# Patient Record
Sex: MALE | Race: WHITE | NOT HISPANIC OR LATINO | Employment: FULL TIME | URBAN - METROPOLITAN AREA
[De-identification: names, ages, dates, MRNs, and addresses within clinical notes are randomized per-mention and may not be internally consistent; named-entity substitution may affect disease eponyms.]

---
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
## 2017-04-12 ENCOUNTER — APPOINTMENT (OUTPATIENT)
Dept: NEUROLOGY | Facility: MEDICAL CENTER | Age: 52
End: 2017-04-12
Payer: COMMERCIAL

## 2017-08-17 ENCOUNTER — HOSPITAL ENCOUNTER (OUTPATIENT)
Dept: LAB | Facility: MEDICAL CENTER | Age: 52
End: 2017-08-17
Attending: FAMILY MEDICINE
Payer: COMMERCIAL

## 2017-08-17 ENCOUNTER — HOSPITAL ENCOUNTER (OUTPATIENT)
Dept: RADIOLOGY | Facility: MEDICAL CENTER | Age: 52
End: 2017-08-17
Attending: FAMILY MEDICINE
Payer: COMMERCIAL

## 2017-08-17 DIAGNOSIS — R05.9 COUGH: ICD-10-CM

## 2017-08-17 LAB
ABO GROUP BLD: NORMAL
ALBUMIN SERPL BCP-MCNC: 4.4 G/DL (ref 3.2–4.9)
ALBUMIN/GLOB SERPL: 1.5 G/DL
ALP SERPL-CCNC: 57 U/L (ref 30–99)
ALT SERPL-CCNC: <5 U/L (ref 2–50)
ANION GAP SERPL CALC-SCNC: 8 MMOL/L (ref 0–11.9)
AST SERPL-CCNC: 16 U/L (ref 12–45)
BASOPHILS # BLD AUTO: 1.2 % (ref 0–1.8)
BASOPHILS # BLD: 0.08 K/UL (ref 0–0.12)
BILIRUB SERPL-MCNC: 0.7 MG/DL (ref 0.1–1.5)
BUN SERPL-MCNC: 14 MG/DL (ref 8–22)
CALCIUM SERPL-MCNC: 9.5 MG/DL (ref 8.5–10.5)
CHLORIDE SERPL-SCNC: 104 MMOL/L (ref 96–112)
CHOLEST SERPL-MCNC: 166 MG/DL (ref 100–199)
CO2 SERPL-SCNC: 27 MMOL/L (ref 20–33)
CREAT SERPL-MCNC: 0.92 MG/DL (ref 0.5–1.4)
EOSINOPHIL # BLD AUTO: 0.05 K/UL (ref 0–0.51)
EOSINOPHIL NFR BLD: 0.7 % (ref 0–6.9)
ERYTHROCYTE [DISTWIDTH] IN BLOOD BY AUTOMATED COUNT: 39.2 FL (ref 35.9–50)
GFR SERPL CREATININE-BSD FRML MDRD: >60 ML/MIN/1.73 M 2
GLOBULIN SER CALC-MCNC: 3 G/DL (ref 1.9–3.5)
GLUCOSE SERPL-MCNC: 76 MG/DL (ref 65–99)
HCT VFR BLD AUTO: 46.1 % (ref 42–52)
HDLC SERPL-MCNC: 60 MG/DL
HGB BLD-MCNC: 15.8 G/DL (ref 14–18)
IMM GRANULOCYTES # BLD AUTO: 0.01 K/UL (ref 0–0.11)
IMM GRANULOCYTES NFR BLD AUTO: 0.1 % (ref 0–0.9)
LDLC SERPL CALC-MCNC: 99 MG/DL
LYMPHOCYTES # BLD AUTO: 1.35 K/UL (ref 1–4.8)
LYMPHOCYTES NFR BLD: 19.7 % (ref 22–41)
MCH RBC QN AUTO: 30.3 PG (ref 27–33)
MCHC RBC AUTO-ENTMCNC: 34.3 G/DL (ref 33.7–35.3)
MCV RBC AUTO: 88.5 FL (ref 81.4–97.8)
MONOCYTES # BLD AUTO: 0.53 K/UL (ref 0–0.85)
MONOCYTES NFR BLD AUTO: 7.7 % (ref 0–13.4)
NEUTROPHILS # BLD AUTO: 4.84 K/UL (ref 1.82–7.42)
NEUTROPHILS NFR BLD: 70.6 % (ref 44–72)
NRBC # BLD AUTO: 0 K/UL
NRBC BLD AUTO-RTO: 0 /100 WBC
PLATELET # BLD AUTO: 218 K/UL (ref 164–446)
PMV BLD AUTO: 10.2 FL (ref 9–12.9)
POTASSIUM SERPL-SCNC: 4.4 MMOL/L (ref 3.6–5.5)
PROT SERPL-MCNC: 7.4 G/DL (ref 6–8.2)
PSA SERPL-MCNC: 1.45 NG/ML (ref 0–4)
RBC # BLD AUTO: 5.21 M/UL (ref 4.7–6.1)
RH BLD: NORMAL
SODIUM SERPL-SCNC: 139 MMOL/L (ref 135–145)
TRIGL SERPL-MCNC: 34 MG/DL (ref 0–149)
TSH SERPL DL<=0.005 MIU/L-ACNC: 0.91 UIU/ML (ref 0.3–3.7)
WBC # BLD AUTO: 6.9 K/UL (ref 4.8–10.8)

## 2017-08-17 PROCEDURE — 86901 BLOOD TYPING SEROLOGIC RH(D): CPT

## 2017-08-17 PROCEDURE — 84443 ASSAY THYROID STIM HORMONE: CPT

## 2017-08-17 PROCEDURE — 84270 ASSAY OF SEX HORMONE GLOBUL: CPT

## 2017-08-17 PROCEDURE — 85025 COMPLETE CBC W/AUTO DIFF WBC: CPT

## 2017-08-17 PROCEDURE — 71020 DX-CHEST-2 VIEWS: CPT

## 2017-08-17 PROCEDURE — 84402 ASSAY OF FREE TESTOSTERONE: CPT

## 2017-08-17 PROCEDURE — 36415 COLL VENOUS BLD VENIPUNCTURE: CPT

## 2017-08-17 PROCEDURE — 84153 ASSAY OF PSA TOTAL: CPT

## 2017-08-17 PROCEDURE — 80053 COMPREHEN METABOLIC PANEL: CPT

## 2017-08-17 PROCEDURE — 86900 BLOOD TYPING SEROLOGIC ABO: CPT

## 2017-08-17 PROCEDURE — 80061 LIPID PANEL: CPT

## 2017-08-17 PROCEDURE — 84403 ASSAY OF TOTAL TESTOSTERONE: CPT

## 2017-08-19 LAB
SHBG SERPL-SCNC: 46 NMOL/L (ref 11–80)
TESTOST FREE MFR SERPL: 1.5 % (ref 1.6–2.9)
TESTOST FREE SERPL-MCNC: 73 PG/ML (ref 47–244)
TESTOST SERPL-MCNC: 470 NG/DL (ref 300–890)

## 2017-11-16 ENCOUNTER — APPOINTMENT (OUTPATIENT)
Dept: ADMISSIONS | Facility: MEDICAL CENTER | Age: 52
End: 2017-11-16
Attending: SURGERY
Payer: COMMERCIAL

## 2017-11-16 RX ORDER — RASAGILINE 1 MG/1
1 TABLET ORAL DAILY
COMMUNITY
End: 2021-06-02

## 2017-11-22 ENCOUNTER — HOSPITAL ENCOUNTER (OUTPATIENT)
Facility: MEDICAL CENTER | Age: 52
End: 2017-11-22
Attending: SURGERY | Admitting: SURGERY
Payer: COMMERCIAL

## 2017-11-22 VITALS
OXYGEN SATURATION: 98 % | HEART RATE: 81 BPM | WEIGHT: 125 LBS | SYSTOLIC BLOOD PRESSURE: 133 MMHG | RESPIRATION RATE: 18 BRPM | BODY MASS INDEX: 18.94 KG/M2 | DIASTOLIC BLOOD PRESSURE: 68 MMHG | TEMPERATURE: 98.1 F | HEIGHT: 68 IN

## 2017-11-22 PROCEDURE — 700101 HCHG RX REV CODE 250

## 2017-11-22 PROCEDURE — C1781 MESH (IMPLANTABLE): HCPCS | Performed by: SURGERY

## 2017-11-22 PROCEDURE — 160029 HCHG SURGERY MINUTES - 1ST 30 MINS LEVEL 4: Performed by: SURGERY

## 2017-11-22 PROCEDURE — 160002 HCHG RECOVERY MINUTES (STAT): Performed by: SURGERY

## 2017-11-22 PROCEDURE — 160041 HCHG SURGERY MINUTES - EA ADDL 1 MIN LEVEL 4: Performed by: SURGERY

## 2017-11-22 PROCEDURE — 160025 RECOVERY II MINUTES (STATS): Performed by: SURGERY

## 2017-11-22 PROCEDURE — 501838 HCHG SUTURE GENERAL: Performed by: SURGERY

## 2017-11-22 PROCEDURE — 700111 HCHG RX REV CODE 636 W/ 250 OVERRIDE (IP)

## 2017-11-22 PROCEDURE — 160046 HCHG PACU - 1ST 60 MINS PHASE II: Performed by: SURGERY

## 2017-11-22 PROCEDURE — 502714 HCHG ROBOTIC SURGERY SERVICES: Performed by: SURGERY

## 2017-11-22 PROCEDURE — 160035 HCHG PACU - 1ST 60 MINS PHASE I: Performed by: SURGERY

## 2017-11-22 PROCEDURE — A9270 NON-COVERED ITEM OR SERVICE: HCPCS

## 2017-11-22 PROCEDURE — 160036 HCHG PACU - EA ADDL 30 MINS PHASE I: Performed by: SURGERY

## 2017-11-22 PROCEDURE — 700102 HCHG RX REV CODE 250 W/ 637 OVERRIDE(OP)

## 2017-11-22 PROCEDURE — 160009 HCHG ANES TIME/MIN: Performed by: SURGERY

## 2017-11-22 PROCEDURE — 500868 HCHG NEEDLE, SURGI(VARES): Performed by: SURGERY

## 2017-11-22 PROCEDURE — 160047 HCHG PACU  - EA ADDL 30 MINS PHASE II: Performed by: SURGERY

## 2017-11-22 PROCEDURE — 160048 HCHG OR STATISTICAL LEVEL 1-5: Performed by: SURGERY

## 2017-11-22 DEVICE — MESH PROGRIP LAPROSCOPIC SELF FIXATING (1/CA): Type: IMPLANTABLE DEVICE | Status: FUNCTIONAL

## 2017-11-22 RX ORDER — LIDOCAINE AND PRILOCAINE 25; 25 MG/G; MG/G
1 CREAM TOPICAL
Status: DISCONTINUED | OUTPATIENT
Start: 2017-11-22 | End: 2017-11-22 | Stop reason: HOSPADM

## 2017-11-22 RX ORDER — LIDOCAINE HYDROCHLORIDE 10 MG/ML
INJECTION, SOLUTION INFILTRATION; PERINEURAL
Status: DISCONTINUED
Start: 2017-11-22 | End: 2017-11-22 | Stop reason: HOSPADM

## 2017-11-22 RX ORDER — OXYCODONE HCL 5 MG/5 ML
SOLUTION, ORAL ORAL
Status: COMPLETED
Start: 2017-11-22 | End: 2017-11-22

## 2017-11-22 RX ORDER — SODIUM CHLORIDE, SODIUM LACTATE, POTASSIUM CHLORIDE, CALCIUM CHLORIDE 600; 310; 30; 20 MG/100ML; MG/100ML; MG/100ML; MG/100ML
INJECTION, SOLUTION INTRAVENOUS CONTINUOUS
Status: DISCONTINUED | OUTPATIENT
Start: 2017-11-22 | End: 2017-11-22 | Stop reason: HOSPADM

## 2017-11-22 RX ORDER — LIDOCAINE HYDROCHLORIDE 10 MG/ML
0.5 INJECTION, SOLUTION INFILTRATION; PERINEURAL
Status: DISCONTINUED | OUTPATIENT
Start: 2017-11-22 | End: 2017-11-22 | Stop reason: HOSPADM

## 2017-11-22 RX ORDER — BUPIVACAINE HYDROCHLORIDE AND EPINEPHRINE 5; 5 MG/ML; UG/ML
INJECTION, SOLUTION EPIDURAL; INTRACAUDAL; PERINEURAL
Status: DISCONTINUED | OUTPATIENT
Start: 2017-11-22 | End: 2017-11-22 | Stop reason: HOSPADM

## 2017-11-22 RX ORDER — LIDOCAINE HYDROCHLORIDE 10 MG/ML
INJECTION, SOLUTION INFILTRATION; PERINEURAL
Status: COMPLETED
Start: 2017-11-22 | End: 2017-11-22

## 2017-11-22 RX ADMIN — LIDOCAINE HYDROCHLORIDE 0.5 ML: 10 INJECTION, SOLUTION INFILTRATION; PERINEURAL at 12:15

## 2017-11-22 RX ADMIN — SODIUM CHLORIDE, SODIUM LACTATE, POTASSIUM CHLORIDE, CALCIUM CHLORIDE: 600; 310; 30; 20 INJECTION, SOLUTION INTRAVENOUS at 12:15

## 2017-11-22 RX ADMIN — OXYCODONE HYDROCHLORIDE 5 MG: 5 SOLUTION ORAL at 14:41

## 2017-11-22 RX ADMIN — OXYCODONE HYDROCHLORIDE 5 MG: 5 SOLUTION ORAL at 14:30

## 2017-11-22 ASSESSMENT — PAIN SCALES - GENERAL
PAINLEVEL_OUTOF10: 4
PAINLEVEL_OUTOF10: 0
PAINLEVEL_OUTOF10: 4
PAINLEVEL_OUTOF10: 7
PAINLEVEL_OUTOF10: 3
PAINLEVEL_OUTOF10: 3
PAINLEVEL_OUTOF10: 7
PAINLEVEL_OUTOF10: 0
PAINLEVEL_OUTOF10: 3

## 2017-11-22 NOTE — OP REPORT
DATE OF SERVICE:  11/22/2017    OPERATING SURGEON:  Zackary Rosa MD.    ASSISTANT:  Kevin Mesa MD.    PROCEDURE:  Robotic repair of bilateral inguinal hernias with mesh.    ANESTHESIA:  General.    ESTIMATED BLOOD LOSS:  Less than 5 mL.    PREOPERATIVE DIAGNOSIS:  Bilateral inguinal hernias.    POSTOPERATIVE DIAGNOSIS:  Bilateral direct hernias.    SUMMARY:  This 52-year-old male has discomfort in the groin and is noted to   have bilateral inguinal hernias.  At this time, he is scheduled for elective   robotic repair.    DESCRIPTION OF PROCEDURE:  The patient was taken to the operating room and   satisfactory general anesthesia was induced via endotracheal intubation.    Juarez catheter was placed in the bladder.  Patient was shaved, prepped and   draped in appropriate fashion.  Local was instilled USP between the   umbilicus and the pubis and a small midline incision made.  A Veress needle   was used for insufflation and a 12 mm port was inserted here and under direct   vision with the laparoscope an 8 mm port on each flank.    The patient was placed in Trendelenburg position and the robot docked.    Peritoneal flaps were developed bilaterally demonstrating direct defects on   both sides with no indirect hernias seen.    A ProGrip mesh was then placed on each side and positioned appropriately with   the flaps closed with a running Quill suture.    The robot was then undocked and the needle was retrieved.    The CO2 was let out of the abdominal cavity and the fascial punctures were   closed with 0 Vicryl.  The skin was closed with 4-0 Vicryl subcuticular skin   stitches and Dermabond.  Wounds were dressed and the patient sent to recovery   room in good condition.  No specimens were sent to pathology.       ____________________________________     ZACKARY ROSA MD    TER / NTS    DD:  11/22/2017 14:02:13  DT:  11/22/2017 14:24:01    D#:  [id number]  Job#:  [id number]    cc: Kevin Mesa MD

## 2017-11-22 NOTE — OR NURSING
Pt resting comfortably, VSS, states minimal pain; incision sites CDI to abdomen. Pt saturating above 94% on RA. Pt tolerating sips of ginger ale. Discharge instructions explained to pt and pt's wife. No further needs at this time; WCTM.     Pt states he is not ready to get dressed, remains stable, VSS, pain remains minimal.     Pt up to BR and voided adequately.     Pt tremulous, states it is due to his Parkinson's disease, states it is baseline. Pt up and getting dressed.     Pt states he is ready for d/c.     Pt states he is feeling a little lightheaded, ginger ale and saltine crackers provided.     Pt D/C safely to home.

## 2017-11-22 NOTE — OR SURGEON
Immediate Post OP Note    PreOp Diagnosis: BIH    PostOp Diagnosis:   Same--direct    Procedure(s):  INGUINAL HERNIA REPAIR ROBOTIC/ WITH MESH PLACEMENT - Wound Class: Clean  Bilateral  Surgeon(s):  JOSE MANUEL Stallworth M.D.    Anesthesiologist/Type of Anesthesia:  Anesthesiologist: Rigoberto Hernandez M.D./General    Surgical Staff:  Circulator: Kevin Reyes R.N.; Nadya Bellamy R.N.  Scrub Person: Pretty Ham; Kam Hercules    Specimens:  * No specimens in log *    Estimated Blood Loss:       Findings:       Complications:           11/22/2017 2:04 PM Zackary Ponce

## 2017-11-22 NOTE — DISCHARGE INSTRUCTIONS
ACTIVITY: Rest and take it easy for the first 24 hours.  A responsible adult is recommended to remain with you during that time.  It is normal to feel sleepy.  We encourage you to not do anything that requires balance, judgment or coordination.    MILD FLU-LIKE SYMPTOMS ARE NORMAL. YOU MAY EXPERIENCE GENERALIZED MUSCLE ACHES, THROAT IRRITATION, HEADACHE AND/OR SOME NAUSEA.    FOR 24 HOURS DO NOT:  Drive, operate machinery or run household appliances.  Drink beer or alcoholic beverages.   Make important decisions or sign legal documents.    SPECIAL INSTRUCTIONS: Open Hernia Repair, Care After  Refer to this sheet in the next few weeks. These instructions provide you with information on caring for yourself after your procedure. Your health care provider may also give you more specific instructions. Your treatment has been planned according to current medical practices, but problems sometimes occur. Call your health care provider if you have any problems or questions after your procedure.  WHAT TO EXPECT AFTER THE PROCEDURE  After your procedure, it is typical to have the following:  · Pain in your abdomen, especially along your incision. You will be given pain medicines to control the pain.  · Constipation. You may be given a stool softener to help prevent this.  HOME CARE INSTRUCTIONS  · Only take over-the-counter or prescription medicines as directed by your health care provider.  · Keep the incision area dry and clean. You may wash the incision area gently with soap and water 48 hours after surgery. Gently blot or dab the incision area dry. Do not take baths, use swimming pools, or use hot tubs for 10 days or until your health care provider approves.  · Change bandages (dressings) as directed by your health care provider.  · Continue your normal diet as directed by your health care provider. Eat plenty of fruits and vegetables to help prevent constipation.  · Drink enough fluids to keep your urine clear or pale  yellow. This also helps prevent constipation.  · Do not drive until your health care provider says it is okay.  · Do not lift anything heavier than 10 lb (4.5 kg) or play contact sports for 4 weeks or until your health care provider approves.  · Follow up with your health care provider as directed. Ask your health care provider when to make an appointment to have your stitches (sutures) or staples removed.  SEEK MEDICAL CARE IF:  · You have increased bleeding coming from the incision site.  · You have blood in your stool.  · You have increasing pain in the incision area.  · You see redness or swelling in the incision area.  · You have fluid (pus) coming from the incision.  · You have a fever.  · You notice a bad smell coming from the incision area or dressing.  SEEK IMMEDIATE MEDICAL CARE IF:  · You develop a rash.  · You have chest pain or shortness of breath.  · You feel lightheaded or feel faint.     This information is not intended to replace advice given to you by your health care provider. Make sure you discuss any questions you have with your health care provider.     Document Released: 07/07/2006 Document Revised: 01/08/2016 Document Reviewed: 07/30/2014  [employer] Interactive Patient Education ©2016 [employer]      DIET: To avoid nausea, slowly advance diet as tolerated, avoiding spicy or greasy foods for the first day.  Add more substantial food to your diet according to your physician's instructions.  Babies can be fed formula or breast milk as soon as they are hungry.  INCREASE FLUIDS AND FIBER TO AVOID CONSTIPATION.    SURGICAL DRESSING/BATHING: Follow MD instruction    FOLLOW-UP APPOINTMENT:  A follow-up appointment should be arranged with your doctor in 1-2 weeks; call to schedule.    You should CALL YOUR PHYSICIAN if you develop:  Fever greater than 101 degrees F.  Pain not relieved by medication, or persistent nausea or vomiting.  Excessive bleeding (blood soaking through dressing) or unexpected  drainage from the wound.  Extreme redness or swelling around the incision site, drainage of pus or foul smelling drainage.  Inability to urinate or empty your bladder within 8 hours.  Problems with breathing or chest pain.    You should call 911 if you develop problems with breathing or chest pain.  If you are unable to contact your doctor or surgical center, you should go to the nearest emergency room or urgent care center.  Physician's telephone #: Dr. Ponce [phone]    If any questions arise, call your doctor.  If your doctor is not available, please feel free to call the Surgical Center at [phone].  The Center is open Monday through Friday from 7AM to 7PM.  You can also call the [employer] HOTLINE open 24 hours/day, 7 days/week and speak to a nurse at [phone], or toll free at [phone].    A registered nurse may call you a few days after your surgery to see how you are doing after your procedure.    MEDICATIONS: Resume taking daily medication.  Take prescribed pain medication with food.  If no medication is prescribed, you may take non-aspirin pain medication if needed.  PAIN MEDICATION CAN BE VERY CONSTIPATING.  Take a stool softener or laxative such as senokot, pericolace, or milk of magnesia if needed.    Prescription given for Norco.  Last pain medication given at 2:41 pm.    If your physician has prescribed pain medication that includes Acetaminophen (Tylenol), do not take additional Acetaminophen (Tylenol) while taking the prescribed medication.    Depression / Suicide Risk    As you are discharged from this [facility] facility, it is important to learn how to keep safe from harming yourself.    Recognize the warning signs:  · Abrupt changes in personality, positive or negative- including increase in energy   · Giving away possessions  · Change in eating patterns- significant weight changes-  positive or negative  · Change in sleeping patterns- unable to sleep or sleeping all the  time   · Unwillingness or inability to communicate  · Depression  · Unusual sadness, discouragement and loneliness  · Talk of wanting to die  · Neglect of personal appearance   · Rebelliousness- reckless behavior  · Withdrawal from people/activities they love  · Confusion- inability to concentrate     If you or a loved one observes any of these behaviors or has concerns about self-harm, here's what you can do:  · Talk about it- your feelings and reasons for harming yourself  · Remove any means that you might use to hurt yourself (examples: pills, rope, extension cords, firearm)  · Get professional help from the community (Mental Health, Substance Abuse, psychological counseling)  · Do not be alone:Call your Safe Contact- someone whom you trust who will be there for you.  · Call your local CRISIS HOTLINE 834-5997 or 555-965-5142  · Call your local Children's Mobile Crisis Response Team [place] (361) 338-9139 or www.Jemstep  · Call the toll free National Suicide Prevention Hotlines   · National Suicide Prevention Lifeline 455-405-KFNM (9696)  · National Hope Line Network 800-SUICIDE (407-9184)

## 2018-07-20 ENCOUNTER — OFFICE VISIT (OUTPATIENT)
Dept: MEDICAL GROUP | Facility: MEDICAL CENTER | Age: 53
End: 2018-07-20
Payer: COMMERCIAL

## 2018-07-20 VITALS
BODY MASS INDEX: 18.79 KG/M2 | HEIGHT: 68 IN | TEMPERATURE: 97.8 F | HEART RATE: 64 BPM | SYSTOLIC BLOOD PRESSURE: 118 MMHG | DIASTOLIC BLOOD PRESSURE: 70 MMHG | OXYGEN SATURATION: 97 % | RESPIRATION RATE: 16 BRPM | WEIGHT: 124 LBS

## 2018-07-20 DIAGNOSIS — G47.20 NIGHT-WAKING DISORDER: ICD-10-CM

## 2018-07-20 DIAGNOSIS — Z01.89 NEEDS SLEEP APNEA ASSESSMENT: ICD-10-CM

## 2018-07-20 DIAGNOSIS — Z76.89 ENCOUNTER TO ESTABLISH CARE: ICD-10-CM

## 2018-07-20 DIAGNOSIS — G20.A1 PARKINSON'S DISEASE: ICD-10-CM

## 2018-07-20 PROCEDURE — 99203 OFFICE O/P NEW LOW 30 MIN: CPT | Performed by: NURSE PRACTITIONER

## 2018-07-20 ASSESSMENT — PATIENT HEALTH QUESTIONNAIRE - PHQ9: CLINICAL INTERPRETATION OF PHQ2 SCORE: 0

## 2018-07-20 NOTE — ASSESSMENT & PLAN NOTE
Diagnosed in 2010, followed by Dr. Briones  On azilect and sinemet which is working fairly well for him.  He is able to stay active.  He participates in a boxing class and bikes regularly

## 2018-07-20 NOTE — PROGRESS NOTES
"Subjective:     Chief Complaint   Patient presents with   • Breathing Problem     AT NIGHT WHILE SLEEPING     Rakan Rader III is a 53 y.o. male here to establish care.  He had previously seen Dr. Hernandez.  He is single, no children.  We discussed:    Parkinson's disease (HCC)  Diagnosed in 2010, followed by Dr. Briones  On azilect and sinemet which is working fairly well for him.  He is able to stay active.  He participates in a boxing class and bikes regularly  Night-waking disorder  Of the last year he has had significant difficulty with sleep.  States that he is waking up frequently, sometimes wakes up feeling as though he cannot catch his breath.  Averaging 4 or 5 hours of sleep at night although not continuous.  Not having any dreams.  Feeling fatigued during the day.  He does have history of septal deviation repair, still feels that his airflow is restricted on the left side       Current medicines (including changes today)  Current Outpatient Prescriptions   Medication Sig Dispense Refill   • rasagiline (AZILECT) 1 MG Tab Take 1 mg by mouth every day.     • carbidopa-levodopa (SINEMET)  MG Tab Take 1 Tab by mouth every day.       No current facility-administered medications for this visit.      He  has a past medical history of Anxiety.    ROS included above     Objective:     Blood pressure 118/70, pulse 64, temperature 36.6 °C (97.8 °F), resp. rate 16, height 1.727 m (5' 8\"), weight 56.2 kg (124 lb), SpO2 97 %. Body mass index is 18.85 kg/m².     Physical Exam:  General: Alert, oriented in no acute distress.  Eye contact is good, speech is normal, affect calm  HEENT: Oral mucosa pink moist, no lesions.  Slight nasal deviation with restricted air flow in left nare.  TMs gray with good landmarks bilaterally. No lymphadenopathy.  Lungs: clear to auscultation bilaterally, normal effort, no wheeze/ rhonchi/ rales.  CV: regular rate and rhythm, S1, S2, no murmur  Abdomen: soft, nontender, flat, no " hepatosplenomegaly  Ext: no edema, color normal, vascularity normal, temperature normal    Assessment and Plan:   The following treatment plan was discussed   1. Parkinson's disease (HCC)   stable on current medication, followed by neurology   2. Night-waking disorder   frequent waking, not getting adequate rest.  Sometimes wakes up feeling as though he cannot breathe.  Referred for sleep study, follow-up pending results   3. Encounter to establish care     4. Needs sleep apnea assessment  REFERRAL TO SLEEP STUDIES       Followup: Pending test         Please note that this dictation was created using voice recognition software. I have worked with consultants from the vendor as well as technical experts from [facility] [employer] to optimize the interface. I have made every reasonable attempt to correct obvious errors, but I expect that there are errors of grammar and possibly content that I did not discover before finalizing the note.

## 2018-07-20 NOTE — ASSESSMENT & PLAN NOTE
Of the last year he has had significant difficulty with sleep.  States that he is waking up frequently, sometimes wakes up feeling as though he cannot catch his breath.  Averaging 4 or 5 hours of sleep at night although not continuous.  Not having any dreams.  Feeling fatigued during the day.  He does have history of septal deviation repair, still feels that his airflow is restricted on the left side

## 2018-07-20 NOTE — LETTER
[facility]  [facility].  [address]  [place] [address]  Fax: [phone]   Authorization for Release/Disclosure of   Protected Health Information   Name: RAKAN RADER III : 1965 SSN: [id number]   Address: [address] [place] [address] [place] [address] Phone:    [phone] (home)    I authorize the entity listed below to release/disclose the PHI below to:   [facility] [place]   Provider or Entity Name: [facility]     Address   City, State, Zip   Phone:      Fax:     Reason for request: continuity of care   Information to be released:    [ x ] LAST COLONOSCOPY,  including any PATH REPORT and follow-up  [  ] LAST FIT/COLOGUARD RESULT [  ] LAST DEXA  [  ] LAST MAMMOGRAM  [  ] LAST PAP  [  ] LAST LABS [  ] RETINA EXAM REPORT  [  ] IMMUNIZATION RECORDS  [  ] Release all info      [  ] Check here and initial the line next to each item to release ALL health information INCLUDING  _____ Care and treatment for drug and / or alcohol abuse  _____ HIV testing, infection status, or AIDS  _____ Genetic Testing    DATES OF SERVICE OR TIME PERIOD TO BE DISCLOSED: _____________  I understand and acknowledge that:  * This Authorization may be revoked at any time by you in writing, except if your health information has already been used or disclosed.  * Your health information that will be used or disclosed as a result of you signing this authorization could be re-disclosed by the recipient. If this occurs, your re-disclosed health information may no longer be protected by State or Federal laws.  * You may refuse to sign this Authorization. Your refusal will not affect your ability to obtain treatment.  * This Authorization becomes effective upon signing and will  on (date) __________.      If no date is indicated, this Authorization will  one (1) year from the signature date.    Name: Rakan Rader III    Signature:   Date:          7/20/2018       PLEASE FAX REQUESTED RECORDS BACK TO: [phone]

## 2018-07-26 ENCOUNTER — SLEEP CENTER VISIT (OUTPATIENT)
Dept: SLEEP MEDICINE | Facility: MEDICAL CENTER | Age: 53
End: 2018-07-26
Payer: COMMERCIAL

## 2018-07-26 VITALS
HEART RATE: 81 BPM | RESPIRATION RATE: 16 BRPM | OXYGEN SATURATION: 96 % | DIASTOLIC BLOOD PRESSURE: 94 MMHG | SYSTOLIC BLOOD PRESSURE: 144 MMHG | WEIGHT: 124 LBS | HEIGHT: 68 IN | BODY MASS INDEX: 18.79 KG/M2

## 2018-07-26 DIAGNOSIS — F41.9 ANXIETY: ICD-10-CM

## 2018-07-26 DIAGNOSIS — G20.A1 PARKINSON'S DISEASE: ICD-10-CM

## 2018-07-26 DIAGNOSIS — G47.20 NIGHT-WAKING DISORDER: ICD-10-CM

## 2018-07-26 DIAGNOSIS — Z72.0 TOBACCO ABUSE: ICD-10-CM

## 2018-07-26 DIAGNOSIS — G47.33 OSA (OBSTRUCTIVE SLEEP APNEA): ICD-10-CM

## 2018-07-26 PROCEDURE — 99244 OFF/OP CNSLTJ NEW/EST MOD 40: CPT | Performed by: INTERNAL MEDICINE

## 2018-07-26 RX ORDER — ZOLPIDEM TARTRATE 5 MG/1
5 TABLET ORAL NIGHTLY PRN
Qty: 3 TAB | Refills: 0 | Status: SHIPPED | OUTPATIENT
Start: 2018-07-26 | End: 2018-08-26

## 2018-07-26 NOTE — PROGRESS NOTES
CC: Evaluation for possible obstructive sleep apnea syndrome    HPI:    Mr. Rakan Rader III is a 52 y/o M kindly referred by [facility] CHANEL for evaluation and management of possible sleep disordered breathing. Accompanied by his wife with whom it doesn't sleep.       Symptom Summary:  Snoring: +  Very loud snoring: light to medium, inconsistent  Witnessed apneas: -  Resuscitative snorts: +  Nocturnal shortness of breath: +  Non-restorative sleep: +  Insomnia: +  Nocturnal awakenings: + 3-4  Nocturia: not often  EDS: denies  Fatigue: +  Tiredness: +  Falls asleep accidentally: sometimes   Napping or returning to bed after arising: -  Restless legs: -  Limb movements during sleep: +  Nocturnal headaches: -  Nocturnal Panic Attacks: + 2/2 SOB    Significant comorbidities and modifying factors include Parkinson's disease and anxiety.    Sx present for more than 15 years and severity is 9/10 on a severity scale.          Patient Active Problem List    Diagnosis Date Noted   • Parkinson's disease (HCC) 07/20/2018   • Night-waking disorder 07/20/2018   • Anxiety 06/24/2011       Past Medical History:   Diagnosis Date   • Anxiety         Past Surgical History:   Procedure Laterality Date   • INGUINAL HERNIA REPAIR ROBOTIC Bilateral 11/22/2017    Procedure: INGUINAL HERNIA REPAIR ROBOTIC/ WITH MESH PLACEMENT;  Surgeon: Zackary Ponce M.D.;  Location: SURGERY [facility];  Service: General   • ELBOW ORIF      right as a child   • LAMINOTOMY      c5-7   • OTHER NEUROLOGICAL SURG      cervical fusion   • OTHER ORTHOPEDIC SURGERY      finger surgery as child   • SEPTOTURBINOPLASTY     • SHOULDER ARTHROSCOPY W/ SLAP / LABRAL REPAIR      left   • SINUSCOPY      maxillary sinuses       Family History   Problem Relation Age of Onset   • Heart Disease Mother    • Hypertension Mother    • Hyperlipidemia Mother    • Psychiatry Mother    • Psychiatry Father    • Alcohol/Drug Father    • Alcohol/Drug Sister    • Sleep  "Apnea Neg Hx        Social History     Social History   • Marital status:      Spouse name: N/A   • Number of children: N/A   • Years of education: N/A     Occupational History   • Not on file.     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      Other Topics Concern   • Not on file     Social History Narrative   • No narrative on file       Current Outpatient Prescriptions   Medication Sig Dispense Refill   • zolpidem (AMBIEN) 5 MG Tab Take 1 Tab by mouth at bedtime as needed for up to 3 doses. Take 1-3 tabs prn 3 Tab 0   • rasagiline (AZILECT) 1 MG Tab Take 1 mg by mouth every day.     • carbidopa-levodopa (SINEMET)  MG Tab Take 1 Tab by mouth every day.       No current facility-administered medications for this visit.     \"CURRENT RX\"    ALLERGIES: Asa [aspirin] and Ibuprofen    ROS  Constitutional: Denies fever, chills, sweats,  weight loss, fatigue.  Eyes: Denies vision loss, pain, drainage, double vision, glasses.  Ears/Nose/Mouth/Throat: Denies earache, difficulty hearing, rhinitis/nasal congestion, injury, recurrent sore throat, persistent hoarseness, decayed teeth/toothaches, ringing or buzzing in the ears.  Cardiovascular: Denies chest pain, tightness, palpitations, swelling in legs/feet, fainting, difficulty breathing when lying down but gets better when sitting up.   Respiratory: Denies shortness of breath, cough, sputum, wheezing, painful breathing, coughing up blood.   Sleep: per HPI  Gastrointestinal: Denies  difficulty swallowing, nausea, abdominal pain, diarrhea, constipation, heartburn.  Genitourinary: Denies  blood in " "urine, discharge, frequent urination.   Musculoskeletal: Denies painful joints, sore muscles, back pain.   Integumentary: Denies rashes, lumps, color changes.   Neurological: Denies frequent headaches,weakness, dizziness. + for migraines    PHYSICAL EXAM    /94   Pulse 81   Resp 16   Ht 1.727 m (5' 8\")   Wt 56.2 kg (124 lb)   SpO2 96%   BMI 18.85 kg/m²   Appearance: Well-nourished, well-developed, no acute distress  Eyes:  PERRLA, EOMI; glasses  ENMT: without lesions, deformities;hearing grossly intact; tongue normal, posterior pharynx without erythema or exudate; Mallampati classification:   Neck: Supple, trachea midline, no masses  Respiratory effort:  No intercostal retractions or use of accessory muscles  Lung auscultation:  No wheezes rhonchi rubs or rales  Cardiac: No murmurs, rubs, or gallops; regular rhythm, normal rate; no edema  Abdomen:  No tenderness, no organomegaly  Musculoskeletal:  Grossly normal; gait and station normal; digits and nails normal  Skin:  No rashes, petechiae, cyanosis  Neurologic: without focal signs; oriented to person, time, place, and purpose; judgement intact; tremulous and anxious  Psychiatric:  No depression, anxiety, agitation        PROBLEMS:  1. TOAN (obstructive sleep apnea)    - zolpidem (AMBIEN) 5 MG Tab; Take 1 Tab by mouth at bedtime as needed for up to 3 doses. Take 1-3 tabs prn  Dispense: 3 Tab; Refill: 0  - POLYSOMNOGRAPHY, 4 OR MORE; Future    2. Parkinson's disease (HCC)      3. Night-waking disorder      4. Anxiety      5. Tobacco abuse        PLAN:   The patient has signs and symptoms consistent with obstructive sleep apnea hypopnea syndrome. Will schedule to have a nocturnal polysomnogram using zolpidem to assist with sleep onset and maintenance should the need arise. Will return after the results are available to determine further diagnostic needs and/or treatment options.    The risks of untreated sleep apnea were discussed with the patient at " length. Patients with TOAN are at increased risk of cardiovascular disease including coronary artery disease, systemic arterial hypertension, pulmonary arterial hypertension, cardiac arrythmias, and stroke. TOAN patients have an increased risk of motor vehicle accidents, type 2 diabetes, chronic kidney disease, and non-alcoholic liver disease. The patient was advised to avoid driving a motor vehicle when drowsy.    Positive airway pressure, such as CPAP, is considered first-line and preferred therapy for sleep apnea and may reverse both symptoms and risks.       Return for after sleep study.

## 2018-08-06 ENCOUNTER — SLEEP STUDY (OUTPATIENT)
Dept: SLEEP MEDICINE | Facility: MEDICAL CENTER | Age: 53
End: 2018-08-06
Attending: INTERNAL MEDICINE
Payer: COMMERCIAL

## 2018-08-06 DIAGNOSIS — G47.33 OSA (OBSTRUCTIVE SLEEP APNEA): ICD-10-CM

## 2018-08-06 PROCEDURE — 95810 POLYSOM 6/> YRS 4/> PARAM: CPT | Performed by: INTERNAL MEDICINE

## 2018-08-07 NOTE — PROCEDURES
Clinical Comments:  The patient underwent a comprehensive polysomnogram using the standard montage for measurement of parameters of sleep, respiratory events, movement abnormalities, heart rate and rhythm. A microphone was used to monitor snoring.      INTERPRETATION:  Testing began at 9:51:36 PM.  The total recording time was 469.5 minutes with a sleep period of 440.2 minutes and the total sleep time was 375.0 minutes with a sleep efficiency of 79.9%.  The sleep latency was 29.3 minutes, and REM latency was 23.5 minutes.  The patient experienced 30 arousals in total, for an arousal index of 4.8    RESPIRATORY: The patient had 2 apneas in total.  Of these, 0 were obstructive apneas, and 2 were central apneas.  This resulted in an apnea index (AI) of 0.3.  The patient had 14 hypopneas, for a hypopnea index of 2.2.  The overall AHI was 2.6, while the AHI during Stage R sleep was 3.2.  AHI while supine was 3.2.    OXIMETRY: Oxygen saturation monitoring showed a mean SpO2 of 95.8%, with a minimum oxygen saturation of 90.0%.  Oxygen saturations were less than or = 89% for 0.0 minutes of sleep time.    CARDIAC: The highest heart rate during the recording was 94.0 beats per minute.  The average heart rate during sleep was 63.0 bpm.    LIMB MOVEMENTS: There were a total of 349 PLMs during sleep, of which 2 were PLMs arousals.  This resulted in a PLMS index of 55.8.    RECORDING TECHNIQUE:       After the scalp was prepared, gold plated electrodes were applied to the scalp according to the International 10-20 System. EEG (electroencephalogram) was continuously monitored from the O1-M2, O2-M1, C3-M2, C4-M1, F3-M2, and F4-M1.   EOGs (electrooculograms) were monitored by electrodes placed at the left and right outer canthi.  Chin EMG (electromyogram) was monitored by electrodes placed on the mentalis and sub-mentalis muscles.  Nasal and oral airflow were monitored using a triple port thermocouple as well as oronasal pressure  transducer.  Respiratory effort was measured by inductive plethysmography technology employing abdominal and thoracic belts.  Blood oxygen saturation and pulse were monitored by pulse oximetry.  Heart rhythm was monitored by surface electrocardiogram.  Leg EMG was studied using surface electrodes placed on left and right anterior tibialis.  A microphone was used to monitor tracheal sounds and snoring.  Body position was monitored and documented by technician observation      SUMMARY:    This was an overnight diagnostic polysomnogram with a possible subsequent positive airway pressure titration.  However, the patient did not meet the split-night protocol.    The total recording time was 469.5 minutes, the sleep period time was 440.2 minutes, and the total sleep time was 375.0 minutes.  The patient's sleep efficiency was 79.9 % which is somewhat reduced.  The patient experienced a normal for REM periods.    The sleep stage durations revealed 65.2 minutes of wake after sleep onset (WASO), 24.0 minutes of N1 sleep, 199 minutes of N2 sleep, 19 minutes of N3 sleep, and 133 minutes of REM.    The latency to sleep was 29.3 minutes which is prolonged.  The latency to REM was 23.5 minutes which is shortened.  Minimal sleep fragmentation occurred.  The arousal index was 4.8.  The Total Limb Movement (isolated) Index was 55.8, the Limb Movement with Arousal Index was 0.3, and the PLM Series Index was 1.4.    The patient experienced 2 apneas and 14 hypopneas and 0 RERAS.  The apnea hypopnea index was 2.6, the RDI was 2.6, the mean event duration was 17.9 seconds, and the longest event lasted 23.5 seconds.  The REM index was 3.2 and the supine index was 3.2.  The apnea hypopnea index represents no significant sleep apnea hypopnea syndrome.    The zonia saturation during sleep was 90 % and the patient spent 0 % of the recording with saturations less than or equal to 90%.    During sleep, the minimum heart rate was 21 bpm, the mean  heart rate was 64 bpm, and the maximum heart rate was 94 bpm.        ASSESSMENT:    No significant obstructive sleep apnea hypopnea - AHI 2.6 which is normal  No nocturnal desaturation - zonia saturation 90 % - saturations <90% for 0% of the recording        RECOMMENDATION:    There is no indication for positive airway pressure therapy or nocturnal oxygen administration.  Clinical correlation is needed.

## 2018-09-19 NOTE — PROGRESS NOTES
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  He was first seen 7/26/2018 for complaints of snoring, nocturnal shortness of breath, resuscitative snorts, fatigue, tiredness, and nocturnal panic attacks.    The sleep study was performed on 8/6/2018 and showed no significant obstructive sleep apnea-AHI was 2.6 which is normal.  Additionally, the patient had no significant nocturnal desaturation.  The lowest saturation was 90% during sleep.    Mr. Rader relates that he often wakes up about 1 AM and then has difficulty falling back to sleep for hours at a time.  He does not have any trouble with sleep onset insomnia.  He experiences racing thoughts when he wakes up in the middle the night as well as significant anxiety.        Patient Active Problem List    Diagnosis Date Noted   • History of tobacco abuse 09/20/2018   • Parkinson's disease (HCC) 07/20/2018   • Night-waking disorder 07/20/2018   • Anxiety 06/24/2011       Past Medical History:   Diagnosis Date   • Anxiety         Past Surgical History:   Procedure Laterality Date   • INGUINAL HERNIA REPAIR ROBOTIC Bilateral 11/22/2017    Procedure: INGUINAL HERNIA REPAIR ROBOTIC/ WITH MESH PLACEMENT;  Surgeon: Zackary Ponce M.D.;  Location: SURGERY [facility];  Service: General   • ELBOW ORIF      right as a child   • LAMINOTOMY      c5-7   • OTHER NEUROLOGICAL SURG      cervical fusion   • OTHER ORTHOPEDIC SURGERY      finger surgery as child   • SEPTOTURBINOPLASTY     • SHOULDER ARTHROSCOPY W/ SLAP / LABRAL REPAIR      left   • SINUSCOPY      maxillary sinuses       Family History   Problem Relation Age of Onset   • Heart Disease Mother    • Hypertension Mother    • Hyperlipidemia Mother    • Psychiatry Mother    • Psychiatry Father    • Alcohol/Drug Father    • Alcohol/Drug Sister    •  "Sleep Apnea Neg Hx        Social History     Social History   • Marital status:      Spouse name: N/A   • Number of children: N/A   • Years of education: N/A     Occupational History   • Not on file.     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      Other Topics Concern   • Not on file     Social History Narrative   • No narrative on file       Current Outpatient Prescriptions   Medication Sig Dispense Refill   • rasagiline (AZILECT) 1 MG Tab Take 1 mg by mouth every day.     • carbidopa-levodopa (SINEMET)  MG Tab Take 1 Tab by mouth every day.       No current facility-administered medications for this visit.     \"CURRENT RX\"    ALLERGIES: Asa [aspirin] and Ibuprofen    ROS    Constitutional: Denies fever, chills, sweats,  weight loss, fatigue  Cardiovascular: Denies chest pain, tightness, palpitations, swelling in legs/feet, fainting, difficulty breathing when lying down but gets better when sitting up.   Respiratory: Denies shortness of breath, cough, sputum, wheezing, painful breathing, coughing up blood.   Sleep: per HPI  Gastrointestinal: Denies  difficulty swallowing, nausea, abdominal pain, diarrhea, constipation, heartburn..   Musculoskeletal: Denies painful joints, sore muscles, back pain.        PHYSICAL EXAM      /68 (BP Location: Right arm, Patient Position: Sitting, BP Cuff Size: Adult)   Pulse (!) 57   Resp 14   Ht 1.727 m (5' 8\")   Wt 56.7 kg (125 lb)   SpO2 95%   BMI 19.01 kg/m²   Appearance: Well-nourished, well-developed, no acute distress  Eyes:  PERRLA, EOMI  ENMT: without lesions, deformities;hearing " grossly intact; tongue normal, posterior pharynx without erythema or exudate; Mallampati classification:   Neck: Supple, trachea midline, no masses  Respiratory effort:  No intercostal retractions or use of accessory muscles  Lung auscultation:  No wheezes rhonchi rubs or rales  Cardiac: No murmurs, rubs, or gallops; regular rhythm, normal rate; no edema  Abdomen:  No tenderness, no organomegaly  Musculoskeletal:  Grossly normal; gait and station normal; digits and nails normal  Skin:  No rashes, petechiae, cyanosis  Neurologic: without focal signs; oriented to person, time, place, and purpose; judgement intact  Psychiatric:  No depression, anxiety, agitation        PROBLEMS:  1. Parkinson's disease (HCC)      2. Anxiety      3. History of tobacco abuse      4. Refusal of influenza vaccine by provider      5. Other insomnia    - REFERRAL TO OTHER      6. Nasal Septal Deviation - recommend ENT evaluation            PLAN:   Patient does not have any significant sleep disordered breathing or nocturnal desaturation.  There is no indication for positive airway pressure treatment or nocturnal oxygen therapy.  He declines a flu shot.    Mr. Rader relates that he often wakes up about 1 AM and then has difficulty falling back to sleep for hours at a time.  He does not have any trouble with sleep onset insomnia.  He experiences racing thoughts when he wakes up in the middle the night as well as significant anxiety.    Will refer to Dr. Yvette Salgado for CBT-I.      Return if symptoms worsen or fail to improve.

## 2018-09-20 ENCOUNTER — SLEEP CENTER VISIT (OUTPATIENT)
Dept: SLEEP MEDICINE | Facility: MEDICAL CENTER | Age: 53
End: 2018-09-20
Payer: COMMERCIAL

## 2018-09-20 VITALS
WEIGHT: 125 LBS | SYSTOLIC BLOOD PRESSURE: 104 MMHG | DIASTOLIC BLOOD PRESSURE: 68 MMHG | RESPIRATION RATE: 14 BRPM | OXYGEN SATURATION: 95 % | HEART RATE: 57 BPM | HEIGHT: 68 IN | BODY MASS INDEX: 18.94 KG/M2

## 2018-09-20 DIAGNOSIS — Z87.891 HISTORY OF TOBACCO ABUSE: ICD-10-CM

## 2018-09-20 DIAGNOSIS — Z28.29 REFUSAL OF INFLUENZA VACCINE BY PROVIDER: ICD-10-CM

## 2018-09-20 DIAGNOSIS — G47.09 OTHER INSOMNIA: ICD-10-CM

## 2018-09-20 DIAGNOSIS — F41.9 ANXIETY: ICD-10-CM

## 2018-09-20 DIAGNOSIS — G20.A1 PARKINSON'S DISEASE: ICD-10-CM

## 2018-09-20 PROCEDURE — 99214 OFFICE O/P EST MOD 30 MIN: CPT | Performed by: INTERNAL MEDICINE

## 2019-09-04 ENCOUNTER — OFFICE VISIT (OUTPATIENT)
Dept: MEDICAL GROUP | Facility: MEDICAL CENTER | Age: 54
End: 2019-09-04
Payer: COMMERCIAL

## 2019-09-04 VITALS
SYSTOLIC BLOOD PRESSURE: 110 MMHG | RESPIRATION RATE: 16 BRPM | DIASTOLIC BLOOD PRESSURE: 60 MMHG | TEMPERATURE: 97.6 F | HEART RATE: 75 BPM | HEIGHT: 68 IN | BODY MASS INDEX: 18.79 KG/M2 | OXYGEN SATURATION: 96 % | WEIGHT: 124 LBS

## 2019-09-04 DIAGNOSIS — M79.672 BILATERAL FOOT PAIN: ICD-10-CM

## 2019-09-04 DIAGNOSIS — Z13.220 LIPID SCREENING: ICD-10-CM

## 2019-09-04 DIAGNOSIS — F32.0 CURRENT MILD EPISODE OF MAJOR DEPRESSIVE DISORDER WITHOUT PRIOR EPISODE (HCC): ICD-10-CM

## 2019-09-04 DIAGNOSIS — Z13.29 THYROID DISORDER SCREEN: ICD-10-CM

## 2019-09-04 DIAGNOSIS — Z00.00 ANNUAL PHYSICAL EXAM: ICD-10-CM

## 2019-09-04 DIAGNOSIS — M79.671 BILATERAL FOOT PAIN: ICD-10-CM

## 2019-09-04 DIAGNOSIS — G20.A1 PARKINSON'S DISEASE: ICD-10-CM

## 2019-09-04 PROCEDURE — 99396 PREV VISIT EST AGE 40-64: CPT | Mod: 25 | Performed by: NURSE PRACTITIONER

## 2019-09-04 PROCEDURE — 99213 OFFICE O/P EST LOW 20 MIN: CPT | Performed by: NURSE PRACTITIONER

## 2019-09-04 RX ORDER — ESCITALOPRAM OXALATE 10 MG/1
TABLET ORAL
Qty: 30 TAB | Refills: 1 | Status: SHIPPED | OUTPATIENT
Start: 2019-09-04 | End: 2019-10-02

## 2019-09-04 ASSESSMENT — PATIENT HEALTH QUESTIONNAIRE - PHQ9: CLINICAL INTERPRETATION OF PHQ2 SCORE: 0

## 2019-09-04 NOTE — LETTER
[facility]  [address]  [place] [address]  Fax: [phone]   Authorization for Release/Disclosure of   Protected Health Information   Name: LURDES VIZCARRA III : 1965 SSN: [id number]   Address: [address] [place] [address] [place] [address] Phone:    [phone] (home)    I authorize the entity listed below to release/disclose the PHI below to:   [facility] and [facility]   Provider or Entity Name:  [facility]   Address   [facility], Zip            [address] [place] [address] Phone:  [phone]      Fax:       [phone]          Reason for request: continuity of care   Information to be released:    [ X ] LAST COLONOSCOPY,  including any PATH REPORT and follow-up  [ X ] LAST FIT/COLOGUARD RESULT [  ] LAST DEXA  [  ] LAST MAMMOGRAM  [  ] LAST PAP  [  ] LAST LABS [  ] RETINA EXAM REPORT  [  ] IMMUNIZATION RECORDS  [  ] Release all info      [  ] Check here and initial the line next to each item to release ALL health information INCLUDING  _____ Care and treatment for drug and / or alcohol abuse  _____ HIV testing, infection status, or AIDS  _____ Genetic Testing    DATES OF SERVICE OR TIME PERIOD TO BE DISCLOSED: _____________  I understand and acknowledge that:  * This Authorization may be revoked at any time by you in writing, except if your health information has already been used or disclosed.  * Your health information that will be used or disclosed as a result of you signing this authorization could be re-disclosed by the recipient. If this occurs, your re-disclosed health information may no longer be protected by State or Federal laws.  * You may refuse to sign this Authorization. Your refusal will not affect your ability to obtain treatment.  * This Authorization becomes effective upon signing and will  on (date) __________.      If no date is indicated, this Authorization will  one  (1) year from the signature date.    Name: Rakan Rader SARAH    Signature:   Date:     9/4/2019       PLEASE FAX REQUESTED RECORDS BACK TO: [phone]

## 2019-09-04 NOTE — PROGRESS NOTES
Chief Complaint   Patient presents with   • Annual Exam     REQUESTING LABS      Rakan Rader III is a 54 y.o. male established patient here for annual exam.  We discussed:    Parkinson's disease (HCC)  Continues to be followed by Dr. Hart, currently managed with Azilect and Sinemet.  Symptoms tend to be better in the morning, worse as the day progresses.  He is finding that his gait is worsening, he has more pressure on the right medial foot and is wearing his shoes out in this area.  He is pain with the right worse than the left.  He has not seen a podiatrist in the past.  He is also finding that he gets into more frequent patterns of negative thinking, feeling discouraged, feels as though his wife does not love him although he states that he understands this is a rational.  We have discussed that depression is often a component of Parkinson's and that he may benefit from SSRI.  No SI/HI, no history of manic behavior, no appetite changes or hallucination reported    Current medicines (including changes today)  Current Outpatient Medications   Medication Sig Dispense Refill   • escitalopram (LEXAPRO) 10 MG Tab 1/2 tab PO dialy x 1 week then 1 tab PO daily 30 Tab 1   • rasagiline (AZILECT) 1 MG Tab Take 1 mg by mouth every day.     • carbidopa-levodopa (SINEMET)  MG Tab Take 1 Tab by mouth every day.       No current facility-administered medications for this visit.      He  has a past medical history of Anxiety.  He  has a past surgical history that includes elbow orif; septoturbinoplasty; sinuscopy; other neurological surg; shoulder arthroscopy w/ slap / labral repair; other orthopedic surgery; inguinal hernia repair robotic (Bilateral, 2017); and laminotomy.  Social History     Tobacco Use   • Smoking status: Former Smoker     Packs/day: 1.00     Years: 5.00     Pack years: 5.00     Types: Cigarettes     Last attempt to quit: 1987     Years since quittin.8   • Smokeless tobacco:  "Former User     Types: Chew   • Tobacco comment: quit at age 23   Substance Use Topics   • Alcohol use: No     Comment: stopped drinking  @ 20   • Drug use: No     Social History     Social History Narrative   • Not on file     Family History   Problem Relation Age of Onset   • Heart Disease Mother    • Hypertension Mother    • Hyperlipidemia Mother    • Psychiatric Illness Mother    • Psychiatric Illness Father    • Alcohol/Drug Father    • Alcohol/Drug Sister    • Sleep Apnea Neg Hx      Family Status   Relation Name Status   • Mo     • Fa     • Sis     • Sis  Alive   • Neg Hx  (Not Specified)         ROS  Problems listed discussed above, all other systems reviewed and negative     Objective:     /60 (BP Location: Left arm, Patient Position: Sitting, BP Cuff Size: Adult)   Pulse 75   Temp 36.4 °C (97.6 °F) (Temporal)   Resp 16   Ht 1.727 m (5' 8\")   Wt 56.2 kg (124 lb)   SpO2 96%  Body mass index is 18.85 kg/m².  Physical Exam:  General: Alert, oriented in no acute distress.  Eye contact is good, speech is normal, affect calm  HEENT:  Oral mucosa pink moist, no lesions. Nares patent. TMs gray with good landmarks bilaterally. No cervical or supraclavicular lymphadenopathy, thyroid isthmus palpable without masses or nodules.  Lungs: clear to auscultation bilaterally, good aeration, normal effort. No wheeze/ rhonchi/ rales.  CV: regular rate and rhythm, S1, S2. No murmur, no JVD, no edema. Pedal pulses 2 + bilaterally  Abdomen: soft, flat, nontender, BS x4, no hepatosplenomegaly.  Ext: color normal, vascularity normal, temperature normal. No rash or lesions.    Assessment and Plan:   The following treatment plan was discussed   1. Annual physical exam   normal physical exam except as detailed below.  Healthy diet and regular exercise discussed  Comp Metabolic Panel   2. Parkinson's disease (HCC)   having more difficulty with gait and pressure particularly on the right medial " foot, causing foot pain.  REFERRAL TO PODIATRY   3. Bilateral foot pain  REFERRAL TO PODIATRY   4. Current mild episode of major depressive disorder without prior episode (HCC)  Finding himself stuck in patterns of negative thinking, feeling down at times.  Benefits of SSRIs reviewed.  Will start trial of Lexapro, follow-up on this in 1 month.  escitalopram (LEXAPRO) 10 MG Tab   5. Thyroid disorder screen  TSH WITH REFLEX TO FT4   6. Lipid screening  Lipid Profile       Educated in proper administration of medication(s) ordered today including safety, possible SE, risks, benefits, rationale and alternatives to therapy.     Followup: 1 month for review of labs and medication follow up             Please note that this dictation was created using voice recognition software. I have worked with consultants from the vendor as well as technical experts from [employer] to optimize the interface. I have made every reasonable attempt to correct obvious errors, but I expect that there are errors of grammar and possibly content that I did not discover before finalizing the note.

## 2019-09-04 NOTE — ASSESSMENT & PLAN NOTE
Continues to be followed by Dr. Hart, currently managed with Azilect and Sinemet.  Symptoms tend to be better in the morning, worse as the day progresses.  He is finding that his gait is worsening, he has more pressure on the right medial foot and is wearing his shoes out in this area.  He is pain with the right worse than the left.  He has not seen a podiatrist in the past.  He is also finding that he gets into more frequent patterns of negative thinking, feeling discouraged, feels as though his wife does not love him although he states that he understands this is a rational.  We have discussed that depression is often a component of Parkinson's and that he may benefit from SSRI.  No SI/HI, no history of manic behavior, no appetite changes or hallucination reported

## 2019-09-11 ENCOUNTER — PATIENT MESSAGE (OUTPATIENT)
Dept: MEDICAL GROUP | Facility: MEDICAL CENTER | Age: 54
End: 2019-09-11

## 2019-09-11 NOTE — TELEPHONE ENCOUNTER
From: Rakan Rader III  To: BA Marquez  Sent: 9/11/2019 9:13 AM PDT  Subject: Prescription Question    Jeff Aleman, I didn't  the medication prescribed, because I don't want to go down road of more pills. I want to be normal.   Rakan

## 2019-09-16 ENCOUNTER — HOSPITAL ENCOUNTER (OUTPATIENT)
Dept: LAB | Facility: MEDICAL CENTER | Age: 54
End: 2019-09-16
Attending: NURSE PRACTITIONER
Payer: COMMERCIAL

## 2019-09-16 DIAGNOSIS — Z00.00 ANNUAL PHYSICAL EXAM: ICD-10-CM

## 2019-09-16 DIAGNOSIS — Z13.29 THYROID DISORDER SCREEN: ICD-10-CM

## 2019-09-16 DIAGNOSIS — Z13.220 LIPID SCREENING: ICD-10-CM

## 2019-09-16 LAB
ALBUMIN SERPL BCP-MCNC: 4.7 G/DL (ref 3.2–4.9)
ALBUMIN/GLOB SERPL: 1.6 G/DL
ALP SERPL-CCNC: 53 U/L (ref 30–99)
ALT SERPL-CCNC: 37 U/L (ref 2–50)
ANION GAP SERPL CALC-SCNC: 7 MMOL/L (ref 0–11.9)
AST SERPL-CCNC: 21 U/L (ref 12–45)
BILIRUB SERPL-MCNC: 0.8 MG/DL (ref 0.1–1.5)
BUN SERPL-MCNC: 15 MG/DL (ref 8–22)
CALCIUM SERPL-MCNC: 9.2 MG/DL (ref 8.5–10.5)
CHLORIDE SERPL-SCNC: 104 MMOL/L (ref 96–112)
CHOLEST SERPL-MCNC: 215 MG/DL (ref 100–199)
CO2 SERPL-SCNC: 29 MMOL/L (ref 20–33)
CREAT SERPL-MCNC: 0.89 MG/DL (ref 0.5–1.4)
FASTING STATUS PATIENT QL REPORTED: NORMAL
GLOBULIN SER CALC-MCNC: 2.9 G/DL (ref 1.9–3.5)
GLUCOSE SERPL-MCNC: 93 MG/DL (ref 65–99)
HDLC SERPL-MCNC: 82 MG/DL
LDLC SERPL CALC-MCNC: 122 MG/DL
POTASSIUM SERPL-SCNC: 4 MMOL/L (ref 3.6–5.5)
PROT SERPL-MCNC: 7.6 G/DL (ref 6–8.2)
SODIUM SERPL-SCNC: 140 MMOL/L (ref 135–145)
TRIGL SERPL-MCNC: 53 MG/DL (ref 0–149)
TSH SERPL DL<=0.005 MIU/L-ACNC: 3.84 UIU/ML (ref 0.38–5.33)

## 2019-09-16 PROCEDURE — 36415 COLL VENOUS BLD VENIPUNCTURE: CPT

## 2019-09-16 PROCEDURE — 80053 COMPREHEN METABOLIC PANEL: CPT

## 2019-09-16 PROCEDURE — 80061 LIPID PANEL: CPT

## 2019-09-16 PROCEDURE — 84443 ASSAY THYROID STIM HORMONE: CPT

## 2019-09-24 ENCOUNTER — PATIENT MESSAGE (OUTPATIENT)
Dept: MEDICAL GROUP | Facility: MEDICAL CENTER | Age: 54
End: 2019-09-24

## 2019-09-25 ENCOUNTER — PATIENT MESSAGE (OUTPATIENT)
Dept: MEDICAL GROUP | Facility: MEDICAL CENTER | Age: 54
End: 2019-09-25

## 2019-09-25 DIAGNOSIS — N40.1 BPH WITH OBSTRUCTION/LOWER URINARY TRACT SYMPTOMS: ICD-10-CM

## 2019-09-25 DIAGNOSIS — N13.8 BPH WITH OBSTRUCTION/LOWER URINARY TRACT SYMPTOMS: ICD-10-CM

## 2019-09-25 DIAGNOSIS — Z12.5 SCREENING FOR PROSTATE CANCER: ICD-10-CM

## 2019-09-25 RX ORDER — TERAZOSIN 1 MG/1
1 CAPSULE ORAL
Qty: 90 CAP | Refills: 0 | Status: SHIPPED | OUTPATIENT
Start: 2019-09-25 | End: 2020-06-25 | Stop reason: SDUPTHER

## 2019-09-25 NOTE — TELEPHONE ENCOUNTER
From: Rakan Rader III  To: BA Marquez  Sent: 9/24/2019 10:37 PM PDT  Subject: Prescription Question    Jeff Aleman, Can you prescribe Terazosin HCL, for my enlarged prostate? I forgot to ask the last visit.   Rickey Bledsoe

## 2019-09-25 NOTE — TELEPHONE ENCOUNTER
From: Rakan Rader III  To: BA Marquez  Sent: 9/25/2019 7:37 AM PDT  Subject: Prescription Question    Its a new prescription request. I'm using the restroom in the middle of the night, and eight to ten times a day. I had a colonoscopy over a year ago and was told I have an enlarged prostate.   Rickey Shah    ----- Message -----  From: BA Marquez  Sent: 9/25/2019 7:27 AM PDT  To: Rakan Rader III  Subject: RE: Prescription Question  Jeff Bledsoe,  This is something that you have been taking, or new prescription request?  Elsy CHANEL      ----- Message -----   From: Rakan Rader [facility]   Sent: 9/24/2019 10:37 PM PDT   To: BA Marquez  Subject: Prescription Question    Jeff Aleman, Can you prescribe Terazosin HCL, for my enlarged prostate? I forgot to ask the last visit.   Rickey Bledsoe

## 2019-09-26 NOTE — TELEPHONE ENCOUNTER
From: Rakan Rader III  To: BA Marquez  Sent: 9/25/2019 6:04 PM PDT  Subject: Prescription Question    Jeff Aleman,    To clarify; I go to any [facility] for the PSA and give them my info?  Do I pickup the prescription at my next appointment with you?  Rickey for all of your help,  Pablo    ----- Message -----  From: BA Marquez  Sent: 9/25/19 9:22 AM  To: Rakan Thompson [facility]  Subject: RE: Prescription Question    Jeff Shah,  We can certainly try a low-dose of the medication to see if that eases your urinary concern. I would also like you to have your PSA retested, I will add a lab order so that you can go in at your convenience. You do not need to fast for this.  Terazosin is best taken at bedtime, we will start with a low dose and adjust as needed. It can take 4 to 6 weeks to have full effect. Side effects include hypotension, dizziness, headache, nausea, palpitations. Please let me know if you have any problems.  Elsy CHANEL        ----- Message -----   From: Rakan Thompson [facility]   Sent: 9/25/2019 7:37 AM PDT   To: BA Marquez  Subject: Prescription Question    Its a new prescription request. I'm using the restroom in the middle of the night, and eight to ten times a day. I had a colonoscopy over a year ago and was told I have an enlarged prostate.   Rickey Shah    ----- Message -----  From: BA Marquez  Sent: 9/25/2019 7:27 AM PDT  To: Rakan Rader III  Subject: RE: Prescription Question  Jeff Bledsoe,  This is something that you have been taking, or new prescription request?  Elsy CHANEL      ----- Message -----   From: Rakan [place] III   Sent: 9/24/2019 10:37 PM PDT   To: BA Marquez  Subject: Prescription Question    Jeff Aleman, Can you prescribe Terazosin HCL, for my enlarged prostate? I forgot to ask the last visit.   Rickey Bledsoe

## 2019-09-30 ENCOUNTER — HOSPITAL ENCOUNTER (OUTPATIENT)
Dept: LAB | Facility: MEDICAL CENTER | Age: 54
End: 2019-09-30
Attending: NURSE PRACTITIONER
Payer: COMMERCIAL

## 2019-09-30 DIAGNOSIS — Z12.5 SCREENING FOR PROSTATE CANCER: ICD-10-CM

## 2019-09-30 LAB — PSA SERPL-MCNC: 2.53 NG/ML (ref 0–4)

## 2019-09-30 PROCEDURE — 84153 ASSAY OF PSA TOTAL: CPT

## 2019-09-30 PROCEDURE — 36415 COLL VENOUS BLD VENIPUNCTURE: CPT

## 2019-10-02 ENCOUNTER — OFFICE VISIT (OUTPATIENT)
Dept: MEDICAL GROUP | Facility: MEDICAL CENTER | Age: 54
End: 2019-10-02
Payer: COMMERCIAL

## 2019-10-02 VITALS
WEIGHT: 126 LBS | HEART RATE: 82 BPM | HEIGHT: 68 IN | SYSTOLIC BLOOD PRESSURE: 122 MMHG | DIASTOLIC BLOOD PRESSURE: 70 MMHG | RESPIRATION RATE: 16 BRPM | BODY MASS INDEX: 19.1 KG/M2 | OXYGEN SATURATION: 96 % | TEMPERATURE: 96.9 F

## 2019-10-02 DIAGNOSIS — F41.9 ANXIETY: ICD-10-CM

## 2019-10-02 DIAGNOSIS — E78.00 ELEVATED CHOLESTEROL: ICD-10-CM

## 2019-10-02 DIAGNOSIS — N40.1 BENIGN PROSTATIC HYPERPLASIA WITH URINARY FREQUENCY: ICD-10-CM

## 2019-10-02 DIAGNOSIS — Z23 NEED FOR VACCINATION: ICD-10-CM

## 2019-10-02 DIAGNOSIS — R35.0 BENIGN PROSTATIC HYPERPLASIA WITH URINARY FREQUENCY: ICD-10-CM

## 2019-10-02 PROCEDURE — 90686 IIV4 VACC NO PRSV 0.5 ML IM: CPT | Performed by: NURSE PRACTITIONER

## 2019-10-02 PROCEDURE — 99214 OFFICE O/P EST MOD 30 MIN: CPT | Mod: 25 | Performed by: NURSE PRACTITIONER

## 2019-10-02 PROCEDURE — 90471 IMMUNIZATION ADMIN: CPT | Performed by: NURSE PRACTITIONER

## 2019-10-02 NOTE — ASSESSMENT & PLAN NOTE
Component      Latest Ref Rng & Units 8/17/2017 9/16/2019          11:02 AM  7:55 AM   Cholesterol,Tot      100 - 199 mg/dL 166 215 (H)   Triglycerides      0 - 149 mg/dL 34 53   HDL      >=40 mg/dL 60 82   LDL      <100 mg/dL 99 122 (H)   Labs reviewed.  LDL has increased some but his HDL is excellent.  He is working on diet but admits that he could reduce meat and dairy.  Exercising regularly

## 2019-10-02 NOTE — ASSESSMENT & PLAN NOTE
Increase in urinary frequency particularly in the mornings.  Tends to wake up about once a night to go to the bathroom.  Just started terazosin in the last few days, 1 mg nightly.  No notable change in symptoms at this time.  Recent PSA normal at 2.53.  No dysuria, hematuria

## 2019-10-02 NOTE — ASSESSMENT & PLAN NOTE
Intermittent difficulty dating back years, we discussed this in some mild depressive symptoms related to his Parkinson's disease at last visit.  I had given him trial of Lexapro which he ultimately decided not to start.  Feels that is doing fine at this time and wishes to stay off of medication

## 2019-10-02 NOTE — PROGRESS NOTES
"Subjective:     Chief Complaint   Patient presents with   • Follow-Up     Rakan Rader III is a 54 y.o. male here today to follow up on chronic issues and for lab review.  We discussed:    Elevated cholesterol  Component      Latest Ref Rng & Units 8/17/2017 9/16/2019          11:02 AM  7:55 AM   Cholesterol,Tot      100 - 199 mg/dL 166 215 (H)   Triglycerides      0 - 149 mg/dL 34 53   HDL      >=40 mg/dL 60 82   LDL      <100 mg/dL 99 122 (H)   Labs reviewed.  LDL has increased some but his HDL is excellent.  He is working on diet but admits that he could reduce meat and dairy.  Exercising regularly    Benign prostatic hyperplasia with urinary frequency  Increase in urinary frequency particularly in the mornings.  Tends to wake up about once a night to go to the bathroom.  Just started terazosin in the last few days, 1 mg nightly.  No notable change in symptoms at this time.  Recent PSA normal at 2.53.  No dysuria, hematuria    Anxiety  Intermittent difficulty dating back years, we discussed this in some mild depressive symptoms related to his Parkinson's disease at last visit.  I had given him trial of Lexapro which he ultimately decided not to start.  Feels that is doing fine at this time and wishes to stay off of medication       Current medicines (including changes today)  Current Outpatient Medications   Medication Sig Dispense Refill   • terazosin (HYTRIN) 1 MG Cap Take 1 Cap by mouth every bedtime. 90 Cap 0   • rasagiline (AZILECT) 1 MG Tab Take 1 mg by mouth every day.     • carbidopa-levodopa (SINEMET)  MG Tab Take 1 Tab by mouth every day.       No current facility-administered medications for this visit.      He  has a past medical history of Anxiety.    ROS included above     Objective:     /70 (BP Location: Left arm, Patient Position: Sitting, BP Cuff Size: Adult)   Pulse 82   Temp 36.1 °C (96.9 °F) (Temporal)   Resp 16   Ht 1.727 m (5' 8\")   Wt 57.2 kg (126 lb)   SpO2 96%  " Body mass index is 19.16 kg/m².     Physical Exam:  General: Alert, oriented in no acute distress.  Eye contact is good, speech is normal, affect calm  Lungs: clear to auscultation bilaterally, normal effort, no wheeze/ rhonchi/ rales.  CV: regular rate and rhythm, S1, S2, no murmur  Ext: RUE tremor, no edema, color normal, vascularity normal, temperature normal    Assessment and Plan:   The following treatment plan was discussed   1. Elevated cholesterol   LDL has increased but his HDL is excellent at 82.  Dietary recommendations reviewed, he will cut back on meat and dairy products, continue regular exercise.  Repeat labs next year   2. Benign prostatic hyperplasia with urinary frequency   recent PSA normal at 2.53, just started trial of Matt Zosyn and is tolerating this without difficulty.  We have discussed that this will take it for another few weeks to have full effect, he will contact me with an update at that time   3. Anxiety   prescribed Lexapro at last visit, elected not to take medication.  He prefers to stay off at this point, will monitor symptoms   4. Need for vaccination  I have placed the below orders and discussed them with an approved delegating provider. The MA is performing the below orders under the direction of Dr. Todd  Influenza Vaccine Quad Injection (PF)       Followup: 4-6 weeks via email regarding medication         Please note that this dictation was created using voice recognition software. I have worked with consultants from the vendor as well as technical experts from [facility] [employer] to optimize the interface. I have made every reasonable attempt to correct obvious errors, but I expect that there are errors of grammar and possibly content that I did not discover before finalizing the note.

## 2020-02-27 ENCOUNTER — HOSPITAL ENCOUNTER (OUTPATIENT)
Dept: RADIOLOGY | Facility: MEDICAL CENTER | Age: 55
End: 2020-02-27
Attending: PSYCHIATRY & NEUROLOGY
Payer: COMMERCIAL

## 2020-02-27 DIAGNOSIS — G20.A1 PARALYSIS AGITANS (HCC): ICD-10-CM

## 2020-02-27 PROCEDURE — A9584 IODINE I-123 IOFLUPANE: HCPCS

## 2020-03-09 ENCOUNTER — TELEPHONE (OUTPATIENT)
Dept: MEDICAL GROUP | Facility: MEDICAL CENTER | Age: 55
End: 2020-03-09

## 2020-03-09 NOTE — TELEPHONE ENCOUNTER
Patient left voicemail at . He wants to be put on the schedule for the Shingles Vaccine. Please call patient to let him know if provider will order. Thank you.

## 2020-03-09 NOTE — TELEPHONE ENCOUNTER
Please inform pt we are unable to provide this vaccine in our office. He should check with his pharmacy. Elsy CHANEL

## 2020-03-11 ENCOUNTER — APPOINTMENT (OUTPATIENT)
Dept: MEDICAL GROUP | Facility: MEDICAL CENTER | Age: 55
End: 2020-03-11
Payer: COMMERCIAL

## 2020-03-16 ENCOUNTER — NON-PROVIDER VISIT (OUTPATIENT)
Dept: MEDICAL GROUP | Facility: MEDICAL CENTER | Age: 55
End: 2020-03-16
Payer: COMMERCIAL

## 2020-03-16 PROCEDURE — 90715 TDAP VACCINE 7 YRS/> IM: CPT | Performed by: FAMILY MEDICINE

## 2020-03-16 PROCEDURE — 90471 IMMUNIZATION ADMIN: CPT | Performed by: FAMILY MEDICINE

## 2020-03-17 ENCOUNTER — TELEPHONE (OUTPATIENT)
Dept: MEDICAL GROUP | Facility: MEDICAL CENTER | Age: 55
End: 2020-03-17

## 2020-03-17 DIAGNOSIS — Z23 NEED FOR VACCINATION: ICD-10-CM

## 2020-03-17 NOTE — TELEPHONE ENCOUNTER
Patient is on the MA Schedule today for T-Dap vaccine/injection.    SPECIFIC Action To Be Taken: Orders pending, please sign.

## 2020-06-25 ENCOUNTER — OFFICE VISIT (OUTPATIENT)
Dept: MEDICAL GROUP | Facility: MEDICAL CENTER | Age: 55
End: 2020-06-25
Payer: COMMERCIAL

## 2020-06-25 VITALS
TEMPERATURE: 98.6 F | HEIGHT: 68 IN | BODY MASS INDEX: 19.21 KG/M2 | HEART RATE: 66 BPM | WEIGHT: 126.76 LBS | DIASTOLIC BLOOD PRESSURE: 68 MMHG | RESPIRATION RATE: 15 BRPM | SYSTOLIC BLOOD PRESSURE: 110 MMHG | OXYGEN SATURATION: 99 %

## 2020-06-25 DIAGNOSIS — R35.0 URINARY FREQUENCY: ICD-10-CM

## 2020-06-25 DIAGNOSIS — E78.00 ELEVATED CHOLESTEROL: ICD-10-CM

## 2020-06-25 DIAGNOSIS — N40.1 BPH WITH OBSTRUCTION/LOWER URINARY TRACT SYMPTOMS: ICD-10-CM

## 2020-06-25 DIAGNOSIS — N13.8 BPH WITH OBSTRUCTION/LOWER URINARY TRACT SYMPTOMS: ICD-10-CM

## 2020-06-25 PROCEDURE — 99214 OFFICE O/P EST MOD 30 MIN: CPT | Performed by: NURSE PRACTITIONER

## 2020-06-25 RX ORDER — AMANTADINE HYDROCHLORIDE 100 MG/1
100 CAPSULE, GELATIN COATED ORAL 2 TIMES DAILY
COMMUNITY
End: 2021-06-02

## 2020-06-25 RX ORDER — TERAZOSIN 1 MG/1
1 CAPSULE ORAL
Qty: 90 CAP | Refills: 0 | Status: SHIPPED
Start: 2020-06-25 | End: 2021-04-21

## 2020-06-25 ASSESSMENT — PATIENT HEALTH QUESTIONNAIRE - PHQ9: CLINICAL INTERPRETATION OF PHQ2 SCORE: 0

## 2020-06-25 NOTE — ASSESSMENT & PLAN NOTE
This is been a chronic issue which is gradually worsening.  Now states that he is having difficulty emptying his bladder, has to strain, often has to urinate many times upon waking in the morning.  In the past he was given trial of terazosin, he admits today that he never really took the medication.  He is reluctant to take any new prescriptions as he is already on several for his Parkinson's  States that he was told his prostate was enlarged when he had a colonoscopy in 2017.  He does not recall having a digital rectal exam in the last several years  Most recent PSA normal at 2.53  Denies dysuria, pelvic pain, hematuria

## 2020-06-25 NOTE — PROGRESS NOTES
"Subjective:     Chief Complaint   Patient presents with   • Urinary Retention     x2yrs (no px)     Rakan Rader III is a 55 y.o. male here today to follow up on:    Benign prostatic hyperplasia with urinary frequency  This is been a chronic issue which is gradually worsening.  Now states that he is having difficulty emptying his bladder, has to strain, often has to urinate many times upon waking in the morning.  In the past he was given trial of terazosin, he admits today that he never really took the medication.  He is reluctant to take any new prescriptions as he is already on several for his Parkinson's  States that he was told his prostate was enlarged when he had a colonoscopy in 2017.  He does not recall having a digital rectal exam in the last several years  Most recent PSA normal at 2.53  Denies dysuria, pelvic pain, hematuria    Elevated cholesterol  History of mildly elevated cholesterol, working on diet and exercise regimen.  Due for recheck of labs       Current medicines (including changes today)  Current Outpatient Medications   Medication Sig Dispense Refill   • amantadine (SYMMETREL) 100 MG Cap Take 100 mg by mouth 2 times a day.     • terazosin (HYTRIN) 1 MG Cap Take 1 Cap by mouth every bedtime. 90 Cap 0   • carbidopa-levodopa (SINEMET)  MG Tab Take 1 Tab by mouth every day.     • rasagiline (AZILECT) 1 MG Tab Take 1 mg by mouth every day.       No current facility-administered medications for this visit.      He  has a past medical history of Anxiety.    ROS included above     Objective:     /68 (BP Location: Left arm, Patient Position: Sitting, BP Cuff Size: Adult)   Pulse 66   Temp 37 °C (98.6 °F)   Resp 15   Ht 1.727 m (5' 8\")   Wt 57.5 kg (126 lb 12.2 oz)   SpO2 99%  Body mass index is 19.27 kg/m².     Physical Exam:  General: Alert, oriented in no acute distress.  Eye contact is good, speech is normal, affect calm  Lungs: clear to auscultation bilaterally, normal " effort, no wheeze/ rhonchi/ rales.  CV: regular rate and rhythm, S1, S2, no murmur  RECTAL: normal rectal sphincter tone, no masses. Prostate moderately enlarged, smooth without asymmetry or nodularity  Ext: no edema, color normal, vascularity normal, temperature normal    Assessment and Plan:   The following treatment plan was discussed   1. BPH with obstruction/lower urinary tract symptoms   patient is been reluctant to try medication but is now willing given that his urinary symptoms are very problematic.  Prostate exam in the office with bilateral enlargement, no nodularity.  We will start trial of  terazosin (HYTRIN) 1 MG Cap  Risks and side effects reviewed, he will contact me for any concerns    PSA TOTAL + %FREE   2. Urinary frequency  PSA TOTAL + %FREE   3. Elevated cholesterol   working on diet and exercise regimen, update labs  Lipid Profile    Comp Metabolic Panel       Followup: Pending labs         Please note that this dictation was created using voice recognition software. I have worked with consultants from the vendor as well as technical experts from [facility] [employer] to optimize the interface. I have made every reasonable attempt to correct obvious errors, but I expect that there are errors of grammar and possibly content that I did not discover before finalizing the note.

## 2020-06-25 NOTE — ASSESSMENT & PLAN NOTE
History of mildly elevated cholesterol, working on diet and exercise regimen.  Due for recheck of labs

## 2020-07-01 ENCOUNTER — HOSPITAL ENCOUNTER (OUTPATIENT)
Dept: LAB | Facility: MEDICAL CENTER | Age: 55
End: 2020-07-01
Attending: NURSE PRACTITIONER
Payer: COMMERCIAL

## 2020-07-01 DIAGNOSIS — E78.00 ELEVATED CHOLESTEROL: ICD-10-CM

## 2020-07-01 DIAGNOSIS — R35.0 URINARY FREQUENCY: ICD-10-CM

## 2020-07-01 DIAGNOSIS — N40.1 BPH WITH OBSTRUCTION/LOWER URINARY TRACT SYMPTOMS: ICD-10-CM

## 2020-07-01 DIAGNOSIS — N13.8 BPH WITH OBSTRUCTION/LOWER URINARY TRACT SYMPTOMS: ICD-10-CM

## 2020-07-01 LAB
ALBUMIN SERPL BCP-MCNC: 4.8 G/DL (ref 3.2–4.9)
ALBUMIN/GLOB SERPL: 1.7 G/DL
ALP SERPL-CCNC: 76 U/L (ref 30–99)
ALT SERPL-CCNC: 26 U/L (ref 2–50)
ANION GAP SERPL CALC-SCNC: 12 MMOL/L (ref 7–16)
AST SERPL-CCNC: 24 U/L (ref 12–45)
BILIRUB SERPL-MCNC: 0.8 MG/DL (ref 0.1–1.5)
BUN SERPL-MCNC: 13 MG/DL (ref 8–22)
CALCIUM SERPL-MCNC: 9.7 MG/DL (ref 8.5–10.5)
CHLORIDE SERPL-SCNC: 102 MMOL/L (ref 96–112)
CHOLEST SERPL-MCNC: 210 MG/DL (ref 100–199)
CO2 SERPL-SCNC: 27 MMOL/L (ref 20–33)
CREAT SERPL-MCNC: 0.8 MG/DL (ref 0.5–1.4)
FASTING STATUS PATIENT QL REPORTED: NORMAL
GLOBULIN SER CALC-MCNC: 2.9 G/DL (ref 1.9–3.5)
GLUCOSE SERPL-MCNC: 84 MG/DL (ref 65–99)
HDLC SERPL-MCNC: 73 MG/DL
LDLC SERPL CALC-MCNC: 125 MG/DL
POTASSIUM SERPL-SCNC: 4.2 MMOL/L (ref 3.6–5.5)
PROT SERPL-MCNC: 7.7 G/DL (ref 6–8.2)
SODIUM SERPL-SCNC: 141 MMOL/L (ref 135–145)
TRIGL SERPL-MCNC: 59 MG/DL (ref 0–149)

## 2020-07-01 PROCEDURE — 84153 ASSAY OF PSA TOTAL: CPT

## 2020-07-01 PROCEDURE — 80061 LIPID PANEL: CPT

## 2020-07-01 PROCEDURE — 80053 COMPREHEN METABOLIC PANEL: CPT

## 2020-07-01 PROCEDURE — 36415 COLL VENOUS BLD VENIPUNCTURE: CPT

## 2020-07-01 PROCEDURE — 84154 ASSAY OF PSA FREE: CPT

## 2020-07-03 LAB
PSA FREE MFR SERPL: 17 %
PSA FREE SERPL-MCNC: 0.5 NG/ML
PSA SERPL-MCNC: 2.9 NG/ML (ref 0–4)

## 2020-11-11 ENCOUNTER — TELEMEDICINE (OUTPATIENT)
Dept: MEDICAL GROUP | Facility: MEDICAL CENTER | Age: 55
End: 2020-11-11
Payer: COMMERCIAL

## 2020-11-11 DIAGNOSIS — G43.109 MIGRAINE WITH AURA AND WITHOUT STATUS MIGRAINOSUS, NOT INTRACTABLE: ICD-10-CM

## 2020-11-11 PROCEDURE — 99213 OFFICE O/P EST LOW 20 MIN: CPT | Mod: 95,CR | Performed by: NURSE PRACTITIONER

## 2020-11-11 RX ORDER — PRAMIPEXOLE DIHYDROCHLORIDE 0.12 MG/1
0.12 TABLET ORAL 3 TIMES DAILY
COMMUNITY
End: 2021-10-14

## 2020-11-11 RX ORDER — RIZATRIPTAN BENZOATE 5 MG/1
TABLET ORAL
Qty: 8 TAB | Refills: 0 | Status: SHIPPED | OUTPATIENT
Start: 2020-11-11 | End: 2022-01-13

## 2020-11-11 NOTE — PROGRESS NOTES
"Telemedicine: Established Patient   This evaluation was conducted via [employer] using secure and encrypted videoconferencing technology. The patient was in a private location in the state of [place].    The patient's identity was confirmed and verbal consent was obtained for this virtual visit.    Subjective:   CC:   Chief Complaint   Patient presents with   • Migraine     2 days or longer       Rakan Rader III is a 55 y.o. male established patient with Parkinson's presenting for evaluation and management of:    Migraine with aura and without status migrainosus, not intractable  Patient reports longstanding history of migraine, diagnosed many years ago.  Typically has episodes about 1 day a month, this is been manageable for him with over-the-counter medications until recently.  The last 2 months his episodes of drug on for 2 to 3 days at a time.  He does have associated nausea, describes blurred vision or \"wavy\" vision prior to onset of headache.  Tends to be in the left temporal region.  Feels that it is commonly triggered by weather change.  He had been given a prescription for sumatriptan many years ago.  [place] that this caused him arm/shoulder pain.  He is interested in an alternative medication option.  Denies any numbness or tingling in extremities, speech difficulty.  No history hypertension, heart disease.  Denies any nasal congestion, sinus pressure      ROS      Allergies   Allergen Reactions   • Asa [Aspirin] Shortness of Breath and Swelling   • Ibuprofen Shortness of Breath and Swelling       Current medicines (including changes today)  Current Outpatient Medications   Medication Sig Dispense Refill   • pramipexole (MIRAPEX) 0.125 MG Tab Take 0.125 mg by mouth 3 times a day.     • rizatriptan (MAXALT) 5 MG tablet 1 tab PO once PRN migraine. May repeat dose in 1 hr if needed. Do not exceed 10mg/ 24 hrs 8 Tab 0   • terazosin (HYTRIN) 1 MG Cap Take 1 Cap by mouth every bedtime. 90 Cap 0   • " carbidopa-levodopa (SINEMET)  MG Tab Take 1 Tab by mouth every day.     • amantadine (SYMMETREL) 100 MG Cap Take 100 mg by mouth 2 times a day.     • rasagiline (AZILECT) 1 MG Tab Take 1 mg by mouth every day.       No current facility-administered medications for this visit.        Patient Active Problem List    Diagnosis Date Noted   • Migraine with aura and without status migrainosus, not intractable 11/11/2020   • Elevated cholesterol 10/02/2019   • Benign prostatic hyperplasia with urinary frequency 10/02/2019   • History of tobacco abuse 09/20/2018   • Other insomnia 09/20/2018   • Parkinson's disease (HCC) 07/20/2018   • Night-waking disorder 07/20/2018   • Anxiety 06/24/2011       Family History   Problem Relation Age of Onset   • Heart Disease Mother    • Hypertension Mother    • Hyperlipidemia Mother    • Psychiatric Illness Mother    • Psychiatric Illness Father    • Alcohol/Drug Father    • Alcohol/Drug Sister    • Sleep Apnea Neg Hx        He  has a past medical history of Anxiety.  He  has a past surgical history that includes elbow orif; septoturbinoplasty; sinuscopy; other neurological surg; shoulder arthroscopy w/ slap / labral repair; other orthopedic surgery; inguinal hernia repair robotic (Bilateral, 11/22/2017); and laminotomy.       Objective:   There were no vitals taken for this visit.    Physical Exam  Physical Exam:  Constitutional: Alert, no distress, well-groomed.  Skin: No rashes in visible areas.  Eye: Round. Conjunctiva clear, lids normal. No icterus.   ENMT: Lips pink without lesions, good dentition, moist mucous membranes. Phonation normal.  Neck: No masses, no thyromegaly. Moves freely without pain.  CV: Pulse as reported by patient  Respiratory: Unlabored respiratory effort, no cough or audible wheeze  Psych: Alert and oriented x3, normal affect and mood.     Assessment and Plan:   The following treatment plan was discussed:     1. Migraine with aura and without status  migrainosus, not intractable  - rizatriptan (MAXALT) 5 MG tablet; 1 tab PO once PRN migraine. May repeat dose in 1 hr if needed. Do not exceed 10mg/ 24 hrs  Dispense: 8 Tab; Refill: 0  Episodes about once monthly and lasting 2 to 3 days at a time, not resolving with OTC medications.  Treatment options reviewed.  We will start trial of Maxalt, risks and side effects discussed.  He may combine that with Excedrin Migraine.  Hydration and healthy lifestyle factors discussed.    Follow-up: As needed

## 2020-11-11 NOTE — ASSESSMENT & PLAN NOTE
"Patient reports longstanding history of migraine, diagnosed many years ago.  Typically has episodes about 1 day a month, this is been manageable for him with over-the-counter medications until recently.  The last 2 months his episodes of drug on for 2 to 3 days at a time.  He does have associated nausea, describes blurred vision or \"wavy\" vision prior to onset of headache.  Tends to be in the left temporal region.  Feels that it is commonly triggered by weather change.  He had been given a prescription for sumatriptan many years ago.  [place] that this caused him arm/shoulder pain.  He is interested in an alternative medication option.  Denies any numbness or tingling in extremities, speech difficulty.  No history hypertension, heart disease.  "

## 2020-11-29 ENCOUNTER — PATIENT MESSAGE (OUTPATIENT)
Dept: MEDICAL GROUP | Facility: MEDICAL CENTER | Age: 55
End: 2020-11-29

## 2021-04-21 DIAGNOSIS — N40.1 BPH WITH OBSTRUCTION/LOWER URINARY TRACT SYMPTOMS: ICD-10-CM

## 2021-04-21 DIAGNOSIS — N13.8 BPH WITH OBSTRUCTION/LOWER URINARY TRACT SYMPTOMS: ICD-10-CM

## 2021-04-21 RX ORDER — TERAZOSIN 2 MG/1
2 CAPSULE ORAL
Qty: 30 CAPSULE | Refills: 1 | Status: SHIPPED | OUTPATIENT
Start: 2021-04-21 | End: 2021-05-18

## 2021-04-21 NOTE — PROGRESS NOTES
Patient reporting worsening of his urinary stream, does state that he is able to fully empty his bladder.  Discussed with the patient's about increasing his Terazosin from 1 mg Every evening to 2 mg caps.  New prescription sent to patient's preferred pharmacy [facility].

## 2021-06-02 ENCOUNTER — OFFICE VISIT (OUTPATIENT)
Dept: MEDICAL GROUP | Facility: MEDICAL CENTER | Age: 56
End: 2021-06-02
Payer: COMMERCIAL

## 2021-06-02 VITALS
WEIGHT: 126.76 LBS | BODY MASS INDEX: 19.21 KG/M2 | RESPIRATION RATE: 18 BRPM | TEMPERATURE: 97.3 F | DIASTOLIC BLOOD PRESSURE: 70 MMHG | HEART RATE: 62 BPM | OXYGEN SATURATION: 98 % | SYSTOLIC BLOOD PRESSURE: 114 MMHG | HEIGHT: 68 IN

## 2021-06-02 DIAGNOSIS — Z12.5 SCREENING PSA (PROSTATE SPECIFIC ANTIGEN): ICD-10-CM

## 2021-06-02 DIAGNOSIS — R20.0 RIGHT ARM NUMBNESS: ICD-10-CM

## 2021-06-02 DIAGNOSIS — E78.00 ELEVATED CHOLESTEROL: ICD-10-CM

## 2021-06-02 DIAGNOSIS — Z00.00 ANNUAL PHYSICAL EXAM: ICD-10-CM

## 2021-06-02 DIAGNOSIS — G20.A1 PARKINSON'S DISEASE (HCC): ICD-10-CM

## 2021-06-02 DIAGNOSIS — R35.0 BENIGN PROSTATIC HYPERPLASIA WITH URINARY FREQUENCY: ICD-10-CM

## 2021-06-02 DIAGNOSIS — G43.109 MIGRAINE WITH AURA AND WITHOUT STATUS MIGRAINOSUS, NOT INTRACTABLE: ICD-10-CM

## 2021-06-02 DIAGNOSIS — Z13.29 THYROID DISORDER SCREEN: ICD-10-CM

## 2021-06-02 DIAGNOSIS — Z13.0 SCREENING FOR DEFICIENCY ANEMIA: ICD-10-CM

## 2021-06-02 DIAGNOSIS — N40.1 BENIGN PROSTATIC HYPERPLASIA WITH URINARY FREQUENCY: ICD-10-CM

## 2021-06-02 PROCEDURE — 99396 PREV VISIT EST AGE 40-64: CPT | Mod: 25 | Performed by: NURSE PRACTITIONER

## 2021-06-02 PROCEDURE — 99213 OFFICE O/P EST LOW 20 MIN: CPT | Performed by: NURSE PRACTITIONER

## 2021-06-02 RX ORDER — CARBIDOPA AND LEVODOPA 25; 100 MG/1; MG/1
1 TABLET, EXTENDED RELEASE ORAL 4 TIMES DAILY
COMMUNITY
Start: 2021-04-21 | End: 2021-06-02

## 2021-06-02 ASSESSMENT — PATIENT HEALTH QUESTIONNAIRE - PHQ9: CLINICAL INTERPRETATION OF PHQ2 SCORE: 0

## 2021-06-02 NOTE — ASSESSMENT & PLAN NOTE
Managing with sinemet  mg QID, mirapex 0.125 TID, azilect 1 mg daily.  Last appointment with neurology was about a year ago.  Symptoms have generally been stable although he does mention that he has developed some dystonia when the Sinemet wears off

## 2021-06-02 NOTE — PROGRESS NOTES
"Subjective:     Chief Complaint   Patient presents with   • Annual Exam     numbness, ache to RUE x3m     Rakan Rader III is a 56 y.o. male here for annual with concerns of numbness in the right arm    Migraine with aura and without status migrainosus, not intractable  Taking coQ10, magnesium, and B2, has not had a migraine in months    Benign prostatic hyperplasia with urinary frequency  On terazosin which is helping with urinary symptoms    Elevated cholesterol  H/o elevated LDL. Working on diet. Continues riding his bike and boxing for exercise    Right arm numbness  New problem starting over last few months.  History of cervical spinal fusion in 2013.  Numbness radiates down the L arm, starting over the deltoid and then down the medial aspect of the arm into the thumb.  Not \"painful\" but uncomfortable.  Not taking any medication for this.  He has some minor neck discomfort, denies any shoulder joint pain or change in range of motion.  No perceivable change in strength.    Parkinson's disease (HCC)  Managing with sinemet  mg QID, mirapex 0.125 TID.  Last appointment with neurology was about a year ago.  Symptoms have generally been stable although he does mention that he has developed some dystonia when the Sinemet wears off       Current medicines (including changes today)  Current Outpatient Medications   Medication Sig Dispense Refill   • terazosin (HYTRIN) 2 MG Cap TAKE 1 CAPSULE BY MOUTH EVERYDAY AT BEDTIME 30 capsule 5   • pramipexole (MIRAPEX) 0.125 MG Tab Take 0.125 mg by mouth 3 times a day.     • rizatriptan (MAXALT) 5 MG tablet 1 tab PO once PRN migraine. May repeat dose in 1 hr if needed. Do not exceed 10mg/ 24 hrs 8 Tab 0   • carbidopa-levodopa (SINEMET)  MG Tab Take 1 Tab by mouth every day.     • carbidopa-levodopa CR (SINEMET CR)  MG per tablet Take 1 tablet by mouth 4 times a day.     • amantadine (SYMMETREL) 100 MG Cap Take 100 mg by mouth 2 times a day. (Patient not " "taking: Reported on 6/2/2021)     • rasagiline (AZILECT) 1 MG Tab Take 1 mg by mouth every day. (Patient not taking: Reported on 6/2/2021)       No current facility-administered medications for this visit.     He  has a past medical history of Anxiety.    ROS included above     Objective:     /70 (BP Location: Left arm, Patient Position: Sitting, BP Cuff Size: Adult)   Pulse 62   Temp 36.3 °C (97.3 °F) (Temporal)   Resp 18   Ht 1.727 m (5' 8\")   Wt 57.5 kg (126 lb 12.2 oz)   SpO2 98%  Body mass index is 19.27 kg/m².     Physical Exam:  General: Alert, oriented in no acute distress.  Eye contact is good, speech is normal, affect calm  HEENT:  TMs gray with good landmarks bilaterally. No lymphadenopathy.  Lungs: clear to auscultation bilaterally, normal effort, no wheeze/ rhonchi/ rales.  CV: regular rate and rhythm, S1, S2, no murmur  Abdomen: soft, nontender, No CVAT, no hepatosplenomegaly  MS: No point tenderness over cervical spinous process, anterior posterior right glenohumeral joint.  Neck with full range of motion, right shoulder also with full range of motion.  Strength is 5 out of 5 in bilateral upper extremities.  Pulses and temperature normal  Ext: no edema, color normal, vascularity normal, temperature normal    Assessment and Plan:   The following treatment plan was discussed   1. Annual physical exam   general health and wellness discussion including healthy diet, regular exercise.  Preventative health measures reviewed  Lipid Profile    Comp Metabolic Panel    PROSTATE SPECIFIC AG SCREENING    TSH WITH REFLEX TO FT4    VITAMIN B12   2. Migraine with aura and without status migrainosus, not intractable   stable and doing well on preventative treatment with co-Q10, magnesium, B2   3. Benign prostatic hyperplasia with urinary frequency   stable with Terazosin   4. Elevated cholesterol   working on diet and exercise regimen  Lipid Profile   5. Parkinson's disease (HCC)   generally stable, " followed by neurology.  No recent falls.  He does have some muscle tension and dystonia  REFERRAL TO PHYSICAL THERAPY   6. Screening PSA (prostate specific antigen)  PROSTATE SPECIFIC AG SCREENING   7. Screening for deficiency anemia  CBC WITH DIFFERENTIAL   8. Thyroid disorder screen     9. Right arm numbness   history of cervical spinal fusion.  New development of numbness in the right arm with no history of injury, suspect that this is a cervical radiculopathy.  Will evaluate x-ray and attempt physical therapy.  May need nerve conduction test if no improvement  DX-CERVICAL SPINE-2 OR 3 VIEWS    VITAMIN B12    REFERRAL TO PHYSICAL THERAPY       Followup: Pending labs         Please note that this dictation was created using voice recognition software. I have worked with consultants from the vendor as well as technical experts from [facility] [employer] to optimize the interface. I have made every reasonable attempt to correct obvious errors, but I expect that there are errors of grammar and possibly content that I did not discover before finalizing the note.

## 2021-06-02 NOTE — ASSESSMENT & PLAN NOTE
"New problem starting over last few months.  History of cervical spinal fusion in 2013.  Numbness radiates down the L arm, starting over the deltoid and then down the medial aspect of the arm into the thumb.  Not \"painful\" but uncomfortable.  Not taking any medication for this.  He has some minor neck discomfort, denies any shoulder joint pain or change in range of motion.  No perceivable change in strength.  "

## 2021-06-08 ENCOUNTER — HOSPITAL ENCOUNTER (OUTPATIENT)
Dept: RADIOLOGY | Facility: MEDICAL CENTER | Age: 56
End: 2021-06-08
Attending: NURSE PRACTITIONER
Payer: COMMERCIAL

## 2021-06-08 DIAGNOSIS — R20.0 RIGHT ARM NUMBNESS: ICD-10-CM

## 2021-06-08 PROCEDURE — 72040 X-RAY EXAM NECK SPINE 2-3 VW: CPT

## 2021-06-18 ENCOUNTER — HOSPITAL ENCOUNTER (OUTPATIENT)
Dept: LAB | Facility: MEDICAL CENTER | Age: 56
End: 2021-06-18
Attending: NURSE PRACTITIONER
Payer: COMMERCIAL

## 2021-06-18 DIAGNOSIS — R20.0 RIGHT ARM NUMBNESS: ICD-10-CM

## 2021-06-18 DIAGNOSIS — Z12.5 SCREENING PSA (PROSTATE SPECIFIC ANTIGEN): ICD-10-CM

## 2021-06-18 DIAGNOSIS — E78.00 ELEVATED CHOLESTEROL: ICD-10-CM

## 2021-06-18 DIAGNOSIS — Z13.0 SCREENING FOR DEFICIENCY ANEMIA: ICD-10-CM

## 2021-06-18 DIAGNOSIS — Z00.00 ANNUAL PHYSICAL EXAM: ICD-10-CM

## 2021-06-18 LAB
ALBUMIN SERPL BCP-MCNC: 4.4 G/DL (ref 3.2–4.9)
ALBUMIN/GLOB SERPL: 1.5 G/DL
ALP SERPL-CCNC: 69 U/L (ref 30–99)
ALT SERPL-CCNC: 24 U/L (ref 2–50)
ANION GAP SERPL CALC-SCNC: 8 MMOL/L (ref 7–16)
AST SERPL-CCNC: 17 U/L (ref 12–45)
BASOPHILS # BLD AUTO: 1.2 % (ref 0–1.8)
BASOPHILS # BLD: 0.07 K/UL (ref 0–0.12)
BILIRUB SERPL-MCNC: 0.6 MG/DL (ref 0.1–1.5)
BUN SERPL-MCNC: 11 MG/DL (ref 8–22)
CALCIUM SERPL-MCNC: 9.2 MG/DL (ref 8.5–10.5)
CHLORIDE SERPL-SCNC: 104 MMOL/L (ref 96–112)
CHOLEST SERPL-MCNC: 186 MG/DL (ref 100–199)
CO2 SERPL-SCNC: 26 MMOL/L (ref 20–33)
CREAT SERPL-MCNC: 0.78 MG/DL (ref 0.5–1.4)
EOSINOPHIL # BLD AUTO: 0.08 K/UL (ref 0–0.51)
EOSINOPHIL NFR BLD: 1.4 % (ref 0–6.9)
ERYTHROCYTE [DISTWIDTH] IN BLOOD BY AUTOMATED COUNT: 43.1 FL (ref 35.9–50)
FASTING STATUS PATIENT QL REPORTED: NORMAL
GLOBULIN SER CALC-MCNC: 2.9 G/DL (ref 1.9–3.5)
GLUCOSE SERPL-MCNC: 109 MG/DL (ref 65–99)
HCT VFR BLD AUTO: 47.4 % (ref 42–52)
HDLC SERPL-MCNC: 75 MG/DL
HGB BLD-MCNC: 15.3 G/DL (ref 14–18)
IMM GRANULOCYTES # BLD AUTO: 0.02 K/UL (ref 0–0.11)
IMM GRANULOCYTES NFR BLD AUTO: 0.3 % (ref 0–0.9)
LDLC SERPL CALC-MCNC: 104 MG/DL
LYMPHOCYTES # BLD AUTO: 1.25 K/UL (ref 1–4.8)
LYMPHOCYTES NFR BLD: 21.1 % (ref 22–41)
MCH RBC QN AUTO: 30.3 PG (ref 27–33)
MCHC RBC AUTO-ENTMCNC: 32.3 G/DL (ref 33.7–35.3)
MCV RBC AUTO: 93.9 FL (ref 81.4–97.8)
MONOCYTES # BLD AUTO: 0.49 K/UL (ref 0–0.85)
MONOCYTES NFR BLD AUTO: 8.3 % (ref 0–13.4)
NEUTROPHILS # BLD AUTO: 4.01 K/UL (ref 1.82–7.42)
NEUTROPHILS NFR BLD: 67.7 % (ref 44–72)
NRBC # BLD AUTO: 0 K/UL
NRBC BLD-RTO: 0 /100 WBC
PLATELET # BLD AUTO: 205 K/UL (ref 164–446)
PMV BLD AUTO: 10.2 FL (ref 9–12.9)
POTASSIUM SERPL-SCNC: 4.3 MMOL/L (ref 3.6–5.5)
PROT SERPL-MCNC: 7.3 G/DL (ref 6–8.2)
PSA SERPL-MCNC: 2.72 NG/ML (ref 0–4)
RBC # BLD AUTO: 5.05 M/UL (ref 4.7–6.1)
SODIUM SERPL-SCNC: 138 MMOL/L (ref 135–145)
TRIGL SERPL-MCNC: 35 MG/DL (ref 0–149)
TSH SERPL DL<=0.005 MIU/L-ACNC: 2.04 UIU/ML (ref 0.38–5.33)
VIT B12 SERPL-MCNC: 1346 PG/ML (ref 211–911)
WBC # BLD AUTO: 5.9 K/UL (ref 4.8–10.8)

## 2021-06-18 PROCEDURE — 80061 LIPID PANEL: CPT

## 2021-06-18 PROCEDURE — 82607 VITAMIN B-12: CPT

## 2021-06-18 PROCEDURE — 80053 COMPREHEN METABOLIC PANEL: CPT

## 2021-06-18 PROCEDURE — 84153 ASSAY OF PSA TOTAL: CPT

## 2021-06-18 PROCEDURE — 85025 COMPLETE CBC W/AUTO DIFF WBC: CPT

## 2021-06-18 PROCEDURE — 84443 ASSAY THYROID STIM HORMONE: CPT

## 2021-06-18 PROCEDURE — 36415 COLL VENOUS BLD VENIPUNCTURE: CPT

## 2021-06-28 ENCOUNTER — PATIENT MESSAGE (OUTPATIENT)
Dept: MEDICAL GROUP | Facility: MEDICAL CENTER | Age: 56
End: 2021-06-28

## 2021-09-07 ENCOUNTER — PATIENT MESSAGE (OUTPATIENT)
Dept: MEDICAL GROUP | Facility: MEDICAL CENTER | Age: 56
End: 2021-09-07

## 2021-09-07 DIAGNOSIS — G20.A1 PARKINSON'S DISEASE (HCC): ICD-10-CM

## 2021-09-08 NOTE — TELEPHONE ENCOUNTER
From: Rakan Rader III  To: Nurse Practitioner Love Fernando  Sent: 9/7/2021 12:13 PM PDT  Subject: Referral     Jeff Aleman, can you give me a referral to Dr. Harsh Ricks Neurologist at [facility]? He is a Movement Specialist in Parkinson's Disease. I want another opinion on my Parkinson's Disease and options on treatment, I don't feel I am getting from my General Neurologist. Thank you, Rakan Rader

## 2021-10-14 ENCOUNTER — OFFICE VISIT (OUTPATIENT)
Dept: NEUROLOGY | Facility: MEDICAL CENTER | Age: 56
End: 2021-10-14
Attending: PSYCHIATRY & NEUROLOGY
Payer: COMMERCIAL

## 2021-10-14 VITALS
HEIGHT: 68 IN | HEART RATE: 64 BPM | DIASTOLIC BLOOD PRESSURE: 74 MMHG | OXYGEN SATURATION: 98 % | TEMPERATURE: 97.7 F | WEIGHT: 126.98 LBS | RESPIRATION RATE: 18 BRPM | SYSTOLIC BLOOD PRESSURE: 118 MMHG | BODY MASS INDEX: 19.25 KG/M2

## 2021-10-14 DIAGNOSIS — G20.A1 PARKINSON'S DISEASE (HCC): ICD-10-CM

## 2021-10-14 PROCEDURE — 99212 OFFICE O/P EST SF 10 MIN: CPT | Performed by: PSYCHIATRY & NEUROLOGY

## 2021-10-14 PROCEDURE — 99204 OFFICE O/P NEW MOD 45 MIN: CPT | Performed by: PSYCHIATRY & NEUROLOGY

## 2021-10-14 RX ORDER — RASAGILINE 1 MG/1
1 TABLET ORAL DAILY
COMMUNITY
Start: 2021-08-19

## 2021-10-14 RX ORDER — ROTIGOTINE 4 MG/24H
1 PATCH, EXTENDED RELEASE TRANSDERMAL DAILY
Qty: 30 PATCH | Refills: 2 | Status: SHIPPED | OUTPATIENT
Start: 2021-10-14 | End: 2022-01-13

## 2021-10-14 ASSESSMENT — FIBROSIS 4 INDEX: FIB4 SCORE: 0.91

## 2021-10-14 NOTE — PROGRESS NOTES
Chief Complaint   Patient presents with   • New Patient     Parkinson Disease       History of present illness:  Rakan Rader III 56 y.o. male with Parkinson's disease since 2010 and was diagnosed in 2016. His initial symptoms were tremors of the right hand. He was previously seen by Dr. Hart.   He is a  at [email or web address] but he is having OFF time and sinemet may take 1-1.5 hours to kick in. His OFF time is bothersome and he is unable to function at work if he wears off. He delays breakfast and lunch so that his medication will function. He has involuntary movement of his right side with twisting of his right leg when the sinemet is wearing off.       PD Meds:   Rasagiline 1mg daily - no benefit noted   Sinemet 25/100 1 tab every 2 hours. 1 tab lasts about 2 hours.     Movement-Specific ROS  Sleep: Vivid dreams that seem real. No trouble falling asleep.   Constipation: No   Urination: Has frequent urination. Does not take terazosin because does not want to take pills.    Dizziness: No   Hallucinations: No    Excessive daytime somnolence: No   Anxiety: Yes, has been meditating. Claustrophobia triggers anxiety.   Balance: Has had poor balance in the evening     Past medical history:   Past Medical History:   Diagnosis Date   • Anxiety        Past surgical history:   Past Surgical History:   Procedure Laterality Date   • INGUINAL HERNIA REPAIR ROBOTIC Bilateral 11/22/2017    Procedure: INGUINAL HERNIA REPAIR ROBOTIC/ WITH MESH PLACEMENT;  Surgeon: Zacakry Ponce M.D.;  Location: SURGERY [facility];  Service: General   • ELBOW ORIF      right as a child   • LAMINOTOMY      c5-7   • OTHER NEUROLOGICAL SURG      cervical fusion   • OTHER ORTHOPEDIC SURGERY      finger surgery as child   • SEPTOTURBINOPLASTY     • SHOULDER ARTHROSCOPY W/ SLAP / LABRAL REPAIR      left   • SINUSCOPY      maxillary sinuses       Family history:   Family History   Problem Relation Age of Onset   • Heart  Disease Mother    • Hypertension Mother    • Hyperlipidemia Mother    • Psychiatric Illness Mother    • Psychiatric Illness Father    • Alcohol/Drug Father    • Alcohol/Drug Sister    • Sleep Apnea Neg Hx        Social history:   Social History     Socioeconomic History   • Marital status:      Spouse name: Not on file   • Number of children: Not on file   • Years of education: Not on file   • Highest education level: Not on file   Occupational History   • Not on file   Tobacco Use   • Smoking status: Former Smoker     Packs/day: 1.00     Years: 5.00     Pack years: 5.00     Types: Cigarettes     Quit date: 1987     Years since quittin.9   • Smokeless tobacco: Former User     Types: Chew   • Tobacco comment: quit at age 23   Vaping Use   • Vaping Use: Never used   Substance and Sexual Activity   • Alcohol use: No     Comment: stopped drinking  @ 20   • Drug use: No   • Sexual activity: Yes     Partners: Female     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    Other Topics Concern   • Not on file   Social History Narrative   • Not on file     Social Determinants of Health     Financial Resource Strain:    • Difficulty of Paying Living Expenses:    Food Insecurity:    • Worried About Running Out of Food in the Last Year:    • Ran Out of Food in the Last Year:    Transportation Needs:    • Lack of Transportation (Medical):    • Lack of Transportation (Non-Medical):    Physical Activity:    • Days of Exercise per Week:    • Minutes of Exercise per Session:    Stress:    • Feeling of Stress :    Social Connections:    • Frequency of Communication with Friends and Family:    • Frequency of Social Gatherings with Friends and Family:    • Attends Judaism Services:    • Active Member of Clubs or Organizations:    • Attends Club or Organization Meetings:    • Marital Status:    Intimate Partner Violence:    • Fear of Current or Ex-Partner:    • Emotionally Abused:    • Physically  "Abused:    • Sexually Abused:        Current medications:   Current Outpatient Medications   Medication   • rasagiline (AZILECT) 1 MG Tab   • Multiple Vitamin (MULTIVITAMIN ADULT PO)   • B Complex Vitamins (VITAMIN B COMPLEX PO)   • rotigotine (NEUPRO) 4 MG/24HR PATCH 24 HR   • Levodopa 42 MG Cap   • carbidopa-levodopa (SINEMET)  MG Tab   • terazosin (HYTRIN) 2 MG Cap   • pramipexole (MIRAPEX) 0.125 MG Tab   • rizatriptan (MAXALT) 5 MG tablet     No current facility-administered medications for this visit.       Medication Allergy:  Allergies   Allergen Reactions   • Asa [Aspirin] Shortness of Breath and Swelling   • Ibuprofen Shortness of Breath and Swelling       Physical examination:   Vitals:    10/14/21 0825 10/14/21 0828   BP: 128/84 118/74   BP Location: Left arm Left arm   Patient Position: Sitting Standing   BP Cuff Size: Adult Adult   Pulse: 68 64   Resp: 18    Temp: 36.5 °C (97.7 °F)    TempSrc: Temporal    SpO2: 97% 98%   Weight: 57.6 kg (126 lb 15.8 oz)    Height: 1.727 m (5' 8\")      Last dose of L-DOPA: 7am.     Neurological Exam  Mental Status  Awake and alert. Speech is normal. Language is fluent with no aphasia.    Motor   Increased muscle tone. +Mild right arm rigidity. . The following abnormal movements were seen: +Mild dyskinesia  No tremor.  Mild right finger tapping decrement.   Normal foot tapping .    Gait  Casual gait: Reduced right arm swing. Normal left arm swing.  No bradykinesia .      Labs:  I reviewed the following labs personally:  None     Imagin Divya scan   IMPRESSION:        Abnormal pattern of uptake in the striata, left more than right, suggestive of a Parkinsonian type syndrome.    ASSESSMENT AND PLAN:  Problem List Items Addressed This Visit     Parkinson's disease (HCC)    Relevant Medications    rasagiline (AZILECT) 1 MG Tab    rotigotine (NEUPRO) 4 MG/24HR PATCH 24 HR    Levodopa 42 MG Cap          1. Parkinson's disease (HCC)  - rotigotine (NEUPRO) 4 MG/24HR " PATCH 24 HR; Place 1 Patch on the skin every day.  Dispense: 30 Patch; Refill: 2  - Levodopa 42 MG Cap; Place 2 Capsules into inhaler and inhale 2 times a day as needed.  Dispense: 120 Capsule; Refill: 3    Other orders  - rasagiline (AZILECT) 1 MG Tab; Take 1 mg by mouth every day.  - Multiple Vitamin (MULTIVITAMIN ADULT PO); Take  by mouth.  - B Complex Vitamins (VITAMIN B COMPLEX PO); Take  by mouth.    He has noticed that food impairs the effect of his sinemet. 1 tab of sinemet takes up to 1.5 hours to kick in he eats. His ON state is high functioning however his fluctuations are bothersome. I have prescribed neupro as well as Inbrija inhaler PRN to bypass gastric absorption issues. No changes were made to sinemet.       FOLLOW-UP:   Return in about 3 months (around 1/14/2022).    Total time spent for the day for this patient unrelated to procedure time is: 44 minutes. I spent 38 minutes in face to face time and I spent 3 minutes pre-charting and 3 minutes in post-visit documentation.      AMAN Del CidO.  [facility]   Movement Disorders Specialist

## 2021-10-14 NOTE — PATIENT INSTRUCTIONS
Start neupro patch at 2mg once daily. Increase to 4mg once daily after 1 week.     Start Inbrija as a rescue treatment. A full dose is 2 caps.     Continue current sinemet regimen with rasagiline.

## 2021-11-22 DIAGNOSIS — N40.1 BPH WITH OBSTRUCTION/LOWER URINARY TRACT SYMPTOMS: ICD-10-CM

## 2021-11-22 DIAGNOSIS — N13.8 BPH WITH OBSTRUCTION/LOWER URINARY TRACT SYMPTOMS: ICD-10-CM

## 2021-11-22 RX ORDER — TERAZOSIN 2 MG/1
CAPSULE ORAL
Qty: 90 CAPSULE | Refills: 1 | Status: SHIPPED | OUTPATIENT
Start: 2021-11-22 | End: 2022-01-13

## 2021-11-22 NOTE — TELEPHONE ENCOUNTER
Received request via: Pharmacy    Was the patient seen in the last year in this department? Yes  6/2/2021  Does the patient have an active prescription (recently filled or refills available) for medication(s) requested? No

## 2022-01-13 ENCOUNTER — OFFICE VISIT (OUTPATIENT)
Dept: NEUROLOGY | Facility: MEDICAL CENTER | Age: 57
End: 2022-01-13
Attending: PSYCHIATRY & NEUROLOGY
Payer: COMMERCIAL

## 2022-01-13 VITALS
HEART RATE: 77 BPM | TEMPERATURE: 98.2 F | OXYGEN SATURATION: 94 % | WEIGHT: 126.54 LBS | BODY MASS INDEX: 19.18 KG/M2 | DIASTOLIC BLOOD PRESSURE: 62 MMHG | SYSTOLIC BLOOD PRESSURE: 102 MMHG | HEIGHT: 68 IN

## 2022-01-13 DIAGNOSIS — G20.A1 PARKINSON'S DISEASE (HCC): ICD-10-CM

## 2022-01-13 PROBLEM — R20.0 RIGHT ARM NUMBNESS: Status: RESOLVED | Noted: 2021-06-02 | Resolved: 2022-01-13

## 2022-01-13 PROCEDURE — 99214 OFFICE O/P EST MOD 30 MIN: CPT | Performed by: PSYCHIATRY & NEUROLOGY

## 2022-01-13 PROCEDURE — 99212 OFFICE O/P EST SF 10 MIN: CPT | Performed by: PSYCHIATRY & NEUROLOGY

## 2022-01-13 RX ORDER — ROTIGOTINE 6 MG/24H
1 PATCH, EXTENDED RELEASE TRANSDERMAL DAILY
Qty: 30 PATCH | Refills: 3 | Status: SHIPPED | OUTPATIENT
Start: 2022-01-13 | End: 2022-04-13

## 2022-01-13 ASSESSMENT — FIBROSIS 4 INDEX: FIB4 SCORE: 0.91

## 2022-01-13 ASSESSMENT — PATIENT HEALTH QUESTIONNAIRE - PHQ9: CLINICAL INTERPRETATION OF PHQ2 SCORE: 0

## 2022-01-13 NOTE — PROGRESS NOTES
Chief Complaint   Patient presents with   • Follow-Up     Parkinson's       History of present illness:  Rakan Rader III 56 y.o. male with Parkinson's disease since 2010 and was diagnosed in 2016. His initial symptoms were tremors of the right hand. He was previously seen by Dr. Hart.   He is a  at [employer] but he is having OFF time and sinemet may take 1-1.5 hours to kick in. His OFF time is bothersome and he is unable to function at work if he wears off. He delays breakfast and lunch so that his medication will function. He has involuntary movement of his right side with twisting of his right leg when the sinemet is wearing off.      10/14/21: He has noticed that food impairs the effect of his sinemet. 1 tab of sinemet takes up to 1.5 hours to kick in he eats. His ON state is high functioning however his fluctuations are bothersome. I have prescribed neupro as well as Inbrija inhaler PRN to bypass gastric absorption issues. No changes were made to sinemet.       1/13/21: Neupro has reduced the severity of the OFF state. Dyskinesia is less severe.     PD Meds:   Rasagiline 1mg daily - no benefit noted   Sinemet 25/100 1 tab every 2 hours. 1 tab lasts about 2 hours.   Neupro 4mg daily      Movement-Specific ROS  Sleep: Is continuing to have trouble falling asleep. He may have a sleepless night 2-3 times/week.   Constipation: No   Urination: Has frequent urination. Does not take terazosin because does not want to take pills.    Dizziness: No   Hallucinations: No    Excessive daytime somnolence: No   Anxiety: Yes, has been meditating. Claustrophobia triggers anxiety.   Balance: Has had poor balance in the evening     Past medical history:   Past Medical History:   Diagnosis Date   • Anxiety        Past surgical history:   Past Surgical History:   Procedure Laterality Date   • INGUINAL HERNIA REPAIR ROBOTIC Bilateral 11/22/2017    Procedure: INGUINAL HERNIA REPAIR ROBOTIC/ WITH MESH  PLACEMENT;  Surgeon: Zackary Ponce M.D.;  Location: SURGERY [facility];  Service: General   • LAMINOTOMY      c5-7   • ORIF, ELBOW      right as a child   • OTHER NEUROLOGICAL SURG      cervical fusion   • OTHER ORTHOPEDIC SURGERY      finger surgery as child   • SEPTOTURBINOPLASTY     • SHOULDER ARTHROSCOPY W/ SLAP / LABRAL REPAIR      left   • SINUSCOPY      maxillary sinuses       Family history:   Family History   Problem Relation Age of Onset   • Heart Disease Mother    • Hypertension Mother    • Hyperlipidemia Mother    • Psychiatric Illness Mother    • Psychiatric Illness Father    • Alcohol/Drug Father    • Alcohol/Drug Sister    • Sleep Apnea Neg Hx        Social history:   Social History     Socioeconomic History   • Marital status:      Spouse name: Not on file   • Number of children: Not on file   • Years of education: Not on file   • Highest education level: Not on file   Occupational History   • Not on file   Tobacco Use   • Smoking status: Former Smoker     Packs/day: 1.00     Years: 5.00     Pack years: 5.00     Types: Cigarettes     Quit date: 1987     Years since quittin.1   • Smokeless tobacco: Former User     Types: Chew   • Tobacco comment: quit at age 23   Vaping Use   • Vaping Use: Never used   Substance and Sexual Activity   • Alcohol use: No     Comment: stopped drinking  @ 20   • Drug use: No   • Sexual activity: Yes     Partners: Female     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    Other Topics Concern   • Not on file   Social History Narrative   • Not on file     Social Determinants of Health     Financial Resource Strain:    • Difficulty of Paying Living Expenses: Not on file   Food Insecurity:    • Worried About Running Out of Food in the Last Year: Not on file   • Ran Out of Food in the Last Year: Not on file   Transportation Needs:    • Lack of Transportation (Medical): Not on file   • Lack of Transportation (Non-Medical): Not on  "file   Physical Activity:    • Days of Exercise per Week: Not on file   • Minutes of Exercise per Session: Not on file   Stress:    • Feeling of Stress : Not on file   Social Connections:    • Frequency of Communication with Friends and Family: Not on file   • Frequency of Social Gatherings with Friends and Family: Not on file   • Attends Mormon Services: Not on file   • Active Member of Clubs or Organizations: Not on file   • Attends Club or Organization Meetings: Not on file   • Marital Status: Not on file   Intimate Partner Violence:    • Fear of Current or Ex-Partner: Not on file   • Emotionally Abused: Not on file   • Physically Abused: Not on file   • Sexually Abused: Not on file   Housing Stability:    • Unable to Pay for Housing in the Last Year: Not on file   • Number of Places Lived in the Last Year: Not on file   • Unstable Housing in the Last Year: Not on file       Current medications:   Current Outpatient Medications   Medication   • rotigotine (NEUPRO) 6 MG/24HR patch   • rasagiline (AZILECT) 1 MG Tab   • Multiple Vitamin (MULTIVITAMIN ADULT PO)   • B Complex Vitamins (VITAMIN B COMPLEX PO)   • Levodopa 42 MG Cap   • carbidopa-levodopa (SINEMET)  MG Tab   • terazosin (HYTRIN) 2 MG Cap   • rizatriptan (MAXALT) 5 MG tablet     No current facility-administered medications for this visit.       Medication Allergy:  Allergies   Allergen Reactions   • Asa [Aspirin] Shortness of Breath and Swelling   • Ibuprofen Shortness of Breath and Swelling       Physical examination:   Vitals:    01/13/22 0744 01/13/22 0748   BP: 100/62 102/62   BP Location: Left arm Left arm   Patient Position: Sitting Standing   BP Cuff Size: Adult Adult   Pulse: 73 77   Temp: 36.8 °C (98.2 °F)    TempSrc: Temporal    SpO2: 96% 94%   Weight: 57.4 kg (126 lb 8.7 oz)    Height: 1.727 m (5' 8\")      Neurological Exam  Mental Status   Mild dysarthria present.    Motor    Mild dyskinesias present at rest.  He currently is ON..   "     Labs:  I reviewed the following labs personally:  None    Imaging:   None     ASSESSMENT AND PLAN:  Problem List Items Addressed This Visit     Parkinson's disease (HCC)    Relevant Medications    rotigotine (NEUPRO) 6 MG/24HR patch          1. Parkinson's disease (HCC)  - rotigotine (NEUPRO) 6 MG/24HR patch; Place 1 Patch on the skin every day.  Dispense: 30 Patch; Refill: 3    66-year-old male with Parkinson's disease.  He is having bothersome motor fluctuations.  At the last visit I started Neupro 4 mg, which has been effective for improving the severity of the off state.  He continues to have bothersome off time.  Increase the Neupro patch to 6 mg.  He also has the Inbrija inhaler, which has been taking 45 minutes to kick in however he has only been using a half dose.  I instructed him today on how to administer a full dose of Inbrija.     FOLLOW-UP:   Return in about 3 months (around 4/13/2022).    Total time spent for the day for this patient unrelated to procedure time is: 23 minutes. I spent 16 minutes in face to face time and I spent 1 minutes pre-charting and 6 minutes in post-visit documentation.      AMAN Del CidO.  [facility]   Movement Disorders Specialist

## 2022-02-09 ENCOUNTER — OFFICE VISIT (OUTPATIENT)
Dept: URGENT CARE | Facility: CLINIC | Age: 57
End: 2022-02-09
Payer: COMMERCIAL

## 2022-02-09 VITALS
WEIGHT: 124 LBS | HEIGHT: 68 IN | DIASTOLIC BLOOD PRESSURE: 70 MMHG | SYSTOLIC BLOOD PRESSURE: 150 MMHG | BODY MASS INDEX: 18.79 KG/M2 | TEMPERATURE: 97.8 F | OXYGEN SATURATION: 96 % | HEART RATE: 69 BPM | RESPIRATION RATE: 18 BRPM

## 2022-02-09 DIAGNOSIS — G89.29 CHRONIC LOW BACK PAIN WITHOUT SCIATICA, UNSPECIFIED BACK PAIN LATERALITY: ICD-10-CM

## 2022-02-09 DIAGNOSIS — M54.50 CHRONIC LOW BACK PAIN WITHOUT SCIATICA, UNSPECIFIED BACK PAIN LATERALITY: ICD-10-CM

## 2022-02-09 DIAGNOSIS — M62.830 BACK MUSCLE SPASM: ICD-10-CM

## 2022-02-09 PROCEDURE — 99214 OFFICE O/P EST MOD 30 MIN: CPT | Performed by: PHYSICIAN ASSISTANT

## 2022-02-09 RX ORDER — METHOCARBAMOL 750 MG/1
750 TABLET, FILM COATED ORAL 3 TIMES DAILY PRN
Qty: 30 TABLET | Refills: 0 | Status: SHIPPED | OUTPATIENT
Start: 2022-02-09 | End: 2022-03-16

## 2022-02-09 RX ORDER — METHYLPREDNISOLONE 4 MG/1
TABLET ORAL
Qty: 21 TABLET | Refills: 0 | Status: SHIPPED | OUTPATIENT
Start: 2022-02-09 | End: 2022-03-16

## 2022-02-09 ASSESSMENT — ENCOUNTER SYMPTOMS
SENSORY CHANGE: 0
COUGH: 0
NUMBNESS: 0
LEG PAIN: 0
NAUSEA: 0
BACK PAIN: 1
CHILLS: 0
WEAKNESS: 0
ABDOMINAL PAIN: 0
TINGLING: 0
SHORTNESS OF BREATH: 0
MYALGIAS: 1
PARESTHESIAS: 0
PERIANAL NUMBNESS: 0
FEVER: 0
PARESIS: 0
VOMITING: 0
BOWEL INCONTINENCE: 0

## 2022-02-09 ASSESSMENT — FIBROSIS 4 INDEX: FIB4 SCORE: 0.91

## 2022-02-09 NOTE — PROGRESS NOTES
"Subjective     Rakan Rader III is a 56 y.o. male who presents with Back Pain ((L) Lower side, locking up. x week ago  (Hx of back locking,worse))            Back Pain  This is a chronic (Several years- recurrent episodes of severe pain and muscle spasm ) problem. Episode onset: recent onset 1 week after bending over. The problem occurs constantly. The problem is unchanged. The pain is present in the lumbar spine (left ). The quality of the pain is described as cramping (\"locked up feeling\"). The pain is moderate. The symptoms are aggravated by bending, twisting and position. Pertinent negatives include no abdominal pain, bladder incontinence, bowel incontinence, chest pain, dysuria, fever, leg pain, numbness, paresis, paresthesias, pelvic pain, perianal numbness, tingling or weakness. He has tried home exercises and chiropractic manipulation for the symptoms. The treatment provided no relief.         Past Medical History:   Diagnosis Date   • Anxiety        Past Surgical History:   Procedure Laterality Date   • INGUINAL HERNIA REPAIR ROBOTIC Bilateral 11/22/2017    Procedure: INGUINAL HERNIA REPAIR ROBOTIC/ WITH MESH PLACEMENT;  Surgeon: Zackary Ponce M.D.;  Location: SURGERY [facility];  Service: General   • LAMINOTOMY      c5-7   • ORIF, ELBOW      right as a child   • OTHER NEUROLOGICAL SURG      cervical fusion   • OTHER ORTHOPEDIC SURGERY      finger surgery as child   • SEPTOTURBINOPLASTY     • SHOULDER ARTHROSCOPY W/ SLAP / LABRAL REPAIR      left   • SINUSCOPY      maxillary sinuses       Family History   Problem Relation Age of Onset   • Heart Disease Mother    • Hypertension Mother    • Hyperlipidemia Mother    • Psychiatric Illness Mother    • Psychiatric Illness Father    • Alcohol/Drug Father    • Alcohol/Drug Sister    • Sleep Apnea Neg Hx        Allergies   Allergen Reactions   • Asa [Aspirin] Shortness of Breath and Swelling   • Ibuprofen Shortness of Breath and Swelling " "      Medications, Allergies, and current problem list reviewed today in Epic    Review of Systems   Constitutional: Negative for chills, fever and malaise/fatigue.   Respiratory: Negative for cough and shortness of breath.    Cardiovascular: Negative for chest pain and leg swelling.   Gastrointestinal: Negative for abdominal pain, bowel incontinence, nausea and vomiting.   Genitourinary: Negative for bladder incontinence, dysuria and pelvic pain.        No urinary or bowel incontinence    Musculoskeletal: Positive for back pain and myalgias.   Neurological: Negative for tingling, sensory change, weakness, numbness and paresthesias.       All other systems reviewed and are negative.              Objective     /70   Pulse 69   Temp 36.6 °C (97.8 °F)   Resp 18   Ht 1.727 m (5' 8\")   Wt 56.2 kg (124 lb)   SpO2 96%   BMI 18.85 kg/m²      Physical Exam  Constitutional:       General: He is not in acute distress.     Appearance: He is not ill-appearing.   HENT:      Head: Normocephalic and atraumatic.   Eyes:      Conjunctiva/sclera: Conjunctivae normal.   Cardiovascular:      Rate and Rhythm: Normal rate and regular rhythm.   Pulmonary:      Effort: Pulmonary effort is normal. No respiratory distress.   Musculoskeletal:        Back:       Comments: Lower extremities with FROM and distal n/v intact.    Skin:     General: Skin is warm and dry.   Neurological:      General: No focal deficit present.      Mental Status: He is alert and oriented to person, place, and time.      Motor: Tremor (resting ) present.   Psychiatric:         Mood and Affect: Mood normal.         Behavior: Behavior normal.         Thought Content: Thought content normal.         Judgment: Judgment normal.                             Assessment & Plan        1. Chronic low back pain without sciatica, unspecified back pain laterality    - methylPREDNISolone (MEDROL DOSEPAK) 4 MG Tablet Therapy Pack; Follow schedule on package instructions.  " Dispense: 21 Tablet; Refill: 0  - methocarbamol (ROBAXIN) 750 MG Tab; Take 1 Tablet by mouth 3 times a day as needed.  Dispense: 30 Tablet; Refill: 0  - Referral to Pain Clinic- Physiatry     2. Back muscle spasm       Differential diagnoses, Supportive care, and indications for immediate follow-up discussed with patient.   Pathogenesis of diagnosis discussed including typical length and natural progression.   Instructed to return to clinic or nearest emergency department for any change in condition, further concerns, or worsening of symptoms.    The patient demonstrated a good understanding and agreed with the treatment plan.    Marilyn Varela P.A.-C.

## 2022-03-16 ENCOUNTER — OFFICE VISIT (OUTPATIENT)
Dept: PHYSICAL MEDICINE AND REHAB | Facility: MEDICAL CENTER | Age: 57
End: 2022-03-16
Payer: COMMERCIAL

## 2022-03-16 VITALS
HEART RATE: 74 BPM | SYSTOLIC BLOOD PRESSURE: 98 MMHG | DIASTOLIC BLOOD PRESSURE: 52 MMHG | WEIGHT: 122.58 LBS | OXYGEN SATURATION: 97 % | BODY MASS INDEX: 18.58 KG/M2 | TEMPERATURE: 98 F | HEIGHT: 68 IN

## 2022-03-16 DIAGNOSIS — G89.29 CHRONIC LEFT-SIDED LOW BACK PAIN WITHOUT SCIATICA: ICD-10-CM

## 2022-03-16 DIAGNOSIS — M54.50 CHRONIC LEFT-SIDED LOW BACK PAIN WITHOUT SCIATICA: ICD-10-CM

## 2022-03-16 DIAGNOSIS — M62.838 MUSCLE SPASM: ICD-10-CM

## 2022-03-16 DIAGNOSIS — G20.A1 PARKINSON'S DISEASE (HCC): ICD-10-CM

## 2022-03-16 PROCEDURE — 99204 OFFICE O/P NEW MOD 45 MIN: CPT | Performed by: PHYSICAL MEDICINE & REHABILITATION

## 2022-03-16 ASSESSMENT — PAIN SCALES - GENERAL: PAINLEVEL: NO PAIN

## 2022-03-16 ASSESSMENT — PATIENT HEALTH QUESTIONNAIRE - PHQ9: CLINICAL INTERPRETATION OF PHQ2 SCORE: 0

## 2022-03-16 ASSESSMENT — FIBROSIS 4 INDEX: FIB4 SCORE: 0.91

## 2022-03-16 NOTE — PROGRESS NOTES
New patient note    Interventional spine and Pain  Physiatry (physical medicine and  Rehabilitation)     Date of service: See epic    Chief complaint:   Chief Complaint   Patient presents with   • New Patient     Back pain        Referring provider: Marilyn Varela P.A.    HISTORY    HPI: Rakan Rader III 56 y.o.  who presents today with Diagnoses of Chronic left-sided low back pain without sciatica, Muscle spasm, and Parkinson's disease (HCC) were pertinent to this visit.    HPI    Acute on chronic left low back pain axial. This has been waxing and waning for the past several years. There is associated spasms and severe pain 8/10. This is usually worse after lifting motions.      Medications tried include muscle relaxers, robaxin, oral steroids. He is allergic to NSAIDs and ASA.     He has done a home exercise and stretching routine including the past three months. He has done chiropractic care at [facility] chiropractor.     Medical records review:  I reviewed the note from the referring provider Marilyn Varela P.A.* including the note dated 2/9/2022.  Noted to have back spasms with chronic low back pain given Robaxin and Medrol Dosepak..           ROS:   Positive for Parkinson's followed by neurology  Red Flags ROS:   Fever, Chills, Sweats: Denies  Involuntary Weight Loss: Denies  Bladder Incontinence: Denies  Bowel Incontinence: denies  Saddle Anesthesia: Denies    All other systems reviewed and negative.       PMHx:   Past Medical History:   Diagnosis Date   • Anxiety          Current Outpatient Medications on File Prior to Visit   Medication Sig Dispense Refill   • rotigotine (NEUPRO) 6 MG/24HR patch Place 1 Patch on the skin every day. 30 Patch 3   • rasagiline (AZILECT) 1 MG Tab Take 1 mg by mouth every day.     • Multiple Vitamin (MULTIVITAMIN ADULT PO) Take  by mouth.     • B Complex Vitamins (VITAMIN B COMPLEX PO) Take  by mouth.     • Levodopa 42 MG Cap Place 2 Capsules into inhaler and  inhale 2 times a day as needed. 120 Capsule 3   • carbidopa-levodopa (SINEMET)  MG Tab Take 1 Tablet by mouth 5 Times a Day.       No current facility-administered medications on file prior to visit.        PSHx:   Past Surgical History:   Procedure Laterality Date   • INGUINAL HERNIA REPAIR ROBOTIC Bilateral 11/22/2017    Procedure: INGUINAL HERNIA REPAIR ROBOTIC/ WITH MESH PLACEMENT;  Surgeon: Zackary Ponce M.D.;  Location: SURGERY [facility];  Service: General   • LAMINOTOMY      c5-7   • ORIF, ELBOW      right as a child   • OTHER NEUROLOGICAL SURG      cervical fusion   • OTHER ORTHOPEDIC SURGERY      finger surgery as child   • SEPTOTURBINOPLASTY     • SHOULDER ARTHROSCOPY W/ SLAP / LABRAL REPAIR      left   • SINUSCOPY      maxillary sinuses       Family history   Family History   Problem Relation Age of Onset   • Heart Disease Mother    • Hypertension Mother    • Hyperlipidemia Mother    • Psychiatric Illness Mother    • Psychiatric Illness Father    • Alcohol/Drug Father    • Alcohol/Drug Sister    • Sleep Apnea Neg Hx          Medications: reviewed on epic.   Outpatient Medications Marked as Taking for the 3/16/22 encounter (Office Visit) with Geovanny Lunsford M.D.   Medication Sig Dispense Refill   • rotigotine (NEUPRO) 6 MG/24HR patch Place 1 Patch on the skin every day. 30 Patch 3   • rasagiline (AZILECT) 1 MG Tab Take 1 mg by mouth every day.     • Multiple Vitamin (MULTIVITAMIN ADULT PO) Take  by mouth.     • B Complex Vitamins (VITAMIN B COMPLEX PO) Take  by mouth.     • Levodopa 42 MG Cap Place 2 Capsules into inhaler and inhale 2 times a day as needed. 120 Capsule 3   • carbidopa-levodopa (SINEMET)  MG Tab Take 1 Tablet by mouth 5 Times a Day.          Allergies:   Allergies   Allergen Reactions   • Asa [Aspirin] Shortness of Breath and Swelling   • Ibuprofen Shortness of Breath and Swelling       Social Hx:   Social History     Socioeconomic History   • Marital status:  "     Spouse name: Not on file   • Number of children: Not on file   • Years of education: Not on file   • Highest education level: Not on file   Occupational History   • Not on file   Tobacco Use   • Smoking status: Former Smoker     Packs/day: 1.00     Years: 5.00     Pack years: 5.00     Types: Cigarettes     Quit date: 1987     Years since quittin.3   • Smokeless tobacco: Former User     Types: Chew   • Tobacco comment: quit at age 23   Vaping Use   • Vaping Use: Never used   Substance and Sexual Activity   • Alcohol use: No     Comment: stopped drinking  @    • Drug use: No   • Sexual activity: Yes     Partners: Female     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    Other Topics Concern   • Not on file   Social History Narrative   • Not on file     Social Determinants of Health     Financial Resource Strain: Not on file   Food Insecurity: Not on file   Transportation Needs: Not on file   Physical Activity: Not on file   Stress: Not on file   Social Connections: Not on file   Intimate Partner Violence: Not on file   Housing Stability: Not on file         EXAMINATION     Physical Exam:   Vitals: BP (!) 98/52 (BP Location: Right arm, Patient Position: Sitting, BP Cuff Size: Adult)   Pulse 74   Temp 36.7 °C (98 °F) (Temporal)   Ht 1.727 m (5' 8\")   Wt 55.6 kg (122 lb 9.2 oz)   SpO2 97%     Constitutional:   Body Habitus: Body mass index is 18.64 kg/m².  Cooperation: Fully cooperates with exam  Appearance: Well-groomed, well-nourished, not disheveled     Eyes: No scleral icterus to suggest severe liver disease, no proptosis to suggest severe hyperthyroid    ENT -no obvious auditory deficits, no obvious tongue lesions, tongue midline, no facial droop     Skin -no rashes or lesions noted     Respiratory-  breathing comfortable on room air, no audible wheezing    Cardiovascular- capillary refills less than 2 seconds.     Psychiatric- alert and oriented ×3. Normal affect. "       Musculoskeletal and Neuro -     Movement pattern consistent with Parkinson's    Thoracic/Lumbar Spine/Sacral Spine/Hips   Inspection: No evidence of atrophy in bilateral lower extremities throughout     ROM: full  active range of motion with flexion, lateral flexion, and rotation bilaterally.   There is decreased active range of motion with lumbar extension with pain.    There is pain with facet loading maneuver (extension rotation) with axial low back pain on the LEFT side(s)    Palpation:   No tenderness to palpation in midline at T1-T12 levels. No tenderness to palpation in the left and right of the midline T1-L5, NEGATIVE for tenderness to palpation to the para-midline region in the lower lumbar levels.  palpation over SI joint: negative bilaterally    palpation in hip or over the gluteus medius tendon insertion: negative bilaterally      Lumbar spine Special tests  Neuro tension  Straight leg test negative bilaterally    Slump test negative bilaterally      HIP  FAIR test negative bilaterally    Range of motion in the RIGHT hip is full  in flexion, extension, abduction, internal rotation, external rotation.  Range of motion in the LEFT hip is full  in flexion, extension, abduction, internal rotation, external rotation.      SI joint tests  Observation patient sits on one buttocks: Negative  SI joint compression negative bilaterally    SI joint distraction negative bilaterally    Thigh thrust test negative bilaterally    RUSTY test negative bilaterally                 Key points for the international standards for neurological classification of spinal cord injury (ISNCSCI) to light touch.     Dermatome R L                                      L2 2 2   L3 2 2   L4 2 2   L5 2 2   S1 2 2   S2 2 2       Motor Exam Lower Extremities    ? Myotome R L   Hip flexion L2 5 5   Knee extension L3 5 5   Ankle dorsiflexion L4 5 5   Toe extension L5 5 5   Ankle plantarflexion S1 5 5         Reflexes  ?  R L                 Patella  0 0   Achilles   0 0     Reflexes difficult to assess because of the patient's Parkinson's    Babinski sign negative bilaterally   Clonus of the ankle negative bilaterally       MEDICAL DECISION MAKING    Medical records review: see under HPI section.     DATA    Labs:   Lab Results   Component Value Date/Time    SODIUM 138 06/18/2021 08:31 AM    POTASSIUM 4.3 06/18/2021 08:31 AM    CHLORIDE 104 06/18/2021 08:31 AM    CO2 26 06/18/2021 08:31 AM    ANION 8.0 06/18/2021 08:31 AM    GLUCOSE 109 (H) 06/18/2021 08:31 AM    BUN 11 06/18/2021 08:31 AM    CREATININE 0.78 06/18/2021 08:31 AM    CALCIUM 9.2 06/18/2021 08:31 AM    ASTSGOT 17 06/18/2021 08:31 AM    ALTSGPT 24 06/18/2021 08:31 AM    TBILIRUBIN 0.6 06/18/2021 08:31 AM    ALBUMIN 4.4 06/18/2021 08:31 AM    TOTPROTEIN 7.3 06/18/2021 08:31 AM    GLOBULIN 2.9 06/18/2021 08:31 AM    AGRATIO 1.5 06/18/2021 08:31 AM   ]    No results found for: PROTHROMBTM, INR     Lab Results   Component Value Date/Time    WBC 5.9 06/18/2021 08:31 AM    RBC 5.05 06/18/2021 08:31 AM    HEMOGLOBIN 15.3 06/18/2021 08:31 AM    HEMATOCRIT 47.4 06/18/2021 08:31 AM    MCV 93.9 06/18/2021 08:31 AM    MCH 30.3 06/18/2021 08:31 AM    MCHC 32.3 (L) 06/18/2021 08:31 AM    MPV 10.2 06/18/2021 08:31 AM    NEUTSPOLYS 67.70 06/18/2021 08:31 AM    LYMPHOCYTES 21.10 (L) 06/18/2021 08:31 AM    MONOCYTES 8.30 06/18/2021 08:31 AM    EOSINOPHILS 1.40 06/18/2021 08:31 AM    BASOPHILS 1.20 06/18/2021 08:31 AM        No results found for: HBA1C     Imaging:   I personally reviewed following images, these are my reads          IMAGING radiology reads. I reviewed the following radiology reads                                                                 Results for orders placed during the hospital encounter of 06/08/21    DX-CERVICAL SPINE-2 OR 3 VIEWS    Impression  1.  No cervical spine compression fracture or subluxation.    2.  ACDF changes C5-C7.    3.  Mild diffuse disc space narrowing at  C4-5.     Results for orders placed during the hospital encounter of 08/17/17    DX-CHEST-2 VIEWS    Impression  Negative two views of the chest.                                             Diagnosis   Visit Diagnoses     ICD-10-CM   1. Chronic left-sided low back pain without sciatica  M54.50    G89.29   2. Muscle spasm  M62.838   3. Parkinson's disease (HCC)  G20           ASSESSMENT AND PLAN:  Rakan Rader III 56 y.o. male      Rakan was seen today for new patient.    Diagnoses and all orders for this visit:    Chronic left-sided low back pain without sciatica  -     DX-LUMBAR SPINE-2 OR 3 VIEWS; Future  -     MR-LUMBAR SPINE-W/O; Future  -     Referral to Physical Therapy    Muscle spasm  -     Referral to Physical Therapy    Parkinson's disease (HCC)  -     Referral to Physical Therapy        He is failed conservative treatments described above.  I believe the patient is mostly having facet mediated pain which is causing functional deficits and moderate to severe pain.      Physical therapy: I ordered physical therapy to focus on strengthening and stretching.     home exercise program: I provided the patient with a strengthening and stretching with a home exercise program     Diagnostic workup: As above    Medications:   During flares of pain he could use Robaxin.    Interventional program: I would consider the patient for an epidural steroid injection versus diagnostic medial branch block depending on the results of the above     Follow-up: After the above diagnostic studies           Please note that this dictation was created using voice recognition software. I have made every reasonable attempt to correct obvious errors but there may be errors of grammar and content that I may have overlooked prior to finalization of this note.      Geovanny Lunsford MD  Physical Medicine and Rehabilitation  Interventional Spine and Sports Physiatry  [facility]           Marilyn Alexander, P.A.*   ALEJANDRO MARTINES  RACHAEL Fernando.

## 2022-03-24 ENCOUNTER — HOSPITAL ENCOUNTER (OUTPATIENT)
Dept: RADIOLOGY | Facility: MEDICAL CENTER | Age: 57
End: 2022-03-24
Attending: PHYSICAL MEDICINE & REHABILITATION
Payer: COMMERCIAL

## 2022-03-24 DIAGNOSIS — M54.50 CHRONIC LEFT-SIDED LOW BACK PAIN WITHOUT SCIATICA: ICD-10-CM

## 2022-03-24 DIAGNOSIS — G89.29 CHRONIC LEFT-SIDED LOW BACK PAIN WITHOUT SCIATICA: ICD-10-CM

## 2022-03-24 PROCEDURE — 72148 MRI LUMBAR SPINE W/O DYE: CPT

## 2022-03-28 ENCOUNTER — OFFICE VISIT (OUTPATIENT)
Dept: PHYSICAL MEDICINE AND REHAB | Facility: MEDICAL CENTER | Age: 57
End: 2022-03-28
Payer: COMMERCIAL

## 2022-03-28 VITALS
BODY MASS INDEX: 18.94 KG/M2 | HEIGHT: 68 IN | DIASTOLIC BLOOD PRESSURE: 72 MMHG | HEART RATE: 85 BPM | OXYGEN SATURATION: 97 % | SYSTOLIC BLOOD PRESSURE: 128 MMHG | TEMPERATURE: 97.4 F | WEIGHT: 125 LBS

## 2022-03-28 DIAGNOSIS — M47.816 LUMBAR SPONDYLOSIS: ICD-10-CM

## 2022-03-28 DIAGNOSIS — G89.29 CHRONIC LEFT-SIDED LOW BACK PAIN WITHOUT SCIATICA: ICD-10-CM

## 2022-03-28 DIAGNOSIS — M43.16 SPONDYLOLISTHESIS OF LUMBAR REGION: ICD-10-CM

## 2022-03-28 DIAGNOSIS — G20.A1 PARKINSON'S DISEASE (HCC): ICD-10-CM

## 2022-03-28 DIAGNOSIS — M54.50 CHRONIC LEFT-SIDED LOW BACK PAIN WITHOUT SCIATICA: ICD-10-CM

## 2022-03-28 PROCEDURE — 99214 OFFICE O/P EST MOD 30 MIN: CPT | Performed by: PHYSICAL MEDICINE & REHABILITATION

## 2022-03-28 ASSESSMENT — PATIENT HEALTH QUESTIONNAIRE - PHQ9: CLINICAL INTERPRETATION OF PHQ2 SCORE: 0

## 2022-03-28 ASSESSMENT — FIBROSIS 4 INDEX: FIB4 SCORE: 0.91

## 2022-03-28 ASSESSMENT — PAIN SCALES - GENERAL: PAINLEVEL: 2=MINIMAL-SLIGHT

## 2022-03-28 NOTE — PROGRESS NOTES
Follow up patient note  Interventional spine and Pain  Physiatry (physical medicine and  Rehabilitation)       Chief complaint:   Chief Complaint   Patient presents with   • Follow-Up     Back pain          HISTORY    Please see new patient note by Dr Lunsford,  for more details.     HPI  Patient identification: Rakan Rader III ,  1965,   With Diagnoses of Lumbar spondylosis, Chronic left-sided low back pain without sciatica, Spondylolisthesis of lumbar region, and Parkinson's disease (HCC) were pertinent to this visit.       The patient is having intermittent pain in his low back.  He denies radiating pains.  Now the pain is 2 out of 10 in intensity in the left side of his low back.  He denies numbness tingling and weakness.  He has upcoming visits with physical therapy.  He wants to avoid medication management given that the patient is on multiple medications for his Parkinson's disease.      Medications tried include muscle relaxers, robaxin, oral steroids. He is allergic to NSAIDs and ASA.      ROS Red Flags :   Fever, Chills, Sweats: Denies  Involuntary Weight Loss: Denies  Bowel/Bladder Incontinence: Denies  Saddle Anesthesia: Denies        PMHx:   Past Medical History:   Diagnosis Date   • Anxiety        PSHx:   Past Surgical History:   Procedure Laterality Date   • INGUINAL HERNIA REPAIR ROBOTIC Bilateral 2017    Procedure: INGUINAL HERNIA REPAIR ROBOTIC/ WITH MESH PLACEMENT;  Surgeon: Zackary Ponce M.D.;  Location: SURGERY [facility];  Service: General   • LAMINOTOMY      c5-7   • ORIF, ELBOW      right as a child   • OTHER NEUROLOGICAL SURG      cervical fusion   • OTHER ORTHOPEDIC SURGERY      finger surgery as child   • SEPTOTURBINOPLASTY     • SHOULDER ARTHROSCOPY W/ SLAP / LABRAL REPAIR      left   • SINUSCOPY      maxillary sinuses       Family history   Family History   Problem Relation Age of Onset   • Heart Disease Mother    • Hypertension Mother    • Hyperlipidemia  Mother    • Psychiatric Illness Mother    • Psychiatric Illness Father    • Alcohol/Drug Father    • Alcohol/Drug Sister    • Sleep Apnea Neg Hx          Medications:   Outpatient Medications Marked as Taking for the 3/28/22 encounter (Office Visit) with Geovanny Lunsford M.D.   Medication Sig Dispense Refill   • rotigotine (NEUPRO) 6 MG/24HR patch Place 1 Patch on the skin every day. 30 Patch 3   • rasagiline (AZILECT) 1 MG Tab Take 1 mg by mouth every day.     • Multiple Vitamin (MULTIVITAMIN ADULT PO) Take  by mouth.     • B Complex Vitamins (VITAMIN B COMPLEX PO) Take  by mouth.     • Levodopa 42 MG Cap Place 2 Capsules into inhaler and inhale 2 times a day as needed. 120 Capsule 3   • carbidopa-levodopa (SINEMET)  MG Tab Take 1 Tablet by mouth 5 Times a Day.          Current Outpatient Medications on File Prior to Visit   Medication Sig Dispense Refill   • rotigotine (NEUPRO) 6 MG/24HR patch Place 1 Patch on the skin every day. 30 Patch 3   • rasagiline (AZILECT) 1 MG Tab Take 1 mg by mouth every day.     • Multiple Vitamin (MULTIVITAMIN ADULT PO) Take  by mouth.     • B Complex Vitamins (VITAMIN B COMPLEX PO) Take  by mouth.     • Levodopa 42 MG Cap Place 2 Capsules into inhaler and inhale 2 times a day as needed. 120 Capsule 3   • carbidopa-levodopa (SINEMET)  MG Tab Take 1 Tablet by mouth 5 Times a Day.       No current facility-administered medications on file prior to visit.         Allergies:   Allergies   Allergen Reactions   • Asa [Aspirin] Shortness of Breath and Swelling   • Ibuprofen Shortness of Breath and Swelling       Social Hx:   Social History     Socioeconomic History   • Marital status:      Spouse name: Not on file   • Number of children: Not on file   • Years of education: Not on file   • Highest education level: Not on file   Occupational History   • Not on file   Tobacco Use   • Smoking status: Former Smoker     Packs/day: 1.00     Years: 5.00     Pack years: 5.00      "Types: Cigarettes     Quit date: 1987     Years since quittin.3   • Smokeless tobacco: Former User     Types: Chew   • Tobacco comment: quit at age 23   Vaping Use   • Vaping Use: Never used   Substance and Sexual Activity   • Alcohol use: No     Comment: stopped drinking  @    • Drug use: No   • Sexual activity: Yes     Partners: Female     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    Other Topics Concern   • Not on file   Social History Narrative   • Not on file     Social Determinants of Health     Financial Resource Strain: Not on file   Food Insecurity: Not on file   Transportation Needs: Not on file   Physical Activity: Not on file   Stress: Not on file   Social Connections: Not on file   Intimate Partner Violence: Not on file   Housing Stability: Not on file         EXAMINATION     Physical Exam:   Vitals: /72 (BP Location: Right arm, Patient Position: Sitting, BP Cuff Size: Adult)   Pulse 85   Temp 36.3 °C (97.4 °F) (Temporal)   Ht 1.727 m (5' 8\")   Wt 56.7 kg (125 lb)   SpO2 97%     Constitutional:   Body Habitus: Body mass index is 19.01 kg/m².  Cooperation: Fully cooperates with exam  Appearance: Well-groomed no disheveled    Respiratory-  breathing comfortable on room air, no audible wheezing  Cardiovascular- capillary refills less than 2 seconds. No lower extremity edema is noted.   Psychiatric- alert and oriented ×3. Normal affect.    MSK and Neuro: -  Strength is 5 out of 5 in the bilateral lower extremities.  Sensation is intact.  No tenderness palpation on any bony prominence.  Facet loading maneuver is positive on the left and negative on the right.  Constant motion consistent with the patient's diagnosis of Parkinson's.          MEDICAL DECISION MAKING    DATA    Labs:   Lab Results   Component Value Date/Time    SODIUM 138 2021 08:31 AM    POTASSIUM 4.3 2021 08:31 AM    CHLORIDE 104 2021 08:31 AM    CO2 26 2021 08:31 AM    " GLUCOSE 109 (H) 06/18/2021 08:31 AM    BUN 11 06/18/2021 08:31 AM    CREATININE 0.78 06/18/2021 08:31 AM        No results found for: PROTHROMBTM, INR     Lab Results   Component Value Date/Time    WBC 5.9 06/18/2021 08:31 AM    RBC 5.05 06/18/2021 08:31 AM    HEMOGLOBIN 15.3 06/18/2021 08:31 AM    HEMATOCRIT 47.4 06/18/2021 08:31 AM    MCV 93.9 06/18/2021 08:31 AM    MCH 30.3 06/18/2021 08:31 AM    MCHC 32.3 (L) 06/18/2021 08:31 AM    MPV 10.2 06/18/2021 08:31 AM    NEUTSPOLYS 67.70 06/18/2021 08:31 AM    LYMPHOCYTES 21.10 (L) 06/18/2021 08:31 AM    MONOCYTES 8.30 06/18/2021 08:31 AM    EOSINOPHILS 1.40 06/18/2021 08:31 AM    BASOPHILS 1.20 06/18/2021 08:31 AM        No results found for: HBA1C       Imaging:   I personally reviewed following images    MRI lumbar spine 3/24/2022  Neuroforaminal stenosis is worst on the left at L3-4 and L5-S1.  Facet arthropathy worse bilaterally at L4-5 and L5 is 1.  Grade 1 spondylolisthesis L5 on S1.      I reviewed the following radiology reports                         Results for orders placed during the hospital encounter of 03/24/22    MR-LUMBAR SPINE-W/O    Impression  1.  L3-4 slight degenerative retrolisthesis. L5-S1 grade 1 spondylolisthesis.  2.  L2-3 slight left lateral disc bulging.  3.  L3-4 moderate-sized left paramedian disc protrusion/extrusion with marked compromise of the left lateral recess.  4.  L4-5 mild disc bulging. Borderline bilateral inferior foraminal stenosis.  5.  L5-S1 minimal disc bulging and pseudo-disc bulging associated with spondylolisthesis. Mild hypertrophic facet arthropathy left greater than right. Mild right foraminal stenosis and moderate-marked left foraminal stenosis.        Results for orders placed during the hospital encounter of 03/24/22    MR-LUMBAR SPINE-W/O    Impression  1.  L3-4 slight degenerative retrolisthesis. L5-S1 grade 1 spondylolisthesis.  2.  L2-3 slight left lateral disc bulging.  3.  L3-4 moderate-sized left  paramedian disc protrusion/extrusion with marked compromise of the left lateral recess.  4.  L4-5 mild disc bulging. Borderline bilateral inferior foraminal stenosis.  5.  L5-S1 minimal disc bulging and pseudo-disc bulging associated with spondylolisthesis. Mild hypertrophic facet arthropathy left greater than right. Mild right foraminal stenosis and moderate-marked left foraminal stenosis.                                                                                         Results for orders placed during the hospital encounter of 21    DX-CERVICAL SPINE-2 OR 3 VIEWS    Impression  1.  No cervical spine compression fracture or subluxation.    2.  ACDF changes C5-C7.    3.  Mild diffuse disc space narrowing at C4-5.     Results for orders placed during the hospital encounter of 17    DX-CHEST-2 VIEWS    Impression  Negative two views of the chest.                                             DIAGNOSIS   Visit Diagnoses     ICD-10-CM   1. Lumbar spondylosis  M47.816   2. Chronic left-sided low back pain without sciatica  M54.50    G89.29   3. Spondylolisthesis of lumbar region  M43.16   4. Parkinson's disease (HCC)  G20         ASSESSMENT and PLAN:     Rakan Rader III  1965 male      Rakan was seen today for follow-up.    Diagnoses and all orders for this visit:    Lumbar spondylosis  -     Referral to establish with [facility]CP    Chronic left-sided low back pain without sciatica  -     Referral to establish with [facility]CP    Spondylolisthesis of lumbar region  -     Referral to establish with [facility] PCP    Parkinson's disease (HCC)  -     Referral to establish with [facility] PCP        I agree with physical therapy.  Currently the patient's pain is well controlled.  He has no neurological deficits in terms of his strength and sensation in the lower extremities.  He follows up with neurology for management of his Parkinson's.  He is also requesting a referral for a PCP and I placed a  referral.    We discussed diagnostic medial branch block since the patient does have worsening of his pain and has functional deficits despite physical therapy and conservative treatments and I would consider him for diagnostic medial branch blocks on the left targeting the L4-5 and L5-S1 facet joints.    Follow up: As needed with me    Thank you for allowing me to participate in the care of this patient. If you have any questions please not hesitate to contact me.             Please note that this dictation was created using voice recognition software. I have made every reasonable attempt to correct obvious errors but there may be errors of grammar and content that I may have overlooked prior to finalization of this note.      Geovanny Lunsford MD  Interventional Spine and Sports Physiatry  Physical Medicine and Rehabilitation  [facility]

## 2022-04-12 ENCOUNTER — APPOINTMENT (OUTPATIENT)
Dept: PHYSICAL MEDICINE AND REHAB | Facility: MEDICAL CENTER | Age: 57
End: 2022-04-12
Payer: COMMERCIAL

## 2022-04-13 ENCOUNTER — OFFICE VISIT (OUTPATIENT)
Dept: NEUROLOGY | Facility: MEDICAL CENTER | Age: 57
End: 2022-04-13
Attending: PSYCHIATRY & NEUROLOGY
Payer: COMMERCIAL

## 2022-04-13 VITALS
HEART RATE: 77 BPM | HEIGHT: 68 IN | TEMPERATURE: 97.1 F | BODY MASS INDEX: 18.94 KG/M2 | WEIGHT: 125 LBS | OXYGEN SATURATION: 98 % | SYSTOLIC BLOOD PRESSURE: 100 MMHG | RESPIRATION RATE: 18 BRPM | DIASTOLIC BLOOD PRESSURE: 64 MMHG

## 2022-04-13 DIAGNOSIS — G47.09 OTHER INSOMNIA: ICD-10-CM

## 2022-04-13 DIAGNOSIS — G20.A1 PARKINSON'S DISEASE (HCC): ICD-10-CM

## 2022-04-13 PROCEDURE — 99212 OFFICE O/P EST SF 10 MIN: CPT | Performed by: PSYCHIATRY & NEUROLOGY

## 2022-04-13 PROCEDURE — 99215 OFFICE O/P EST HI 40 MIN: CPT | Performed by: PSYCHIATRY & NEUROLOGY

## 2022-04-13 RX ORDER — ROTIGOTINE 8 MG/24H
1 PATCH, EXTENDED RELEASE TRANSDERMAL DAILY
Qty: 30 PATCH | Refills: 2 | Status: SHIPPED | OUTPATIENT
Start: 2022-04-13 | End: 2022-07-07

## 2022-04-13 ASSESSMENT — FIBROSIS 4 INDEX: FIB4 SCORE: 0.91

## 2022-04-13 NOTE — PROGRESS NOTES
Chief Complaint   Patient presents with   • Follow-Up     Parkinson's disease       History of present illness:  Rakan Rader III 56 y.o. male with Parkinson's disease since 2010 and was diagnosed in 2016. His initial symptoms were tremors of the right hand. He was previously seen by Dr. Hart.   He is a  at [employer] but he is having OFF time and sinemet may take 1-1.5 hours to kick in. His OFF time is bothersome and he is unable to function at work if he wears off. He delays breakfast and lunch so that his medication will function. He has involuntary movement of his right side with twisting of his right leg when the sinemet is wearing off.       10/14/21: He has noticed that food impairs the effect of his sinemet. 1 tab of sinemet takes up to 1.5 hours to kick in he eats. His ON state is high functioning however his fluctuations are bothersome. I have prescribed neupro as well as Inbrija inhaler PRN to bypass gastric absorption issues. No changes were made to sinemet.        1/13/21: Neupro has reduced the severity of the OFF state. Dyskinesia is less severe.   He is having bothersome motor fluctuations.  At the last visit I started Neupro 4 mg, which has been effective for improving the severity of the off state.  He continues to have bothersome off time.  Increase the Neupro patch to 6 mg.  He also has the Inbrija inhaler, which has been taking 45 minutes to kick in however he has only been using a half dose.  I instructed him today on how to administer a full dose of Inbrija.     4/13/22: The Inbrija inhaler takes about 30 minutes to kick in and lasts for about 2 hours. He uses a dose about every day. He has 2 OFF periods/day. He is afraid of brain surgery.     PD Meds:   Rasagiline 1mg daily - no benefit noted   Sinemet 25/100 1 tab every 2 hours. 1 tab lasts about 2 hours.   Neupro 6mg daily   Inbrija PRN      Movement-Specific ROS  Sleep: +Insomnia. Is continuing to have  trouble falling asleep. He has trouble returning to sleep when he wakes up in the middle of the night.   Constipation: No   Urination: Has frequent urination. Does not take terazosin because does not want to take pills.    Dizziness: No   Hallucinations: No    Excessive daytime somnolence: No   Anxiety: Yes, has been meditating. Claustrophobia triggers anxiety.   Balance: Has had poor balance in the evening    Dyskinesia: Twisting of his leg like a snake. 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     Past medical history:   Past Medical History:   Diagnosis Date   • Anxiety        Past surgical history:   Past Surgical History:   Procedure Laterality Date   • INGUINAL HERNIA REPAIR ROBOTIC Bilateral 2017    Procedure: INGUINAL HERNIA REPAIR ROBOTIC/ WITH MESH PLACEMENT;  Surgeon: Zackary Ponce M.D.;  Location: SURGERY [facility];  Service: General   • LAMINOTOMY      c5-7   • ORIF, ELBOW      right as a child   • OTHER NEUROLOGICAL SURG      cervical fusion   • OTHER ORTHOPEDIC SURGERY      finger surgery as child   • SEPTOTURBINOPLASTY     • SHOULDER ARTHROSCOPY W/ SLAP / LABRAL REPAIR      left   • SINUSCOPY      maxillary sinuses       Family history:   Family History   Problem Relation Age of Onset   • Heart Disease Mother    • Hypertension Mother    • Hyperlipidemia Mother    • Psychiatric Illness Mother    • Psychiatric Illness Father    • Alcohol/Drug Father    • Alcohol/Drug Sister    • Sleep Apnea Neg Hx        Social history:   Social History     Socioeconomic History   • Marital status:      Spouse name: Not on file   • Number of children: Not on file   • Years of education: Not on file   • Highest education level: Not on file   Occupational History   • Not on file   Tobacco Use   • Smoking status: Former Smoker     Packs/day: 1.00     Years: 5.00     Pack years: 5.00     Types: Cigarettes     Quit date: 1987     Years since quittin.4   • Smokeless tobacco:  "Former User     Types: Chew   • Tobacco comment: quit at age 23   Vaping Use   • Vaping Use: Never used   Substance and Sexual Activity   • Alcohol use: No     Comment: stopped drinking 1985 @ 20   • Drug use: No   • Sexual activity: Yes     Partners: Female     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    Other Topics Concern   • Not on file   Social History Narrative   • Not on file     Social Determinants of Health     Financial Resource Strain: Not on file   Food Insecurity: Not on file   Transportation Needs: Not on file   Physical Activity: Not on file   Stress: Not on file   Social Connections: Not on file   Intimate Partner Violence: Not on file   Housing Stability: Not on file       Current medications:   Current Outpatient Medications   Medication   • Rotigotine (NEUPRO) 8 MG/24HR PATCH 24 HR   • rasagiline (AZILECT) 1 MG Tab   • Multiple Vitamin (MULTIVITAMIN ADULT PO)   • B Complex Vitamins (VITAMIN B COMPLEX PO)   • Levodopa 42 MG Cap   • carbidopa-levodopa (SINEMET)  MG Tab     No current facility-administered medications for this visit.       Medication Allergy:  Allergies   Allergen Reactions   • Asa [Aspirin] Shortness of Breath and Swelling   • Ibuprofen Shortness of Breath and Swelling       Physical examination:   Vitals:    04/13/22 0803 04/13/22 0807   BP: 118/70 100/64   BP Location: Left arm Left arm   Patient Position: Sitting Standing   BP Cuff Size: Adult Adult   Pulse: 66 77   Resp: 18    Temp: 36.2 °C (97.1 °F)    TempSrc: Temporal    SpO2: 99% 98%   Weight: 56.7 kg (125 lb)    Height: 1.727 m (5' 8\")      Neurological Exam    Motor   Normal muscle tone. The following abnormal movements were seen: Mild dyskinetic movements are present.  No tremors.  His left-sided movements are slightly diminished amplitude and rhythm..    No tremors. .    Gait  Casual gait is normal including stance, stride, and arm swing.  There is an normal gait.  He has normal stride length and " normal arm swing.  No bradykinesia of gait..       Labs:  I reviewed the following labs personally:  None    Imaging:   None     ASSESSMENT AND PLAN:  Problem List Items Addressed This Visit     Parkinson's disease ([facility])    Relevant Medications    Rotigotine (NEUPRO) 8 MG/24HR PATCH 24 HR    Other insomnia          1. Parkinson's disease (HCC)  - Rotigotine (NEUPRO) 8 MG/24HR PATCH 24 HR; Place 1 Patch on the skin every day.  Dispense: 30 Patch; Refill: 2    56-year-old male with Parkinson's disease, currently complicated by motor fluctuations and dyskinesia.  He is ON state is high functioning, with the complication of mild dyskinetic movements.  He was unable to tolerate amantadine in the past due to psychosis.  He continues to have 1-2 wearing off episodes per day.  I have increased the Neupro patch to 8 mg.  He also has the Inbrija inhaler as a rescue medication, which is effective after 30 minutes.  Today I counseled him on deep brain stimulation, however he does not wish to proceed to brain surgery yet.    2. Other insomnia    Insomnia is a problem for him.  Sleep anxiety is a problem. He wakes up in the middle of the night and is unable to.  I counseled him on sleep hygiene and recommended as needed use of melatonin.    FOLLOW-UP:   Return in about 4 months (around 8/13/2022).    Total time spent for the day for this patient unrelated to procedure time is: 40 minutes. I spent 30 minutes in face to face time and I spent 5 minutes pre-charting and 5 minutes in post-visit documentation.      Dr. Chance Ricks D.O.  [facility]

## 2022-04-20 SDOH — ECONOMIC STABILITY: HOUSING INSECURITY: IN THE LAST 12 MONTHS, HOW MANY PLACES HAVE YOU LIVED?: 1

## 2022-04-20 SDOH — ECONOMIC STABILITY: FOOD INSECURITY: WITHIN THE PAST 12 MONTHS, THE FOOD YOU BOUGHT JUST DIDN'T LAST AND YOU DIDN'T HAVE MONEY TO GET MORE.: NEVER TRUE

## 2022-04-20 SDOH — ECONOMIC STABILITY: HOUSING INSECURITY
IN THE LAST 12 MONTHS, WAS THERE A TIME WHEN YOU DID NOT HAVE A STEADY PLACE TO SLEEP OR SLEPT IN A SHELTER (INCLUDING NOW)?: NO

## 2022-04-20 SDOH — ECONOMIC STABILITY: INCOME INSECURITY: IN THE LAST 12 MONTHS, WAS THERE A TIME WHEN YOU WERE NOT ABLE TO PAY THE MORTGAGE OR RENT ON TIME?: NO

## 2022-04-20 SDOH — ECONOMIC STABILITY: TRANSPORTATION INSECURITY
IN THE PAST 12 MONTHS, HAS THE LACK OF TRANSPORTATION KEPT YOU FROM MEDICAL APPOINTMENTS OR FROM GETTING MEDICATIONS?: NO

## 2022-04-20 SDOH — ECONOMIC STABILITY: TRANSPORTATION INSECURITY
IN THE PAST 12 MONTHS, HAS LACK OF TRANSPORTATION KEPT YOU FROM MEETINGS, WORK, OR FROM GETTING THINGS NEEDED FOR DAILY LIVING?: NO

## 2022-04-20 SDOH — ECONOMIC STABILITY: FOOD INSECURITY: WITHIN THE PAST 12 MONTHS, YOU WORRIED THAT YOUR FOOD WOULD RUN OUT BEFORE YOU GOT MONEY TO BUY MORE.: NEVER TRUE

## 2022-04-20 SDOH — ECONOMIC STABILITY: INCOME INSECURITY: HOW HARD IS IT FOR YOU TO PAY FOR THE VERY BASICS LIKE FOOD, HOUSING, MEDICAL CARE, AND HEATING?: NOT VERY HARD

## 2022-04-20 SDOH — ECONOMIC STABILITY: TRANSPORTATION INSECURITY
IN THE PAST 12 MONTHS, HAS LACK OF RELIABLE TRANSPORTATION KEPT YOU FROM MEDICAL APPOINTMENTS, MEETINGS, WORK OR FROM GETTING THINGS NEEDED FOR DAILY LIVING?: NO

## 2022-04-20 SDOH — HEALTH STABILITY: PHYSICAL HEALTH: ON AVERAGE, HOW MANY DAYS PER WEEK DO YOU ENGAGE IN MODERATE TO STRENUOUS EXERCISE (LIKE A BRISK WALK)?: 3 DAYS

## 2022-04-20 SDOH — HEALTH STABILITY: PHYSICAL HEALTH: ON AVERAGE, HOW MANY MINUTES DO YOU ENGAGE IN EXERCISE AT THIS LEVEL?: 120 MIN

## 2022-04-20 SDOH — HEALTH STABILITY: MENTAL HEALTH
STRESS IS WHEN SOMEONE FEELS TENSE, NERVOUS, ANXIOUS, OR CAN'T SLEEP AT NIGHT BECAUSE THEIR MIND IS TROUBLED. HOW STRESSED ARE YOU?: ONLY A LITTLE

## 2022-04-20 ASSESSMENT — SOCIAL DETERMINANTS OF HEALTH (SDOH)
IN A TYPICAL WEEK, HOW MANY TIMES DO YOU TALK ON THE PHONE WITH FAMILY, FRIENDS, OR NEIGHBORS?: THREE TIMES A WEEK
DO YOU BELONG TO ANY CLUBS OR ORGANIZATIONS SUCH AS CHURCH GROUPS UNIONS, FRATERNAL OR ATHLETIC GROUPS, OR SCHOOL GROUPS?: YES
HOW OFTEN DO YOU ATTEND CHURCH OR RELIGIOUS SERVICES?: 1 TO 4 TIMES PER YEAR
HOW OFTEN DO YOU GET TOGETHER WITH FRIENDS OR RELATIVES?: MORE THAN THREE TIMES A WEEK
HOW OFTEN DO YOU ATTEND CHURCH OR RELIGIOUS SERVICES?: 1 TO 4 TIMES PER YEAR
IN A TYPICAL WEEK, HOW MANY TIMES DO YOU TALK ON THE PHONE WITH FAMILY, FRIENDS, OR NEIGHBORS?: THREE TIMES A WEEK
DO YOU BELONG TO ANY CLUBS OR ORGANIZATIONS SUCH AS CHURCH GROUPS UNIONS, FRATERNAL OR ATHLETIC GROUPS, OR SCHOOL GROUPS?: YES
HOW OFTEN DO YOU GET TOGETHER WITH FRIENDS OR RELATIVES?: MORE THAN THREE TIMES A WEEK
HOW OFTEN DO YOU HAVE SIX OR MORE DRINKS ON ONE OCCASION: NEVER
HOW OFTEN DO YOU ATTENT MEETINGS OF THE CLUB OR ORGANIZATION YOU BELONG TO?: MORE THAN 4 TIMES PER YEAR
HOW OFTEN DO YOU HAVE A DRINK CONTAINING ALCOHOL: NEVER
WITHIN THE PAST 12 MONTHS, YOU WORRIED THAT YOUR FOOD WOULD RUN OUT BEFORE YOU GOT THE MONEY TO BUY MORE: NEVER TRUE
HOW OFTEN DO YOU ATTENT MEETINGS OF THE CLUB OR ORGANIZATION YOU BELONG TO?: MORE THAN 4 TIMES PER YEAR
HOW HARD IS IT FOR YOU TO PAY FOR THE VERY BASICS LIKE FOOD, HOUSING, MEDICAL CARE, AND HEATING?: NOT VERY HARD

## 2022-04-20 ASSESSMENT — LIFESTYLE VARIABLES
HOW OFTEN DO YOU HAVE A DRINK CONTAINING ALCOHOL: NEVER
HOW OFTEN DO YOU HAVE SIX OR MORE DRINKS ON ONE OCCASION: NEVER

## 2022-04-21 ENCOUNTER — OFFICE VISIT (OUTPATIENT)
Dept: MEDICAL GROUP | Facility: CLINIC | Age: 57
End: 2022-04-21
Payer: COMMERCIAL

## 2022-04-21 VITALS
BODY MASS INDEX: 19.29 KG/M2 | DIASTOLIC BLOOD PRESSURE: 81 MMHG | SYSTOLIC BLOOD PRESSURE: 156 MMHG | HEART RATE: 69 BPM | OXYGEN SATURATION: 97 % | HEIGHT: 68 IN | WEIGHT: 127.3 LBS

## 2022-04-21 DIAGNOSIS — N40.1 BENIGN PROSTATIC HYPERPLASIA WITH URINARY FREQUENCY: Primary | ICD-10-CM

## 2022-04-21 DIAGNOSIS — R35.0 BENIGN PROSTATIC HYPERPLASIA WITH URINARY FREQUENCY: Primary | ICD-10-CM

## 2022-04-21 PROCEDURE — 99213 OFFICE O/P EST LOW 20 MIN: CPT | Mod: GE | Performed by: FAMILY MEDICINE

## 2022-04-21 RX ORDER — TAMSULOSIN HYDROCHLORIDE 0.4 MG/1
0.4 CAPSULE ORAL
Qty: 90 CAPSULE | Refills: 3 | Status: SHIPPED | OUTPATIENT
Start: 2022-04-21

## 2022-04-21 ASSESSMENT — ENCOUNTER SYMPTOMS
FEVER: 0
BACK PAIN: 0
MYALGIAS: 0
FLANK PAIN: 0

## 2022-04-21 ASSESSMENT — FIBROSIS 4 INDEX: FIB4 SCORE: 0.93

## 2022-04-21 NOTE — PROGRESS NOTES
Subjective:   CC:   Chief Complaint   Patient presents with   • Establish Care     BPH (prostate concerns)       HPI: Rakan is a 57 y.o. male who presents today for the following problems:    1.  Increased urination  - Patient endorses a 2-year history of urinating 10-13 times per day, 1-3 times at night  - States that he will have the feeling of needing to go, with dribbling, and eventually able to urinate a small amount  - He was told after his colonoscopy 3 years ago that he had an enlarged prostate and may develop BPH  - He also states that he was told by his previous PCP that he likely had BPH  - Of note, the patient is also being seen by neurology for Parkinson disease, and is treated with multiple medications    Current Outpatient Medications   Medication Sig Dispense Refill   • tamsulosin (FLOMAX) 0.4 MG capsule Take 1 Capsule by mouth every morning with breakfast. 90 Capsule 3   • Rotigotine (NEUPRO) 8 MG/24HR PATCH 24 HR Place 1 Patch on the skin every day. 30 Patch 2   • rasagiline (AZILECT) 1 MG Tab Take 1 mg by mouth every day.     • Multiple Vitamin (MULTIVITAMIN ADULT PO) Take  by mouth.     • B Complex Vitamins (VITAMIN B COMPLEX PO) Take  by mouth.     • Levodopa 42 MG Cap Place 2 Capsules into inhaler and inhale 2 times a day as needed. 120 Capsule 3   • carbidopa-levodopa (SINEMET)  MG Tab Take 1 Tablet by mouth 5 Times a Day.       No current facility-administered medications for this visit.       Social History     Tobacco Use   • Smoking status: Former Smoker     Packs/day: 1.00     Years: 5.00     Pack years: 5.00     Types: Cigarettes     Quit date: 1987     Years since quittin.4   • Smokeless tobacco: Former User     Types: Chew   • Tobacco comment: quit at age 23   Vaping Use   • Vaping Use: Never used   Substance Use Topics   • Alcohol use: No     Comment: stopped drinking  @ 20   • Drug use: No     Review of Systems   Constitutional: Negative for fever and  "malaise/fatigue.   Genitourinary: Positive for frequency and urgency. Negative for dysuria, flank pain and hematuria.   Musculoskeletal: Negative for back pain and myalgias.     Objective:     Vitals:    04/21/22 0830   BP: 156/81   BP Location: Left arm   Patient Position: Sitting   BP Cuff Size: Small adult   Pulse: 69   SpO2: 97%   Weight: 57.7 kg (127 lb 4.8 oz)   Height: 1.721 m (5' 7.75\")     Body mass index is 19.5 kg/m².     Physical Exam:   Gen: Well developed, well nourished in no acute distress.   Skin: Pink, warm, and dry  HEENT: conjunctiva non-injected, sclera non-icteric. EOMs intact.   Nasal mucosa without edema nor erythema. No facial tenderness  Pinna normal. TM pearly gray.   Oral mucous membranes pink and moist with no lesions.  Neck: Supple, trachea midline. No adenopathy or masses in the neck or supraclavicular regions.  Lungs: Effort is normal. Clear to auscultation bilaterally with good excursion.  CV: regular rate and rhythm.  Abdomen: soft, nontender, + BS. No HSM.  No CVAT  Ext: no edema, color normal, vascularity normal, temperature normal  Alert and oriented Eye contact is good, speech goal directed, affect calm    Assessment & Plan:   1. Benign prostatic hyperplasia with urinary frequency  Chronic, symptomatic.  Condition new to this provider. Patient endorses a 2-year history of urinating 10-13 times per day, 1-3 times at night. States that he will have the feeling of needing to go, with dribbling, and eventually able to urinate a small amount. He was told after his colonoscopy 3 years ago that he had an enlarged prostate and may develop BPH, as well as by his previous PCP that he likely had BPH. He is currently being treated by his neurologist for Parkinson's disease, and I have asked the patient to discuss with his neurologist if any of his medications could be contributing to his increased urination.  A quick check of adverse reactions with medications that he is on notes that the " carbidopa levodopa combination medication has a < 1% reported adverse reaction of increased urination.  I did not find any mention of increased urination as an adverse reaction with the other medications that he is to follow-up with for his Parkinson's disease.  At this time plan to check labs including CMP, PSA, CBC.  Plan to also start the patient on Flomax.  If the patient's PSA comes back elevated or if his symptoms or not improved with the Flomax, then I will send him to urology for further evaluation and management.  - Comp Metabolic Panel; Future  - tamsulosin (FLOMAX) 0.4 MG capsule; Take 1 Capsule by mouth every morning with breakfast.  Dispense: 90 Capsule; Refill: 3  - PROSTATE SPECIFIC AG SCREENING; Future  - CBC WITH DIFFERENTIAL; Future    Followup: Return in about 2 weeks (around 5/5/2022).    Nirmal Nolasco D.O.    Please note that this dictation was created using voice recognition software. I have made every reasonable attempt to correct obvious errors, but I expect that there are errors of grammar and possibly content that I did not discover before finalizing the note.

## 2022-05-13 ENCOUNTER — HOSPITAL ENCOUNTER (OUTPATIENT)
Dept: LAB | Facility: MEDICAL CENTER | Age: 57
End: 2022-05-13
Attending: FAMILY MEDICINE

## 2022-05-13 DIAGNOSIS — R35.0 BENIGN PROSTATIC HYPERPLASIA WITH URINARY FREQUENCY: ICD-10-CM

## 2022-05-13 DIAGNOSIS — N40.1 BENIGN PROSTATIC HYPERPLASIA WITH URINARY FREQUENCY: ICD-10-CM

## 2022-05-13 LAB
ALBUMIN SERPL BCP-MCNC: 4.6 G/DL (ref 3.2–4.9)
ALBUMIN/GLOB SERPL: 1.9 G/DL
ALP SERPL-CCNC: 76 U/L (ref 30–99)
ALT SERPL-CCNC: 13 U/L (ref 2–50)
ANION GAP SERPL CALC-SCNC: 9 MMOL/L (ref 7–16)
AST SERPL-CCNC: 26 U/L (ref 12–45)
BASOPHILS # BLD AUTO: 1.7 % (ref 0–1.8)
BASOPHILS # BLD: 0.09 K/UL (ref 0–0.12)
BILIRUB SERPL-MCNC: 0.4 MG/DL (ref 0.1–1.5)
BUN SERPL-MCNC: 14 MG/DL (ref 8–22)
CALCIUM SERPL-MCNC: 8.9 MG/DL (ref 8.5–10.5)
CHLORIDE SERPL-SCNC: 102 MMOL/L (ref 96–112)
CO2 SERPL-SCNC: 25 MMOL/L (ref 20–33)
CREAT SERPL-MCNC: 0.8 MG/DL (ref 0.5–1.4)
EOSINOPHIL # BLD AUTO: 0.16 K/UL (ref 0–0.51)
EOSINOPHIL NFR BLD: 3 % (ref 0–6.9)
ERYTHROCYTE [DISTWIDTH] IN BLOOD BY AUTOMATED COUNT: 40.4 FL (ref 35.9–50)
GFR SERPLBLD CREATININE-BSD FMLA CKD-EPI: 104 ML/MIN/1.73 M 2
GLOBULIN SER CALC-MCNC: 2.4 G/DL (ref 1.9–3.5)
GLUCOSE SERPL-MCNC: 91 MG/DL (ref 65–99)
HCT VFR BLD AUTO: 46.5 % (ref 42–52)
HGB BLD-MCNC: 15.4 G/DL (ref 14–18)
IMM GRANULOCYTES # BLD AUTO: 0.01 K/UL (ref 0–0.11)
IMM GRANULOCYTES NFR BLD AUTO: 0.2 % (ref 0–0.9)
LYMPHOCYTES # BLD AUTO: 1.24 K/UL (ref 1–4.8)
LYMPHOCYTES NFR BLD: 23.2 % (ref 22–41)
MCH RBC QN AUTO: 30.4 PG (ref 27–33)
MCHC RBC AUTO-ENTMCNC: 33.1 G/DL (ref 33.7–35.3)
MCV RBC AUTO: 91.7 FL (ref 81.4–97.8)
MONOCYTES # BLD AUTO: 0.61 K/UL (ref 0–0.85)
MONOCYTES NFR BLD AUTO: 11.4 % (ref 0–13.4)
NEUTROPHILS # BLD AUTO: 3.23 K/UL (ref 1.82–7.42)
NEUTROPHILS NFR BLD: 60.5 % (ref 44–72)
NRBC # BLD AUTO: 0 K/UL
NRBC BLD-RTO: 0 /100 WBC
PLATELET # BLD AUTO: 220 K/UL (ref 164–446)
PMV BLD AUTO: 10.1 FL (ref 9–12.9)
POTASSIUM SERPL-SCNC: 4.1 MMOL/L (ref 3.6–5.5)
PROT SERPL-MCNC: 7 G/DL (ref 6–8.2)
PSA SERPL-MCNC: 2.91 NG/ML (ref 0–4)
RBC # BLD AUTO: 5.07 M/UL (ref 4.7–6.1)
SODIUM SERPL-SCNC: 136 MMOL/L (ref 135–145)
WBC # BLD AUTO: 5.3 K/UL (ref 4.8–10.8)

## 2022-05-13 PROCEDURE — 36415 COLL VENOUS BLD VENIPUNCTURE: CPT

## 2022-05-13 PROCEDURE — 80053 COMPREHEN METABOLIC PANEL: CPT

## 2022-05-13 PROCEDURE — 84153 ASSAY OF PSA TOTAL: CPT

## 2022-05-13 PROCEDURE — 85025 COMPLETE CBC W/AUTO DIFF WBC: CPT

## 2022-05-25 ENCOUNTER — APPOINTMENT (OUTPATIENT)
Dept: PHYSICAL THERAPY | Facility: REHABILITATION | Age: 57
End: 2022-05-25
Attending: PHYSICAL MEDICINE & REHABILITATION
Payer: COMMERCIAL

## 2022-06-01 ENCOUNTER — APPOINTMENT (OUTPATIENT)
Dept: PHYSICAL THERAPY | Facility: REHABILITATION | Age: 57
End: 2022-06-01
Attending: PHYSICAL MEDICINE & REHABILITATION
Payer: COMMERCIAL

## 2022-06-08 ENCOUNTER — APPOINTMENT (OUTPATIENT)
Dept: PHYSICAL THERAPY | Facility: REHABILITATION | Age: 57
End: 2022-06-08
Attending: PHYSICAL MEDICINE & REHABILITATION
Payer: COMMERCIAL

## 2022-06-14 ENCOUNTER — APPOINTMENT (OUTPATIENT)
Dept: MEDICAL GROUP | Facility: CLINIC | Age: 57
End: 2022-06-14
Payer: COMMERCIAL

## 2022-06-15 ENCOUNTER — APPOINTMENT (OUTPATIENT)
Dept: PHYSICAL THERAPY | Facility: REHABILITATION | Age: 57
End: 2022-06-15
Attending: PHYSICAL MEDICINE & REHABILITATION
Payer: COMMERCIAL

## 2022-06-21 ENCOUNTER — OFFICE VISIT (OUTPATIENT)
Dept: MEDICAL GROUP | Facility: CLINIC | Age: 57
End: 2022-06-21
Payer: COMMERCIAL

## 2022-06-21 VITALS
BODY MASS INDEX: 18.67 KG/M2 | DIASTOLIC BLOOD PRESSURE: 70 MMHG | OXYGEN SATURATION: 95 % | WEIGHT: 123.2 LBS | HEART RATE: 77 BPM | TEMPERATURE: 97.9 F | HEIGHT: 68 IN | SYSTOLIC BLOOD PRESSURE: 111 MMHG

## 2022-06-21 DIAGNOSIS — R35.0 BENIGN PROSTATIC HYPERPLASIA WITH URINARY FREQUENCY: ICD-10-CM

## 2022-06-21 DIAGNOSIS — N40.1 BENIGN PROSTATIC HYPERPLASIA WITH URINARY FREQUENCY: ICD-10-CM

## 2022-06-21 PROCEDURE — 99213 OFFICE O/P EST LOW 20 MIN: CPT | Mod: GE | Performed by: STUDENT IN AN ORGANIZED HEALTH CARE EDUCATION/TRAINING PROGRAM

## 2022-06-21 ASSESSMENT — FIBROSIS 4 INDEX: FIB4 SCORE: 1.802775637731994647

## 2022-06-22 ENCOUNTER — APPOINTMENT (OUTPATIENT)
Dept: PHYSICAL THERAPY | Facility: REHABILITATION | Age: 57
End: 2022-06-22
Attending: PHYSICAL MEDICINE & REHABILITATION
Payer: COMMERCIAL

## 2022-06-22 NOTE — ASSESSMENT & PLAN NOTE
The patient does state that his urinary symptoms have noticeably improved, though he still voids approximately 10 times a day and finds this bothersome.  He was diagnosed with Parkinson's at a young age and takes several medications as prescribed by neurology for this.  At the last visit, Dr. Nolasco performed a thorough review of his medications and their side effect profiles, and none seemed likely to cause urinary frequency or retention.    The patient's CBC, CMP were within normal limits.  His PSA is essentially stable, though slightly higher than last year.    I discussed with the patient option of increasing pharmacological therapy for BPH.  He states that he takes so many medications for his Parkinson's that he would rather not add more medications to his regimen, and in fact would not like to take Flomax indefinitely (ss he dislikes having to take so many daily medications).  He is interested in a urology referral today for possible procedural interventions for his BPH.  I think this is reasonable, and so have sent a referral for him today.    I did asked the patient whether we could collect a urinalysis today but he stated he needed to go back to work, and so this was not performed.

## 2022-06-22 NOTE — PROGRESS NOTES
"Subjective:     CC: Follow-up BPH and Flomax    HPI:   Rakan presents today with the above chief complaint    Problem   Benign Prostatic Hyperplasia With Urinary Frequency    Patient returns to clinic for follow up BPH.  He was seen by Dr. Nolasco for the same 2 months ago.  The patient has a longstanding history of enlarged prostate, and he saw Dr. Nolasco 2 months ago for frequent urination (approximately 13 or more voids per day).  Dr. Nolasco started him on Flomax and asked the patient to return in a few months to follow up on his symptoms.  Additionally, he ordered a CBC, CMP and PSA.         Current Outpatient Medications Ordered in Epic   Medication Sig Dispense Refill   • tamsulosin (FLOMAX) 0.4 MG capsule Take 1 Capsule by mouth every morning with breakfast. 90 Capsule 3   • Rotigotine (NEUPRO) 8 MG/24HR PATCH 24 HR Place 1 Patch on the skin every day. 30 Patch 2   • rasagiline (AZILECT) 1 MG Tab Take 1 mg by mouth every day.     • Multiple Vitamin (MULTIVITAMIN ADULT PO) Take  by mouth.     • B Complex Vitamins (VITAMIN B COMPLEX PO) Take  by mouth.     • Levodopa 42 MG Cap Place 2 Capsules into inhaler and inhale 2 times a day as needed. 120 Capsule 3   • carbidopa-levodopa (SINEMET)  MG Tab Take 1 Tablet by mouth 5 Times a Day.       No current [facility]-ordered facility-administered medications on file.       Health Maintenance: Patient states he is up-to-date on colonoscopy and Shingrix.  He has had 2 COVID-19 vaccinations but has not had a booster.    ROS:  Gen: no fevers/chills  Eyes: no changes in vision  ENT: no sore throat, no hearing loss, no rhinorrhea  Pulm: no sob, no cough  CV: no chest pain, no palpitations  GI: no nausea/vomiting, no diarrhea  : no dysuria; see HPI  MSk: no myalgias  Skin: no rash        Objective:     Exam:  /70 (BP Location: Left arm, Patient Position: Sitting, BP Cuff Size: Small adult)   Pulse 77   Temp 36.6 °C (97.9 °F)   Ht 1.72 m (5' 7.72\")   Wt 55.9 kg (123 " lb 3.2 oz)   SpO2 95%   BMI 18.89 kg/m²  Body mass index is 18.89 kg/m².    Gen: Alert and oriented, No apparent distress.  Neck: Neck is supple without lymphadenopathy.  Lungs: Normal effort, CTA bilaterally, no wheezes, rhonchi, or rales  CV: Regular rate and rhythm. No murmurs, rubs, or gallops.  Ext: No clubbing, cyanosis, edema.      Assessment & Plan:     57 y.o. male with the following -     Problem List Items Addressed This Visit     Benign prostatic hyperplasia with urinary frequency     The patient does state that his urinary symptoms have noticeably improved, though he still voids approximately 10 times a day and finds this bothersome.  He was diagnosed with Parkinson's at a young age and takes several medications as prescribed by neurology for this.  At the last visit, Dr. Nolasco performed a thorough review of his medications and their side effect profiles, and none seemed likely to cause urinary frequency or retention.    The patient's CBC, CMP were within normal limits.  His PSA is essentially stable, though slightly higher than last year.    I discussed with the patient option of increasing pharmacological therapy for BPH.  He states that he takes so many medications for his Parkinson's that he would rather not add more medications to his regimen, and in fact would not like to take Flomax indefinitely (ss he dislikes having to take so many daily medications).  He is interested in a urology referral today for possible procedural interventions for his BPH.  I think this is reasonable, and so have sent a referral for him today.    I did asked the patient whether we could collect a urinalysis today but he stated he needed to go back to work, and so this was not performed.           Relevant Orders    Referral to Urology              No follow-ups on file.  Patient plans to establish with a [facility] resident for primary care, as he would like to establish with a physician who will be in this practice for at  least a few years.

## 2022-06-29 ENCOUNTER — APPOINTMENT (OUTPATIENT)
Dept: PHYSICAL THERAPY | Facility: REHABILITATION | Age: 57
End: 2022-06-29
Attending: PHYSICAL MEDICINE & REHABILITATION
Payer: COMMERCIAL

## 2022-07-06 ENCOUNTER — APPOINTMENT (OUTPATIENT)
Dept: PHYSICAL THERAPY | Facility: REHABILITATION | Age: 57
End: 2022-07-06
Attending: PHYSICAL MEDICINE & REHABILITATION
Payer: COMMERCIAL

## 2022-07-07 DIAGNOSIS — G20.A1 PARKINSON'S DISEASE (HCC): ICD-10-CM

## 2022-07-07 RX ORDER — ROTIGOTINE 8 MG/24H
1 PATCH, EXTENDED RELEASE TRANSDERMAL DAILY
Qty: 30 PATCH | Refills: 2 | Status: SHIPPED | OUTPATIENT
Start: 2022-07-07 | End: 2022-10-25

## 2022-07-13 ENCOUNTER — APPOINTMENT (OUTPATIENT)
Dept: PHYSICAL THERAPY | Facility: REHABILITATION | Age: 57
End: 2022-07-13
Attending: PHYSICAL MEDICINE & REHABILITATION
Payer: COMMERCIAL

## 2022-08-01 ENCOUNTER — PATIENT MESSAGE (OUTPATIENT)
Dept: NEUROLOGY | Facility: MEDICAL CENTER | Age: 57
End: 2022-08-01
Payer: COMMERCIAL

## 2022-08-01 DIAGNOSIS — G20.A1 PARKINSON'S DISEASE (HCC): ICD-10-CM

## 2022-08-17 ENCOUNTER — OFFICE VISIT (OUTPATIENT)
Dept: NEUROLOGY | Facility: MEDICAL CENTER | Age: 57
End: 2022-08-17
Attending: PSYCHIATRY & NEUROLOGY
Payer: COMMERCIAL

## 2022-08-17 VITALS
TEMPERATURE: 96.9 F | HEIGHT: 68 IN | DIASTOLIC BLOOD PRESSURE: 68 MMHG | BODY MASS INDEX: 18.94 KG/M2 | HEART RATE: 83 BPM | WEIGHT: 125 LBS | OXYGEN SATURATION: 98 % | SYSTOLIC BLOOD PRESSURE: 118 MMHG

## 2022-08-17 DIAGNOSIS — G20.B1 DYSKINESIA DUE TO PARKINSON'S DISEASE (HCC): ICD-10-CM

## 2022-08-17 DIAGNOSIS — G20.A1 PARKINSON'S DISEASE (HCC): ICD-10-CM

## 2022-08-17 PROCEDURE — 99212 OFFICE O/P EST SF 10 MIN: CPT | Performed by: PSYCHIATRY & NEUROLOGY

## 2022-08-17 PROCEDURE — 99214 OFFICE O/P EST MOD 30 MIN: CPT | Performed by: PSYCHIATRY & NEUROLOGY

## 2022-08-17 RX ORDER — AMANTADINE HYDROCHLORIDE 100 MG/1
100 CAPSULE, GELATIN COATED ORAL DAILY
Qty: 90 CAPSULE | Refills: 2 | Status: SHIPPED | OUTPATIENT
Start: 2022-08-17 | End: 2022-09-15

## 2022-08-17 ASSESSMENT — FIBROSIS 4 INDEX: FIB4 SCORE: 1.802775637731994647

## 2022-08-17 NOTE — PROGRESS NOTES
"Chief Complaint   Patient presents with    Follow-Up     Movement Disorder       History of present illness:  Rakan Rader III 57 y.o. male with Parkinson's disease since 2010 and was diagnosed in 2016. His initial symptoms were tremors of the right hand. He was previously seen by Dr. Hart.   He is a  at [employer] but he is having OFF time and sinemet may take 1-1.5 hours to kick in. His OFF time is bothersome and he is unable to function at work if he wears off. He delays breakfast and lunch so that his medication will function. He has involuntary movement of his right side with twisting of his right leg when the sinemet is wearing off.       10/14/21: He has noticed that food impairs the effect of his sinemet. 1 tab of sinemet takes up to 1.5 hours to kick in he eats. His ON state is high functioning however his fluctuations are bothersome. I have prescribed neupro as well as Inbrija inhaler PRN to bypass gastric absorption issues. No changes were made to sinemet.        1/13/21: Neupro has reduced the severity of the OFF state. Dyskinesia is less severe.   He is having bothersome motor fluctuations.  At the last visit I started Neupro 4 mg, which has been effective for improving the severity of the off state.  He continues to have bothersome off time.  Increase the Neupro patch to 6 mg.  He also has the Inbrija inhaler, which has been taking 45 minutes to kick in however he has only been using a half dose.  I instructed him today on how to administer a full dose of Inbrija.      4/13/22: The Inbrija inhaler takes about 30 minutes to kick in and lasts for about 2 hours. He uses a dose about every day. He has 2 OFF periods/day. He is afraid of brain surgery.      8/17/22: He uses the Inbrija inhaler occasionally in the early afternoon. He may \"crash\" in the early afternoon where his tremor, balance, and mobility is worse. He uses the Inbrija 5 days/week. He has toe curling in the " morning that resolves when the l-dopa kicks in.     PD Meds:   Sinemet 25/100 1 tab every 2 hours. 1 tab lasts about 2 hours 5 times daily   Neupro 8mg daily - this prevents the OFF periods from being as severe.    Inbrija PRN      Movement-Specific ROS  Sleep: +Insomnia. Is continuing to have trouble falling asleep. He has trouble returning to sleep when he wakes up in the middle of the night.   Constipation: No   Urination: Has frequent urination. Does not take terazosin because does not want to take pills.    Dizziness: No   Hallucinations: No    Excessive daytime somnolence: No   Anxiety: Yes, has been meditating. Claustrophobia triggers anxiety. His anxiety is worsened when his dopamine levels are low.    Balance: Has had poor balance in the evening    Dyskinesia: Twisting of his leg like a snake. 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 His wife is not willing to risk this occurring again.       Past medical history:   Past Medical History:   Diagnosis Date    Anxiety        Past surgical history:   Past Surgical History:   Procedure Laterality Date    INGUINAL HERNIA REPAIR ROBOTIC Bilateral 11/22/2017    Procedure: INGUINAL HERNIA REPAIR ROBOTIC/ WITH MESH PLACEMENT;  Surgeon: Zackary Ponce M.D.;  Location: SURGERY [facility];  Service: General    LAMINOTOMY      c5-7    ORIF, ELBOW      right as a child    OTHER NEUROLOGICAL SURG      cervical fusion    OTHER ORTHOPEDIC SURGERY      finger surgery as child    SEPTOTURBINOPLASTY      SHOULDER ARTHROSCOPY W/ SLAP / LABRAL REPAIR      left    SINUSCOPY      maxillary sinuses       Family history:   Family History   Problem Relation Age of Onset    Heart Disease Mother     Hypertension Mother     Hyperlipidemia Mother     Psychiatric Illness Mother     Psychiatric Illness Father     Alcohol/Drug Father     Alcohol/Drug Sister     Sleep Apnea Neg Hx        Social history:   Social History     Socioeconomic History    Marital status:       Spouse name: Not on file    Number of children: Not on file    Years of education: Not on file    Highest education level: GED or equivalent   Occupational History    Not on file   Tobacco Use    Smoking status: Former     Packs/day: 1.00     Years: 5.00     Pack years: 5.00     Types: Cigarettes     Quit date: 1987     Years since quittin.7    Smokeless tobacco: Former     Types: Chew    Tobacco comments:     quit at age 23   Vaping Use    Vaping Use: Never used   Substance and Sexual Activity    Alcohol use: No     Comment: stopped drinking  @ 20    Drug use: No    Sexual activity: Yes     Partners: Female     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    Other Topics Concern    Not on file   Social History Narrative    Not on file     Social Determinants of Health     Financial Resource Strain: Low Risk     Difficulty of Paying Living Expenses: Not very hard   Food Insecurity: No Food Insecurity    Worried About Running Out of Food in the Last Year: Never true    Ran Out of Food in the Last Year: Never true   Transportation Needs: No Transportation Needs    Lack of Transportation (Medical): No    Lack of Transportation (Non-Medical): No   Physical Activity: Sufficiently Active    Days of Exercise per Week: 3 days    Minutes of Exercise per Session: 120 min   Stress: No Stress Concern Present    Feeling of Stress : Only a little   Social Connections: Socially Integrated    Frequency of Communication with Friends and Family: Three times a week    Frequency of Social Gatherings with Friends and Family: More than three times a week    Attends Sikh Services: 1 to 4 times per year    Active Member of Clubs or Organizations: Yes    Attends Club or Organization Meetings: More than 4 times per year    Marital Status:    Intimate Partner Violence: Not on file   Housing Stability: Low Risk     Unable to Pay for Housing in the Last Year: No    Number of Places Lived in  "the Last Year: 1    Unstable Housing in the Last Year: No       Current medications:   Current Outpatient Medications   Medication    amantadine (SYMMETREL) 100 MG Cap    Levodopa 42 MG Cap    NEUPRO 8 MG/24HR PATCH 24 HR    tamsulosin (FLOMAX) 0.4 MG capsule    Multiple Vitamin (MULTIVITAMIN ADULT PO)    B Complex Vitamins (VITAMIN B COMPLEX PO)    carbidopa-levodopa (SINEMET)  MG Tab    rasagiline (AZILECT) 1 MG Tab     No current facility-administered medications for this visit.       Medication Allergy:  Allergies   Allergen Reactions    Asa [Aspirin] Shortness of Breath and Swelling    Ibuprofen Shortness of Breath and Swelling       Physical examination:   Vitals:    08/17/22 0802 08/17/22 0808   BP: 126/70 118/68   BP Location: Right arm Right arm   Patient Position: Sitting Standing   BP Cuff Size: Adult Adult   Pulse: 82 83   Temp: 36.1 °C (96.9 °F)    TempSrc: Temporal    SpO2: 99% 98%   Weight: 56.7 kg (125 lb)    Height: 1.727 m (5' 8\")      Neurological Exam    Motor   Normal muscle tone. The following abnormal movements were seen: Moderate dyskinetic movements .        .    Gait  Casual gait is normal including stance, stride, and arm swing.  No bradykinesia .None     Labs:  I reviewed the following labs personally:  None    Imaging:   None    ASSESSMENT AND PLAN:  Problem List Items Addressed This Visit       Parkinson's disease (HCC)    Relevant Medications    amantadine (SYMMETREL) 100 MG Cap    Dyskinesia due to Parkinson's disease (HCC)    Relevant Medications    amantadine (SYMMETREL) 100 MG Cap       1. Parkinson's disease (HCC)  - amantadine (SYMMETREL) 100 MG Cap; Take 1 Capsule by mouth every day.  Dispense: 90 Capsule; Refill: 2    2. Dyskinesia due to Parkinson's disease (HCC)  - amantadine (SYMMETREL) 100 MG Cap; Take 1 Capsule by mouth every day.  Dispense: 90 Capsule; Refill: 2    57-year-old male with Parkinson's disease complicated by motor fluctuations and dyskinesia.  He is " having motor fluctuations on a dosage of 1 tablet of carbidopa/levodopa every 2 hours,, combined with the Neupro patch at a dosage of 8 mg once daily.  Today, I recommended that he pursue deep brain stimulation, however he is not sure he wishes to proceed with this yet, as he is afraid of the brain surgery.  Dyskinesias are a moderate issue for him.  I prescribed amantadine for treatment of the drug-induced dyskinetic movements, however he does endorse that in the past 1 pill twice daily caused psychosis therefore I only prescribed 1 pill once daily.    FOLLOW-UP:   Return in about 6 months (around 2/17/2023).    Total time spent for the day for this patient unrelated to procedure time is: 37 minutes. I spent 33 minutes in face to face time and I spent 1 minutes pre-charting and 3 minutes in post-visit documentation.      Dr. Chance Ricks D.O.  [facility]

## 2022-08-17 NOTE — PATIENT INSTRUCTIONS
Use the Inbrija inhaler first thing on waking up in the morning with your first daily dose of levodopa.     Amantadine 100mg once daily was prescribed for treatment of dyskinesia.     Deep brain stimulation is the recommended long term treatment for Parkinson's disease in your case. Dr. Vladimir Nieto is the neurosurgeon who does this at [facility].

## 2022-09-09 ENCOUNTER — PATIENT MESSAGE (OUTPATIENT)
Dept: NEUROLOGY | Facility: MEDICAL CENTER | Age: 57
End: 2022-09-09
Payer: COMMERCIAL

## 2022-09-09 DIAGNOSIS — G20.A1 PARKINSON'S DISEASE (HCC): ICD-10-CM

## 2022-09-11 ENCOUNTER — PATIENT MESSAGE (OUTPATIENT)
Dept: NEUROLOGY | Facility: MEDICAL CENTER | Age: 57
End: 2022-09-11
Payer: COMMERCIAL

## 2022-09-11 DIAGNOSIS — G20.A1 PARKINSON'S DISEASE (HCC): ICD-10-CM

## 2022-09-15 ENCOUNTER — TELEPHONE (OUTPATIENT)
Dept: NEUROLOGY | Facility: MEDICAL CENTER | Age: 57
End: 2022-09-15
Payer: COMMERCIAL

## 2022-09-15 NOTE — TELEPHONE ENCOUNTER
Patient wife (Sandra) came in to the office today to inform Dr. Ricks about the patient having bad side effect on Amantadine. Requesting an urgent call from Dr. Ricks today please to discuss what is going on. Please advise. Thank you.RASHI Walls.

## 2022-09-15 NOTE — TELEPHONE ENCOUNTER
Wife is reporting that Rakan is paranoid that she is sleeping with his friends. Rakan did fill a prescription for amantadine and she believes that this is making him psychotic. Similar issues occurred in the past when he was on amantadine. I have D/C'd the amantadine.     Dr. Chance Ricks D.O.  [facility]

## 2022-09-22 RX ORDER — AMANTADINE HYDROCHLORIDE 100 MG/1
100 CAPSULE, GELATIN COATED ORAL 2 TIMES DAILY
Qty: 60 CAPSULE | Refills: 2 | Status: SHIPPED | OUTPATIENT
Start: 2022-09-22 | End: 2022-11-18

## 2022-10-25 DIAGNOSIS — G20.A1 PARKINSON'S DISEASE (HCC): ICD-10-CM

## 2022-10-25 RX ORDER — ROTIGOTINE 8 MG/24H
1 PATCH, EXTENDED RELEASE TRANSDERMAL DAILY
Qty: 30 PATCH | Refills: 2 | Status: SHIPPED | OUTPATIENT
Start: 2022-10-25

## 2022-11-11 DIAGNOSIS — G20.A1 PARKINSON'S DISEASE (HCC): ICD-10-CM

## 2022-11-11 NOTE — TELEPHONE ENCOUNTER
Received request via: Pharmacy    Was the patient seen in the last year in this department? Yes    Does the patient have an active prescription (recently filled or refills available) for medication(s) requested? Yes.     Does the patient have [facility] Plus and need 100 day supply (blood pressure, diabetes and cholesterol meds only)? No    Please send refill to [facility] specialty pharmacy. Patient is down to 2 last dose.

## 2022-11-16 ENCOUNTER — PATIENT MESSAGE (OUTPATIENT)
Dept: NEUROLOGY | Facility: MEDICAL CENTER | Age: 57
End: 2022-11-16
Payer: COMMERCIAL

## 2022-11-16 DIAGNOSIS — G20.A1 PARKINSON'S DISEASE (HCC): ICD-10-CM

## 2022-11-18 DIAGNOSIS — G20.A1 PARKINSON'S DISEASE (HCC): ICD-10-CM

## 2022-11-18 RX ORDER — AMANTADINE HYDROCHLORIDE 100 MG/1
CAPSULE, GELATIN COATED ORAL
Qty: 60 CAPSULE | Refills: 2 | Status: SHIPPED | OUTPATIENT
Start: 2022-11-18 | End: 2023-02-22

## 2022-11-18 NOTE — TELEPHONE ENCOUNTER
Received request via: Pharmacy    Was the patient seen in the last year in this department? Yes    Does the patient have an active prescription (recently filled or refills available) for medication(s) requested? Yes.     Does the patient have [facility] and need 100 day supply (blood pressure, diabetes and cholesterol meds only)? No

## 2022-12-07 ENCOUNTER — APPOINTMENT (RX ONLY)
Dept: URBAN - METROPOLITAN AREA CLINIC 6 | Facility: CLINIC | Age: 57
Setting detail: DERMATOLOGY
End: 2022-12-07

## 2022-12-07 DIAGNOSIS — L82.0 INFLAMED SEBORRHEIC KERATOSIS: ICD-10-CM

## 2022-12-07 DIAGNOSIS — D22 MELANOCYTIC NEVI: ICD-10-CM

## 2022-12-07 DIAGNOSIS — L73.8 OTHER SPECIFIED FOLLICULAR DISORDERS: ICD-10-CM

## 2022-12-07 DIAGNOSIS — Z71.89 OTHER SPECIFIED COUNSELING: ICD-10-CM

## 2022-12-07 PROBLEM — D22.5 MELANOCYTIC NEVI OF TRUNK: Status: ACTIVE | Noted: 2022-12-07

## 2022-12-07 PROCEDURE — ? DIAGNOSIS COMMENT

## 2022-12-07 PROCEDURE — 17110 DESTRUCTION B9 LES UP TO 14: CPT

## 2022-12-07 PROCEDURE — ? RECOMMENDATIONS

## 2022-12-07 PROCEDURE — ? LIQUID NITROGEN

## 2022-12-07 PROCEDURE — ? COUNSELING

## 2022-12-07 PROCEDURE — ? PHOTO-DOCUMENTATION

## 2022-12-07 PROCEDURE — 99203 OFFICE O/P NEW LOW 30 MIN: CPT | Mod: 25

## 2022-12-07 ASSESSMENT — LOCATION SIMPLE DESCRIPTION DERM
LOCATION SIMPLE: LEFT UPPER BACK
LOCATION SIMPLE: RIGHT THIGH
LOCATION SIMPLE: RIGHT UPPER BACK

## 2022-12-07 ASSESSMENT — LOCATION DETAILED DESCRIPTION DERM
LOCATION DETAILED: LEFT MID-UPPER BACK
LOCATION DETAILED: RIGHT ANTERIOR PROXIMAL THIGH
LOCATION DETAILED: RIGHT SUPERIOR UPPER BACK
LOCATION DETAILED: LEFT SUPERIOR UPPER BACK

## 2022-12-07 ASSESSMENT — LOCATION ZONE DERM
LOCATION ZONE: TRUNK
LOCATION ZONE: LEG

## 2022-12-07 NOTE — PROCEDURE: RECOMMENDATIONS
Recommendation Preamble: The following recommendations were made during the visit:
Detail Level: Zone
Render Risk Assessment In Note?: no
Recommendation Override: Recommended hot compresses to the area .

## 2022-12-07 NOTE — PROCEDURE: DIAGNOSIS COMMENT
Comment: Will be rechecking in 2 months .
Detail Level: Simple
Render Risk Assessment In Note?: no
Comment: With keratin plug

## 2022-12-07 NOTE — PROCEDURE: LIQUID NITROGEN
Show Aperture Variable?: Yes
Render Post-Care Instructions In Note?: no
Medical Necessity Information: It is in your best interest to select a reason for this procedure from the list below. All of these items fulfill various CMS LCD requirements except the new and changing color options.
Medical Necessity Clause: This procedure was medically necessary because the lesions that were treated were:
Post-Care Instructions: I reviewed with the patient in detail post-care instructions. Patient is to wear sunprotection, and avoid picking at any of the treated lesions. Pt may apply Vaseline to crusted or scabbing areas.
Spray Paint Text: The liquid nitrogen was applied to the skin utilizing a spray paint frosting technique.
Detail Level: Detailed
Consent: The patient's consent was obtained including but not limited to risks of crusting, scabbing, blistering, scarring, darker or lighter pigmentary change, recurrence, incomplete removal and infection.

## 2022-12-07 NOTE — PROCEDURE: PHOTO-DOCUMENTATION
Photo Preface (Leave Blank If You Do Not Want): Photographs were obtained today
Detail Level: Zone
Details (Free Text): Sizes \\nleft superior upper back - 0.3\\n\\nleft superior upper back - 0.6 \\n \\nright superior upper back - 0.4

## 2022-12-30 ENCOUNTER — PATIENT MESSAGE (OUTPATIENT)
Dept: MEDICAL GROUP | Facility: CLINIC | Age: 57
End: 2022-12-30

## 2023-02-07 ENCOUNTER — APPOINTMENT (RX ONLY)
Dept: URBAN - METROPOLITAN AREA CLINIC 6 | Facility: CLINIC | Age: 58
Setting detail: DERMATOLOGY
End: 2023-02-07

## 2023-02-07 DIAGNOSIS — Z71.89 OTHER SPECIFIED COUNSELING: ICD-10-CM

## 2023-02-07 DIAGNOSIS — D485 NEOPLASM OF UNCERTAIN BEHAVIOR OF SKIN: ICD-10-CM

## 2023-02-07 PROBLEM — D48.5 NEOPLASM OF UNCERTAIN BEHAVIOR OF SKIN: Status: ACTIVE | Noted: 2023-02-07

## 2023-02-07 PROCEDURE — 99212 OFFICE O/P EST SF 10 MIN: CPT | Mod: 25

## 2023-02-07 PROCEDURE — 11102 TANGNTL BX SKIN SINGLE LES: CPT

## 2023-02-07 PROCEDURE — ? BIOPSY BY SHAVE METHOD

## 2023-02-07 PROCEDURE — ? COUNSELING

## 2023-02-07 ASSESSMENT — LOCATION DETAILED DESCRIPTION DERM
LOCATION DETAILED: LEFT SUPERIOR UPPER BACK
LOCATION DETAILED: LEFT MID-UPPER BACK

## 2023-02-07 ASSESSMENT — LOCATION ZONE DERM: LOCATION ZONE: TRUNK

## 2023-02-07 ASSESSMENT — LOCATION SIMPLE DESCRIPTION DERM: LOCATION SIMPLE: LEFT UPPER BACK

## 2023-02-07 NOTE — PROCEDURE: BIOPSY BY SHAVE METHOD
Detail Level: Detailed
Depth Of Biopsy: dermis
Was A Bandage Applied: Yes
Size Of Lesion In Cm: 0
X Size Of Lesion In Cm: 0.6
Biopsy Type: H and E
Biopsy Method: Dermablade
Anesthesia Type: 0.05% lidocaine without epinephrine
Anesthesia Volume In Cc: 0.5
Hemostasis: Drysol
Wound Care: Petrolatum
Dressing: bandage
Destruction After The Procedure: No
Type Of Destruction Used: Curettage
Curettage Text: The wound bed was treated with curettage after the biopsy was performed.
Cryotherapy Text: The wound bed was treated with cryotherapy after the biopsy was performed.
Electrodesiccation Text: The wound bed was treated with electrodesiccation after the biopsy was performed.
Electrodesiccation And Curettage Text: The wound bed was treated with electrodesiccation and curettage after the biopsy was performed.
Silver Nitrate Text: The wound bed was treated with silver nitrate after the biopsy was performed.
Lab: [id number]
Lab Facility: 
Consent: Written consent was obtained and risks were reviewed including but not limited to scarring, infection, bleeding, scabbing, incomplete removal, nerve damage and allergy to anesthesia.
Post-Care Instructions: I reviewed with the patient in detail post-care instructions. Patient is to keep the biopsy site dry overnight, and then apply bacitracin twice daily until healed. Patient may apply hydrogen peroxide soaks to remove any crusting.
Notification Instructions: Patient will be notified of biopsy results. However, patient instructed to call the office if not contacted within 2 weeks.
Billing Type: Third-Party Bill
Information: Selecting Yes will display possible errors in your note based on the variables you have selected. This validation is only offered as a suggestion for you. PLEASE NOTE THAT THE VALIDATION TEXT WILL BE REMOVED WHEN YOU FINALIZE YOUR NOTE. IF YOU WANT TO FAX A PRELIMINARY NOTE YOU WILL NEED TO TOGGLE THIS TO 'NO' IF YOU DO NOT WANT IT IN YOUR FAXED NOTE.

## 2023-02-22 DIAGNOSIS — G20.A1 PARKINSON'S DISEASE (HCC): ICD-10-CM

## 2023-02-22 RX ORDER — AMANTADINE HYDROCHLORIDE 100 MG/1
100 CAPSULE, GELATIN COATED ORAL 2 TIMES DAILY
Qty: 180 CAPSULE | Refills: 2 | Status: SHIPPED | OUTPATIENT
Start: 2023-02-22

## 2023-02-22 NOTE — TELEPHONE ENCOUNTER
Received request via: Pharmacy    Was the patient seen in the last year in this department? No    Does the patient have an active prescription (recently filled or refills available) for medication(s) requested? No    Does the patient have [facility] Plus and need 100 day supply (blood pressure, diabetes and cholesterol meds only)? Patient does not have SCP

## 2023-03-23 ENCOUNTER — APPOINTMENT (RX ONLY)
Dept: URBAN - METROPOLITAN AREA CLINIC 6 | Facility: CLINIC | Age: 58
Setting detail: DERMATOLOGY
End: 2023-03-23

## 2023-03-23 DIAGNOSIS — D22 MELANOCYTIC NEVI: ICD-10-CM

## 2023-03-23 PROBLEM — D22.5 MELANOCYTIC NEVI OF TRUNK: Status: ACTIVE | Noted: 2023-03-23

## 2023-03-23 PROBLEM — D48.5 NEOPLASM OF UNCERTAIN BEHAVIOR OF SKIN: Status: ACTIVE | Noted: 2023-03-23

## 2023-03-23 PROCEDURE — 11401 EXC TR-EXT B9+MARG 0.6-1 CM: CPT

## 2023-03-23 PROCEDURE — 12032 INTMD RPR S/A/T/EXT 2.6-7.5: CPT | Mod: 59

## 2023-03-23 PROCEDURE — ? EXCISION

## 2023-03-23 PROCEDURE — ? COUNSELING

## 2023-03-23 PROCEDURE — 11102 TANGNTL BX SKIN SINGLE LES: CPT | Mod: 59

## 2023-03-23 PROCEDURE — ? BIOPSY BY SHAVE METHOD

## 2023-03-23 ASSESSMENT — LOCATION DETAILED DESCRIPTION DERM
LOCATION DETAILED: RIGHT SUPERIOR UPPER BACK
LOCATION DETAILED: LEFT SUPERIOR UPPER BACK

## 2023-03-23 ASSESSMENT — LOCATION SIMPLE DESCRIPTION DERM
LOCATION SIMPLE: RIGHT UPPER BACK
LOCATION SIMPLE: LEFT UPPER BACK

## 2023-03-23 ASSESSMENT — LOCATION ZONE DERM: LOCATION ZONE: TRUNK

## 2023-03-23 NOTE — PROCEDURE: EXCISION
Medical Necessity Information: It is in your best interest to select a reason for this procedure from the list below. All of these items fulfill various CMS LCD requirements except lesion extends to a margin.
Include Z78.9 (Other Specified Conditions Influencing Health Status) As An Associated Diagnosis?: No
Medical Necessity Clause: This procedure was medically necessary because the lesion that was treated was:
Lab: [id number]
Lab Facility: 
Accession #: [id number]
Date Of Previous Biopsy (Optional): 02/07/2023
Referring Physician (Optional): Kerri Jang
Surgeon (Optional): Dr. Mercado
Size Of Lesion In Cm: 0.8
X Size Of Lesion In Cm (Optional): 0.6
Size Of Margin In Cm: 0.1
Anesthesia Volume In Cc: 1
Eye Clamp Note Details: An eye clamp was used during the procedure.
Excision Method: Elliptical
Saucerization Depth: dermis and superficial adipose tissue
Repair Type: Intermediate
Intermediate / Complex Repair - Final Wound Length In Cm: 4.5
Suturegard Retention Suture: 2-0 Nylon
Retention Suture Bite Size: 3 mm
Length To Time In Minutes Device Was In Place: 10
Undermining Type: Entire Wound
Debridement Text: The wound edges were debrided prior to proceeding with the closure to facilitate wound healing.
Helical Rim Text: The closure involved the helical rim.
Vermilion Border Text: The closure involved the vermilion border.
Nostril Rim Text: The closure involved the nostril rim.
Retention Suture Text: Retention sutures were placed to support the closure and prevent dehiscence.
Primary Defect Length (In Cm): 0
Epidermal Closure Graft Donor Site (Optional): simple interrupted
Graft Donor Site Bandage (Optional-Leave Blank If You Don't Want In Note): Steri-strips and a pressure bandage were applied to the donor site.
Detail Level: Detailed
Excision Depth: adipose tissue
Scalpel Size: 10 blade
Anesthesia Type: 1% lidocaine with epinephrine and a 1:10 solution of 8.4% sodium bicarbonate
Additional Anesthesia Volume In Cc: 6
Hemostasis: Electrodesiccation
Estimated Blood Loss (Cc): minimal
Deep Sutures: 4-0 Monocryl
Dermal Closure: buried vertical mattress
Epidermal Sutures: 4-0 Caprosyn
Epidermal Closure: running subcuticular
Wound Care: Vaseline
Dressing: pressure dressing
Suturegard Intro: Intraoperative tissue expansion was performed, utilizing the SUTUREGARD device, in order to reduce wound tension.
Suturegard Body: The suture ends were repeatedly re-tightened and re-clamped to achieve the desired tissue expansion.
Hemigard Intro: Due to skin fragility and wound tension, it was decided to use HEMIGARD adhesive retention suture devices to permit a linear closure. The skin was cleaned and dried for a 6cm distance away from the wound. Excessive hair, if present, was removed to allow for adhesion.
Hemigard Postcare Instructions: The HEMIGARD strips are to remain completely dry for at least 5-7 days.
Positioning (Leave Blank If You Do Not Want): The patient was placed in a comfortable position exposing the surgical site.
Complex Repair Preamble Text (Leave Blank If You Do Not Want): Extensive wide undermining was performed.
Intermediate Repair Preamble Text (Leave Blank If You Do Not Want): Undermining was performed with blunt dissection.
Fusiform Excision Additional Text (Leave Blank If You Do Not Want): The margin was drawn around the clinically apparent lesion.  A fusiform shape was then drawn on the skin incorporating the lesion and margins.  Incisions were then made along these lines to the appropriate tissue plane and the lesion was extirpated.
Eliptical Excision Additional Text (Leave Blank If You Do Not Want): The margin was drawn around the clinically apparent lesion.  An elliptical shape was then drawn on the skin incorporating the lesion and margins.  Incisions were then made along these lines to the appropriate tissue plane and the lesion was extirpated.
Saucerization Excision Additional Text (Leave Blank If You Do Not Want): The margin was drawn around the clinically apparent lesion.  Incisions were then made along these lines, in a tangential fashion, to the appropriate tissue plane and the lesion was extirpated.
Slit Excision Additional Text (Leave Blank If You Do Not Want): A linear line was drawn on the skin overlying the lesion. An incision was made slowly until the lesion was visualized.  Once visualized, the lesion was removed with blunt dissection.
Excisional Biopsy Additional Text (Leave Blank If You Do Not Want): The margin was drawn around the clinically apparent lesion. An elliptical shape was then drawn on the skin incorporating the lesion and margins.  Incisions were then made along these lines to the appropriate tissue plane and the lesion was extirpated.
Perilesional Excision Additional Text (Leave Blank If You Do Not Want): The margin was drawn around the clinically apparent lesion. Incisions were then made along these lines to the appropriate tissue plane and the lesion was extirpated.
Repair Performed By Another Provider Text (Leave Blank If You Do Not Want): After the tissue was excised the defect was repaired by another provider.
No Repair - Repaired With Adjacent Surgical Defect Text (Leave Blank If You Do Not Want): After the excision the defect was repaired concurrently with another surgical defect which was in close approximation.
Adjacent Tissue Transfer Text: The defect edges were debeveled with a #15 scalpel blade.  Given the location of the defect and the proximity to free margins an adjacent tissue transfer was deemed most appropriate.  Using a sterile surgical marker, an appropriate flap was drawn incorporating the defect and placing the expected incisions within the relaxed skin tension lines where possible.    The area thus outlined was incised deep to adipose tissue with a #15 scalpel blade.  The skin margins were undermined to an appropriate distance in all directions utilizing iris scissors.
Advancement Flap (Single) Text: The defect edges were debeveled with a #15 scalpel blade.  Given the location of the defect and the proximity to free margins a single advancement flap was deemed most appropriate.  Using a sterile surgical marker, an appropriate advancement flap was drawn incorporating the defect and placing the expected incisions within the relaxed skin tension lines where possible.    The area thus outlined was incised deep to adipose tissue with a #15 scalpel blade.  The skin margins were undermined to an appropriate distance in all directions utilizing iris scissors.
Advancement Flap (Double) Text: The defect edges were debeveled with a #15 scalpel blade.  Given the location of the defect and the proximity to free margins a double advancement flap was deemed most appropriate.  Using a sterile surgical marker, the appropriate advancement flaps were drawn incorporating the defect and placing the expected incisions within the relaxed skin tension lines where possible.    The area thus outlined was incised deep to adipose tissue with a #15 scalpel blade.  The skin margins were undermined to an appropriate distance in all directions utilizing iris scissors.
Burow's Advancement Flap Text: The defect edges were debeveled with a #15 scalpel blade.  Given the location of the defect and the proximity to free margins a Burow's advancement flap was deemed most appropriate.  Using a sterile surgical marker, the appropriate advancement flap was drawn incorporating the defect and placing the expected incisions within the relaxed skin tension lines where possible.    The area thus outlined was incised deep to adipose tissue with a #15 scalpel blade.  The skin margins were undermined to an appropriate distance in all directions utilizing iris scissors.
Chonodrocutaneous Helical Advancement Flap Text: The defect edges were debeveled with a #15 scalpel blade.  Given the location of the defect and the proximity to free margins a chondrocutaneous helical advancement flap was deemed most appropriate.  Using a sterile surgical marker, the appropriate advancement flap was drawn incorporating the defect and placing the expected incisions within the relaxed skin tension lines where possible.    The area thus outlined was incised deep to adipose tissue with a #15 scalpel blade.  The skin margins were undermined to an appropriate distance in all directions utilizing iris scissors.
Crescentic Advancement Flap Text: The defect edges were debeveled with a #15 scalpel blade.  Given the location of the defect and the proximity to free margins a crescentic advancement flap was deemed most appropriate.  Using a sterile surgical marker, the appropriate advancement flap was drawn incorporating the defect and placing the expected incisions within the relaxed skin tension lines where possible.    The area thus outlined was incised deep to adipose tissue with a #15 scalpel blade.  The skin margins were undermined to an appropriate distance in all directions utilizing iris scissors.
A-T Advancement Flap Text: The defect edges were debeveled with a #15 scalpel blade.  Given the location of the defect, shape of the defect and the proximity to free margins an A-T advancement flap was deemed most appropriate.  Using a sterile surgical marker, an appropriate advancement flap was drawn incorporating the defect and placing the expected incisions within the relaxed skin tension lines where possible.    The area thus outlined was incised deep to adipose tissue with a #15 scalpel blade.  The skin margins were undermined to an appropriate distance in all directions utilizing iris scissors.
O-T Advancement Flap Text: The defect edges were debeveled with a #15 scalpel blade.  Given the location of the defect, shape of the defect and the proximity to free margins an O-T advancement flap was deemed most appropriate.  Using a sterile surgical marker, an appropriate advancement flap was drawn incorporating the defect and placing the expected incisions within the relaxed skin tension lines where possible.    The area thus outlined was incised deep to adipose tissue with a #15 scalpel blade.  The skin margins were undermined to an appropriate distance in all directions utilizing iris scissors.
O-L Flap Text: The defect edges were debeveled with a #15 scalpel blade.  Given the location of the defect, shape of the defect and the proximity to free margins an O-L flap was deemed most appropriate.  Using a sterile surgical marker, an appropriate advancement flap was drawn incorporating the defect and placing the expected incisions within the relaxed skin tension lines where possible.    The area thus outlined was incised deep to adipose tissue with a #15 scalpel blade.  The skin margins were undermined to an appropriate distance in all directions utilizing iris scissors.
O-Z Flap Text: The defect edges were debeveled with a #15 scalpel blade.  Given the location of the defect, shape of the defect and the proximity to free margins an O-Z flap was deemed most appropriate.  Using a sterile surgical marker, an appropriate transposition flap was drawn incorporating the defect and placing the expected incisions within the relaxed skin tension lines where possible. The area thus outlined was incised deep to adipose tissue with a #15 scalpel blade.  The skin margins were undermined to an appropriate distance in all directions utilizing iris scissors.
Double O-Z Flap Text: The defect edges were debeveled with a #15 scalpel blade.  Given the location of the defect, shape of the defect and the proximity to free margins a Double O-Z flap was deemed most appropriate.  Using a sterile surgical marker, an appropriate transposition flap was drawn incorporating the defect and placing the expected incisions within the relaxed skin tension lines where possible. The area thus outlined was incised deep to adipose tissue with a #15 scalpel blade.  The skin margins were undermined to an appropriate distance in all directions utilizing iris scissors.
V-Y Flap Text: The defect edges were debeveled with a #15 scalpel blade.  Given the location of the defect, shape of the defect and the proximity to free margins a V-Y flap was deemed most appropriate.  Using a sterile surgical marker, an appropriate advancement flap was drawn incorporating the defect and placing the expected incisions within the relaxed skin tension lines where possible.    The area thus outlined was incised deep to adipose tissue with a #15 scalpel blade.  The skin margins were undermined to an appropriate distance in all directions utilizing iris scissors.
Advancement-Rotation Flap Text: The defect edges were debeveled with a #15 scalpel blade.  Given the location of the defect, shape of the defect and the proximity to free margins an advancement-rotation flap was deemed most appropriate.  Using a sterile surgical marker, an appropriate flap was drawn incorporating the defect and placing the expected incisions within the relaxed skin tension lines where possible. The area thus outlined was incised deep to adipose tissue with a #15 scalpel blade.  The skin margins were undermined to an appropriate distance in all directions utilizing iris scissors.
Mercedes Flap Text: The defect edges were debeveled with a #15 scalpel blade.  Given the location of the defect, shape of the defect and the proximity to free margins a Mercedes flap was deemed most appropriate.  Using a sterile surgical marker, an appropriate advancement flap was drawn incorporating the defect and placing the expected incisions within the relaxed skin tension lines where possible. The area thus outlined was incised deep to adipose tissue with a #15 scalpel blade.  The skin margins were undermined to an appropriate distance in all directions utilizing iris scissors.
Modified Advancement Flap Text: The defect edges were debeveled with a #15 scalpel blade.  Given the location of the defect, shape of the defect and the proximity to free margins a modified advancement flap was deemed most appropriate.  Using a sterile surgical marker, an appropriate advancement flap was drawn incorporating the defect and placing the expected incisions within the relaxed skin tension lines where possible.    The area thus outlined was incised deep to adipose tissue with a #15 scalpel blade.  The skin margins were undermined to an appropriate distance in all directions utilizing iris scissors.
Mucosal Advancement Flap Text: Given the location of the defect, shape of the defect and the proximity to free margins a mucosal advancement flap was deemed most appropriate. Incisions were made with a 15 blade scalpel in the appropriate fashion along the cutaneous vermillion border and the mucosal lip. The remaining actinically damaged mucosal tissue was excised.  The mucosal advancement flap was then elevated to the gingival sulcus with care taken to preserve the neurovascular structures and advanced into the primary defect. Care was taken to ensure that precise realignment of the vermilion border was achieved.
Peng Advancement Flap Text: The defect edges were debeveled with a #15 scalpel blade.  Given the location of the defect, shape of the defect and the proximity to free margins a Peng advancement flap was deemed most appropriate.  Using a sterile surgical marker, an appropriate advancement flap was drawn incorporating the defect and placing the expected incisions within the relaxed skin tension lines where possible. The area thus outlined was incised deep to adipose tissue with a #15 scalpel blade.  The skin margins were undermined to an appropriate distance in all directions utilizing iris scissors.
Hatchet Flap Text: The defect edges were debeveled with a #15 scalpel blade.  Given the location of the defect, shape of the defect and the proximity to free margins a hatchet flap was deemed most appropriate.  Using a sterile surgical marker, an appropriate hatchet flap was drawn incorporating the defect and placing the expected incisions within the relaxed skin tension lines where possible.    The area thus outlined was incised deep to adipose tissue with a #15 scalpel blade.  The skin margins were undermined to an appropriate distance in all directions utilizing iris scissors.
Rotation Flap Text: The defect edges were debeveled with a #15 scalpel blade.  Given the location of the defect, shape of the defect and the proximity to free margins a rotation flap was deemed most appropriate.  Using a sterile surgical marker, an appropriate rotation flap was drawn incorporating the defect and placing the expected incisions within the relaxed skin tension lines where possible.    The area thus outlined was incised deep to adipose tissue with a #15 scalpel blade.  The skin margins were undermined to an appropriate distance in all directions utilizing iris scissors.
Spiral Flap Text: The defect edges were debeveled with a #15 scalpel blade.  Given the location of the defect, shape of the defect and the proximity to free margins a spiral flap was deemed most appropriate.  Using a sterile surgical marker, an appropriate rotation flap was drawn incorporating the defect and placing the expected incisions within the relaxed skin tension lines where possible. The area thus outlined was incised deep to adipose tissue with a #15 scalpel blade.  The skin margins were undermined to an appropriate distance in all directions utilizing iris scissors.
Staged Advancement Flap Text: The defect edges were debeveled with a #15 scalpel blade.  Given the location of the defect, shape of the defect and the proximity to free margins a staged advancement flap was deemed most appropriate.  Using a sterile surgical marker, an appropriate advancement flap was drawn incorporating the defect and placing the expected incisions within the relaxed skin tension lines where possible. The area thus outlined was incised deep to adipose tissue with a #15 scalpel blade.  The skin margins were undermined to an appropriate distance in all directions utilizing iris scissors.
Star Wedge Flap Text: The defect edges were debeveled with a #15 scalpel blade.  Given the location of the defect, shape of the defect and the proximity to free margins a star wedge flap was deemed most appropriate.  Using a sterile surgical marker, an appropriate rotation flap was drawn incorporating the defect and placing the expected incisions within the relaxed skin tension lines where possible. The area thus outlined was incised deep to adipose tissue with a #15 scalpel blade.  The skin margins were undermined to an appropriate distance in all directions utilizing iris scissors.
Transposition Flap Text: The defect edges were debeveled with a #15 scalpel blade.  Given the location of the defect and the proximity to free margins a transposition flap was deemed most appropriate.  Using a sterile surgical marker, an appropriate transposition flap was drawn incorporating the defect.    The area thus outlined was incised deep to adipose tissue with a #15 scalpel blade.  The skin margins were undermined to an appropriate distance in all directions utilizing iris scissors.
Muscle Hinge Flap Text: The defect edges were debeveled with a #15 scalpel blade.  Given the size, depth and location of the defect and the proximity to free margins a muscle hinge flap was deemed most appropriate.  Using a sterile surgical marker, an appropriate hinge flap was drawn incorporating the defect. The area thus outlined was incised with a #15 scalpel blade.  The skin margins were undermined to an appropriate distance in all directions utilizing iris scissors.
Mustarde Flap Text: The defect edges were debeveled with a #15 scalpel blade.  Given the size, depth and location of the defect and the proximity to free margins a Mustarde flap was deemed most appropriate.  Using a sterile surgical marker, an appropriate flap was drawn incorporating the defect. The area thus outlined was incised with a #15 scalpel blade.  The skin margins were undermined to an appropriate distance in all directions utilizing iris scissors.
Nasal Turnover Hinge Flap Text: The defect edges were debeveled with a #15 scalpel blade.  Given the size, depth, location of the defect and the defect being full thickness a nasal turnover hinge flap was deemed most appropriate.  Using a sterile surgical marker, an appropriate hinge flap was drawn incorporating the defect. The area thus outlined was incised with a #15 scalpel blade. The flap was designed to recreate the nasal mucosal lining and the alar rim. The skin margins were undermined to an appropriate distance in all directions utilizing iris scissors.
Nasalis-Muscle-Based Myocutaneous Island Pedicle Flap Text: Using a #15 blade, an incision was made around the donor flap to the level of the nasalis muscle. Wide lateral undermining was then performed in both the subcutaneous plane above the nasalis muscle, and in a submuscular plane just above periosteum. This allowed the formation of a free nasalis muscle axial pedicle (based on the angular artery) which was still attached to the actual cutaneous flap, increasing its mobility and vascular viability. Hemostasis was obtained with pinpoint electrocoagulation. The flap was mobilized into position and the pivotal anchor points positioned and stabilized with buried interrupted sutures. Subcutaneous and dermal tissues were closed in a multilayered fashion with sutures. Tissue redundancies were excised, and the epidermal edges were apposed without significant tension and sutured with sutures.
Orbicularis Oris Muscle Flap Text: The defect edges were debeveled with a #15 scalpel blade.  Given that the defect affected the competency of the oral sphincter an orbicularis oris muscle flap was deemed most appropriate to restore this competency and normal muscle function.  Using a sterile surgical marker, an appropriate flap was drawn incorporating the defect. The area thus outlined was incised with a #15 scalpel blade.
Melolabial Transposition Flap Text: The defect edges were debeveled with a #15 scalpel blade.  Given the location of the defect and the proximity to free margins a melolabial flap was deemed most appropriate.  Using a sterile surgical marker, an appropriate melolabial transposition flap was drawn incorporating the defect.    The area thus outlined was incised deep to adipose tissue with a #15 scalpel blade.  The skin margins were undermined to an appropriate distance in all directions utilizing iris scissors.
Rhombic Flap Text: The defect edges were debeveled with a #15 scalpel blade.  Given the location of the defect and the proximity to free margins a rhombic flap was deemed most appropriate.  Using a sterile surgical marker, an appropriate rhombic flap was drawn incorporating the defect.    The area thus outlined was incised deep to adipose tissue with a #15 scalpel blade.  The skin margins were undermined to an appropriate distance in all directions utilizing iris scissors.
Rhomboid Transposition Flap Text: The defect edges were debeveled with a #15 scalpel blade.  Given the location of the defect and the proximity to free margins a rhomboid transposition flap was deemed most appropriate.  Using a sterile surgical marker, an appropriate rhomboid flap was drawn incorporating the defect.    The area thus outlined was incised deep to adipose tissue with a #15 scalpel blade.  The skin margins were undermined to an appropriate distance in all directions utilizing iris scissors.
Bi-Rhombic Flap Text: The defect edges were debeveled with a #15 scalpel blade.  Given the location of the defect and the proximity to free margins a bi-rhombic flap was deemed most appropriate.  Using a sterile surgical marker, an appropriate rhombic flap was drawn incorporating the defect. The area thus outlined was incised deep to adipose tissue with a #15 scalpel blade.  The skin margins were undermined to an appropriate distance in all directions utilizing iris scissors.
Helical Rim Advancement Flap Text: The defect edges were debeveled with a #15 blade scalpel.  Given the location of the defect and the proximity to free margins (helical rim) a double helical rim advancement flap was deemed most appropriate.  Using a sterile surgical marker, the appropriate advancement flaps were drawn incorporating the defect and placing the expected incisions between the helical rim and antihelix where possible.  The area thus outlined was incised through and through with a #15 scalpel blade.  With a skin hook and iris scissors, the flaps were gently and sharply undermined and freed up.
Bilateral Helical Rim Advancement Flap Text: The defect edges were debeveled with a #15 blade scalpel.  Given the location of the defect and the proximity to free margins (helical rim) a bilateral helical rim advancement flap was deemed most appropriate.  Using a sterile surgical marker, the appropriate advancement flaps were drawn incorporating the defect and placing the expected incisions between the helical rim and antihelix where possible.  The area thus outlined was incised through and through with a #15 scalpel blade.  With a skin hook and iris scissors, the flaps were gently and sharply undermined and freed up.
Ear Star Wedge Flap Text: The defect edges were debeveled with a #15 blade scalpel.  Given the location of the defect and the proximity to free margins (helical rim) an ear star wedge flap was deemed most appropriate.  Using a sterile surgical marker, the appropriate flap was drawn incorporating the defect and placing the expected incisions between the helical rim and antihelix where possible.  The area thus outlined was incised through and through with a #15 scalpel blade.
Banner Transposition Flap Text: The defect edges were debeveled with a #15 scalpel blade.  Given the location of the defect and the proximity to free margins a Banner transposition flap was deemed most appropriate.  Using a sterile surgical marker, an appropriate flap drawn around the defect. The area thus outlined was incised deep to adipose tissue with a #15 scalpel blade.  The skin margins were undermined to an appropriate distance in all directions utilizing iris scissors.
Bilobed Flap Text: The defect edges were debeveled with a #15 scalpel blade.  Given the location of the defect and the proximity to free margins a bilobe flap was deemed most appropriate.  Using a sterile surgical marker, an appropriate bilobe flap drawn around the defect.    The area thus outlined was incised deep to adipose tissue with a #15 scalpel blade.  The skin margins were undermined to an appropriate distance in all directions utilizing iris scissors.
Bilobed Transposition Flap Text: The defect edges were debeveled with a #15 scalpel blade.  Given the location of the defect and the proximity to free margins a bilobed transposition flap was deemed most appropriate.  Using a sterile surgical marker, an appropriate bilobe flap drawn around the defect.    The area thus outlined was incised deep to adipose tissue with a #15 scalpel blade.  The skin margins were undermined to an appropriate distance in all directions utilizing iris scissors.
Trilobed Flap Text: The defect edges were debeveled with a #15 scalpel blade.  Given the location of the defect and the proximity to free margins a trilobed flap was deemed most appropriate.  Using a sterile surgical marker, an appropriate trilobed flap drawn around the defect.    The area thus outlined was incised deep to adipose tissue with a #15 scalpel blade.  The skin margins were undermined to an appropriate distance in all directions utilizing iris scissors.
Dorsal Nasal Flap Text: The defect edges were debeveled with a #15 scalpel blade.  Given the location of the defect and the proximity to free margins a dorsal nasal flap was deemed most appropriate.  Using a sterile surgical marker, an appropriate dorsal nasal flap was drawn around the defect.    The area thus outlined was incised deep to adipose tissue with a #15 scalpel blade.  The skin margins were undermined to an appropriate distance in all directions utilizing iris scissors.
Island Pedicle Flap Text: The defect edges were debeveled with a #15 scalpel blade.  Given the location of the defect, shape of the defect and the proximity to free margins an island pedicle advancement flap was deemed most appropriate.  Using a sterile surgical marker, an appropriate advancement flap was drawn incorporating the defect, outlining the appropriate donor tissue and placing the expected incisions within the relaxed skin tension lines where possible.    The area thus outlined was incised deep to adipose tissue with a #15 scalpel blade.  The skin margins were undermined to an appropriate distance in all directions around the primary defect and laterally outward around the island pedicle utilizing iris scissors.  There was minimal undermining beneath the pedicle flap.
Island Pedicle Flap With Canthal Suspension Text: The defect edges were debeveled with a #15 scalpel blade.  Given the location of the defect, shape of the defect and the proximity to free margins an island pedicle advancement flap was deemed most appropriate.  Using a sterile surgical marker, an appropriate advancement flap was drawn incorporating the defect, outlining the appropriate donor tissue and placing the expected incisions within the relaxed skin tension lines where possible. The area thus outlined was incised deep to adipose tissue with a #15 scalpel blade.  The skin margins were undermined to an appropriate distance in all directions around the primary defect and laterally outward around the island pedicle utilizing iris scissors.  There was minimal undermining beneath the pedicle flap. A suspension suture was placed in the canthal tendon to prevent tension and prevent ectropion.
Alar Island Pedicle Flap Text: The defect edges were debeveled with a #15 scalpel blade.  Given the location of the defect, shape of the defect and the proximity to the alar rim an island pedicle advancement flap was deemed most appropriate.  Using a sterile surgical marker, an appropriate advancement flap was drawn incorporating the defect, outlining the appropriate donor tissue and placing the expected incisions within the nasal ala running parallel to the alar rim. The area thus outlined was incised with a #15 scalpel blade.  The skin margins were undermined minimally to an appropriate distance in all directions around the primary defect and laterally outward around the island pedicle utilizing iris scissors.  There was minimal undermining beneath the pedicle flap.
Double Island Pedicle Flap Text: The defect edges were debeveled with a #15 scalpel blade.  Given the location of the defect, shape of the defect and the proximity to free margins a double island pedicle advancement flap was deemed most appropriate.  Using a sterile surgical marker, an appropriate advancement flap was drawn incorporating the defect, outlining the appropriate donor tissue and placing the expected incisions within the relaxed skin tension lines where possible.    The area thus outlined was incised deep to adipose tissue with a #15 scalpel blade.  The skin margins were undermined to an appropriate distance in all directions around the primary defect and laterally outward around the island pedicle utilizing iris scissors.  There was minimal undermining beneath the pedicle flap.
Island Pedicle Flap-Requiring Vessel Identification Text: The defect edges were debeveled with a #15 scalpel blade.  Given the location of the defect, shape of the defect and the proximity to free margins an island pedicle advancement flap was deemed most appropriate.  Using a sterile surgical marker, an appropriate advancement flap was drawn, based on the axial vessel mentioned above, incorporating the defect, outlining the appropriate donor tissue and placing the expected incisions within the relaxed skin tension lines where possible.    The area thus outlined was incised deep to adipose tissue with a #15 scalpel blade.  The skin margins were undermined to an appropriate distance in all directions around the primary defect and laterally outward around the island pedicle utilizing iris scissors.  There was minimal undermining beneath the pedicle flap.
Keystone Flap Text: The defect edges were debeveled with a #15 scalpel blade.  Given the location of the defect, shape of the defect a keystone flap was deemed most appropriate.  Using a sterile surgical marker, an appropriate keystone flap was drawn incorporating the defect, outlining the appropriate donor tissue and placing the expected incisions within the relaxed skin tension lines where possible. The area thus outlined was incised deep to adipose tissue with a #15 scalpel blade.  The skin margins were undermined to an appropriate distance in all directions around the primary defect and laterally outward around the flap utilizing iris scissors.
O-T Plasty Text: The defect edges were debeveled with a #15 scalpel blade.  Given the location of the defect, shape of the defect and the proximity to free margins an O-T plasty was deemed most appropriate.  Using a sterile surgical marker, an appropriate O-T plasty was drawn incorporating the defect and placing the expected incisions within the relaxed skin tension lines where possible.    The area thus outlined was incised deep to adipose tissue with a #15 scalpel blade.  The skin margins were undermined to an appropriate distance in all directions utilizing iris scissors.
O-Z Plasty Text: The defect edges were debeveled with a #15 scalpel blade.  Given the location of the defect, shape of the defect and the proximity to free margins an O-Z plasty (double transposition flap) was deemed most appropriate.  Using a sterile surgical marker, the appropriate transposition flaps were drawn incorporating the defect and placing the expected incisions within the relaxed skin tension lines where possible.    The area thus outlined was incised deep to adipose tissue with a #15 scalpel blade.  The skin margins were undermined to an appropriate distance in all directions utilizing iris scissors.  Hemostasis was achieved with electrocautery.  The flaps were then transposed into place, one clockwise and the other counterclockwise, and anchored with interrupted buried subcutaneous sutures.
Double O-Z Plasty Text: The defect edges were debeveled with a #15 scalpel blade.  Given the location of the defect, shape of the defect and the proximity to free margins a Double O-Z plasty (double transposition flap) was deemed most appropriate.  Using a sterile surgical marker, the appropriate transposition flaps were drawn incorporating the defect and placing the expected incisions within the relaxed skin tension lines where possible. The area thus outlined was incised deep to adipose tissue with a #15 scalpel blade.  The skin margins were undermined to an appropriate distance in all directions utilizing iris scissors.  Hemostasis was achieved with electrocautery.  The flaps were then transposed into place, one clockwise and the other counterclockwise, and anchored with interrupted buried subcutaneous sutures.
V-Y Plasty Text: The defect edges were debeveled with a #15 scalpel blade.  Given the location of the defect, shape of the defect and the proximity to free margins an V-Y advancement flap was deemed most appropriate.  Using a sterile surgical marker, an appropriate advancement flap was drawn incorporating the defect and placing the expected incisions within the relaxed skin tension lines where possible.    The area thus outlined was incised deep to adipose tissue with a #15 scalpel blade.  The skin margins were undermined to an appropriate distance in all directions utilizing iris scissors.
H Plasty Text: Given the location of the defect, shape of the defect and the proximity to free margins a H-plasty was deemed most appropriate for repair.  Using a sterile surgical marker, the appropriate advancement arms of the H-plasty were drawn incorporating the defect and placing the expected incisions within the relaxed skin tension lines where possible. The area thus outlined was incised deep to adipose tissue with a #15 scalpel blade. The skin margins were undermined to an appropriate distance in all directions utilizing iris scissors.  The opposing advancement arms were then advanced into place in opposite direction and anchored with interrupted buried subcutaneous sutures.
W Plasty Text: The lesion was extirpated to the level of the fat with a #15 scalpel blade.  Given the location of the defect, shape of the defect and the proximity to free margins a W-plasty was deemed most appropriate for repair.  Using a sterile surgical marker, the appropriate transposition arms of the W-plasty were drawn incorporating the defect and placing the expected incisions within the relaxed skin tension lines where possible.    The area thus outlined was incised deep to adipose tissue with a #15 scalpel blade.  The skin margins were undermined to an appropriate distance in all directions utilizing iris scissors.  The opposing transposition arms were then transposed into place in opposite direction and anchored with interrupted buried subcutaneous sutures.
Z Plasty Text: The lesion was extirpated to the level of the fat with a #15 scalpel blade.  Given the location of the defect, shape of the defect and the proximity to free margins a Z-plasty was deemed most appropriate for repair.  Using a sterile surgical marker, the appropriate transposition arms of the Z-plasty were drawn incorporating the defect and placing the expected incisions within the relaxed skin tension lines where possible.    The area thus outlined was incised deep to adipose tissue with a #15 scalpel blade.  The skin margins were undermined to an appropriate distance in all directions utilizing iris scissors.  The opposing transposition arms were then transposed into place in opposite direction and anchored with interrupted buried subcutaneous sutures.
Zygomaticofacial Flap Text: Given the location of the defect, shape of the defect and the proximity to free margins a zygomaticofacial flap was deemed most appropriate for repair.  Using a sterile surgical marker, the appropriate flap was drawn incorporating the defect and placing the expected incisions within the relaxed skin tension lines where possible. The area thus outlined was incised deep to adipose tissue with a #15 scalpel blade with preservation of a vascular pedicle.  The skin margins were undermined to an appropriate distance in all directions utilizing iris scissors.  The flap was then placed into the defect and anchored with interrupted buried subcutaneous sutures.
Cheek Interpolation Flap Text: A decision was made to reconstruct the defect utilizing an interpolation axial flap and a staged reconstruction.  A telfa template was made of the defect.  This telfa template was then used to outline the Cheek Interpolation flap.  The donor area for the pedicle flap was then injected with anesthesia.  The flap was excised through the skin and subcutaneous tissue down to the layer of the underlying musculature.  The interpolation flap was carefully excised within this deep plane to maintain its blood supply.  The edges of the donor site were undermined.   The donor site was closed in a primary fashion.  The pedicle was then rotated into position and sutured.  Once the tube was sutured into place, adequate blood supply was confirmed with blanching and refill.  The pedicle was then wrapped with xeroform gauze and dressed appropriately with a telfa and gauze bandage to ensure continued blood supply and protect the attached pedicle.
Cheek-To-Nose Interpolation Flap Text: A decision was made to reconstruct the defect utilizing an interpolation axial flap and a staged reconstruction.  A telfa template was made of the defect.  This telfa template was then used to outline the Cheek-To-Nose Interpolation flap.  The donor area for the pedicle flap was then injected with anesthesia.  The flap was excised through the skin and subcutaneous tissue down to the layer of the underlying musculature.  The interpolation flap was carefully excised within this deep plane to maintain its blood supply.  The edges of the donor site were undermined.   The donor site was closed in a primary fashion.  The pedicle was then rotated into position and sutured.  Once the tube was sutured into place, adequate blood supply was confirmed with blanching and refill.  The pedicle was then wrapped with xeroform gauze and dressed appropriately with a telfa and gauze bandage to ensure continued blood supply and protect the attached pedicle.
Interpolation Flap Text: A decision was made to reconstruct the defect utilizing an interpolation axial flap and a staged reconstruction.  A telfa template was made of the defect.  This telfa template was then used to outline the interpolation flap.  The donor area for the pedicle flap was then injected with anesthesia.  The flap was excised through the skin and subcutaneous tissue down to the layer of the underlying musculature.  The interpolation flap was carefully excised within this deep plane to maintain its blood supply.  The edges of the donor site were undermined.   The donor site was closed in a primary fashion.  The pedicle was then rotated into position and sutured.  Once the tube was sutured into place, adequate blood supply was confirmed with blanching and refill.  The pedicle was then wrapped with xeroform gauze and dressed appropriately with a telfa and gauze bandage to ensure continued blood supply and protect the attached pedicle.
Melolabial Interpolation Flap Text: A decision was made to reconstruct the defect utilizing an interpolation axial flap and a staged reconstruction.  A telfa template was made of the defect.  This telfa template was then used to outline the melolabial interpolation flap.  The donor area for the pedicle flap was then injected with anesthesia.  The flap was excised through the skin and subcutaneous tissue down to the layer of the underlying musculature.  The pedicle flap was carefully excised within this deep plane to maintain its blood supply.  The edges of the donor site were undermined.   The donor site was closed in a primary fashion.  The pedicle was then rotated into position and sutured.  Once the tube was sutured into place, adequate blood supply was confirmed with blanching and refill.  The pedicle was then wrapped with xeroform gauze and dressed appropriately with a telfa and gauze bandage to ensure continued blood supply and protect the attached pedicle.
Mastoid Interpolation Flap Text: A decision was made to reconstruct the defect utilizing an interpolation axial flap and a staged reconstruction.  A telfa template was made of the defect.  This telfa template was then used to outline the mastoid interpolation flap.  The donor area for the pedicle flap was then injected with anesthesia.  The flap was excised through the skin and subcutaneous tissue down to the layer of the underlying musculature.  The pedicle flap was carefully excised within this deep plane to maintain its blood supply.  The edges of the donor site were undermined.   The donor site was closed in a primary fashion.  The pedicle was then rotated into position and sutured.  Once the tube was sutured into place, adequate blood supply was confirmed with blanching and refill.  The pedicle was then wrapped with xeroform gauze and dressed appropriately with a telfa and gauze bandage to ensure continued blood supply and protect the attached pedicle.
Posterior Auricular Interpolation Flap Text: A decision was made to reconstruct the defect utilizing an interpolation axial flap and a staged reconstruction.  A telfa template was made of the defect.  This telfa template was then used to outline the posterior auricular interpolation flap.  The donor area for the pedicle flap was then injected with anesthesia.  The flap was excised through the skin and subcutaneous tissue down to the layer of the underlying musculature.  The pedicle flap was carefully excised within this deep plane to maintain its blood supply.  The edges of the donor site were undermined.   The donor site was closed in a primary fashion.  The pedicle was then rotated into position and sutured.  Once the tube was sutured into place, adequate blood supply was confirmed with blanching and refill.  The pedicle was then wrapped with xeroform gauze and dressed appropriately with a telfa and gauze bandage to ensure continued blood supply and protect the attached pedicle.
Paramedian Forehead Flap Text: A decision was made to reconstruct the defect utilizing an interpolation axial flap and a staged reconstruction.  A telfa template was made of the defect.  This telfa template was then used to outline the paramedian forehead pedicle flap.  The donor area for the pedicle flap was then injected with anesthesia.  The flap was excised through the skin and subcutaneous tissue down to the layer of the underlying musculature.  The pedicle flap was carefully excised within this deep plane to maintain its blood supply.  The edges of the donor site were undermined.   The donor site was closed in a primary fashion.  The pedicle was then rotated into position and sutured.  Once the tube was sutured into place, adequate blood supply was confirmed with blanching and refill.  The pedicle was then wrapped with xeroform gauze and dressed appropriately with a telfa and gauze bandage to ensure continued blood supply and protect the attached pedicle.
Abbe Flap (Upper To Lower Lip) Text: The defect of the lower lip was assessed and measured.  Given the location and size of the defect, an Abbe flap was deemed most appropriate.  Using a sterile surgical marker, an appropriate Abbe flap was measured and drawn on the upper lip. Local anesthesia was then infiltrated.  A scalpel was then used to incise the upper lip through and through the skin, vermilion, muscle and mucosa, leaving the flap pedicled on the opposite side.  The flap was then rotated and transferred to the lower lip defect.  The flap was then sutured into place with a three layer technique, closing the orbicularis oris muscle layer with subcutaneous buried sutures, followed by a mucosal layer and an epidermal layer.
Abbe Flap (Lower To Upper Lip) Text: The defect of the upper lip was assessed and measured.  Given the location and size of the defect, an Abbe flap was deemed most appropriate.  Using a sterile surgical marker, an appropriate Abbe flap was measured and drawn on the lower lip. Local anesthesia was then infiltrated. A scalpel was then used to incise the upper lip through and through the skin, vermilion, muscle and mucosa, leaving the flap pedicled on the opposite side.  The flap was then rotated and transferred to the lower lip defect.  The flap was then sutured into place with a three layer technique, closing the orbicularis oris muscle layer with subcutaneous buried sutures, followed by a mucosal layer and an epidermal layer.
Estlander Flap (Upper To Lower Lip) Text: The defect of the lower lip was assessed and measured.  Given the location and size of the defect, an Estlander flap was deemed most appropriate.  Using a sterile surgical marker, an appropriate Estlander flap was measured and drawn on the upper lip. Local anesthesia was then infiltrated. A scalpel was then used to incise the lateral aspect of the flap, through skin, muscle and mucosa, leaving the flap pedicled medially.  The flap was then rotated and positioned to fill the lower lip defect.  The flap was then sutured into place with a three layer technique, closing the orbicularis oris muscle layer with subcutaneous buried sutures, followed by a mucosal layer and an epidermal layer.
Lip Wedge Excision Repair Text: Given the location of the defect and the proximity to free margins a full thickness wedge repair was deemed most appropriate.  Using a sterile surgical marker, the appropriate repair was drawn incorporating the defect and placing the expected incisions perpendicular to the vermilion border.  The vermilion border was also meticulously outlined to ensure appropriate reapproximation during the repair.  The area thus outlined was incised through and through with a #15 scalpel blade.  The muscularis and dermis were reaproximated with deep sutures following hemostasis. Care was taken to realign the vermilion border before proceeding with the superficial closure.  Once the vermilion was realigned the superfical and mucosal closure was finished.
Ftsg Text: The defect edges were debeveled with a #15 scalpel blade.  Given the location of the defect, shape of the defect and the proximity to free margins a full thickness skin graft was deemed most appropriate.  Using a sterile surgical marker, the primary defect shape was transferred to the donor site. The area thus outlined was incised deep to adipose tissue with a #15 scalpel blade.  The harvested graft was then trimmed of adipose tissue until only dermis and epidermis was left.  The skin margins of the secondary defect were undermined to an appropriate distance in all directions utilizing iris scissors.  The secondary defect was closed with interrupted buried subcutaneous sutures.  The skin edges were then re-apposed with running  sutures.  The skin graft was then placed in the primary defect and oriented appropriately.
Split-Thickness Skin Graft Text: The defect edges were debeveled with a #15 scalpel blade.  Given the location of the defect, shape of the defect and the proximity to free margins a split thickness skin graft was deemed most appropriate.  Using a sterile surgical marker, the primary defect shape was transferred to the donor site. The split thickness graft was then harvested.  The skin graft was then placed in the primary defect and oriented appropriately.
Burow's Graft Text: The defect edges were debeveled with a #15 scalpel blade.  Given the location of the defect, shape of the defect, the proximity to free margins and the presence of a standing cone deformity a Burow's skin graft was deemed most appropriate. The standing cone was removed and this tissue was then trimmed to the shape of the primary defect. The adipose tissue was also removed until only dermis and epidermis were left.  The skin margins of the secondary defect were undermined to an appropriate distance in all directions utilizing iris scissors.  The secondary defect was closed with interrupted buried subcutaneous sutures.  The skin edges were then re-apposed with running  sutures.  The skin graft was then placed in the primary defect and oriented appropriately.
Cartilage Graft Text: The defect edges were debeveled with a #15 scalpel blade.  Given the location of the defect, shape of the defect, the fact the defect involved a full thickness cartilage defect a cartilage graft was deemed most appropriate.  An appropriate donor site was identified, cleansed, and anesthetized. The cartilage graft was then harvested and transferred to the recipient site, oriented appropriately and then sutured into place.  The secondary defect was then repaired using a primary closure.
Composite Graft Text: The defect edges were debeveled with a #15 scalpel blade.  Given the location of the defect, shape of the defect, the proximity to free margins and the fact the defect was full thickness a composite graft was deemed most appropriate.  The defect was outline and then transferred to the donor site.  A full thickness graft was then excised from the donor site. The graft was then placed in the primary defect, oriented appropriately and then sutured into place.  The secondary defect was then repaired using a primary closure.
Epidermal Autograft Text: The defect edges were debeveled with a #15 scalpel blade.  Given the location of the defect, shape of the defect and the proximity to free margins an epidermal autograft was deemed most appropriate.  Using a sterile surgical marker, the primary defect shape was transferred to the donor site. The epidermal graft was then harvested.  The skin graft was then placed in the primary defect and oriented appropriately.
Dermal Autograft Text: The defect edges were debeveled with a #15 scalpel blade.  Given the location of the defect, shape of the defect and the proximity to free margins a dermal autograft was deemed most appropriate.  Using a sterile surgical marker, the primary defect shape was transferred to the donor site. The area thus outlined was incised deep to adipose tissue with a #15 scalpel blade.  The harvested graft was then trimmed of adipose and epidermal tissue until only dermis was left.  The skin graft was then placed in the primary defect and oriented appropriately.
Skin Substitute Text: The defect edges were debeveled with a #15 scalpel blade.  Given the location of the defect, shape of the defect and the proximity to free margins a skin substitute graft was deemed most appropriate.  The graft material was trimmed to fit the size of the defect. The graft was then placed in the primary defect and oriented appropriately.
Tissue Cultured Epidermal Autograft Text: The defect edges were debeveled with a #15 scalpel blade.  Given the location of the defect, shape of the defect and the proximity to free margins a tissue cultured epidermal autograft was deemed most appropriate.  The graft was then trimmed to fit the size of the defect.  The graft was then placed in the primary defect and oriented appropriately.
Xenograft Text: The defect edges were debeveled with a #15 scalpel blade.  Given the location of the defect, shape of the defect and the proximity to free margins a xenograft was deemed most appropriate.  The graft was then trimmed to fit the size of the defect.  The graft was then placed in the primary defect and oriented appropriately.
Purse String (Intermediate) Text: Given the location of the defect and the characteristics of the surrounding skin a purse string intermediate closure was deemed most appropriate.  Undermining was performed circumferentially around the surgical defect.  A purse string suture was then placed and tightened.
Purse String (Simple) Text: Given the location of the defect and the characteristics of the surrounding skin a purse string simple closure was deemed most appropriate.  Undermining was performed circumferentially around the surgical defect.  A purse string suture was then placed and tightened.
Complex Repair And Single Advancement Flap Text: The defect edges were debeveled with a #15 scalpel blade.  The primary defect was closed partially with a complex linear closure.  Given the location of the remaining defect, shape of the defect and the proximity to free margins a single advancement flap was deemed most appropriate for complete closure of the defect.  Using a sterile surgical marker, an appropriate advancement flap was drawn incorporating the defect and placing the expected incisions within the relaxed skin tension lines where possible.    The area thus outlined was incised deep to adipose tissue with a #15 scalpel blade.  The skin margins were undermined to an appropriate distance in all directions utilizing iris scissors.
Complex Repair And Double Advancement Flap Text: The defect edges were debeveled with a #15 scalpel blade.  The primary defect was closed partially with a complex linear closure.  Given the location of the remaining defect, shape of the defect and the proximity to free margins a double advancement flap was deemed most appropriate for complete closure of the defect.  Using a sterile surgical marker, an appropriate advancement flap was drawn incorporating the defect and placing the expected incisions within the relaxed skin tension lines where possible.    The area thus outlined was incised deep to adipose tissue with a #15 scalpel blade.  The skin margins were undermined to an appropriate distance in all directions utilizing iris scissors.
Complex Repair And Modified Advancement Flap Text: The defect edges were debeveled with a #15 scalpel blade.  The primary defect was closed partially with a complex linear closure.  Given the location of the remaining defect, shape of the defect and the proximity to free margins a modified advancement flap was deemed most appropriate for complete closure of the defect.  Using a sterile surgical marker, an appropriate advancement flap was drawn incorporating the defect and placing the expected incisions within the relaxed skin tension lines where possible.    The area thus outlined was incised deep to adipose tissue with a #15 scalpel blade.  The skin margins were undermined to an appropriate distance in all directions utilizing iris scissors.
Complex Repair And A-T Advancement Flap Text: The defect edges were debeveled with a #15 scalpel blade.  The primary defect was closed partially with a complex linear closure.  Given the location of the remaining defect, shape of the defect and the proximity to free margins an A-T advancement flap was deemed most appropriate for complete closure of the defect.  Using a sterile surgical marker, an appropriate advancement flap was drawn incorporating the defect and placing the expected incisions within the relaxed skin tension lines where possible.    The area thus outlined was incised deep to adipose tissue with a #15 scalpel blade.  The skin margins were undermined to an appropriate distance in all directions utilizing iris scissors.
Complex Repair And O-T Advancement Flap Text: The defect edges were debeveled with a #15 scalpel blade.  The primary defect was closed partially with a complex linear closure.  Given the location of the remaining defect, shape of the defect and the proximity to free margins an O-T advancement flap was deemed most appropriate for complete closure of the defect.  Using a sterile surgical marker, an appropriate advancement flap was drawn incorporating the defect and placing the expected incisions within the relaxed skin tension lines where possible.    The area thus outlined was incised deep to adipose tissue with a #15 scalpel blade.  The skin margins were undermined to an appropriate distance in all directions utilizing iris scissors.
Complex Repair And O-L Flap Text: The defect edges were debeveled with a #15 scalpel blade.  The primary defect was closed partially with a complex linear closure.  Given the location of the remaining defect, shape of the defect and the proximity to free margins an O-L flap was deemed most appropriate for complete closure of the defect.  Using a sterile surgical marker, an appropriate flap was drawn incorporating the defect and placing the expected incisions within the relaxed skin tension lines where possible.    The area thus outlined was incised deep to adipose tissue with a #15 scalpel blade.  The skin margins were undermined to an appropriate distance in all directions utilizing iris scissors.
Complex Repair And Bilobe Flap Text: The defect edges were debeveled with a #15 scalpel blade.  The primary defect was closed partially with a complex linear closure.  Given the location of the remaining defect, shape of the defect and the proximity to free margins a bilobe flap was deemed most appropriate for complete closure of the defect.  Using a sterile surgical marker, an appropriate advancement flap was drawn incorporating the defect and placing the expected incisions within the relaxed skin tension lines where possible.    The area thus outlined was incised deep to adipose tissue with a #15 scalpel blade.  The skin margins were undermined to an appropriate distance in all directions utilizing iris scissors.
Complex Repair And Melolabial Flap Text: The defect edges were debeveled with a #15 scalpel blade.  The primary defect was closed partially with a complex linear closure.  Given the location of the remaining defect, shape of the defect and the proximity to free margins a melolabial flap was deemed most appropriate for complete closure of the defect.  Using a sterile surgical marker, an appropriate advancement flap was drawn incorporating the defect and placing the expected incisions within the relaxed skin tension lines where possible.    The area thus outlined was incised deep to adipose tissue with a #15 scalpel blade.  The skin margins were undermined to an appropriate distance in all directions utilizing iris scissors.
Complex Repair And Rotation Flap Text: The defect edges were debeveled with a #15 scalpel blade.  The primary defect was closed partially with a complex linear closure.  Given the location of the remaining defect, shape of the defect and the proximity to free margins a rotation flap was deemed most appropriate for complete closure of the defect.  Using a sterile surgical marker, an appropriate advancement flap was drawn incorporating the defect and placing the expected incisions within the relaxed skin tension lines where possible.    The area thus outlined was incised deep to adipose tissue with a #15 scalpel blade.  The skin margins were undermined to an appropriate distance in all directions utilizing iris scissors.
Complex Repair And Rhombic Flap Text: The defect edges were debeveled with a #15 scalpel blade.  The primary defect was closed partially with a complex linear closure.  Given the location of the remaining defect, shape of the defect and the proximity to free margins a rhombic flap was deemed most appropriate for complete closure of the defect.  Using a sterile surgical marker, an appropriate advancement flap was drawn incorporating the defect and placing the expected incisions within the relaxed skin tension lines where possible.    The area thus outlined was incised deep to adipose tissue with a #15 scalpel blade.  The skin margins were undermined to an appropriate distance in all directions utilizing iris scissors.
Complex Repair And Transposition Flap Text: The defect edges were debeveled with a #15 scalpel blade.  The primary defect was closed partially with a complex linear closure.  Given the location of the remaining defect, shape of the defect and the proximity to free margins a transposition flap was deemed most appropriate for complete closure of the defect.  Using a sterile surgical marker, an appropriate advancement flap was drawn incorporating the defect and placing the expected incisions within the relaxed skin tension lines where possible.    The area thus outlined was incised deep to adipose tissue with a #15 scalpel blade.  The skin margins were undermined to an appropriate distance in all directions utilizing iris scissors.
Complex Repair And V-Y Plasty Text: The defect edges were debeveled with a #15 scalpel blade.  The primary defect was closed partially with a complex linear closure.  Given the location of the remaining defect, shape of the defect and the proximity to free margins a V-Y plasty was deemed most appropriate for complete closure of the defect.  Using a sterile surgical marker, an appropriate advancement flap was drawn incorporating the defect and placing the expected incisions within the relaxed skin tension lines where possible.    The area thus outlined was incised deep to adipose tissue with a #15 scalpel blade.  The skin margins were undermined to an appropriate distance in all directions utilizing iris scissors.
Complex Repair And M Plasty Text: The defect edges were debeveled with a #15 scalpel blade.  The primary defect was closed partially with a complex linear closure.  Given the location of the remaining defect, shape of the defect and the proximity to free margins an M plasty was deemed most appropriate for complete closure of the defect.  Using a sterile surgical marker, an appropriate advancement flap was drawn incorporating the defect and placing the expected incisions within the relaxed skin tension lines where possible.    The area thus outlined was incised deep to adipose tissue with a #15 scalpel blade.  The skin margins were undermined to an appropriate distance in all directions utilizing iris scissors.
Complex Repair And Double M Plasty Text: The defect edges were debeveled with a #15 scalpel blade.  The primary defect was closed partially with a complex linear closure.  Given the location of the remaining defect, shape of the defect and the proximity to free margins a double M plasty was deemed most appropriate for complete closure of the defect.  Using a sterile surgical marker, an appropriate advancement flap was drawn incorporating the defect and placing the expected incisions within the relaxed skin tension lines where possible.    The area thus outlined was incised deep to adipose tissue with a #15 scalpel blade.  The skin margins were undermined to an appropriate distance in all directions utilizing iris scissors.
Complex Repair And W Plasty Text: The defect edges were debeveled with a #15 scalpel blade.  The primary defect was closed partially with a complex linear closure.  Given the location of the remaining defect, shape of the defect and the proximity to free margins a W plasty was deemed most appropriate for complete closure of the defect.  Using a sterile surgical marker, an appropriate advancement flap was drawn incorporating the defect and placing the expected incisions within the relaxed skin tension lines where possible.    The area thus outlined was incised deep to adipose tissue with a #15 scalpel blade.  The skin margins were undermined to an appropriate distance in all directions utilizing iris scissors.
Complex Repair And Z Plasty Text: The defect edges were debeveled with a #15 scalpel blade.  The primary defect was closed partially with a complex linear closure.  Given the location of the remaining defect, shape of the defect and the proximity to free margins a Z plasty was deemed most appropriate for complete closure of the defect.  Using a sterile surgical marker, an appropriate advancement flap was drawn incorporating the defect and placing the expected incisions within the relaxed skin tension lines where possible.    The area thus outlined was incised deep to adipose tissue with a #15 scalpel blade.  The skin margins were undermined to an appropriate distance in all directions utilizing iris scissors.
Complex Repair And Dorsal Nasal Flap Text: The defect edges were debeveled with a #15 scalpel blade.  The primary defect was closed partially with a complex linear closure.  Given the location of the remaining defect, shape of the defect and the proximity to free margins a dorsal nasal flap was deemed most appropriate for complete closure of the defect.  Using a sterile surgical marker, an appropriate flap was drawn incorporating the defect and placing the expected incisions within the relaxed skin tension lines where possible.    The area thus outlined was incised deep to adipose tissue with a #15 scalpel blade.  The skin margins were undermined to an appropriate distance in all directions utilizing iris scissors.
Complex Repair And Ftsg Text: The defect edges were debeveled with a #15 scalpel blade.  The primary defect was closed partially with a complex linear closure.  Given the location of the defect, shape of the defect and the proximity to free margins a full thickness skin graft was deemed most appropriate to repair the remaining defect.  The graft was trimmed to fit the size of the remaining defect.  The graft was then placed in the primary defect, oriented appropriately, and sutured into place.
Complex Repair And Burow's Graft Text: The defect edges were debeveled with a #15 scalpel blade.  The primary defect was closed partially with a complex linear closure.  Given the location of the defect, shape of the defect, the proximity to free margins and the presence of a standing cone deformity a Burow's graft was deemed most appropriate to repair the remaining defect.  The graft was trimmed to fit the size of the remaining defect.  The graft was then placed in the primary defect, oriented appropriately, and sutured into place.
Complex Repair And Split-Thickness Skin Graft Text: The defect edges were debeveled with a #15 scalpel blade.  The primary defect was closed partially with a complex linear closure.  Given the location of the defect, shape of the defect and the proximity to free margins a split thickness skin graft was deemed most appropriate to repair the remaining defect.  The graft was trimmed to fit the size of the remaining defect.  The graft was then placed in the primary defect, oriented appropriately, and sutured into place.
Complex Repair And Epidermal Autograft Text: The defect edges were debeveled with a #15 scalpel blade.  The primary defect was closed partially with a complex linear closure.  Given the location of the defect, shape of the defect and the proximity to free margins an epidermal autograft was deemed most appropriate to repair the remaining defect.  The graft was trimmed to fit the size of the remaining defect.  The graft was then placed in the primary defect, oriented appropriately, and sutured into place.
Complex Repair And Dermal Autograft Text: The defect edges were debeveled with a #15 scalpel blade.  The primary defect was closed partially with a complex linear closure.  Given the location of the defect, shape of the defect and the proximity to free margins an dermal autograft was deemed most appropriate to repair the remaining defect.  The graft was trimmed to fit the size of the remaining defect.  The graft was then placed in the primary defect, oriented appropriately, and sutured into place.
Complex Repair And Tissue Cultured Epidermal Autograft Text: The defect edges were debeveled with a #15 scalpel blade.  The primary defect was closed partially with a complex linear closure.  Given the location of the defect, shape of the defect and the proximity to free margins an tissue cultured epidermal autograft was deemed most appropriate to repair the remaining defect.  The graft was trimmed to fit the size of the remaining defect.  The graft was then placed in the primary defect, oriented appropriately, and sutured into place.
Complex Repair And Xenograft Text: The defect edges were debeveled with a #15 scalpel blade.  The primary defect was closed partially with a complex linear closure.  Given the location of the defect, shape of the defect and the proximity to free margins a xenograft was deemed most appropriate to repair the remaining defect.  The graft was trimmed to fit the size of the remaining defect.  The graft was then placed in the primary defect, oriented appropriately, and sutured into place.
Complex Repair And Skin Substitute Graft Text: The defect edges were debeveled with a #15 scalpel blade.  The primary defect was closed partially with a complex linear closure.  Given the location of the remaining defect, shape of the defect and the proximity to free margins a skin substitute graft was deemed most appropriate to repair the remaining defect.  The graft was trimmed to fit the size of the remaining defect.  The graft was then placed in the primary defect, oriented appropriately, and sutured into place.
Path Notes (To The Dermatopathologist): Please check margins.
Consent was obtained from the patient. The risks and benefits to therapy were discussed in detail. Specifically, the risks of infection, scarring, bleeding, prolonged wound healing, incomplete removal, allergy to anesthesia, nerve injury and recurrence were addressed. Prior to the procedure, the treatment site was clearly identified and confirmed by the patient. All components of Universal Protocol/PAUSE Rule completed.
Render Post-Care Instructions In Note?: yes
Post-Care Instructions: I reviewed with the patient in detail post-care instructions. Patient is not to engage in any heavy lifting, exercise, or swimming for the next 14 days. Should the patient develop any fevers, chills, bleeding, severe pain patient will contact the office immediately.
Home Suture Removal Text: Patient was provided a home suture removal kit and will remove their sutures at home.  If they have any questions or difficulties they will call the office.
Where Do You Want The Question To Include Opioid Counseling Located?: Case Summary Tab
Billing Type: Third-Party Bill
Information: Selecting Yes will display possible errors in your note based on the variables you have selected. This validation is only offered as a suggestion for you. PLEASE NOTE THAT THE VALIDATION TEXT WILL BE REMOVED WHEN YOU FINALIZE YOUR NOTE. IF YOU WANT TO FAX A PRELIMINARY NOTE YOU WILL NEED TO TOGGLE THIS TO 'NO' IF YOU DO NOT WANT IT IN YOUR FAXED NOTE.

## 2023-05-19 ENCOUNTER — APPOINTMENT (OUTPATIENT)
Dept: NEUROLOGY | Facility: MEDICAL CENTER | Age: 58
End: 2023-05-19
Attending: PSYCHIATRY & NEUROLOGY

## 2023-06-15 DIAGNOSIS — G20.A1 PARKINSON'S DISEASE (HCC): ICD-10-CM

## 2023-09-25 ENCOUNTER — APPOINTMENT (RX ONLY)
Dept: URBAN - METROPOLITAN AREA CLINIC 6 | Facility: CLINIC | Age: 58
Setting detail: DERMATOLOGY
End: 2023-09-25

## 2023-09-25 DIAGNOSIS — Z71.89 OTHER SPECIFIED COUNSELING: ICD-10-CM

## 2023-09-25 DIAGNOSIS — L82.1 OTHER SEBORRHEIC KERATOSIS: ICD-10-CM

## 2023-09-25 DIAGNOSIS — R20.2 PARESTHESIA OF SKIN: ICD-10-CM

## 2023-09-25 DIAGNOSIS — L82.0 INFLAMED SEBORRHEIC KERATOSIS: ICD-10-CM

## 2023-09-25 DIAGNOSIS — L81.4 OTHER MELANIN HYPERPIGMENTATION: ICD-10-CM

## 2023-09-25 DIAGNOSIS — L57.0 ACTINIC KERATOSIS: ICD-10-CM

## 2023-09-25 DIAGNOSIS — D22 MELANOCYTIC NEVI: ICD-10-CM

## 2023-09-25 DIAGNOSIS — D18.0 HEMANGIOMA: ICD-10-CM

## 2023-09-25 PROBLEM — D22.5 MELANOCYTIC NEVI OF TRUNK: Status: ACTIVE | Noted: 2023-09-25

## 2023-09-25 PROBLEM — D18.01 HEMANGIOMA OF SKIN AND SUBCUTANEOUS TISSUE: Status: ACTIVE | Noted: 2023-09-25

## 2023-09-25 PROCEDURE — 17110 DESTRUCTION B9 LES UP TO 14: CPT

## 2023-09-25 PROCEDURE — 99213 OFFICE O/P EST LOW 20 MIN: CPT | Mod: 25

## 2023-09-25 PROCEDURE — ? LIQUID NITROGEN

## 2023-09-25 PROCEDURE — 17003 DESTRUCT PREMALG LES 2-14: CPT | Mod: 59

## 2023-09-25 PROCEDURE — 17000 DESTRUCT PREMALG LESION: CPT | Mod: 59

## 2023-09-25 PROCEDURE — ? COUNSELING

## 2023-09-25 ASSESSMENT — LOCATION ZONE DERM
LOCATION ZONE: SCALP
LOCATION ZONE: FACE
LOCATION ZONE: HAND
LOCATION ZONE: ARM
LOCATION ZONE: TRUNK

## 2023-09-25 ASSESSMENT — LOCATION SIMPLE DESCRIPTION DERM
LOCATION SIMPLE: ABDOMEN
LOCATION SIMPLE: RIGHT HAND
LOCATION SIMPLE: CHEST
LOCATION SIMPLE: UPPER BACK
LOCATION SIMPLE: LEFT HAND
LOCATION SIMPLE: LEFT FOREARM
LOCATION SIMPLE: POSTERIOR SCALP
LOCATION SIMPLE: LEFT CHEEK
LOCATION SIMPLE: SCALP

## 2023-09-25 ASSESSMENT — LOCATION DETAILED DESCRIPTION DERM
LOCATION DETAILED: PERIUMBILICAL SKIN
LOCATION DETAILED: STERNUM
LOCATION DETAILED: RIGHT SUPERIOR PARIETAL SCALP
LOCATION DETAILED: EPIGASTRIC SKIN
LOCATION DETAILED: LEFT INFERIOR CENTRAL MALAR CHEEK
LOCATION DETAILED: LEFT MEDIAL INFERIOR CHEST
LOCATION DETAILED: RIGHT RADIAL DORSAL HAND
LOCATION DETAILED: LEFT ULNAR DORSAL HAND
LOCATION DETAILED: SUPERIOR THORACIC SPINE
LOCATION DETAILED: LEFT SUPERIOR PARIETAL SCALP
LOCATION DETAILED: POSTERIOR MID-PARIETAL SCALP
LOCATION DETAILED: LEFT PROXIMAL DORSAL FOREARM

## 2023-09-25 NOTE — PROCEDURE: LIQUID NITROGEN
Include Z78.9 (Other Specified Conditions Influencing Health Status) As An Associated Diagnosis?: No
Show Topical Anesthesia Variable?: Yes
Medical Necessity Clause: This procedure was medically necessary because the lesions that were treated were:
Detail Level: Detailed
Post-Care Instructions: I reviewed with the patient in detail post-care instructions. Patient is to wear sunprotection, and avoid picking at any of the treated lesions. Pt may apply Vaseline to crusted or scabbing areas.
Spray Paint Text: The liquid nitrogen was applied to the skin utilizing a spray paint frosting technique.
Medical Necessity Information: It is in your best interest to select a reason for this procedure from the list below. All of these items fulfill various CMS LCD requirements except the new and changing color options.
Consent: The patient's consent was obtained including but not limited to risks of crusting, scabbing, blistering, scarring, darker or lighter pigmentary change, recurrence, incomplete removal and infection.
Duration Of Freeze Thaw-Cycle (Seconds): 5
Number Of Freeze-Thaw Cycles: 3 freeze-thaw cycles

## 2024-03-18 DIAGNOSIS — G20.A1 PARKINSON'S DISEASE (HCC): ICD-10-CM

## 2024-03-21 NOTE — TELEPHONE ENCOUNTER
Received request via: Pharmacy    Medication Name/Dosage carbidopa-levodopa (SINEMET)  MG Tab     When was medication last prescribed 06/15/23    How many refills were previously provided 2    How many Refills does he patient have left from last prescription 0    Was the patient seen in the last year in this department? No   Date of last office visit 08/17/2022     Per last Neurology Office Visit, when was the date of next follow up visit set for?                            Date of office visit follow up request 6 months     Does the patient have an upcoming appointment? No   If yes, when N/A             If no, schedule appointment I called pt left message, also sent message through my chart portal    Does the patient have skilled nursing Plus and need 100 day supply (blood pressure, diabetes and cholesterol meds only)? Patient does not have SCP

## 2024-03-25 ENCOUNTER — APPOINTMENT (RX ONLY)
Dept: URBAN - METROPOLITAN AREA CLINIC 6 | Facility: CLINIC | Age: 59
Setting detail: DERMATOLOGY
End: 2024-03-25

## 2024-03-25 DIAGNOSIS — Z71.89 OTHER SPECIFIED COUNSELING: ICD-10-CM

## 2024-03-25 DIAGNOSIS — D18.0 HEMANGIOMA: ICD-10-CM

## 2024-03-25 DIAGNOSIS — L57.0 ACTINIC KERATOSIS: ICD-10-CM | Status: INADEQUATELY CONTROLLED

## 2024-03-25 DIAGNOSIS — D22 MELANOCYTIC NEVI: ICD-10-CM

## 2024-03-25 DIAGNOSIS — L82.1 OTHER SEBORRHEIC KERATOSIS: ICD-10-CM

## 2024-03-25 DIAGNOSIS — L81.4 OTHER MELANIN HYPERPIGMENTATION: ICD-10-CM

## 2024-03-25 PROBLEM — D22.5 MELANOCYTIC NEVI OF TRUNK: Status: ACTIVE | Noted: 2024-03-25

## 2024-03-25 PROBLEM — D18.01 HEMANGIOMA OF SKIN AND SUBCUTANEOUS TISSUE: Status: ACTIVE | Noted: 2024-03-25

## 2024-03-25 PROCEDURE — ? COUNSELING

## 2024-03-25 PROCEDURE — 17000 DESTRUCT PREMALG LESION: CPT

## 2024-03-25 PROCEDURE — ? LIQUID NITROGEN

## 2024-03-25 PROCEDURE — 17003 DESTRUCT PREMALG LES 2-14: CPT

## 2024-03-25 PROCEDURE — 99213 OFFICE O/P EST LOW 20 MIN: CPT | Mod: 25

## 2024-03-25 PROCEDURE — ? SUNSCREEN RECOMMENDATIONS

## 2024-03-25 ASSESSMENT — LOCATION ZONE DERM
LOCATION ZONE: TRUNK
LOCATION ZONE: HAND
LOCATION ZONE: SCALP
LOCATION ZONE: FACE

## 2024-03-25 ASSESSMENT — LOCATION SIMPLE DESCRIPTION DERM
LOCATION SIMPLE: ABDOMEN
LOCATION SIMPLE: SCALP
LOCATION SIMPLE: LEFT CHEEK
LOCATION SIMPLE: POSTERIOR SCALP
LOCATION SIMPLE: LEFT HAND
LOCATION SIMPLE: CHEST
LOCATION SIMPLE: RIGHT HAND

## 2024-03-25 ASSESSMENT — LOCATION DETAILED DESCRIPTION DERM
LOCATION DETAILED: RIGHT CENTRAL PARIETAL SCALP
LOCATION DETAILED: STERNUM
LOCATION DETAILED: PERIUMBILICAL SKIN
LOCATION DETAILED: LEFT MEDIAL INFERIOR CHEST
LOCATION DETAILED: LEFT ULNAR DORSAL HAND
LOCATION DETAILED: LEFT INFERIOR CENTRAL MALAR CHEEK
LOCATION DETAILED: EPIGASTRIC SKIN
LOCATION DETAILED: RIGHT SUPERIOR OCCIPITAL SCALP
LOCATION DETAILED: LEFT SUPERIOR OCCIPITAL SCALP
LOCATION DETAILED: LEFT CENTRAL MALAR CHEEK
LOCATION DETAILED: RIGHT RADIAL DORSAL HAND

## 2024-03-25 NOTE — PROCEDURE: LIQUID NITROGEN
Detail Level: Detailed
Show Aperture Variable?: Yes
Consent: The patient's consent was obtained including but not limited to risks of crusting, scabbing, blistering, scarring, darker or lighter pigmentary change, recurrence, incomplete removal and infection.
Render Note In Bullet Format When Appropriate: No
Post-Care Instructions: I reviewed with the patient in detail post-care instructions. Patient is to wear sunprotection, and avoid picking at any of the treated lesions. Pt may apply Vaseline to crusted or scabbing areas.
Duration Of Freeze Thaw-Cycle (Seconds): 5
Number Of Freeze-Thaw Cycles: 3 freeze-thaw cycles

## 2024-08-01 ENCOUNTER — TELEPHONE (OUTPATIENT)
Dept: NEUROLOGY | Facility: MEDICAL CENTER | Age: 59
End: 2024-08-01
Payer: COMMERCIAL

## 2024-08-01 NOTE — TELEPHONE ENCOUNTER
Patient mistakenly scheduled in general neurology slot.  Will try to set earlier appointment for him.

## 2024-08-02 NOTE — TELEPHONE ENCOUNTER
I made some changes is the schedule for 08/23/24 for the 2:20 and 3:20 pts I called them to move them up to 2pm and 3pm and asked Marge if I can have the 2 botox slots for 4:20 and 4:40 and Ischeduled pt for 60 new movement disorder. I notified pt already by phone and my chart message

## 2024-08-23 ENCOUNTER — OFFICE VISIT (OUTPATIENT)
Dept: NEUROLOGY | Facility: MEDICAL CENTER | Age: 59
End: 2024-08-23
Attending: INTERNAL MEDICINE
Payer: COMMERCIAL

## 2024-08-23 VITALS
BODY MASS INDEX: 19.28 KG/M2 | WEIGHT: 127.21 LBS | HEIGHT: 68 IN | OXYGEN SATURATION: 98 % | HEART RATE: 68 BPM | SYSTOLIC BLOOD PRESSURE: 110 MMHG | TEMPERATURE: 97.5 F | DIASTOLIC BLOOD PRESSURE: 70 MMHG

## 2024-08-23 DIAGNOSIS — Z45.42 ENCOUNTER FOR ADJUSTMENT AND MANAGEMENT OF NEUROSTIMULATOR: ICD-10-CM

## 2024-08-23 DIAGNOSIS — G20.B2 PARKINSON'S DISEASE WITH DYSKINESIA AND FLUCTUATING MANIFESTATIONS (HCC): Primary | ICD-10-CM

## 2024-08-23 PROCEDURE — 99212 OFFICE O/P EST SF 10 MIN: CPT | Performed by: INTERNAL MEDICINE

## 2024-08-23 PROCEDURE — 95984 ALYS BRN NPGT PRGRMG ADDL 15: CPT | Performed by: INTERNAL MEDICINE

## 2024-08-23 PROCEDURE — 3074F SYST BP LT 130 MM HG: CPT | Performed by: INTERNAL MEDICINE

## 2024-08-23 PROCEDURE — 3078F DIAST BP <80 MM HG: CPT | Performed by: INTERNAL MEDICINE

## 2024-08-23 PROCEDURE — 99215 OFFICE O/P EST HI 40 MIN: CPT | Performed by: INTERNAL MEDICINE

## 2024-08-23 PROCEDURE — 95983 ALYS BRN NPGT PRGRMG 15 MIN: CPT | Performed by: INTERNAL MEDICINE

## 2024-08-23 ASSESSMENT — PATIENT HEALTH QUESTIONNAIRE - PHQ9: CLINICAL INTERPRETATION OF PHQ2 SCORE: 0

## 2024-08-23 NOTE — PROGRESS NOTES
[facility]  [address] [place] [address], [address]. [place] [address]  Phone: [phone], Fax: [phone]    Gualberto Taylor DO  Neurology, Movement Disorders Patient Name: Rakan Rader III  : 1965  MRN: [id number]     ASSESSMENT / PLAN   Rakan Rader III is a 59 y.o. RHD male presenting for parkinson's disease    Parkinson's disease  Dyskinesias  Bilateral STN deep brain stimulator ([employer] Genus RC)  Onset  with right sided symptoms, s/p DBS .   - carbidopa/levodopa 25/100, half-tablet every 2-3 hours. Increase interval of medication as tolerated  - DBS: program 2 ORIGINAL. Program 3: NEW    Insomnia  - discussed sleep hygiene      No orders of the defined types were placed in this encounter.    Return in about 2 months (around 10/23/2024).    BILLING DOCUMENTATION:   Excluding time spent on procedures during visit, I spent 40 minutes reviewing the medical record, interviewing and examining the patient, discussing diagnosis and treatment, and coordinating care.    30 minutes spent on deep brain stimulator interrogation and programming          HISTORY OF PRESENT ILLNESS   Rakan Rader III is a 59 y.o. RHD male presenting for parkinson's disease    Initial HPI 24  Right side hand tremors onset approx . Dx in .  started left side. Troublesome issues with motor fluctuations and dyskinesias. Now s/p bilateral STN and BSC Genus RC with Dr. Ivania Castro ([place]) on 2024.     Neuroleptics/pesticide exposure:--  Family history: --    Kat Kraus, NP-C 2024, [place]  - His tremor is now mostly gone but does reemerge with walking in his right hand. Noted improvements in his dystonia. He is now up and walking without significant freezing (still occurs about 10% of the day) no longer requiring a wheelchair and will occasionally still use walking sticks when going on longer walks outside. He has been out taking his dog on long walks. He  has been riding his bike again and back to work a couple days a week as a .   - He slowly has continued to reduce his carbidopa levodopa, he is now taking a total of 5 full tablets a day spaced out and 0.5 tablet increments. Yesterday was his first day trying 4.5 total tablets per day and he tolerated well. He has remained in program #2 and his stimulations at 2.5 mA on both sides   - He reports an improved sense of smell, decreased urge to urinate frequently and he feels his speech and stuttering has improved. He is sleeping better without now waking up during the middle the night around 2 to 3 AM.       Current Regimen  Carbidopa/levodopa 25/100, half tablet every 2 hours  Latency:   Duration: <2 hrs  Efficacy: walking better, legs less stiff  Dyskinesia/Dystonia: dyskinesias only when >2 tab carbidopa/levodopa  Sfx: none    ROS:  Gait: some stiffness in right leg, no falls. Riding bike.  Orthostasis: no issues, only when quickly.   Constipation: no issues  Urinary issues: better after DBS  Speech/Swallow: no dysphagia. Voice better after DBS  Anosmia: better after DBS  Cognition: --  Mood: anxiety at night when not falling asleep  Hallucinations: --  Sleep/RBD: Sleep 9:30PM, wakes up at 3AM. Stays in bed for 2 hour. Rarely yells out in sleep.   Fatigue: napping at 2PM for 30m-1 hour      Past Medical History:   Diagnosis Date    Anxiety      Past Surgical History:   Procedure Laterality Date    INGUINAL HERNIA REPAIR ROBOTIC Bilateral 11/22/2017    Procedure: INGUINAL HERNIA REPAIR ROBOTIC/ WITH MESH PLACEMENT;  Surgeon: Zackary Ponce M.D.;  Location: SURGERY [facility];  Service: General    LAMINOTOMY      c5-7    ORIF, ELBOW      right as a child    OTHER NEUROLOGICAL SURG      cervical fusion    OTHER ORTHOPEDIC SURGERY      finger surgery as child    SEPTOTURBINOPLASTY      SHOULDER ARTHROSCOPY W/ SLAP / LABRAL REPAIR      left    SINUSCOPY      maxillary sinuses     Family History    Problem Relation Age of Onset    Heart Disease Mother     Hypertension Mother     Hyperlipidemia Mother     Psychiatric Illness Mother     Psychiatric Illness Father     Alcohol/Drug Father     Alcohol/Drug Sister     Sleep Apnea Neg Hx      Social History     Socioeconomic History    Marital status: Unknown     Spouse name: Not on file    Number of children: Not on file    Years of education: Not on file    Highest education level: GED or equivalent   Occupational History    Not on file   Tobacco Use    Smoking status: Former     Current packs/day: 0.00     Average packs/day: 1 pack/day for 5.0 years (5.0 ttl pk-yrs)     Types: Cigarettes     Start date: 1982     Quit date: 1987     Years since quittin.7    Smokeless tobacco: Former     Types: Chew    Tobacco comments:     quit at age 23   Vaping Use    Vaping status: Never Used   Substance and Sexual Activity    Alcohol use: No     Comment: stopped drinking  @ 20    Drug use: No    Sexual activity: Yes     Partners: Female     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    Other Topics Concern    Not on file   Social History Narrative    Not on file     Social Determinants of Health     Financial Resource Strain: Low Risk  (2022)    Overall Financial Resource Strain (CARDIA)     Difficulty of Paying Living Expenses: Not very hard   Food Insecurity: Not At Risk (2024)    [facility]    Food Insecurity     Because difficulties at home can cause health problems, we are asking all of our patients if they are experiencing difficulties in any of the following areas:: Patient does not need help with any of these   Transportation Needs: Not At Risk (2024)    [facility]    Food Insecurity      Because difficulties at home can cause health problems, we are asking all of our patients if they are experiencing difficulties in any of the following areas:: Patient does not need help with any of these   Physical Activity: Sufficiently Active (4/20/2022)    Exercise Vital Sign     Days of Exercise per Week: 3 days     Minutes of Exercise per Session: 120 min   Stress: No Stress Concern Present (4/20/2022)    [employer] - Occupational Stress Questionnaire     Feeling of Stress : Only a little   Social Connections: Socially Integrated (4/20/2022)    Social Connection and Isolation Panel [NHANES]     Frequency of Communication with Friends and Family: Three times a week     Frequency of Social Gatherings with Friends and Family: More than three times a week     Attends [employer] Services: 1 to 4 times per year     Active Member of Clubs or Organizations: Yes     Attends Club or Organization Meetings: More than 4 times per year     Marital Status:    Intimate Partner Violence: Not on file   Housing Stability: Not At Risk (6/25/2024)    [facility]    Food Insecurity     Because difficulties at home can cause health problems, we are asking all of our patients if they are experiencing difficulties in any of the following areas:: Patient does not need help with any of these     Current Outpatient Medications   Medication    carbidopa-levodopa (SINEMET)  MG Tab    tamsulosin (FLOMAX) 0.4 MG capsule     No current facility-administered medications for this visit.     No Known Allergies            DATA / RESULTS   2020 Divya scan  abnormal pattern of uptake in the striata, left more than right, suggestive of a Parkinsonian type syndrome.    Vitamin B12 -True Cobalamin   Date Value Ref Range Status   06/18/2021 1346 (H) 211 - 911 pg/mL Final     TSH   Date Value Ref  "Range Status   06/18/2021 2.040 0.380 - 5.330 uIU/mL Final     Comment:     Please note new reference ranges effective 12/14/2017 10:00 AM    Pregnant Females, 1st Trimester  0.050-3.700  Pregnant Females, 2nd Trimester  0.310-4.350  Pregnant Females, 3rd Trimester  0.410-5.180       LDL   Date Value Ref Range Status   06/18/2021 104 (H) <100 mg/dL Final                OBJECTIVE      Vitals:    08/23/24 1533   BP: 110/70   BP Location: Left arm   Patient Position: Sitting   BP Cuff Size: Adult   Pulse: 68   Temp: 36.4 °C (97.5 °F)   TempSrc: Temporal   SpO2: 98%   Weight: 57.7 kg (127 lb 3.3 oz)   Height: 1.727 m (5' 8\")     Physical Exam  Last dose of C/L taken 12pm    General: NAD, appears stated age.      Mental status: Speech clear and fluent without tremor. +mild hypophonia. Fund of knowledge is good.      Cranial Nerves:  CN2: PERRL. Visual fields are full to finger confrontation.   CN3/4/6: EOMI. There is no nystagmus.   CN5: V1-V3 intact to light touch    CN7: Symmetric face.   CN8: Hearing grossly intact.   CN9/10/12: Soft palate and uvula rise symmetrically. Tongue midline.   CN11: Shoulder shrug intact bilaterally.     Strength  Right Left   Shoulder Abduction  5 5   Elbow Flexion 5 5   Elbow Extension  5 5   Wrist Extension  5 5   Finger Extension  5 5   Hip Flexion  5 5   Knee Extension 5 5   Ankle Dorsiflexion  5 5     Deep Tendon Reflexes: 2+ biceps, brachioradialis, patella and ankles. Plantar reflexes in flexion. Negative hoffmans.      Abnormal movements:    UPDRS Right Left   Finger tapping 0 0   Hand Movement 0 0   Toe Tapping 0 0   Leg Agility 0 0   Rigidity 1 1   Rest Tremor 2-3 2-3   Postural Tremor 1 1   Kinetic Tremor 1 1      No dystonia, dyskinesias, tics, stereotypies, athetosis, akathisia, or chorea noted.      Sensory: normal to light touch    Cerebellar: No dysmetria with FTN    Gait:   Posture - normal   Base - narrow   Stride length - decreased right > left   Arm swing - decreased " bilateral right > left   Speed - slower  Shuffling/freezing - none  U-Turn - slower, 3-4 steps           PROCEDURE     DEEP BRAIN STIMULATION PROGRAMMING  Model [employer] Genus RC  Leads [id number], Bilateral STN  Implanted 6/25/2024      Monopolar Review (ring mode) 7/22/2024  PW 60 130 Hz   L STN:  L 1.0   1.5 mA no AE  2.0 mA no AE  2.5 mA no AE  3.0 mA dyskinesia, bradykinesia improved, tremor improved  3.5 mA dyskinesia and transient tingling, bradykinesia improved, tremor improved      L 2.0   1.5 mA no AE  2.0 mA no AE  2.5 mA no AE, bradykinesia improved, tremor improved  3.0 mA dyskinesia, bradykinesia improved, tremor improved   3.5 mA dyskinesia, bradykinesia improved, tremor improved      L 3.0  1.5 mA no AE  2.0 mA no AE  2.5 mA no AE bradykinesia improved, tremor improved  3.0 mA dyskinesia, bradykinesia improved, tremor improved  3.5 mA dyskinesia, bradykinesia improved, tremor improved     L 4.0  1.5 mA no AE  2.0 mA no AE, bradykinesia improved  2.5 mA no AE, bradykinesia improved  3.0 mA no AE, bradykinesia improved  3.5 mA no AE, bradykinesia improved    R STN:   L 1.0   1.5 mA no AE  2.0 mA bradykinesia improved, tremor improved  2.5 mA dyskinesia, bradykinesia improved, tremor improved  3.0 mA dyskinesia, bradykinesia improved, tremor improved  3.5 mA dyskinesia, bradykinesia improved, tremor improved     L 2.0   1.5 mA no AE  2.0 mA no AE  2.5 mA dyskinesia, bradykinesia improved, tremor improved  3.0 mA dyskinesia, bradykinesia improved, tremor improved  3.5 mA dyskinesia, bradykinesia improved, tremor improved     L 3.0  1.5 mA no AE, bradykinesia improved  2.0 mA no AE, bradykinesia improved  2.5 mA no AE, bradykinesia improved  3.0 mA dyskinesia, bradykinesia improved  3.5 mA dyskinesia, bradykinesia improved     L 4.0  1.5 mA no AE, bradykinesia improved  2.0 mA no AE, bradykinesia improved  2.5 mA no AE, bradykinesia improved  3.0 mA dyskinesia, bradykinesia improved  3.5 mA  dyskinesia, bradykinesia improved      Continue with Program #2, which targets ZI using more dorsal contacts to help prevent dyskinesia.   Plan to use Program #1 in the future to better address his tremor.       Left STN Setting Notes Right STN Setting Notes   08/23/24 Battery level 4V, Impedance OK   Program 1 3A: 9%  3B: 61%  2A: 4%  2C: 26%    0.5mA  50uS  185Hz  3A: 3%  3B: 3%  3C: 44%  2A: 3%  2B: 3%  2C: 44%    0.5mA  50uS  185Hz     Program 2  ORIGINAL 4: 20%  3A: 7%  3B: 49%  2A: 4%  2C: 20%    1mA  50uS  185Hz  4: 20%  3A: 2%  3B: 2%  3C: 35%  2A: 3%  2B: 3%  2C: 35%    1.2mA  50uS  185Hz     Program 3  NEW  ACTIVE 4: 16%  3A: 5%  3B: +10%  3C: 37%  2A: 5%  2B: +9%  2C: 37%    3.3mA  60uS  185Hz - added 3B+2B due to side effects with higher mA  - 60uS and 3.3mA helped with leg BK when walking 4: 20%  3A: 2%  3B: 2%  3C: 35%  2A: 3%  2B: 3%  2C: 35%    2.6mA  60uS  185Hz  - 60uS: RT better

## 2024-08-23 NOTE — PATIENT INSTRUCTIONS
PARKINSON SUPPORT CENTER [facility]  [email or web address]  [email or web address]  [phone]     Informational Resources  Dany Omrfin Foundation - www.NTQ-Data.org  Fish Mckenzie Foundation - www.davisphinneyfoundation.org  National Parkinsons Foundation - www.parkinson.org  Gonzalez Jerry Foundation - www.braingrant.org    Therapy  LSVT BIG (Physical) and LOUD (Speech) Therapy: www.lsvtglobal.Teedot    Tools  [employer] - make-up line designed by a person with PD for easier use  Liftware - utensils for use with tremors     NUSHU walking shoes for Parkinson's: www.[employer]reatheAmerica.mycirQle/shop/nushu-x    Online exercise classes  med.[place].[facility]/parkinsons/living-with-PD/exercise-videos.html    A selected few from that list:  www.parkinsonseurope.org/exercisecast/  videos.aarp.org/search?q=exercise  www."[employer].FaceTags/videos/  www.[employer] One.com/c/SYLVIAAGreaterStLouisChapter  www.Shweeb.FaceTags/youtube     [facility] [facility]    [facility] is a first-of-its-kind, [facility]   nonprofit gym founded in 2006 to provide an effective form of physical   exercise to people who are living with Parkinson’s. Though it may seem   surprising, this non-contact boxing-inspired fitness routine is   dramatically improving the ability of people with Parkinson’s to live   independent lives. [facility] was founded by former [facility] prosecutor Lito Bunch, who was diagnosed with   Parkinson’s disease at age 42.     Various studies in the 1980s and 1990s supported the notion that   rigorous exercise, emphasizing gross motor movement, balance, core   strength, and rhythm, could favorably impact range of motion,   flexibility, posture, gait, and activities of daily living. More recent   studies, most notably at [facility], focus on the concept   of intense “forced” exercise, and have begun to suggest that certain   kinds of exercise may be neuro-protective, i.e., actually slowing disease   progression.      RSB enables people with Parkinson’s disease to fight their disease by   providing non-contact boxing-style fitness programs that improve their   quality of life and sense of efficacy and self-worth. Recent studies also   suggest that intense exercise programs may be “neuro-protective,”   actually working to delay the progression of symptoms. RSB provides   encouragement through a “tough love” approach, inspiring maximum   effort, speed, strength, balance, and flexibility. Boxing works by moving   your body in all planes of motion while continuously changing the   routine as you progress through the workout. RSB classes have proven   that anyone at any level of Parkinson’s can actually lessen their   symptoms and lead a healthier/happier life.     1. Classes: We offer two different levels of classes to accommodate   varying degrees of Parkinson’s and fitness levels.   2. Camaraderie: Friends for fighters and caregivers. (aka cornermen)   3. Hope: We empower you to Fight Back against Parkinson’s disease.    www.GoTable/[place]   831-153-2158  mina@[employer]  [facility]  [address], [place]  11:00 am, Monday - Friday Monday/Wednesday/Friday classes: intermediate pace/level   Tuesday/Thursday: beginner/slower paced level    www.[employer] LaboratoriesingCarbon60 Networksub.[employer]  [place] [phone], Brad [phone]  [facility]  [address], [facility] Steady Boxing - Tuesday/Thursday at 3:00pm-4:00pm  Low Impact/Seated Boxing - Tues/Thurs at 2:00pm-2:50pm   [facility]  [address] [place] [address] Steady Boxing - Monday/Wednesday/Friday at 3:00pm-4:00pm  Senior Work Out - Mon/Fri at 4:00pm-5:00pm

## 2024-08-24 PROBLEM — Z45.42 ENCOUNTER FOR ADJUSTMENT AND MANAGEMENT OF NEUROSTIMULATOR: Status: ACTIVE | Noted: 2024-08-24

## 2024-09-30 ENCOUNTER — APPOINTMENT (RX ONLY)
Dept: URBAN - METROPOLITAN AREA CLINIC 6 | Facility: CLINIC | Age: 59
Setting detail: DERMATOLOGY
End: 2024-09-30

## 2024-09-30 DIAGNOSIS — L81.4 OTHER MELANIN HYPERPIGMENTATION: ICD-10-CM

## 2024-09-30 DIAGNOSIS — D18.0 HEMANGIOMA: ICD-10-CM

## 2024-09-30 DIAGNOSIS — L57.0 ACTINIC KERATOSIS: ICD-10-CM | Status: INADEQUATELY CONTROLLED

## 2024-09-30 DIAGNOSIS — L82.1 OTHER SEBORRHEIC KERATOSIS: ICD-10-CM

## 2024-09-30 DIAGNOSIS — D485 NEOPLASM OF UNCERTAIN BEHAVIOR OF SKIN: ICD-10-CM

## 2024-09-30 DIAGNOSIS — Z71.89 OTHER SPECIFIED COUNSELING: ICD-10-CM

## 2024-09-30 PROBLEM — D48.5 NEOPLASM OF UNCERTAIN BEHAVIOR OF SKIN: Status: ACTIVE | Noted: 2024-09-30

## 2024-09-30 PROBLEM — D18.01 HEMANGIOMA OF SKIN AND SUBCUTANEOUS TISSUE: Status: ACTIVE | Noted: 2024-09-30

## 2024-09-30 PROCEDURE — ? LIQUID NITROGEN

## 2024-09-30 PROCEDURE — 11102 TANGNTL BX SKIN SINGLE LES: CPT

## 2024-09-30 PROCEDURE — 99213 OFFICE O/P EST LOW 20 MIN: CPT | Mod: 25

## 2024-09-30 PROCEDURE — ? COUNSELING

## 2024-09-30 PROCEDURE — ? BIOPSY BY SHAVE METHOD

## 2024-09-30 PROCEDURE — ? SUNSCREEN RECOMMENDATIONS

## 2024-09-30 PROCEDURE — 17000 DESTRUCT PREMALG LESION: CPT | Mod: 59

## 2024-09-30 PROCEDURE — 17003 DESTRUCT PREMALG LES 2-14: CPT

## 2024-09-30 ASSESSMENT — LOCATION SIMPLE DESCRIPTION DERM
LOCATION SIMPLE: ABDOMEN
LOCATION SIMPLE: CHEST
LOCATION SIMPLE: LEFT CHEEK
LOCATION SIMPLE: NOSE
LOCATION SIMPLE: RIGHT HAND
LOCATION SIMPLE: LEFT HAND
LOCATION SIMPLE: SCALP

## 2024-09-30 ASSESSMENT — LOCATION DETAILED DESCRIPTION DERM
LOCATION DETAILED: RIGHT SUPERIOR PARIETAL SCALP
LOCATION DETAILED: EPIGASTRIC SKIN
LOCATION DETAILED: RIGHT RADIAL DORSAL HAND
LOCATION DETAILED: LEFT MEDIAL INFERIOR CHEST
LOCATION DETAILED: LEFT SUPERIOR PARIETAL SCALP
LOCATION DETAILED: XIPHOID
LOCATION DETAILED: LEFT INFERIOR CENTRAL MALAR CHEEK
LOCATION DETAILED: NASAL TIP
LOCATION DETAILED: STERNUM
LOCATION DETAILED: LEFT ULNAR DORSAL HAND

## 2024-09-30 ASSESSMENT — LOCATION ZONE DERM
LOCATION ZONE: NOSE
LOCATION ZONE: TRUNK
LOCATION ZONE: SCALP
LOCATION ZONE: FACE
LOCATION ZONE: HAND

## 2024-09-30 NOTE — PROCEDURE: LIQUID NITROGEN
Render Post-Care Instructions In Note?: no
Show Aperture Variable?: Yes
Detail Level: Detailed
Number Of Freeze-Thaw Cycles: 3 freeze-thaw cycles
Consent: The patient's consent was obtained including but not limited to risks of crusting, scabbing, blistering, scarring, darker or lighter pigmentary change, recurrence, incomplete removal and infection.
Duration Of Freeze Thaw-Cycle (Seconds): 3
Post-Care Instructions: I reviewed with the patient in detail post-care instructions. Patient is to wear sunprotection, and avoid picking at any of the treated lesions. Pt may apply Vaseline to crusted or scabbing areas.

## 2024-10-23 ENCOUNTER — TELEPHONE (OUTPATIENT)
Dept: PHARMACY | Facility: MEDICAL CENTER | Age: 59
End: 2024-10-23
Payer: COMMERCIAL

## 2024-10-23 ENCOUNTER — OFFICE VISIT (OUTPATIENT)
Dept: NEUROLOGY | Facility: MEDICAL CENTER | Age: 59
End: 2024-10-23
Attending: INTERNAL MEDICINE
Payer: COMMERCIAL

## 2024-10-23 VITALS
DIASTOLIC BLOOD PRESSURE: 68 MMHG | BODY MASS INDEX: 19.78 KG/M2 | WEIGHT: 130.5 LBS | SYSTOLIC BLOOD PRESSURE: 108 MMHG | HEART RATE: 61 BPM | RESPIRATION RATE: 16 BRPM | TEMPERATURE: 97.8 F | HEIGHT: 68 IN | OXYGEN SATURATION: 97 %

## 2024-10-23 DIAGNOSIS — G20.B2 PARKINSON'S DISEASE WITH DYSKINESIA AND FLUCTUATING MANIFESTATIONS (HCC): ICD-10-CM

## 2024-10-23 DIAGNOSIS — Z45.42 ENCOUNTER FOR ADJUSTMENT AND MANAGEMENT OF NEUROSTIMULATOR: ICD-10-CM

## 2024-10-23 PROCEDURE — 95983 ALYS BRN NPGT PRGRMG 15 MIN: CPT | Performed by: INTERNAL MEDICINE

## 2024-10-23 PROCEDURE — 99211 OFF/OP EST MAY X REQ PHY/QHP: CPT | Performed by: INTERNAL MEDICINE

## 2024-10-23 PROCEDURE — 3078F DIAST BP <80 MM HG: CPT | Performed by: INTERNAL MEDICINE

## 2024-10-23 PROCEDURE — 3074F SYST BP LT 130 MM HG: CPT | Performed by: INTERNAL MEDICINE

## 2024-10-23 PROCEDURE — 95984 ALYS BRN NPGT PRGRMG ADDL 15: CPT | Performed by: INTERNAL MEDICINE

## 2024-10-23 PROCEDURE — 99214 OFFICE O/P EST MOD 30 MIN: CPT | Mod: 25 | Performed by: INTERNAL MEDICINE

## 2024-10-23 RX ORDER — CARBIDOPA AND LEVODOPA 25; 100 MG/1; MG/1
1 TABLET ORAL 3 TIMES DAILY
Qty: 270 TABLET | Refills: 3 | Status: SHIPPED | OUTPATIENT
Start: 2024-10-23 | End: 2025-10-23

## 2024-10-23 ASSESSMENT — PATIENT HEALTH QUESTIONNAIRE - PHQ9: CLINICAL INTERPRETATION OF PHQ2 SCORE: 0

## 2024-12-23 ENCOUNTER — HOSPITAL ENCOUNTER (OUTPATIENT)
Dept: RADIOLOGY | Facility: MEDICAL CENTER | Age: 59
End: 2024-12-23
Attending: PHYSICIAN ASSISTANT
Payer: MEDICARE

## 2024-12-23 DIAGNOSIS — R97.20 ELEVATED PROSTATE SPECIFIC ANTIGEN (PSA): ICD-10-CM

## 2024-12-23 PROCEDURE — 72197 MRI PELVIS W/O & W/DYE: CPT

## 2024-12-23 PROCEDURE — 700111 HCHG RX REV CODE 636 W/ 250 OVERRIDE (IP): Mod: JZ,JG | Performed by: RADIOLOGY

## 2024-12-23 PROCEDURE — A9579 GAD-BASE MR CONTRAST NOS,1ML: HCPCS | Mod: JZ | Performed by: PHYSICIAN ASSISTANT

## 2024-12-23 PROCEDURE — 700117 HCHG RX CONTRAST REV CODE 255: Mod: JZ | Performed by: PHYSICIAN ASSISTANT

## 2024-12-23 RX ADMIN — GLUCAGON 1 MG: 1 INJECTION, POWDER, LYOPHILIZED, FOR SOLUTION INTRAMUSCULAR; INTRAVENOUS at 09:48

## 2024-12-23 RX ADMIN — GADOTERIDOL 15 ML: 279.3 INJECTION, SOLUTION INTRAVENOUS at 10:27

## 2025-01-06 ENCOUNTER — TELEPHONE (OUTPATIENT)
Dept: NEUROLOGY | Facility: MEDICAL CENTER | Age: 60
End: 2025-01-06
Payer: MEDICARE

## 2025-01-08 ENCOUNTER — OFFICE VISIT (OUTPATIENT)
Dept: NEUROLOGY | Facility: MEDICAL CENTER | Age: 60
End: 2025-01-08
Attending: INTERNAL MEDICINE
Payer: MEDICARE

## 2025-01-08 VITALS
OXYGEN SATURATION: 99 % | RESPIRATION RATE: 18 BRPM | HEIGHT: 68 IN | WEIGHT: 130.29 LBS | DIASTOLIC BLOOD PRESSURE: 62 MMHG | SYSTOLIC BLOOD PRESSURE: 100 MMHG | BODY MASS INDEX: 19.75 KG/M2 | TEMPERATURE: 98.6 F | HEART RATE: 68 BPM

## 2025-01-08 DIAGNOSIS — Z45.42 ENCOUNTER FOR ADJUSTMENT AND MANAGEMENT OF NEUROSTIMULATOR: ICD-10-CM

## 2025-01-08 DIAGNOSIS — G20.B2 PARKINSON'S DISEASE WITH DYSKINESIA AND FLUCTUATING MANIFESTATIONS (HCC): ICD-10-CM

## 2025-01-08 PROCEDURE — 3074F SYST BP LT 130 MM HG: CPT | Performed by: INTERNAL MEDICINE

## 2025-01-08 PROCEDURE — 95983 ALYS BRN NPGT PRGRMG 15 MIN: CPT | Performed by: INTERNAL MEDICINE

## 2025-01-08 PROCEDURE — 99214 OFFICE O/P EST MOD 30 MIN: CPT | Mod: 25 | Performed by: INTERNAL MEDICINE

## 2025-01-08 PROCEDURE — 99211 OFF/OP EST MAY X REQ PHY/QHP: CPT | Performed by: INTERNAL MEDICINE

## 2025-01-08 PROCEDURE — 3078F DIAST BP <80 MM HG: CPT | Performed by: INTERNAL MEDICINE

## 2025-01-08 ASSESSMENT — PATIENT HEALTH QUESTIONNAIRE - PHQ9: CLINICAL INTERPRETATION OF PHQ2 SCORE: 0

## 2025-01-08 NOTE — PATIENT INSTRUCTIONS
- PROGRAM 4: ORIGINAL. Can adjust up and down.  - PROGRAM 1: NEW. Currently ACTIVE. Can adjust up and down.

## 2025-01-08 NOTE — PROGRESS NOTES
[facility]  [address] [place] [address], [address]. [place] [address]  Phone: [phone], Fax: [phone]    Gualberto Taylor DO  Neurology, Movement Disorders Patient Name: Rakan Rader III  : 1965  MRN: [id number]     ASSESSMENT / PLAN   Rakan Rader III is a 59 y.o. RHD male presenting for parkinson's disease    Parkinson's disease  Levodopa induced dyskinesias  Bilateral STN deep brain stimulator ([employer] [facility])  Onset  with right sided symptoms, dx , s/p DBS . Still having some residual rigidity in right leg only when walking.  - carbidopa/levodopa 25/100, one tablet 3-4x/day  - DBS programming as noted below.   PROGRAM 4: ORIGINAL. Can adjust up and down.  PROGRAM 1: NEW. Can adjust up and down.    Insomnia  - discussed sleep hygiene. Overall improved      No orders of the defined types were placed in this encounter.    Return in about 3 months (around 2025).    BILLING DOCUMENTATION:   [facility] Level 4  30 minutes spent on deep brain stimulator interrogation and programming          HISTORY OF PRESENT ILLNESS   Rakan Rader III is a 59 y.o. RHD male presenting for parkinson's disease    Initial HPI 24  Right side hand tremors onset approx . Dx in .  started left side. Troublesome issues with motor fluctuations and dyskinesias. Now s/p bilateral STN and BSC [facility] with Dr. Ivania Castro ([place]) on 2024.     Neuroleptics/pesticide exposure:--  Family history: --    Kat Kraus, EVONNE-C 2024, [place]  - His tremor is now mostly gone but does reemerge with walking in his right hand. Noted improvements in his dystonia. He is now up and walking without significant freezing (still occurs about 10% of the day) no longer requiring a wheelchair and will occasionally still use walking sticks when going on longer walks outside. He has been out taking his dog on long walks. He has been riding his bike again and back to work a  couple days a week as a .   - He slowly has continued to reduce his carbidopa levodopa, he is now taking a total of 5 full tablets a day spaced out and 0.5 tablet increments. Yesterday was his first day trying 4.5 total tablets per day and he tolerated well. He has remained in program #2 and his stimulations at 2.5 mA on both sides   - He reports an improved sense of smell, decreased urge to urinate frequently and he feels his speech and stuttering has improved. He is sleeping better without now waking up during the middle the night around 2 to 3 AM.       Current Regimen  Carbidopa/levodopa 25/100, one tablet: 3-4x/day (splits half-tab each dose)  DBS Program 3  Latency: can't tell  Duration: can't tell  Efficacy: walking better, legs less stiff.   Dyskinesia/Dystonia: no dyskinesias, possibly some restlessness. Dystonia: possibly along left hip  Sfx: none      ROS:  Gait: some stiffness in right leg, no falls. Riding bike.  10/23/24: Right leg seems more stiff, not heel-toe as much. Can improve for 30 min with carbidopa-levodopa  01/08/25: Left leg feels more stiff at the hip. Right leg more stiff at the ankle.   Orthostasis:   no issues, only when quickly.   Constipation:   no issues  Urinary issues:   better after DBS  Speech/Swallow:   no dysphagia. Voice better after DBS, mild stuttering  Anosmia: better after DBS  Cognition:   No issues  Mood:   anxiety at night when not falling asleep.   Hallucinations:   No issues  Sleep/RBD:   Sleep 9:30PM, wakes up at 3AM. Stays in bed for 2 hour. Rarely yells out in sleep.   01/08/25: sometimes trouble staying asleep past 2-3AM, once every 2 weeks  Fatigue:   napping at 2PM for 30m-1 hour      Past Medical History:   Diagnosis Date    Anxiety      Past Surgical History:   Procedure Laterality Date    INGUINAL HERNIA REPAIR ROBOTIC Bilateral 11/22/2017    Procedure: INGUINAL HERNIA REPAIR ROBOTIC/ WITH MESH PLACEMENT;  Surgeon: Zackary Ponce M.D.;   Location: SURGERY [facility];  Service: General    LAMINOTOMY      c5-7    ORIF, ELBOW      right as a child    OTHER NEUROLOGICAL SURG      cervical fusion    OTHER ORTHOPEDIC SURGERY      finger surgery as child    SEPTOPLASTY, NOSE, WITH TURBINOPLASTY      SHOULDER ARTHROSCOPY W/ SLAP / LABRAL REPAIR      left    SINUSCOPY      maxillary sinuses     Family History   Problem Relation Age of Onset    Heart Disease Mother     Hypertension Mother     Hyperlipidemia Mother     Psychiatric Illness Mother     Psychiatric Illness Father     Alcohol/Drug Father     Alcohol/Drug Sister     Sleep Apnea Neg Hx      Social History     Socioeconomic History    Marital status: Single     Spouse name: Not on file    Number of children: Not on file    Years of education: Not on file    Highest education level: GED or equivalent   Occupational History    Not on file   Tobacco Use    Smoking status: Former     Current packs/day: 0.00     Average packs/day: 1 pack/day for 5.0 years (5.0 ttl pk-yrs)     Types: Cigarettes     Start date: 1982     Quit date: 1987     Years since quittin.1    Smokeless tobacco: Former     Types: Chew    Tobacco comments:     quit at age 23   Vaping Use    Vaping status: Never Used   Substance and Sexual Activity    Alcohol use: No     Comment: stopped drinking  @ 20    Drug use: No    Sexual activity: Yes     Partners: Female     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    Other Topics Concern    Not on file   Social History Narrative    Not on file     Social Drivers of Health     Financial Resource Strain: Low Risk  (2022)    Overall Financial Resource Strain (CARDIA)     Difficulty of Paying Living Expenses: Not very hard   Food Insecurity: Not At Risk (2024)    [facility], [facility]    Food Insecurity     Because difficulties at home can cause health  problems, we are asking all of our patients if they are experiencing difficulties in any of the following areas:: Patient does not need help with any of these   Transportation Needs: Not At Risk (6/25/2024)    [facility]    Food Insecurity     Because difficulties at home can cause health problems, we are asking all of our patients if they are experiencing difficulties in any of the following areas:: Patient does not need help with any of these   Physical Activity: Sufficiently Active (4/20/2022)    Exercise Vital Sign     Days of Exercise per Week: 3 days     Minutes of Exercise per Session: 120 min   Stress: No Stress Concern Present (4/20/2022)    [employer] [place] [employer] - Occupational Stress Questionnaire     Feeling of Stress : Only a little   Social Connections: Socially Integrated (4/20/2022)    Social Connection and Isolation Panel [NHANES]     Frequency of Communication with Friends and Family: Three times a week     Frequency of Social Gatherings with Friends and Family: More than three times a week     Attends Mu-ism Services: 1 to 4 times per year     Active Member of Clubs or Organizations: Yes     Attends Club or Organization Meetings: More than 4 times per year     Marital Status:    Intimate Partner Violence: Not on file   Housing Stability: Not At Risk (6/25/2024)    [facility]    Food Insecurity     Because difficulties at home can cause health problems, we are asking all of our patients if they are experiencing difficulties in any of the following areas:: Patient does not need help with any of these     Current Outpatient Medications   Medication    carbidopa-levodopa (SINEMET)  MG Tab    tamsulosin (FLOMAX) 0.4 MG capsule     No current  "facility-administered medications for this visit.     No Known Allergies            DATA / RESULTS     2020 Divya scan  abnormal pattern of uptake in the striata, left more than right, suggestive of a Parkinsonian type syndrome.    Vitamin B12 -True Cobalamin   Date Value Ref Range Status   06/18/2021 1346 (H) 211 - 911 pg/mL Final     TSH   Date Value Ref Range Status   06/18/2021 2.040 0.380 - 5.330 uIU/mL Final     Comment:     Please note new reference ranges effective 12/14/2017 10:00 AM    Pregnant Females, 1st Trimester  0.050-3.700  Pregnant Females, 2nd Trimester  0.310-4.350  Pregnant Females, 3rd Trimester  0.410-5.180       LDL   Date Value Ref Range Status   06/18/2021 104 (H) <100 mg/dL Final                OBJECTIVE      Vitals:    01/08/25 0754   BP: 100/62   BP Location: Left arm   Patient Position: Sitting   BP Cuff Size: Adult   Pulse: 68   Resp: 18   Temp: 37 °C (98.6 °F)   TempSrc: Temporal   SpO2: 99%   Weight: 59.1 kg (130 lb 4.7 oz)   Height: 1.727 m (5' 8\")     Physical Exam  Last dose of C/L taken 12pm    Voice clear and good volume (better)    UPDRS Right Left   Finger tapping 0 0   Hand Movement 0 0   Toe Tapping 0 0   Leg Agility 0 0   Rigidity 1 1   Rest Tremor 0 better 0 better   Postural Tremor 1 1   Kinetic Tremor 1 1     No dystonia, dyskinesias, tics, stereotypies, athetosis, akathisia, or chorea noted.     Cerebellar: No dysmetria with FTN    Gait:   Posture - normal   Base - narrow   Stride length - decreased right   Arm swing - decreased right   Speed - normal (better)  Shuffling/freezing - none  U-Turn - normal, 3 steps (better)          PROCEDURE     DEEP BRAIN STIMULATION PROGRAMMING  Model Vestiage Genus RC  Leads [id number], Bilateral STN  Implanted 6/25/2024    Monopolar Review (ring mode) 7/22/2024  PW 60 130 Hz   L STN:  L 1.0   1.5 mA no AE  2.0 mA no AE  2.5 mA no AE  3.0 mA dyskinesia, bradykinesia improved, tremor improved  3.5 mA dyskinesia and transient " tingling, bradykinesia improved, tremor improved R STN:   L 1.0   1.5 mA no AE  2.0 mA bradykinesia improved, tremor improved  2.5 mA dyskinesia, bradykinesia improved, tremor improved  3.0 mA dyskinesia, bradykinesia improved, tremor improved  3.5 mA dyskinesia, bradykinesia improved, tremor improved   L 2.0   1.5 mA no AE  2.0 mA no AE  2.5 mA no AE, bradykinesia improved, tremor improved  3.0 mA dyskinesia, bradykinesia improved, tremor improved   3.5 mA dyskinesia, bradykinesia improved, tremor improved  L 2.0   1.5 mA no AE  2.0 mA no AE  2.5 mA dyskinesia, bradykinesia improved, tremor improved  3.0 mA dyskinesia, bradykinesia improved, tremor improved  3.5 mA dyskinesia, bradykinesia improved, tremor improved   L 3.0  1.5 mA no AE  2.0 mA no AE  2.5 mA no AE bradykinesia improved, tremor improved  3.0 mA dyskinesia, bradykinesia improved, tremor improved  3.5 mA dyskinesia, bradykinesia improved, tremor improved L 3.0  1.5 mA no AE, bradykinesia improved  2.0 mA no AE, bradykinesia improved  2.5 mA no AE, bradykinesia improved  3.0 mA dyskinesia, bradykinesia improved  3.5 mA dyskinesia, bradykinesia improved   L 4.0  1.5 mA no AE  2.0 mA no AE, bradykinesia improved  2.5 mA no AE, bradykinesia improved  3.0 mA no AE, bradykinesia improved  3.5 mA no AE, bradykinesia improved L 4.0  1.5 mA no AE, bradykinesia improved  2.0 mA no AE, bradykinesia improved  2.5 mA no AE, bradykinesia improved  3.0 mA dyskinesia, bradykinesia improved  3.5 mA dyskinesia, bradykinesia improved      Continue with Program #2, which targets ZI using more dorsal contacts to help prevent dyskinesia.   Plan to use Program #1 in the future to better address his tremor.       Left STN Setting Notes Right STN Setting Notes   08/23/24 Battery level 4V, Impedance OK   Program 1 3A: 9%  3B: 61%  2A: 4%  2C: 26%    0.5mA  50uS  185Hz  3A: 3%  3B: 3%  3C: 44%  2A: 3%  2B: 3%  2C: 44%    0.5mA  50uS  185Hz     Program 2  ORIGINAL 4: 20%  3A:  7%  3B: 49%  2A: 4%  2C: 20%    1mA  50uS  185Hz  4: 20%  3A: 2%  3B: 2%  3C: 35%  2A: 3%  2B: 3%  2C: 35%    1.2mA  50uS  185Hz     Program 3  NEW  ACTIVE 4: 16%  3A: 5%  3B: +10%  3C: 37%  2A: 5%  2B: +9%  2C: 37%    3.3mA  60uS  185Hz - added 3B+2B due to side effects with higher mA  - 60uS and 3.3mA helped with leg BK when walking 4: 20%  3A: 2%  3B: 2%  3C: 35%  2A: 3%  2B: 3%  2C: 35%    2.6mA  60uS  185Hz  - 60uS: RT better      10/23/24 Battery level 4V, Impedance OK   Program 3  ORIGINAL 4: -16%  3A: -5%  3B: +10%  3C: -37%  2A: -5%  2B: +9%  2C: -37%    3.8mA  60uS  185Hz Increased to 3.8mA at home due to right leg stiffness 4: -20%  3A: -2%  3B: -2%  3C: -35%  2A: 3%  2B: 3%  2C: 35%    3.2mA  60uS  185Hz  Increased to 3.2mA at home due to left arm tremors   Program 4  NEW  ACTIVE 4: -13%  3A: -4%  3B: +10%  3C: -36%  2A: -5%  2B: +9%  2C: -42%    3.8mA  60uS  185Hz Increasing to 4mA caused pulling sensation.    Increased 2C to stimulate more of STN. RT and arm swing improved 4: -18%  3A: -2%  3B: -2%  3C: -35%  2A: -2%  2B: -3%  2C: -38%    3.2mA  60uS  185Hz  Increased 2C to stimulate more of STN. RT improved.           01/08/25 Battery 4V, impedances OK   Program 1  NEW 4: +5%  3A: -3%  3B: +15%  3C: -37%  2A: -7%  2B: +13%  2C: -53%    5mA  60uS  185Hz Increase 5mA:  Slurred speech  4.7mA: resolved sfx  4.5mA: speech better at baseline    70uS: speech more slurred  4mA: resolved sfx but walking worse (more rigid)    Removed L4+ stim, improved speech at 4.5mA    Added anodic block at L4, and increased at 3B+2B: tolerated 5mA better 4: -18%  3A: -2%  3B: -2%  3C: -35%  2A: -2%  2B: -3%  2C: -38%    4mA  60uS  185Hz  No changes                   Program 4  ORIGINAL 4: -13%  3A: -4%  3B: +10%  3C: -36%  2A: -5%  2B: +9%  2C: -42%    4.5mA  60uS  185Hz No changes.   Patient increased mA at home 4: -18%  3A: -2%  3B: -2%  3C: -35%  2A: -2%  2B: -3%  2C: -38%    4mA  60uS  185Hz  No changes.   Patient  increased mA at home

## 2025-02-06 ENCOUNTER — OFFICE VISIT (OUTPATIENT)
Dept: NEUROLOGY | Facility: MEDICAL CENTER | Age: 60
End: 2025-02-06
Attending: INTERNAL MEDICINE
Payer: MEDICARE

## 2025-02-06 VITALS
DIASTOLIC BLOOD PRESSURE: 70 MMHG | HEART RATE: 68 BPM | RESPIRATION RATE: 18 BRPM | SYSTOLIC BLOOD PRESSURE: 118 MMHG | WEIGHT: 129.19 LBS | TEMPERATURE: 97.9 F | HEIGHT: 68 IN | OXYGEN SATURATION: 97 % | BODY MASS INDEX: 19.58 KG/M2

## 2025-02-06 DIAGNOSIS — G20.B2 PARKINSON'S DISEASE WITH DYSKINESIA AND FLUCTUATING MANIFESTATIONS (HCC): ICD-10-CM

## 2025-02-06 DIAGNOSIS — F41.9 ANXIETY: ICD-10-CM

## 2025-02-06 DIAGNOSIS — Z45.42 ENCOUNTER FOR ADJUSTMENT AND MANAGEMENT OF NEUROSTIMULATOR: ICD-10-CM

## 2025-02-06 PROCEDURE — 95984 ALYS BRN NPGT PRGRMG ADDL 15: CPT | Performed by: INTERNAL MEDICINE

## 2025-02-06 PROCEDURE — 95983 ALYS BRN NPGT PRGRMG 15 MIN: CPT

## 2025-02-06 PROCEDURE — 95984 ALYS BRN NPGT PRGRMG ADDL 15: CPT

## 2025-02-06 PROCEDURE — 95983 ALYS BRN NPGT PRGRMG 15 MIN: CPT | Performed by: INTERNAL MEDICINE

## 2025-02-06 PROCEDURE — 3074F SYST BP LT 130 MM HG: CPT | Performed by: INTERNAL MEDICINE

## 2025-02-06 PROCEDURE — 3078F DIAST BP <80 MM HG: CPT | Performed by: INTERNAL MEDICINE

## 2025-02-06 PROCEDURE — 99214 OFFICE O/P EST MOD 30 MIN: CPT | Mod: 25 | Performed by: INTERNAL MEDICINE

## 2025-02-06 PROCEDURE — 99211 OFF/OP EST MAY X REQ PHY/QHP: CPT | Performed by: INTERNAL MEDICINE

## 2025-02-06 ASSESSMENT — PATIENT HEALTH QUESTIONNAIRE - PHQ9: CLINICAL INTERPRETATION OF PHQ2 SCORE: 0

## 2025-02-06 NOTE — PROGRESS NOTES
[facility]  [address] [place] [address], [address]. [place] [address]  Phone: [phone], Fax: [phone]    Gualberto Taylor DO  Neurology, Movement Disorders Patient Name: Rakan Rader III  : 1965  MRN: [id number]     ASSESSMENT / PLAN   Rakan Rader III is a 59 y.o. RHD male presenting for parkinson's disease    Parkinson's disease  Levodopa induced dyskinesias  Bilateral STN deep brain stimulator ([employer] Genus RC)  Onset  with right sided symptoms, dx , s/p DBS . Still having some residual rigidity in right leg only when walking.  - carbidopa/levodopa 25/100, one tablet 3-4x/day (no need to take half-tablet divided by 30 minutes)  - DBS programming as noted below.   PROGRAM 4: ORIGINAL. Can adjust up and down.  PROGRAM 1: NEW. Can adjust up and down.    Right arm rest tremor, intermittent  Acute onset of worsened right arm tremor on 2/3/2025.  No clear preceding illness or injury.  No change in DBS settings or medications.  Exam notable for distractible and fluctuating right arm rest tremor that does not consistently improve when held in a posture. He also has right leg rigidity that is not distractible. However, has good toe tapping and leg agility when sitting which changes once he is standing.     Decreasing left STN leads mA worsened his toe tapping, but did not impact rest tremor. Higher pulse width, amplitude, and frequency had minimal benefit to the rest tremor.     Differential includes functional rest tremor, displaced DBS lead, recent viral illness causing worsening PD symptoms.     - consider CT Head to check lead placement if not improving  - referral to therapist as he has recent worsening of anxiety    Insomnia  - discussed sleep hygiene. Overall improved      Orders Placed This Encounter    Referral to Behavioral Health     Return in about 4 weeks (around 3/6/2025) for 60 min slot.    BILLING DOCUMENTATION:   [facility] Level 4  45 minutes spent on deep  brain stimulator interrogation and programming          HISTORY OF PRESENT ILLNESS   Rakan Rader III is a 59 y.o. RHD male presenting for parkinson's disease    Initial HPI 08/23/24  Right side hand tremors onset approx 2010. Dx in 2013. 2023 started left side. Troublesome issues with motor fluctuations and dyskinesias. Now s/p bilateral STN and [employer] Genus RC with Dr. Ivania Castro ([employer]) on 6/25/2024.     Neuroleptics/pesticide exposure:--  Family history: --    Kat Kraus, NP-C 8/5/2024, [employer]  - His tremor is now mostly gone but does reemerge with walking in his right hand. Noted improvements in his dystonia. He is now up and walking without significant freezing (still occurs about 10% of the day) no longer requiring a wheelchair and will occasionally still use walking sticks when going on longer walks outside. He has been out taking his dog on long walks. He has been riding his bike again and back to work a couple days a week as a .   - He slowly has continued to reduce his carbidopa levodopa, he is now taking a total of 5 full tablets a day spaced out and 0.5 tablet increments. Yesterday was his first day trying 4.5 total tablets per day and he tolerated well. He has remained in program #2 and his stimulations at 2.5 mA on both sides   - He reports an improved sense of smell, decreased urge to urinate frequently and he feels his speech and stuttering has improved. He is sleeping better without now waking up during the middle the night around 2 to 3 AM.       Interval history  08/23/24: first visit with me  10/23/24: DBS programming  01/08/25: DBS programming  02/06/25: DBS programming        Current Regimen  Carbidopa/levodopa 25/100, one tablet: 3-4x/day (splits half-tab each dose)  DBS Program 1  Latency: can't tell  Duration: can't tell  Efficacy: walking better, legs less stiff.   - 02/06/25: he had sudden worsening of right arm rest tremor and walking 2/3 (Monday).   He had recently just recovered from a viral illness which was rather mild.  No head injuries.  He tried adjusting DBS to different programs and increasing stimulation without significant improvement in his right arm tremor.  He was also having right leg rigidity that is affecting his walking.    - no dyskinesias, possibly some restlessness.   - Dystonia: possibly along left hip  Sfx: none      ROS:  Gait: some stiffness in right leg, no falls. Riding bike.  10/23/24: Right leg seems more stiff, not heel-toe as much. Can improve for 30 min with carbidopa-levodopa  01/08/25: Left leg feels more stiff at the hip. Right leg more stiff at the ankle.   Orthostasis:   no issues, only when quickly.   Constipation:   no issues  Urinary issues:   better after DBS  Speech/Swallow:   no dysphagia. Voice better after DBS, mild stuttering  Anosmia: better after DBS  Cognition:   No issues  Mood:   anxiety at night when not falling asleep.   02/06/25: new stressors with finances at home, pending prostate biopsy  Hallucinations:   No issues  Sleep/RBD:   Sleep 9:30PM, wakes up at 3AM. Stays in bed for 2 hour. Rarely yells out in sleep.   01/08/25: sometimes trouble staying asleep past 2-3AM, once every 2 weeks  Fatigue:   napping at 2PM for 30m-1 hour      Past Medical History:   Diagnosis Date    Anxiety      Past Surgical History:   Procedure Laterality Date    INGUINAL HERNIA REPAIR ROBOTIC Bilateral 11/22/2017    Procedure: INGUINAL HERNIA REPAIR ROBOTIC/ WITH MESH PLACEMENT;  Surgeon: Zackary Ponce M.D.;  Location: SURGERY [facility];  Service: General    LAMINOTOMY      c5-7    ORIF, ELBOW      right as a child    OTHER NEUROLOGICAL SURG      cervical fusion    OTHER ORTHOPEDIC SURGERY      finger surgery as child    SEPTOPLASTY, NOSE, WITH TURBINOPLASTY      SHOULDER ARTHROSCOPY W/ SLAP / LABRAL REPAIR      left    SINUSCOPY      maxillary sinuses     Family History   Problem Relation Age of Onset    Heart Disease  Mother     Hypertension Mother     Hyperlipidemia Mother     Psychiatric Illness Mother     Psychiatric Illness Father     Alcohol/Drug Father     Alcohol/Drug Sister     Sleep Apnea Neg Hx      Social History     Socioeconomic History    Marital status: Single     Spouse name: Not on file    Number of children: Not on file    Years of education: Not on file    Highest education level: GED or equivalent   Occupational History    Not on file   Tobacco Use    Smoking status: Former     Current packs/day: 0.00     Average packs/day: 1 pack/day for 5.0 years (5.0 ttl pk-yrs)     Types: Cigarettes     Start date: 1982     Quit date: 1987     Years since quittin.2    Smokeless tobacco: Former     Types: Chew    Tobacco comments:     quit at age 23   Vaping Use    Vaping status: Never Used   Substance and Sexual Activity    Alcohol use: No     Comment: stopped drinking  @     Drug use: No    Sexual activity: Yes     Partners: Female     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    Other Topics Concern    Not on file   Social History Narrative    Not on file     Social Drivers of Health     Financial Resource Strain: Low Risk  (2022)    Overall Financial Resource Strain (CARDIA)     Difficulty of Paying Living Expenses: Not very hard   Food Insecurity: Not At Risk (2024)    [facility], [facility]    Food Insecurity     Because difficulties at home can cause health problems, we are asking all of our patients if they are experiencing difficulties in any of the following areas:: Patient does not need help with any of these   Transportation Needs: Not At Risk (2024)    Received from [facility], [facility]    Food Insecurity     Because difficulties at home can cause health problems, we are asking  all of our patients if they are experiencing difficulties in any of the following areas:: Patient does not need help with any of these   Physical Activity: Sufficiently Active (4/20/2022)    Exercise Vital Sign     Days of Exercise per Week: 3 days     Minutes of Exercise per Session: 120 min   Stress: No Stress Concern Present (4/20/2022)    [employer] - Occupational Stress Questionnaire     Feeling of Stress : Only a little   Social Connections: Socially Integrated (4/20/2022)    Social Connection and Isolation Panel [NHANES]     Frequency of Communication with Friends and Family: Three times a week     Frequency of Social Gatherings with Friends and Family: More than three times a week     Attends Sabianism Services: 1 to 4 times per year     Active Member of Clubs or Organizations: Yes     Attends Club or Organization Meetings: More than 4 times per year     Marital Status:    Intimate Partner Violence: Not on file   Housing Stability: Not At Risk (6/25/2024)    [facility] from [facility], [facility]    Food Insecurity     Because difficulties at home can cause health problems, we are asking all of our patients if they are experiencing difficulties in any of the following areas:: Patient does not need help with any of these     Current Outpatient Medications   Medication    carbidopa-levodopa (SINEMET)  MG Tab    tamsulosin (FLOMAX) 0.4 MG capsule     No current facility-administered medications for this visit.     No Known Allergies            DATA / RESULTS     2020 Divya scan  abnormal pattern of uptake in the striata, left more than right, suggestive of a Parkinsonian type syndrome.    Vitamin B12 -True Cobalamin   Date Value Ref Range Status   06/18/2021 1346 (H) 211 - 911 pg/mL Final     TSH   Date Value Ref Range Status   06/18/2021 2.040 0.380 - 5.330 uIU/mL Final     Comment:      "Please note new reference ranges effective 12/14/2017 10:00 AM    Pregnant Females, 1st Trimester  0.050-3.700  Pregnant Females, 2nd Trimester  0.310-4.350  Pregnant Females, 3rd Trimester  0.410-5.180       LDL   Date Value Ref Range Status   06/18/2021 104 (H) <100 mg/dL Final                OBJECTIVE      Vitals:    02/06/25 1008   BP: 118/70   BP Location: Left arm   Patient Position: Sitting   BP Cuff Size: Adult   Pulse: 68   Resp: 18   Temp: 36.6 °C (97.9 °F)   TempSrc: Temporal   SpO2: 97%   Weight: 58.6 kg (129 lb 3 oz)   Height: 1.727 m (5' 8\")       Physical Exam  Last dose of C/L taken last night    Voice clear and good volume (better)    UPDRS Right Left   Finger tapping 0 0   Hand Movement 0 0   Toe Tapping 0 0   Leg Agility 0 0   Rigidity 1 1   Rest Tremor 3 intermittent (worse) 0   Postural Tremor 1 1   Kinetic Tremor 1 1     +fluctuating right arm rest tremor. Does not worsen consistently at rest (serial 7, testing finger tap, etc.). Worsens when discussing the rest tremor and when walking. Improves when he relaxes in supine position.     +right leg rigidity: normal LA and TT, normal tone. However once he is standing, he has rigidity and decreased right leg swing. When standing on one leg at a time, he has good knee flexion/extension and hip flexion/extension bilaterally.     No dystonia, dyskinesias, tics, stereotypies, athetosis, akathisia, or chorea noted.     Cerebellar: No dysmetria with FTN    Gait:   Posture - normal   Base - narrow   Stride length - decreased right (worse)  Arm swing - decreased right (worse), held in dystonic posture, 1-3cm tremor  Speed - slow (worse)  Shuffling/freezing - none  U-Turn - normal, 3 steps  Heel, toe, tandem walk - some difficulty but able to perform. Hand tremor does not change during this time.           PROCEDURE     DEEP BRAIN STIMULATION PROGRAMMING  [employer] Genus RC  Leads [id number], Bilateral STN  Implanted 6/25/2024    Monopolar Review " (ring mode) 7/22/2024  PW 60 130 Hz   L STN:  L 1.0   1.5 mA no AE  2.0 mA no AE  2.5 mA no AE  3.0 mA dyskinesia, bradykinesia improved, tremor improved  3.5 mA dyskinesia and transient tingling, bradykinesia improved, tremor improved R STN:   L 1.0   1.5 mA no AE  2.0 mA bradykinesia improved, tremor improved  2.5 mA dyskinesia, bradykinesia improved, tremor improved  3.0 mA dyskinesia, bradykinesia improved, tremor improved  3.5 mA dyskinesia, bradykinesia improved, tremor improved   L 2.0   1.5 mA no AE  2.0 mA no AE  2.5 mA no AE, bradykinesia improved, tremor improved  3.0 mA dyskinesia, bradykinesia improved, tremor improved   3.5 mA dyskinesia, bradykinesia improved, tremor improved  L 2.0   1.5 mA no AE  2.0 mA no AE  2.5 mA dyskinesia, bradykinesia improved, tremor improved  3.0 mA dyskinesia, bradykinesia improved, tremor improved  3.5 mA dyskinesia, bradykinesia improved, tremor improved   L 3.0  1.5 mA no AE  2.0 mA no AE  2.5 mA no AE bradykinesia improved, tremor improved  3.0 mA dyskinesia, bradykinesia improved, tremor improved  3.5 mA dyskinesia, bradykinesia improved, tremor improved L 3.0  1.5 mA no AE, bradykinesia improved  2.0 mA no AE, bradykinesia improved  2.5 mA no AE, bradykinesia improved  3.0 mA dyskinesia, bradykinesia improved  3.5 mA dyskinesia, bradykinesia improved   L 4.0  1.5 mA no AE  2.0 mA no AE, bradykinesia improved  2.5 mA no AE, bradykinesia improved  3.0 mA no AE, bradykinesia improved  3.5 mA no AE, bradykinesia improved L 4.0  1.5 mA no AE, bradykinesia improved  2.0 mA no AE, bradykinesia improved  2.5 mA no AE, bradykinesia improved  3.0 mA dyskinesia, bradykinesia improved  3.5 mA dyskinesia, bradykinesia improved      Continue with Program #2, which targets ZI using more dorsal contacts to help prevent dyskinesia.   Plan to use Program #1 in the future to better address his tremor.       Left STN Setting Notes Right STN Setting Notes   08/23/24 Battery level 4V,  Impedance OK   Program 1 3A: 9%  3B: 61%  2A: 4%  2C: 26%    0.5mA  50uS  185Hz  3A: 3%  3B: 3%  3C: 44%  2A: 3%  2B: 3%  2C: 44%    0.5mA  50uS  185Hz     Program 2  ORIGINAL 4: 20%  3A: 7%  3B: 49%  2A: 4%  2C: 20%    1mA  50uS  185Hz  4: 20%  3A: 2%  3B: 2%  3C: 35%  2A: 3%  2B: 3%  2C: 35%    1.2mA  50uS  185Hz     Program 3  NEW  ACTIVE 4: 16%  3A: 5%  3B: +10%  3C: 37%  2A: 5%  2B: +9%  2C: 37%    3.3mA  60uS  185Hz - added 3B+2B due to side effects with higher mA  - 60uS and 3.3mA helped with leg BK when walking 4: 20%  3A: 2%  3B: 2%  3C: 35%  2A: 3%  2B: 3%  2C: 35%    2.6mA  60uS  185Hz  - 60uS: RT better      10/23/24 Battery level 4V, Impedance OK   Program 3  ORIGINAL 4: -16%  3A: -5%  3B: +10%  3C: -37%  2A: -5%  2B: +9%  2C: -37%    3.8mA  60uS  185Hz Increased to 3.8mA at home due to right leg stiffness 4: -20%  3A: -2%  3B: -2%  3C: -35%  2A: 3%  2B: 3%  2C: 35%    3.2mA  60uS  185Hz  Increased to 3.2mA at home due to left arm tremors   Program 4  NEW  ACTIVE 4: -13%  3A: -4%  3B: +10%  3C: -36%  2A: -5%  2B: +9%  2C: -42%    3.8mA  60uS  185Hz Increasing to 4mA caused pulling sensation.    Increased 2C to stimulate more of STN. RT and arm swing improved 4: -18%  3A: -2%  3B: -2%  3C: -35%  2A: -2%  2B: -3%  2C: -38%    3.2mA  60uS  185Hz  Increased 2C to stimulate more of STN. RT improved.      01/08/25 Battery 4V, impedances OK   Program 1  NEW 4: +5%  3A: -3%  3B: +15%  3C: -37%  2A: -7%  2B: +13%  2C: -53%    5mA  60uS  185Hz Increase 5mA:  Slurred speech  4.7mA: resolved sfx  4.5mA: speech better at baseline    70uS: speech more slurred  4mA: resolved sfx but walking worse (more rigid)    Removed L4+ stim, improved speech at 4.5mA    Added anodic block at L4, and increased at 3B+2B: tolerated 5mA better 4: -18%  3A: -2%  3B: -2%  3C: -35%  2A: -2%  2B: -3%  2C: -38%    4mA  60uS  185Hz  No changes   Program 4  ORIGINAL 4: -13%  3A: -4%  3B: +10%  3C: -36%  2A: -5%  2B: +9%  2C:  -42%    4.5mA  60uS  185Hz No changes.   Patient increased mA at home 4: -18%  3A: -2%  3B: -2%  3C: -35%  2A: -2%  2B: -3%  2C: -38%    4mA  60uS  185Hz  No changes.   Patient increased mA at home           02/06/25 Battery 3.9V, impedances OK   Program 1 4: +5%  3A: -3%  3B: +15%  3C: -37%  2A: -7%  2B: +13%  2C: -53%    5mA  60uS  185Hz 3.5mA: worse toe tapping    6mA: possible improvement of RT    80uS: no change to RT    225Hz: no change to RT 4: -18%  3A: -2%  3B: -2%  3C: -35%  2A: -2%  2B: -3%  2C: -38%    3.5mA  60uS  185Hz  No changes

## 2025-02-10 ENCOUNTER — OFFICE VISIT (OUTPATIENT)
Dept: URGENT CARE | Facility: CLINIC | Age: 60
End: 2025-02-10
Payer: MEDICARE

## 2025-02-10 VITALS
SYSTOLIC BLOOD PRESSURE: 114 MMHG | BODY MASS INDEX: 19.7 KG/M2 | HEART RATE: 70 BPM | OXYGEN SATURATION: 98 % | TEMPERATURE: 97.6 F | WEIGHT: 130 LBS | DIASTOLIC BLOOD PRESSURE: 82 MMHG | HEIGHT: 68 IN | RESPIRATION RATE: 16 BRPM

## 2025-02-10 DIAGNOSIS — N40.1 BENIGN PROSTATIC HYPERPLASIA WITH URINARY FREQUENCY: ICD-10-CM

## 2025-02-10 DIAGNOSIS — R35.0 BENIGN PROSTATIC HYPERPLASIA WITH URINARY FREQUENCY: ICD-10-CM

## 2025-02-10 PROCEDURE — 99213 OFFICE O/P EST LOW 20 MIN: CPT | Performed by: PHYSICIAN ASSISTANT

## 2025-02-10 PROCEDURE — 3074F SYST BP LT 130 MM HG: CPT | Performed by: PHYSICIAN ASSISTANT

## 2025-02-10 PROCEDURE — 3079F DIAST BP 80-89 MM HG: CPT | Performed by: PHYSICIAN ASSISTANT

## 2025-02-10 ASSESSMENT — ENCOUNTER SYMPTOMS
FEVER: 0
VOMITING: 0
NAUSEA: 0
EYE REDNESS: 0
EYE DISCHARGE: 0
HEADACHES: 0

## 2025-02-11 ENCOUNTER — OFFICE VISIT (OUTPATIENT)
Dept: MEDICAL GROUP | Facility: CLINIC | Age: 60
End: 2025-02-11
Payer: MEDICARE

## 2025-02-11 VITALS — BODY MASS INDEX: 19.7 KG/M2 | WEIGHT: 130 LBS | HEIGHT: 68 IN

## 2025-02-11 DIAGNOSIS — N40.1 BENIGN PROSTATIC HYPERPLASIA WITH URINARY FREQUENCY: ICD-10-CM

## 2025-02-11 DIAGNOSIS — G20.B1 DYSKINESIA DUE TO PARKINSON'S DISEASE (HCC): ICD-10-CM

## 2025-02-11 DIAGNOSIS — Z72.0 TOBACCO USE: ICD-10-CM

## 2025-02-11 DIAGNOSIS — R35.0 BENIGN PROSTATIC HYPERPLASIA WITH URINARY FREQUENCY: ICD-10-CM

## 2025-02-11 DIAGNOSIS — F41.9 ANXIETY: ICD-10-CM

## 2025-02-11 PROCEDURE — 99213 OFFICE O/P EST LOW 20 MIN: CPT | Mod: GE

## 2025-02-11 NOTE — ASSESSMENT & PLAN NOTE
Orders:    Referral to Urology      The patient presents to clinic requesting a referral to urology.  The patient is an established patient at [facility], but needs a new referral due to changes in insurance.  The patient does have an upcoming prostate biopsy scheduled and needs a new referral in order to have the procedure completed.  Will place a referral to urology at this time.  Advised the patient to follow-up with urology as scheduled and his PCP as needed.  The patient had no additional complaints at this time.    Differential diagnoses, supportive care, and indications for immediate follow-up discussed with patient.   Instructed to return to clinic or nearest emergency department for any change in condition, further concerns, or worsening of symptoms.    I personally reviewed prior external notes and test results pertinent to today's visit.  I have independently reviewed and interpreted all diagnostics ordered during this urgent care visit.       Please note that this dictation was created using voice recognition software. I have made every reasonable attempt to correct obvious errors, but I expect that there may be errors of grammar and possibly content that I did not discover before finalizing the note.     This note was electronically signed by Ronel Gallagher PA-C

## 2025-02-11 NOTE — PROGRESS NOTES
Subjective     Rakan Rader III is a 59 y.o. male who presents with Referral Needed (For urology due to new insurance / no pcp at the moment )            HPI    This is a new problem.  The patient presents to clinic requesting referral to urology.  The patient states he has been an established patient of [facility] with Dr. Grande for the past 3 years for treatment and management of his enlarged prostate.  The patient states he has an upcoming biopsy scheduled.  The patient states as they were scheduling the biopsy the found that he needed a new referral due to changes in his insurance.  The patient is requesting referral to urology at this time.  The patient has no current complaints.  The patient is currently taking tamsulosin for his enlarged prostate.    PMH:  has a past medical history of Anxiety.  MEDS:   Current Outpatient Medications:     carbidopa-levodopa (SINEMET)  MG Tab, Take 1 Tablet by mouth 3 times a day. For Parkinson's, Disp: 270 Tablet, Rfl: 3    tamsulosin (FLOMAX) 0.4 MG capsule, Take 1 Capsule by mouth every morning with breakfast. (Patient taking differently: Take 0.4 mg by mouth every evening. TAKING IT AT NIGHT), Disp: 90 Capsule, Rfl: 3  ALLERGIES: No Known Allergies  SURGHX:   Past Surgical History:   Procedure Laterality Date    INGUINAL HERNIA REPAIR ROBOTIC Bilateral 11/22/2017    Procedure: INGUINAL HERNIA REPAIR ROBOTIC/ WITH MESH PLACEMENT;  Surgeon: Zackary Ponce M.D.;  Location: SURGERY [facility];  Service: General    LAMINOTOMY      c5-7    ORIF, ELBOW      right as a child    OTHER NEUROLOGICAL SURG      cervical fusion    OTHER ORTHOPEDIC SURGERY      finger surgery as child    SEPTOPLASTY, NOSE, WITH TURBINOPLASTY      SHOULDER ARTHROSCOPY W/ SLAP / LABRAL REPAIR      left    SINUSCOPY      maxillary sinuses     SOCHX:  reports that he quit smoking about 37 years ago. His smoking use included cigarettes. He started smoking about 42 years ago. He has a  "5 pack-year smoking history. He has quit using smokeless tobacco.  His smokeless tobacco use included chew. He reports that he does not drink alcohol and does not use drugs.  FH: Family history was reviewed, no pertinent findings to report    Review of Systems   Constitutional:  Negative for fever.   Eyes:  Negative for discharge and redness.   Gastrointestinal:  Negative for nausea and vomiting.   Genitourinary:  Negative for dysuria and hematuria.   Neurological:  Negative for headaches.              Objective     /82 (BP Location: Left arm, Patient Position: Sitting, BP Cuff Size: Adult)   Pulse 70   Temp 36.4 °C (97.6 °F) (Temporal)   Resp 16   Ht 1.727 m (5' 8\")   Wt 59 kg (130 lb)   SpO2 98%   BMI 19.77 kg/m²      Physical Exam  Constitutional:       General: He is not in acute distress.     Appearance: Normal appearance. He is well-developed. He is not ill-appearing.   HENT:      Head: Normocephalic and atraumatic.      Right Ear: External ear normal.      Left Ear: External ear normal.   Eyes:      Extraocular Movements: Extraocular movements intact.      Conjunctiva/sclera: Conjunctivae normal.   Cardiovascular:      Rate and Rhythm: Normal rate.   Pulmonary:      Effort: Pulmonary effort is normal.   Musculoskeletal:         General: Normal range of motion.      Cervical back: Normal range of motion and neck supple.   Skin:     General: Skin is warm and dry.   Neurological:      Mental Status: He is alert and oriented to person, place, and time.                             Assessment & Plan        Assessment & Plan  Benign prostatic hyperplasia with urinary frequency    Orders:    Referral to Urology      The patient presents to clinic requesting a referral to urology.  [facility], but needs a new referral due to changes in insurance.  The patient does have an upcoming prostate biopsy scheduled and needs a new referral in order to have the procedure " completed.  Will place a referral to urology at this time.  Advised the patient to follow-up with urology as scheduled and his PCP as needed.  The patient had no additional complaints at this time.    Differential diagnoses, supportive care, and indications for immediate follow-up discussed with patient.   Instructed to return to clinic or nearest emergency department for any change in condition, further concerns, or worsening of symptoms.    I personally reviewed prior external notes and test results pertinent to today's visit.  I have independently reviewed and interpreted all diagnostics ordered during this urgent care visit.       Please note that this dictation was created using voice recognition software. I have made every reasonable attempt to correct obvious errors, but I expect that there may be errors of grammar and possibly content that I did not discover before finalizing the note.     This note was electronically signed by Ronel Gallagher PA-C

## 2025-02-12 NOTE — PROGRESS NOTES
This note is formatted in an APSO format, for additional subjective and objective evaluation please scroll to the bottom of the note.    CC:  Chief Complaint   Patient presents with    Referral Needed       Assessment/Plan:  Problem List Items Addressed This Visit       Anxiety     Referral to behavioral health, see #Parkinson's         Benign prostatic hyperplasia with urinary frequency     Chronic BPH, followed by urologannemarie Armstrong for over 3 years.  Has been on tamsulosin for this whole time, no dose changes.  Also taking over-the-counter supplements, noting significant improvement with his symptoms on current regimen.  Due to recent insurance change, needs new referral to continue to see the same urology group he has been seen for the past 3 years.  He has a biopsy scheduled with them for 2/20/2025.  - Referral to [facility] [place], marked urgent to ensure the referral is processed prior to his upcoming biopsy on 2/20  - Continue further management per urology         Relevant Orders    Referral to Urology    Dyskinesia due to Parkinson's disease (HCC)     Patient with longstanding history of Parkinson's disease, symptoms overall very much improved after a deep brain stimulator inserted in June 2024.  Followed by [facility] neurology.  Patient requesting assistance with some mood support due to some anxiety related to his diagnosis and symptoms.  Patient has already been referred to behavioral health by his neurologist 5 days ago.  - Phone number for processed behavioral health referral provided to patient in the office today  - Also provided emotional support, reassurance  - Check to the patient he can schedule with our in-house psychologist, Dr. Foreman, if he is unable to schedule in a timely appointment with behavioral health  - Follow-up in 6 weeks          Other Visit Diagnoses       Tobacco use                  Orders Placed This Encounter    Referral to Urology       Return in about 6 weeks (around  3/25/2025).    Future Appointments   Date Time Provider Department Center   3/7/2025  8:20 AM SHAHANA Razo None   4/9/2025  8:00 AM SHAHANA Razo None        LABS: Results reviewed and discussed with the patient, questions answered.    HISTORY OF PRESENT ILLNESS: Patient is a 59 y.o. male established patient who presents today with:    Problem   Dyskinesia Due to Parkinson's Disease (Hcc)    02/11/2025 Pt had deep brain stimulator placed for Parkinson's in June 2024 at [place]. Had significant improvement in symptoms, but is now having some tremors again. Followed by Dr. Taylor, [facility]. Has some stress and fears related to his diagnosis, and is requesting referral to a therapist who may be familiar with this.      Benign Prostatic Hyperplasia With Urinary Frequency    02/11/2025 Pt following up for BPH. Has been seeing [facility] [place] and taking tamsulosin 0.4 mg for about 3 years. Changed insurance and now needs a new referral to Urology. Also taking an herbal supplement for about 2 months. Now rarely ever waking up at night to urinate.     Prior 2022: Patient returns to clinic for follow up BPH.  He was seen by Dr. Nolasco for the same 2 months ago.  The patient has a longstanding history of enlarged prostate, and he saw Dr. Nolasco 2 months ago for frequent urination (approximately 13 or more voids per day).  Dr. Nolasco started him on Flomax and asked the patient to return in a few months to follow up on his symptoms.  Additionally, he ordered a CBC, CMP and PSA.     Anxiety    02/11/2025 Hx of anxiety related to parkinson's symptoms for the past 15 years. Used to have panic attacks.  Now symptoms generally improved, but requesting emotional support.         1. Benign prostatic hyperplasia with urinary frequency  Referral to Urology      2. Dyskinesia due to Parkinson's disease (HCC)        3. Anxiety        4. Tobacco use            Patient Active Problem List    Diagnosis Date Noted     "Encounter for adjustment and management of neurostimulator [Z45.42] 2024    Dyskinesia due to Parkinson's disease (HCC) 2022    Migraine with aura and without status migrainosus, not intractable 2020    Elevated cholesterol 10/02/2019    Benign prostatic hyperplasia with urinary frequency 10/02/2019    History of tobacco abuse 2018    Other insomnia 2018    Parkinson's disease with dyskinesia and fluctuating manifestations (HCC) 2018    Night-waking disorder 2018    Anxiety 2011      Allergies:Patient has no known allergies.    Current Outpatient Medications   Medication Sig Dispense Refill    carbidopa-levodopa (SINEMET)  MG Tab Take 1 Tablet by mouth 3 times a day. For Parkinson's 270 Tablet 3    tamsulosin (FLOMAX) 0.4 MG capsule Take 1 Capsule by mouth every morning with breakfast. (Patient taking differently: Take 0.4 mg by mouth every evening. TAKING IT AT NIGHT) 90 Capsule 3     No current facility-administered medications for this visit.       Social History     Tobacco Use    Smoking status: Former     Current packs/day: 0.00     Average packs/day: 1 pack/day for 5.0 years (5.0 ttl pk-yrs)     Types: Cigarettes     Start date: 1982     Quit date: 1987     Years since quittin.2    Smokeless tobacco: Former     Types: Chew    Tobacco comments:     quit at age 23   Vaping Use    Vaping status: Never Used   Substance Use Topics    Alcohol use: No     Comment: stopped drinking  @ 20    Drug use: No     Social History     Social History Narrative    Not on file       Family History   Problem Relation Age of Onset    Heart Disease Mother     Hypertension Mother     Hyperlipidemia Mother     Psychiatric Illness Mother     Psychiatric Illness Father     Alcohol/Drug Father     Alcohol/Drug Sister     Sleep Apnea Neg Hx        Exam:    Ht 1.727 m (5' 8\")   Wt 59 kg (130 lb)   BMI 19.77 kg/m²  Body mass index is 19.77 kg/m².    Gen: Alert and " oriented, No apparent distress. Well developed pleasant young male, sitting in office chair.  HEENT: NCAT, MMM  Neck: Supple, FROM  Chest: No deformities, Equal chest expansion  Lungs: Normal effort, no audible wheezing or respiratory distress  CV: Extremities appear well perfused, no peripheral cyanosis or pallor  Abd: Non-distended  Ext: No clubbing, cyanosis, edema.  Skin: Warm/dry, without rashes  Neuro: A/O x 4, CN 2-12 Grossly intact.  Intermittent tremor of the right upper extremity.  Ataxic gait, baseline per patient.  Psych: Normal behavior, normal affect       Emeterio Dave M.D.   PGY-2  [facility]

## 2025-02-12 NOTE — ASSESSMENT & PLAN NOTE
Patient with longstanding history of Parkinson's disease, symptoms overall very much improved after a deep brain stimulator inserted in June 2024.  Followed by [facility] neurology.  Patient requesting assistance with some mood support due to some anxiety related to his diagnosis and symptoms.  Patient has already been referred to behavioral health by his neurologist 5 days ago.  - Phone number for processed behavioral health referral provided to patient in the office today  - Also provided emotional support, reassurance  - Check to the patient he can schedule with our in-house psychologist, Dr. Foreman, if he is unable to schedule in a timely appointment with behavioral health  - Follow-up in 6 weeks

## 2025-02-12 NOTE — ASSESSMENT & PLAN NOTE
Chronic BPH, followed by beckie Armstrong for over 3 years.  Has been on tamsulosin for this whole time, no dose changes.  Also taking over-the-counter supplements, noting significant improvement with his symptoms on current regimen.  Due to recent insurance change, needs new referral to continue to see the same urology group he has been seen for the past 3 years.  He has a biopsy scheduled with them for 2/20/2025.  - Referral to beckie Armstrong, marked urgent to ensure the referral is processed prior to his upcoming biopsy on 2/20  - Continue further management per urology

## 2025-02-14 NOTE — Clinical Note
REFERRAL APPROVAL NOTICE         Sent on February 14, 2025                   Rakan Rader III  [address] [place] [address]                   Dear Mr. Rader,    After a careful review of the medical information and benefit coverage, [facility] has processed your referral. See below for additional details.    If applicable, you must be actively enrolled with your insurance for coverage of the authorized service. If you have any questions regarding your coverage, please contact your insurance directly.    REFERRAL INFORMATION   Referral #:  [id number]  Referred-To Provider    Referred-By Provider:  Urology    Emeterio Dave M.D.   [facility]      [address]  [place] [address]  [phone] [address]  [place] [address]  [phone]    Referral Start Date:  02/11/2025  Referral End Date:   02/11/2026             SCHEDULING  If you do not already have an appointment, please call [phone] to make an appointment.     MORE INFORMATION  If you do not already have a [facility] account, sign up at: [email or web address]  You can access your medical information, make appointments, see lab results, billing information, and more.  If you have questions regarding this referral, please contact  the [facility] Referrals department at:             [phone]. Monday - Friday 8:00AM - 5:00PM.     Sincerely,    [facility]

## 2025-03-07 ENCOUNTER — OFFICE VISIT (OUTPATIENT)
Dept: NEUROLOGY | Facility: MEDICAL CENTER | Age: 60
End: 2025-03-07
Attending: INTERNAL MEDICINE
Payer: MEDICARE

## 2025-03-07 VITALS
DIASTOLIC BLOOD PRESSURE: 62 MMHG | HEIGHT: 68 IN | TEMPERATURE: 97.7 F | RESPIRATION RATE: 18 BRPM | SYSTOLIC BLOOD PRESSURE: 110 MMHG | WEIGHT: 129.63 LBS | BODY MASS INDEX: 19.65 KG/M2 | OXYGEN SATURATION: 98 % | HEART RATE: 68 BPM

## 2025-03-07 DIAGNOSIS — G20.B2 PARKINSON'S DISEASE WITH DYSKINESIA AND FLUCTUATING MANIFESTATIONS (HCC): ICD-10-CM

## 2025-03-07 DIAGNOSIS — Z45.42 ENCOUNTER FOR ADJUSTMENT AND MANAGEMENT OF NEUROSTIMULATOR: ICD-10-CM

## 2025-03-07 PROBLEM — Z96.89 STATUS POST DEEP BRAIN STIMULATOR PLACEMENT: Status: ACTIVE | Noted: 2025-03-07

## 2025-03-07 PROBLEM — Z96.89 STATUS POST DEEP BRAIN STIMULATOR PLACEMENT: Status: RESOLVED | Noted: 2025-03-07 | Resolved: 2025-03-07

## 2025-03-07 PROCEDURE — 95983 ALYS BRN NPGT PRGRMG 15 MIN: CPT | Performed by: INTERNAL MEDICINE

## 2025-03-07 PROCEDURE — 3078F DIAST BP <80 MM HG: CPT | Performed by: INTERNAL MEDICINE

## 2025-03-07 PROCEDURE — 99211 OFF/OP EST MAY X REQ PHY/QHP: CPT | Mod: 25 | Performed by: INTERNAL MEDICINE

## 2025-03-07 PROCEDURE — 99215 OFFICE O/P EST HI 40 MIN: CPT | Mod: 25 | Performed by: INTERNAL MEDICINE

## 2025-03-07 PROCEDURE — 3074F SYST BP LT 130 MM HG: CPT | Performed by: INTERNAL MEDICINE

## 2025-03-07 ASSESSMENT — PATIENT HEALTH QUESTIONNAIRE - PHQ9: CLINICAL INTERPRETATION OF PHQ2 SCORE: 0

## 2025-03-07 NOTE — PROGRESS NOTES
[facility]  [address] [place] [address], [address]. [place] [address]  Phone: [phone], Fax: [phone]    Gualberto Taylor DO  Neurology, Movement Disorders Patient Name: Rakan Rader III  : 1965  MRN: [id number]     ASSESSMENT / PLAN   Rakan Rader III is a 59 y.o. RHD male presenting for parkinson's disease    Parkinson's disease  Levodopa induced dyskinesias  Bilateral STN deep brain stimulator (Dinamundo Genus RC)  Onset  with right sided symptoms, dx , s/p DBS . Still having some residual rigidity in right leg only when walking.  - carbidopa/levodopa 25/100, one tablet 3-4x/day (no need to take half-tablet divided by 30 minutes)  - DBS programming as noted below. Can gradually decrease 0.2mA per week to see if less stimulation is sufficient    Right arm rest tremor and rigidity, resolved  Acute onset of worsened right arm tremor on 2/3/2025.  No clear preceding illness or injury.  No change in DBS settings or medications.  Exam notable for distractible and fluctuating right arm rest tremor that does not consistently improve when held in a posture. He also has right leg rigidity that is not distractible. However, has good toe tapping and leg agility when sitting which changes once he is standing.     Decreasing left STN leads mA worsened his toe tapping, but did not impact rest tremor. Higher pulse width, amplitude, and frequency had minimal benefit to the rest tremor. Differential includes functional rest tremor, displaced DBS lead, recent viral illness causing worsening PD symptoms. 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 Overall improved    No orders of the defined types were placed in this encounter.    Return in about 3 months (around 2025).    BILLING DOCUMENTATION:   Excluding time spent on procedures during  visit, I spent 40 minutes reviewing the medical record, interviewing and examining the patient, discussing diagnosis and treatment, and coordinating care.  15 minutes spent on deep brain stimulator interrogation and programming          HISTORY OF PRESENT ILLNESS   Rakan Rader III is a 59 y.o. RHD male presenting for parkinson's disease    Initial HPI 08/23/24  Right side hand tremors onset approx 2010. Dx in 2013. 2023 started left side. Troublesome issues with motor fluctuations and dyskinesias. Now s/p bilateral STN and BSC Genus RC with Dr. Ivania Castro ([place]) on 6/25/2024.     Neuroleptics/pesticide exposure:--  Family history: --    Kat Kraus, NP-C 8/5/2024, [place]  - His tremor is now mostly gone but does reemerge with walking in his right hand. Noted improvements in his dystonia. He is now up and walking without significant freezing (still occurs about 10% of the day) no longer requiring a wheelchair and will occasionally still use walking sticks when going on longer walks outside. He has been out taking his dog on long walks. He has been riding his bike again and back to work a couple days a week as a .   - He slowly has continued to reduce his carbidopa levodopa, he is now taking a total of 5 full tablets a day spaced out and 0.5 tablet increments. Yesterday was his first day trying 4.5 total tablets per day and he tolerated well. He has remained in program #2 and his stimulations at 2.5 mA on both sides   - He reports an improved sense of smell, decreased urge to urinate frequently and he feels his speech and stuttering has improved. He is sleeping better without now waking up during the middle the night around 2 to 3 AM.       Interval history  08/23/24: first visit with me  10/23/24: DBS programming  01/08/25: DBS programming  02/06/25: DBS programming. Referral to behavioral health. Dx with functional tremor which had gradually resolved the following  weeks.  03/07/25: no changes made      Current Regimen  Carbidopa/levodopa 25/100, one tablet: 3-4x/day (splits half-tab each dose)  DBS Program 1  Latency: can't tell  Duration: can't tell  Efficacy:   02/06/25: he had sudden worsening of right arm rest tremor and walking 2/3 (Monday).  He had recently just recovered from a viral illness which was rather mild.  No head injuries.  He tried adjusting DBS to different programs and increasing stimulation without significant improvement in his right arm tremor.  He was also having right leg rigidity that is affecting his walking.  03/07/25: right arm rest tremor gradually improved after last clinic visit. No issues now, back to his baseline.   - no dyskinesias, possibly some restlessness.   - Dystonia: possibly along left hip  Sfx: none      ROS:  Gait: some stiffness in right leg, no falls. Riding bike.  10/23/24: Right leg seems more stiff, not heel-toe as much. Can improve for 30 min with carbidopa-levodopa  01/08/25: Left leg feels more stiff at the hip. Right leg more stiff at the ankle.   Orthostasis:   no issues, only when quickly.   Constipation:   no issues  Urinary issues:   better after DBS  Speech/Swallow:   no dysphagia. Voice better after DBS, mild stuttering  Anosmia: better after DBS  Cognition:   No issues  Mood:   anxiety at night when not falling asleep.   02/06/25: new stressors with finances at home, pending prostate biopsy  03/07/25: PHQ-9 = 0, C-SSRS = neg. 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 Stays in bed for 2 hour. Rarely yells out in sleep.   01/08/25: sometimes trouble staying asleep past 2-3AM, once every 2 weeks  Fatigue:   napping at 2PM for 30m-1 hour      Past Medical History:   Diagnosis Date    Anxiety      Past Surgical History:   Procedure Laterality Date    INGUINAL HERNIA REPAIR ROBOTIC Bilateral 11/22/2017    Procedure: INGUINAL HERNIA REPAIR ROBOTIC/ WITH  MESH PLACEMENT;  Surgeon: Zackary Ponce M.D.;  Location: SURGERY [facility];  Service: General    LAMINOTOMY      c5-7    ORIF, ELBOW      right as a child    OTHER NEUROLOGICAL SURG      cervical fusion    OTHER ORTHOPEDIC SURGERY      finger surgery as child    SEPTOPLASTY, NOSE, WITH TURBINOPLASTY      SHOULDER ARTHROSCOPY W/ SLAP / LABRAL REPAIR      left    SINUSCOPY      maxillary sinuses     Family History   Problem Relation Age of Onset    Heart Disease Mother     Hypertension Mother     Hyperlipidemia Mother     Psychiatric Illness Mother     Psychiatric Illness Father     Alcohol/Drug Father     Alcohol/Drug Sister     Sleep Apnea Neg Hx      Social History     Socioeconomic History    Marital status: Single     Spouse name: Not on file    Number of children: Not on file    Years of education: Not on file    Highest education level: GED or equivalent   Occupational History    Not on file   Tobacco Use    Smoking status: Former     Current packs/day: 0.00     Average packs/day: 1 pack/day for 5.0 years (5.0 ttl pk-yrs)     Types: Cigarettes     Start date: 1982     Quit date: 1987     Years since quittin.3    Smokeless tobacco: Former     Types: Chew    Tobacco comments:     quit at age 23   Vaping Use    Vaping status: Never Used   Substance and Sexual Activity    Alcohol use: No     Comment: stopped drinking  @ 20    Drug use: No    Sexual activity: Yes     Partners: Female     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    Other Topics Concern    Not on file   Social History Narrative    Not on file     Social Drivers of Health     Financial Resource Strain: Low Risk  (2022)    Overall Financial Resource Strain (CARDIA)     Difficulty of Paying Living Expenses: Not very hard   Food Insecurity: Not At Risk (2024)    [facility], [facility]    Food  Insecurity     Because difficulties at home can cause health problems, we are asking all of our patients if they are experiencing difficulties in any of the following areas:: Patient does not need help with any of these   Transportation Needs: Not At Risk (6/25/2024)    [facility], [facility]    Food Insecurity     Because difficulties at home can cause health problems, we are asking all of our patients if they are experiencing difficulties in any of the following areas:: Patient does not need help with any of these   Physical Activity: Sufficiently Active (4/20/2022)    Exercise Vital Sign     Days of Exercise per Week: 3 days     Minutes of Exercise per Session: 120 min   Stress: No Stress Concern Present (4/20/2022)    [employer] - Occupational Stress Questionnaire     Feeling of Stress : Only a little   Social Connections: Socially Integrated (4/20/2022)    Social Connection and Isolation Panel [NHANES]     Frequency of Communication with Friends and Family: Three times a week     Frequency of Social Gatherings with Friends and Family: More than three times a week     Attends Jain Services: 1 to 4 times per year     Active Member of Clubs or Organizations: Yes     Attends Club or Organization Meetings: More than 4 times per year     Marital Status:    Intimate Partner Violence: Not on file   Housing Stability: Not At Risk (6/25/2024)    Received from [facility], [facility]    Food Insecurity     Because difficulties at home can cause health problems, we are asking all of our patients if they are experiencing difficulties in any of the following areas:: Patient does not need help with any of these     Current Outpatient Medications   Medication    carbidopa-levodopa (SINEMET)  MG Tab     "tamsulosin (FLOMAX) 0.4 MG capsule     No current facility-administered medications for this visit.     No Known Allergies            DATA / RESULTS     2020 Divya scan  abnormal pattern of uptake in the striata, left more than right, suggestive of a Parkinsonian type syndrome.    Vitamin B12 -True Cobalamin   Date Value Ref Range Status   06/18/2021 1346 (H) 211 - 911 pg/mL Final     TSH   Date Value Ref Range Status   06/18/2021 2.040 0.380 - 5.330 uIU/mL Final     Comment:     Please note new reference ranges effective 12/14/2017 10:00 AM    Pregnant Females, 1st Trimester  0.050-3.700  Pregnant Females, 2nd Trimester  0.310-4.350  Pregnant Females, 3rd Trimester  0.410-5.180       LDL   Date Value Ref Range Status   06/18/2021 104 (H) <100 mg/dL Final                OBJECTIVE      Vitals:    03/07/25 0823   BP: 110/62   BP Location: Left arm   Patient Position: Sitting   BP Cuff Size: Adult   Pulse: 68   Resp: 18   Temp: 36.5 °C (97.7 °F)   TempSrc: Temporal   SpO2: 98%   Weight: 58.8 kg (129 lb 10.1 oz)   Height: 1.727 m (5' 8\")       Physical Exam  Last dose of C/L taken last night (9PM), 11 hours ago.     Voice clear and good volume    UPDRS Right Left   Finger tapping 1 worse 0   Hand Movement 0 0   Toe Tapping 0 0   Leg Agility 0 0   Rigidity 1 1   Rest Tremor 0 0   Postural Tremor 0 better 0 better   Kinetic Tremor 0 better 0 better     No dystonia, dyskinesias, tics, stereotypies, athetosis, akathisia, or chorea noted.     Cerebellar: No dysmetria with FTN    Gait:   Posture - normal   Base - narrow   Stride length - slight decreased right (better)  Arm swing - slight decreased right arm, no tremor (better)  Speed - normal (better)  Shuffling/freezing - none  U-Turn - normal, 3 steps          PROCEDURE     DEEP BRAIN STIMULATION PROGRAMMING  Model HistoPathway Genus RC  Leads [id number], Bilateral STN  Implanted 6/25/2024    Monopolar Review (ring mode) 7/22/2024  PW 60 130 Hz   L STN:  L 1.0   1.5 mA no " AE  2.0 mA no AE  2.5 mA no AE  3.0 mA dyskinesia, bradykinesia improved, tremor improved  3.5 mA dyskinesia and transient tingling, bradykinesia improved, tremor improved R STN:   L 1.0   1.5 mA no AE  2.0 mA bradykinesia improved, tremor improved  2.5 mA dyskinesia, bradykinesia improved, tremor improved  3.0 mA dyskinesia, bradykinesia improved, tremor improved  3.5 mA dyskinesia, bradykinesia improved, tremor improved   L 2.0   1.5 mA no AE  2.0 mA no AE  2.5 mA no AE, bradykinesia improved, tremor improved  3.0 mA dyskinesia, bradykinesia improved, tremor improved   3.5 mA dyskinesia, bradykinesia improved, tremor improved  L 2.0   1.5 mA no AE  2.0 mA no AE  2.5 mA dyskinesia, bradykinesia improved, tremor improved  3.0 mA dyskinesia, bradykinesia improved, tremor improved  3.5 mA dyskinesia, bradykinesia improved, tremor improved   L 3.0  1.5 mA no AE  2.0 mA no AE  2.5 mA no AE bradykinesia improved, tremor improved  3.0 mA dyskinesia, bradykinesia improved, tremor improved  3.5 mA dyskinesia, bradykinesia improved, tremor improved L 3.0  1.5 mA no AE, bradykinesia improved  2.0 mA no AE, bradykinesia improved  2.5 mA no AE, bradykinesia improved  3.0 mA dyskinesia, bradykinesia improved  3.5 mA dyskinesia, bradykinesia improved   L 4.0  1.5 mA no AE  2.0 mA no AE, bradykinesia improved  2.5 mA no AE, bradykinesia improved  3.0 mA no AE, bradykinesia improved  3.5 mA no AE, bradykinesia improved L 4.0  1.5 mA no AE, bradykinesia improved  2.0 mA no AE, bradykinesia improved  2.5 mA no AE, bradykinesia improved  3.0 mA dyskinesia, bradykinesia improved  3.5 mA dyskinesia, bradykinesia improved      Continue with Program #2, which targets ZI using more dorsal contacts to help prevent dyskinesia.   Plan to use Program #1 in the future to better address his tremor.       Left STN Setting Notes Right STN Setting Notes   08/23/24 Battery level 4V, Impedance OK   Program 1 3A: 9%  3B: 61%  2A: 4%  2C:  26%    0.5mA  50uS  185Hz  3A: 3%  3B: 3%  3C: 44%  2A: 3%  2B: 3%  2C: 44%    0.5mA  50uS  185Hz     Program 2  ORIGINAL 4: 20%  3A: 7%  3B: 49%  2A: 4%  2C: 20%    1mA  50uS  185Hz  4: 20%  3A: 2%  3B: 2%  3C: 35%  2A: 3%  2B: 3%  2C: 35%    1.2mA  50uS  185Hz     Program 3  NEW  ACTIVE 4: 16%  3A: 5%  3B: +10%  3C: 37%  2A: 5%  2B: +9%  2C: 37%    3.3mA  60uS  185Hz - added 3B+2B due to side effects with higher mA  - 60uS and 3.3mA helped with leg BK when walking 4: 20%  3A: 2%  3B: 2%  3C: 35%  2A: 3%  2B: 3%  2C: 35%    2.6mA  60uS  185Hz  - 60uS: RT better      10/23/24 Battery level 4V, Impedance OK   Program 3  ORIGINAL 4: -16%  3A: -5%  3B: +10%  3C: -37%  2A: -5%  2B: +9%  2C: -37%    3.8mA  60uS  185Hz Increased to 3.8mA at home due to right leg stiffness 4: -20%  3A: -2%  3B: -2%  3C: -35%  2A: 3%  2B: 3%  2C: 35%    3.2mA  60uS  185Hz  Increased to 3.2mA at home due to left arm tremors   Program 4  NEW  ACTIVE 4: -13%  3A: -4%  3B: +10%  3C: -36%  2A: -5%  2B: +9%  2C: -42%    3.8mA  60uS  185Hz Increasing to 4mA caused pulling sensation.    Increased 2C to stimulate more of STN. RT and arm swing improved 4: -18%  3A: -2%  3B: -2%  3C: -35%  2A: -2%  2B: -3%  2C: -38%    3.2mA  60uS  185Hz  Increased 2C to stimulate more of STN. RT improved.      01/08/25 Battery 4V, impedances OK   Program 1  NEW 4: +5%  3A: -3%  3B: +15%  3C: -37%  2A: -7%  2B: +13%  2C: -53%    5mA  60uS  185Hz Increase 5mA:  Slurred speech  4.7mA: resolved sfx  4.5mA: speech better at baseline    70uS: speech more slurred  4mA: resolved sfx but walking worse (more rigid)    Removed L4+ stim, improved speech at 4.5mA    Added anodic block at L4, and increased at 3B+2B: tolerated 5mA better 4: -18%  3A: -2%  3B: -2%  3C: -35%  2A: -2%  2B: -3%  2C: -38%    4mA  60uS  185Hz  No changes   Program 4  ORIGINAL 4: -13%  3A: -4%  3B: +10%  3C: -36%  2A: -5%  2B: +9%  2C: -42%    4.5mA  60uS  185Hz No changes.   Patient increased mA at home  4: -18%  3A: -2%  3B: -2%  3C: -35%  2A: -2%  2B: -3%  2C: -38%    4mA  60uS  185Hz  No changes.   Patient increased mA at home           02/06/25 Battery 3.9V, impedances OK   Program 1 4: +5%  3A: -3%  3B: +15%  3C: -37%  2A: -7%  2B: +13%  2C: -53%    5mA  60uS  185Hz 3.5mA: worse toe tapping    6mA: possible improvement of RT    80uS: no change to RT    225Hz: no change to RT 4: -18%  3A: -2%  3B: -2%  3C: -35%  2A: -2%  2B: -3%  2C: -38%    3.5mA  60uS  185Hz  No changes           03/07/25 Battery 3.8V, impedances OK      4: +5%  3A: -3%  3B: +15%  3C: -37%  2A: -7%  2B: +13%  2C: -53%    5.4mA  60uS  185Hz Increased stimulation at home.  No changes in clinic. 4: -18%  3A: -2%  3B: -2%  3C: -35%  2A: -2%  2B: -3%  2C: -38%    4.5mA  60uS  185Hz

## 2025-03-31 ENCOUNTER — APPOINTMENT (OUTPATIENT)
Dept: URBAN - METROPOLITAN AREA CLINIC 6 | Facility: CLINIC | Age: 60
Setting detail: DERMATOLOGY
End: 2025-03-31

## 2025-03-31 DIAGNOSIS — D22 MELANOCYTIC NEVI: ICD-10-CM

## 2025-03-31 DIAGNOSIS — L81.4 OTHER MELANIN HYPERPIGMENTATION: ICD-10-CM

## 2025-03-31 DIAGNOSIS — L82.1 OTHER SEBORRHEIC KERATOSIS: ICD-10-CM

## 2025-03-31 DIAGNOSIS — D18.0 HEMANGIOMA: ICD-10-CM

## 2025-03-31 DIAGNOSIS — Z71.89 OTHER SPECIFIED COUNSELING: ICD-10-CM

## 2025-03-31 DIAGNOSIS — Z87.2 PERSONAL HISTORY OF DISEASES OF THE SKIN AND SUBCUTANEOUS TISSUE: ICD-10-CM

## 2025-03-31 PROBLEM — D18.01 HEMANGIOMA OF SKIN AND SUBCUTANEOUS TISSUE: Status: ACTIVE | Noted: 2025-03-31

## 2025-03-31 PROBLEM — D22.5 MELANOCYTIC NEVI OF TRUNK: Status: ACTIVE | Noted: 2025-03-31

## 2025-03-31 PROCEDURE — 99213 OFFICE O/P EST LOW 20 MIN: CPT

## 2025-03-31 PROCEDURE — ? DIAGNOSIS COMMENT

## 2025-03-31 PROCEDURE — ? SUNSCREEN RECOMMENDATIONS

## 2025-03-31 PROCEDURE — ? COUNSELING

## 2025-03-31 ASSESSMENT — LOCATION DETAILED DESCRIPTION DERM
LOCATION DETAILED: LEFT INFERIOR CENTRAL MALAR CHEEK
LOCATION DETAILED: PERIUMBILICAL SKIN
LOCATION DETAILED: LEFT MEDIAL INFERIOR CHEST
LOCATION DETAILED: EPIGASTRIC SKIN
LOCATION DETAILED: STERNUM
LOCATION DETAILED: LEFT SUPERIOR UPPER BACK
LOCATION DETAILED: RIGHT RADIAL DORSAL HAND
LOCATION DETAILED: LEFT ULNAR DORSAL HAND

## 2025-03-31 ASSESSMENT — LOCATION ZONE DERM
LOCATION ZONE: FACE
LOCATION ZONE: TRUNK
LOCATION ZONE: HAND

## 2025-03-31 ASSESSMENT — LOCATION SIMPLE DESCRIPTION DERM
LOCATION SIMPLE: CHEST
LOCATION SIMPLE: LEFT UPPER BACK
LOCATION SIMPLE: LEFT CHEEK
LOCATION SIMPLE: LEFT HAND
LOCATION SIMPLE: ABDOMEN
LOCATION SIMPLE: RIGHT HAND

## 2025-04-09 ENCOUNTER — APPOINTMENT (OUTPATIENT)
Dept: NEUROLOGY | Facility: MEDICAL CENTER | Age: 60
End: 2025-04-09
Attending: INTERNAL MEDICINE
Payer: MEDICARE

## 2025-04-29 ENCOUNTER — APPOINTMENT (OUTPATIENT)
Dept: ADMISSIONS | Facility: MEDICAL CENTER | Age: 60
End: 2025-04-29
Attending: UROLOGY
Payer: MEDICARE

## 2025-05-07 ENCOUNTER — PRE-ADMISSION TESTING (OUTPATIENT)
Dept: ADMISSIONS | Facility: MEDICAL CENTER | Age: 60
End: 2025-05-07
Attending: UROLOGY
Payer: MEDICARE

## 2025-05-07 RX ORDER — IBUPROFEN 200 MG
200 TABLET ORAL EVERY 6 HOURS PRN
Status: ON HOLD | COMMUNITY
End: 2025-06-19

## 2025-05-07 NOTE — PREADMIT AVS NOTE
Current Medications   Medication Instructions    Omega-3 Fatty Acids (OMEGA 3 PO) Stop 7 days before surgery    B Complex Vitamins (B COMPLEX PO) Stop 7 days before surgery    MAGNESIUM PO Stop 7 days before surgery    VITAMIN K PO Stop 7 days before surgery    ibuprofen (MOTRIN) 200 MG Tab Stop 5 days before surgery    carbidopa-levodopa (SINEMET)  MG Tab Continue taking medication as prescribed, including morning of procedure     tamsulosin (FLOMAX) 0.4 MG capsule Continue taking medication as prescribed, including morning of procedure

## 2025-05-07 NOTE — OR NURSING
FOR SURGERY ON:5/28/25  Patient provided medication instructions per [facility]'s Guideline for Pre-Operative Medication Management. AVS sent to [employer]. Preparing For Your Procedure packet reviewed with patient. Encouraged patient to increase oral intake the day prior to procedure including intake of electrolyte drinks such as Gatorade or electrolyte water. Patient reports that he has fasting instructions from his surgeon's office that he will be following. Patient advised to contact surgeon with any additional questions or concerns.     Patient verbalized understanding of their instructions.    Fall risk - Parkinson's    Deep brain stimulator in place - Patient to bring controller DOS    Adverse reaction to anesthesia: PONV

## 2025-05-13 ENCOUNTER — PRE-ADMISSION TESTING (OUTPATIENT)
Dept: ADMISSIONS | Facility: MEDICAL CENTER | Age: 60
End: 2025-05-13
Attending: UROLOGY
Payer: MEDICARE

## 2025-05-13 DIAGNOSIS — Z01.812 PRE-OPERATIVE LABORATORY EXAMINATION: ICD-10-CM

## 2025-05-13 LAB
ANION GAP SERPL CALC-SCNC: 11 MMOL/L (ref 7–16)
APPEARANCE UR: CLEAR
BACTERIA #/AREA URNS HPF: NORMAL /HPF
BASOPHILS # BLD AUTO: 0.8 % (ref 0–1.8)
BASOPHILS # BLD: 0.07 K/UL (ref 0–0.12)
BILIRUB UR QL STRIP.AUTO: NEGATIVE
BUN SERPL-MCNC: 12 MG/DL (ref 8–22)
CALCIUM SERPL-MCNC: 9.6 MG/DL (ref 8.5–10.5)
CASTS URNS QL MICRO: NORMAL /LPF (ref 0–2)
CHLORIDE SERPL-SCNC: 105 MMOL/L (ref 96–112)
CO2 SERPL-SCNC: 25 MMOL/L (ref 20–33)
COLOR UR: YELLOW
CREAT SERPL-MCNC: 0.88 MG/DL (ref 0.5–1.4)
EOSINOPHIL # BLD AUTO: 0.1 K/UL (ref 0–0.51)
EOSINOPHIL NFR BLD: 1.1 % (ref 0–6.9)
EPITHELIAL CELLS 1715: NORMAL /HPF (ref 0–5)
ERYTHROCYTE [DISTWIDTH] IN BLOOD BY AUTOMATED COUNT: 41.6 FL (ref 35.9–50)
GFR SERPLBLD CREATININE-BSD FMLA CKD-EPI: 100 ML/MIN/1.73 M 2
GLUCOSE SERPL-MCNC: 95 MG/DL (ref 65–99)
GLUCOSE UR STRIP.AUTO-MCNC: NEGATIVE MG/DL
HCT VFR BLD AUTO: 45.8 % (ref 42–52)
HGB BLD-MCNC: 15.3 G/DL (ref 14–18)
IMM GRANULOCYTES # BLD AUTO: 0.02 K/UL (ref 0–0.11)
IMM GRANULOCYTES NFR BLD AUTO: 0.2 % (ref 0–0.9)
KETONES UR STRIP.AUTO-MCNC: NEGATIVE MG/DL
LEUKOCYTE ESTERASE UR QL STRIP.AUTO: ABNORMAL
LYMPHOCYTES # BLD AUTO: 1.69 K/UL (ref 1–4.8)
LYMPHOCYTES NFR BLD: 19.4 % (ref 22–41)
MCH RBC QN AUTO: 30.1 PG (ref 27–33)
MCHC RBC AUTO-ENTMCNC: 33.4 G/DL (ref 32.3–36.5)
MCV RBC AUTO: 90.2 FL (ref 81.4–97.8)
MICRO URNS: ABNORMAL
MONOCYTES # BLD AUTO: 0.71 K/UL (ref 0–0.85)
MONOCYTES NFR BLD AUTO: 8.2 % (ref 0–13.4)
NEUTROPHILS # BLD AUTO: 6.12 K/UL (ref 1.82–7.42)
NEUTROPHILS NFR BLD: 70.3 % (ref 44–72)
NITRITE UR QL STRIP.AUTO: NEGATIVE
NRBC # BLD AUTO: 0 K/UL
NRBC BLD-RTO: 0 /100 WBC (ref 0–0.2)
PH UR STRIP.AUTO: 7 [PH] (ref 5–8)
PLATELET # BLD AUTO: 251 K/UL (ref 164–446)
PMV BLD AUTO: 9.9 FL (ref 9–12.9)
POTASSIUM SERPL-SCNC: 4.2 MMOL/L (ref 3.6–5.5)
PROT UR QL STRIP: NEGATIVE MG/DL
RBC # BLD AUTO: 5.08 M/UL (ref 4.7–6.1)
RBC # URNS HPF: NORMAL /HPF (ref 0–2)
RBC UR QL AUTO: NEGATIVE
SODIUM SERPL-SCNC: 141 MMOL/L (ref 135–145)
SP GR UR STRIP.AUTO: 1.01
UROBILINOGEN UR STRIP.AUTO-MCNC: 0.2 EU/DL
WBC # BLD AUTO: 8.7 K/UL (ref 4.8–10.8)
WBC #/AREA URNS HPF: NORMAL /HPF

## 2025-05-13 PROCEDURE — 85025 COMPLETE CBC W/AUTO DIFF WBC: CPT

## 2025-05-13 PROCEDURE — 80048 BASIC METABOLIC PNL TOTAL CA: CPT

## 2025-05-13 PROCEDURE — 87086 URINE CULTURE/COLONY COUNT: CPT

## 2025-05-13 PROCEDURE — 36415 COLL VENOUS BLD VENIPUNCTURE: CPT

## 2025-05-13 PROCEDURE — 81001 URINALYSIS AUTO W/SCOPE: CPT

## 2025-05-15 LAB
BACTERIA UR CULT: NORMAL
SIGNIFICANT IND 70042: NORMAL
SITE SITE: NORMAL
SOURCE SOURCE: NORMAL

## 2025-05-28 ENCOUNTER — ANESTHESIA EVENT (OUTPATIENT)
Dept: SURGERY | Facility: MEDICAL CENTER | Age: 60
End: 2025-05-28
Payer: MEDICARE

## 2025-05-28 ENCOUNTER — ANESTHESIA (OUTPATIENT)
Dept: SURGERY | Facility: MEDICAL CENTER | Age: 60
End: 2025-05-28
Payer: MEDICARE

## 2025-05-28 ENCOUNTER — HOSPITAL ENCOUNTER (INPATIENT)
Facility: MEDICAL CENTER | Age: 60
End: 2025-05-28
Attending: UROLOGY | Admitting: UROLOGY
Payer: MEDICARE

## 2025-05-28 DIAGNOSIS — G89.18 POSTOPERATIVE PAIN: Primary | ICD-10-CM

## 2025-05-28 PROBLEM — R11.2 PONV (POSTOPERATIVE NAUSEA AND VOMITING): Status: ACTIVE | Noted: 2025-05-28

## 2025-05-28 PROBLEM — C61 PROSTATE CANCER (HCC): Status: ACTIVE | Noted: 2025-05-28

## 2025-05-28 PROBLEM — Z98.890 PONV (POSTOPERATIVE NAUSEA AND VOMITING): Status: ACTIVE | Noted: 2025-05-28

## 2025-05-28 LAB
ANION GAP SERPL CALC-SCNC: 10 MMOL/L (ref 7–16)
BUN SERPL-MCNC: 13 MG/DL (ref 8–22)
CALCIUM SERPL-MCNC: 8.6 MG/DL (ref 8.5–10.5)
CHLORIDE SERPL-SCNC: 105 MMOL/L (ref 96–112)
CO2 SERPL-SCNC: 24 MMOL/L (ref 20–33)
CREAT SERPL-MCNC: 0.98 MG/DL (ref 0.5–1.4)
ERYTHROCYTE [DISTWIDTH] IN BLOOD BY AUTOMATED COUNT: 41.7 FL (ref 35.9–50)
GFR SERPLBLD CREATININE-BSD FMLA CKD-EPI: 89 ML/MIN/1.73 M 2
GLUCOSE SERPL-MCNC: 132 MG/DL (ref 65–99)
HCT VFR BLD AUTO: 41.9 % (ref 42–52)
HGB BLD-MCNC: 13.7 G/DL (ref 14–18)
MCH RBC QN AUTO: 30 PG (ref 27–33)
MCHC RBC AUTO-ENTMCNC: 32.7 G/DL (ref 32.3–36.5)
MCV RBC AUTO: 91.9 FL (ref 81.4–97.8)
PATHOLOGY CONSULT NOTE: NORMAL
PLATELET # BLD AUTO: 209 K/UL (ref 164–446)
PMV BLD AUTO: 9.6 FL (ref 9–12.9)
POTASSIUM SERPL-SCNC: 4.4 MMOL/L (ref 3.6–5.5)
RBC # BLD AUTO: 4.56 M/UL (ref 4.7–6.1)
SODIUM SERPL-SCNC: 139 MMOL/L (ref 135–145)
WBC # BLD AUTO: 15.8 K/UL (ref 4.8–10.8)

## 2025-05-28 PROCEDURE — 96372 THER/PROPH/DIAG INJ SC/IM: CPT

## 2025-05-28 PROCEDURE — 700105 HCHG RX REV CODE 258: Performed by: STUDENT IN AN ORGANIZED HEALTH CARE EDUCATION/TRAINING PROGRAM

## 2025-05-28 PROCEDURE — 700102 HCHG RX REV CODE 250 W/ 637 OVERRIDE(OP): Performed by: STUDENT IN AN ORGANIZED HEALTH CARE EDUCATION/TRAINING PROGRAM

## 2025-05-28 PROCEDURE — 88305 TISSUE EXAM BY PATHOLOGIST: CPT | Mod: 59 | Performed by: PATHOLOGY

## 2025-05-28 PROCEDURE — 160042 HCHG SURGERY MINUTES - EA ADDL 1 MIN LEVEL 5: Performed by: UROLOGY

## 2025-05-28 PROCEDURE — 88309 TISSUE EXAM BY PATHOLOGIST: CPT | Mod: 26 | Performed by: PATHOLOGY

## 2025-05-28 PROCEDURE — 700111 HCHG RX REV CODE 636 W/ 250 OVERRIDE (IP): Mod: JZ | Performed by: STUDENT IN AN ORGANIZED HEALTH CARE EDUCATION/TRAINING PROGRAM

## 2025-05-28 PROCEDURE — 8E0W4CZ ROBOTIC ASSISTED PROCEDURE OF TRUNK REGION, PERCUTANEOUS ENDOSCOPIC APPROACH: ICD-10-PCS | Performed by: UROLOGY

## 2025-05-28 PROCEDURE — 0VT04ZZ RESECTION OF PROSTATE, PERCUTANEOUS ENDOSCOPIC APPROACH: ICD-10-PCS | Performed by: UROLOGY

## 2025-05-28 PROCEDURE — 700111 HCHG RX REV CODE 636 W/ 250 OVERRIDE (IP): Mod: JZ | Performed by: UROLOGY

## 2025-05-28 PROCEDURE — A9270 NON-COVERED ITEM OR SERVICE: HCPCS | Performed by: STUDENT IN AN ORGANIZED HEALTH CARE EDUCATION/TRAINING PROGRAM

## 2025-05-28 PROCEDURE — 160048 HCHG OR STATISTICAL LEVEL 1-5: Performed by: UROLOGY

## 2025-05-28 PROCEDURE — 160009 HCHG ANES TIME/MIN: Performed by: UROLOGY

## 2025-05-28 PROCEDURE — 502240 HCHG MISC OR SUPPLY RC 0272: Performed by: UROLOGY

## 2025-05-28 PROCEDURE — A9270 NON-COVERED ITEM OR SERVICE: HCPCS | Performed by: UROLOGY

## 2025-05-28 PROCEDURE — 0TNB4ZZ RELEASE BLADDER, PERCUTANEOUS ENDOSCOPIC APPROACH: ICD-10-PCS | Performed by: UROLOGY

## 2025-05-28 PROCEDURE — 160036 HCHG PACU - EA ADDL 30 MINS PHASE I: Performed by: UROLOGY

## 2025-05-28 PROCEDURE — 160002 HCHG RECOVERY MINUTES (STAT): Performed by: UROLOGY

## 2025-05-28 PROCEDURE — 160035 HCHG PACU - 1ST 60 MINS PHASE I: Performed by: UROLOGY

## 2025-05-28 PROCEDURE — 160015 HCHG STAT PREOP MINUTES: Performed by: UROLOGY

## 2025-05-28 PROCEDURE — 160031 HCHG SURGERY MINUTES - 1ST 30 MINS LEVEL 5: Performed by: UROLOGY

## 2025-05-28 PROCEDURE — RXMED WILLOW AMBULATORY MEDICATION CHARGE

## 2025-05-28 PROCEDURE — 88309 TISSUE EXAM BY PATHOLOGIST: CPT | Performed by: PATHOLOGY

## 2025-05-28 PROCEDURE — 96365 THER/PROPH/DIAG IV INF INIT: CPT

## 2025-05-28 PROCEDURE — 88305 TISSUE EXAM BY PATHOLOGIST: CPT | Mod: 26 | Performed by: PATHOLOGY

## 2025-05-28 PROCEDURE — 700102 HCHG RX REV CODE 250 W/ 637 OVERRIDE(OP): Performed by: UROLOGY

## 2025-05-28 PROCEDURE — 700101 HCHG RX REV CODE 250: Performed by: UROLOGY

## 2025-05-28 PROCEDURE — 700105 HCHG RX REV CODE 258: Performed by: UROLOGY

## 2025-05-28 PROCEDURE — 96375 TX/PRO/DX INJ NEW DRUG ADDON: CPT

## 2025-05-28 PROCEDURE — 700101 HCHG RX REV CODE 250: Performed by: STUDENT IN AN ORGANIZED HEALTH CARE EDUCATION/TRAINING PROGRAM

## 2025-05-28 PROCEDURE — 80048 BASIC METABOLIC PNL TOTAL CA: CPT

## 2025-05-28 PROCEDURE — 85027 COMPLETE CBC AUTOMATED: CPT

## 2025-05-28 PROCEDURE — G0378 HOSPITAL OBSERVATION PER HR: HCPCS

## 2025-05-28 PROCEDURE — 502714 HCHG ROBOTIC SURGERY SERVICES: Performed by: UROLOGY

## 2025-05-28 PROCEDURE — 36415 COLL VENOUS BLD VENIPUNCTURE: CPT

## 2025-05-28 PROCEDURE — 110371 HCHG SHELL REV 272: Performed by: UROLOGY

## 2025-05-28 RX ORDER — OXYCODONE HCL 5 MG/5 ML
10 SOLUTION, ORAL ORAL
Status: COMPLETED | OUTPATIENT
Start: 2025-05-28 | End: 2025-05-28

## 2025-05-28 RX ORDER — LABETALOL HYDROCHLORIDE 5 MG/ML
5 INJECTION, SOLUTION INTRAVENOUS
Status: DISCONTINUED | OUTPATIENT
Start: 2025-05-28 | End: 2025-05-28 | Stop reason: HOSPADM

## 2025-05-28 RX ORDER — HEPARIN SODIUM 5000 [USP'U]/ML
5000 INJECTION, SOLUTION INTRAVENOUS; SUBCUTANEOUS EVERY 8 HOURS
Status: DISCONTINUED | OUTPATIENT
Start: 2025-05-28 | End: 2025-05-31

## 2025-05-28 RX ORDER — LIDOCAINE HYDROCHLORIDE 20 MG/ML
JELLY TOPICAL PRN
Status: DISCONTINUED | OUTPATIENT
Start: 2025-05-28 | End: 2025-05-28 | Stop reason: SURG

## 2025-05-28 RX ORDER — TAMSULOSIN HYDROCHLORIDE 0.4 MG/1
0.4 CAPSULE ORAL NIGHTLY
Status: DISCONTINUED | OUTPATIENT
Start: 2025-05-28 | End: 2025-06-01 | Stop reason: HOSPADM

## 2025-05-28 RX ORDER — DEXAMETHASONE SODIUM PHOSPHATE 4 MG/ML
INJECTION, SOLUTION INTRA-ARTICULAR; INTRALESIONAL; INTRAMUSCULAR; INTRAVENOUS; SOFT TISSUE PRN
Status: DISCONTINUED | OUTPATIENT
Start: 2025-05-28 | End: 2025-05-28 | Stop reason: SURG

## 2025-05-28 RX ORDER — CARBIDOPA AND LEVODOPA 25; 100 MG/1; MG/1
1 TABLET ORAL 3 TIMES DAILY
Status: DISCONTINUED | OUTPATIENT
Start: 2025-05-28 | End: 2025-06-01 | Stop reason: HOSPADM

## 2025-05-28 RX ORDER — KETOROLAC TROMETHAMINE 15 MG/ML
INJECTION, SOLUTION INTRAMUSCULAR; INTRAVENOUS PRN
Status: DISCONTINUED | OUTPATIENT
Start: 2025-05-28 | End: 2025-05-28 | Stop reason: SURG

## 2025-05-28 RX ORDER — ACETAMINOPHEN 500 MG
1000 TABLET ORAL EVERY 8 HOURS PRN
Status: DISCONTINUED | OUTPATIENT
Start: 2025-06-02 | End: 2025-06-01 | Stop reason: HOSPADM

## 2025-05-28 RX ORDER — POLYETHYLENE GLYCOL 3350 17 G/17G
1 POWDER, FOR SOLUTION ORAL DAILY
Status: DISCONTINUED | OUTPATIENT
Start: 2025-05-29 | End: 2025-06-01 | Stop reason: HOSPADM

## 2025-05-28 RX ORDER — IPRATROPIUM BROMIDE AND ALBUTEROL SULFATE 2.5; .5 MG/3ML; MG/3ML
3 SOLUTION RESPIRATORY (INHALATION)
Status: DISCONTINUED | OUTPATIENT
Start: 2025-05-28 | End: 2025-05-28 | Stop reason: HOSPADM

## 2025-05-28 RX ORDER — KETOROLAC TROMETHAMINE 15 MG/ML
15 INJECTION, SOLUTION INTRAMUSCULAR; INTRAVENOUS EVERY 6 HOURS
Status: DISPENSED | OUTPATIENT
Start: 2025-05-28 | End: 2025-05-31

## 2025-05-28 RX ORDER — VITAMIN B COMPLEX
4000 TABLET ORAL 2 TIMES DAILY
COMMUNITY

## 2025-05-28 RX ORDER — SODIUM CHLORIDE, SODIUM LACTATE, POTASSIUM CHLORIDE, CALCIUM CHLORIDE 600; 310; 30; 20 MG/100ML; MG/100ML; MG/100ML; MG/100ML
INJECTION, SOLUTION INTRAVENOUS
Status: DISCONTINUED | OUTPATIENT
Start: 2025-05-28 | End: 2025-05-28 | Stop reason: SURG

## 2025-05-28 RX ORDER — CEFAZOLIN SODIUM 1 G/3ML
INJECTION, POWDER, FOR SOLUTION INTRAMUSCULAR; INTRAVENOUS PRN
Status: DISCONTINUED | OUTPATIENT
Start: 2025-05-28 | End: 2025-05-28 | Stop reason: SURG

## 2025-05-28 RX ORDER — DEXMEDETOMIDINE HYDROCHLORIDE 100 UG/ML
INJECTION, SOLUTION INTRAVENOUS PRN
Status: DISCONTINUED | OUTPATIENT
Start: 2025-05-28 | End: 2025-05-28 | Stop reason: SURG

## 2025-05-28 RX ORDER — AMOXICILLIN 250 MG
2 CAPSULE ORAL 2 TIMES DAILY
Status: DISCONTINUED | OUTPATIENT
Start: 2025-05-28 | End: 2025-06-01 | Stop reason: HOSPADM

## 2025-05-28 RX ORDER — LIDOCAINE HYDROCHLORIDE 20 MG/ML
INJECTION, SOLUTION EPIDURAL; INFILTRATION; INTRACAUDAL; PERINEURAL PRN
Status: DISCONTINUED | OUTPATIENT
Start: 2025-05-28 | End: 2025-05-28 | Stop reason: SURG

## 2025-05-28 RX ORDER — HYDROMORPHONE HYDROCHLORIDE 1 MG/ML
0.5 INJECTION, SOLUTION INTRAMUSCULAR; INTRAVENOUS; SUBCUTANEOUS
Status: DISCONTINUED | OUTPATIENT
Start: 2025-05-28 | End: 2025-06-01 | Stop reason: HOSPADM

## 2025-05-28 RX ORDER — MIDAZOLAM HYDROCHLORIDE 1 MG/ML
1 INJECTION INTRAMUSCULAR; INTRAVENOUS
Status: DISCONTINUED | OUTPATIENT
Start: 2025-05-28 | End: 2025-05-28 | Stop reason: HOSPADM

## 2025-05-28 RX ORDER — OXYCODONE HCL 5 MG/5 ML
5 SOLUTION, ORAL ORAL
Status: COMPLETED | OUTPATIENT
Start: 2025-05-28 | End: 2025-05-28

## 2025-05-28 RX ORDER — ROCURONIUM BROMIDE 10 MG/ML
INJECTION, SOLUTION INTRAVENOUS PRN
Status: DISCONTINUED | OUTPATIENT
Start: 2025-05-28 | End: 2025-05-28 | Stop reason: SURG

## 2025-05-28 RX ORDER — BUPIVACAINE HYDROCHLORIDE AND EPINEPHRINE 2.5; 5 MG/ML; UG/ML
INJECTION, SOLUTION EPIDURAL; INFILTRATION; INTRACAUDAL; PERINEURAL
Status: DISCONTINUED | OUTPATIENT
Start: 2025-05-28 | End: 2025-05-28 | Stop reason: HOSPADM

## 2025-05-28 RX ORDER — OXYCODONE HYDROCHLORIDE 10 MG/1
10 TABLET ORAL EVERY 4 HOURS PRN
Status: DISCONTINUED | OUTPATIENT
Start: 2025-05-28 | End: 2025-06-01 | Stop reason: HOSPADM

## 2025-05-28 RX ORDER — ONDANSETRON 2 MG/ML
4 INJECTION INTRAMUSCULAR; INTRAVENOUS
Status: DISCONTINUED | OUTPATIENT
Start: 2025-05-28 | End: 2025-05-28 | Stop reason: HOSPADM

## 2025-05-28 RX ORDER — ONDANSETRON 2 MG/ML
4 INJECTION INTRAMUSCULAR; INTRAVENOUS EVERY 4 HOURS PRN
Status: DISCONTINUED | OUTPATIENT
Start: 2025-05-28 | End: 2025-06-01 | Stop reason: HOSPADM

## 2025-05-28 RX ORDER — HYDROMORPHONE HYDROCHLORIDE 2 MG/ML
INJECTION, SOLUTION INTRAMUSCULAR; INTRAVENOUS; SUBCUTANEOUS PRN
Status: DISCONTINUED | OUTPATIENT
Start: 2025-05-28 | End: 2025-05-28 | Stop reason: SURG

## 2025-05-28 RX ORDER — ONDANSETRON 2 MG/ML
INJECTION INTRAMUSCULAR; INTRAVENOUS PRN
Status: DISCONTINUED | OUTPATIENT
Start: 2025-05-28 | End: 2025-05-28 | Stop reason: SURG

## 2025-05-28 RX ORDER — HYDRALAZINE HYDROCHLORIDE 20 MG/ML
5 INJECTION INTRAMUSCULAR; INTRAVENOUS
Status: DISCONTINUED | OUTPATIENT
Start: 2025-05-28 | End: 2025-05-28 | Stop reason: HOSPADM

## 2025-05-28 RX ORDER — DIPHENHYDRAMINE HYDROCHLORIDE 50 MG/ML
12.5 INJECTION, SOLUTION INTRAMUSCULAR; INTRAVENOUS
Status: DISCONTINUED | OUTPATIENT
Start: 2025-05-28 | End: 2025-05-28 | Stop reason: HOSPADM

## 2025-05-28 RX ORDER — SODIUM CHLORIDE 9 MG/ML
INJECTION, SOLUTION INTRAVENOUS CONTINUOUS
Status: ACTIVE | OUTPATIENT
Start: 2025-05-28 | End: 2025-05-29

## 2025-05-28 RX ORDER — CEFAZOLIN SODIUM 1 G/50ML
1 INJECTION, SOLUTION INTRAVENOUS EVERY 8 HOURS
Status: COMPLETED | OUTPATIENT
Start: 2025-05-28 | End: 2025-05-29

## 2025-05-28 RX ORDER — MIDAZOLAM HYDROCHLORIDE 1 MG/ML
INJECTION INTRAMUSCULAR; INTRAVENOUS PRN
Status: DISCONTINUED | OUTPATIENT
Start: 2025-05-28 | End: 2025-05-28 | Stop reason: SURG

## 2025-05-28 RX ORDER — SODIUM CHLORIDE, SODIUM LACTATE, POTASSIUM CHLORIDE, CALCIUM CHLORIDE 600; 310; 30; 20 MG/100ML; MG/100ML; MG/100ML; MG/100ML
INJECTION, SOLUTION INTRAVENOUS CONTINUOUS
Status: ACTIVE | OUTPATIENT
Start: 2025-05-28 | End: 2025-05-28

## 2025-05-28 RX ORDER — IBUPROFEN 800 MG/1
800 TABLET, FILM COATED ORAL 3 TIMES DAILY PRN
Status: DISCONTINUED | OUTPATIENT
Start: 2025-05-31 | End: 2025-06-01 | Stop reason: HOSPADM

## 2025-05-28 RX ORDER — OXYCODONE HYDROCHLORIDE 5 MG/1
5 TABLET ORAL EVERY 4 HOURS PRN
Status: DISCONTINUED | OUTPATIENT
Start: 2025-05-28 | End: 2025-06-01 | Stop reason: HOSPADM

## 2025-05-28 RX ORDER — POLYETHYLENE GLYCOL 3350 17 G/17G
17 POWDER, FOR SOLUTION ORAL DAILY
Qty: 10 PACKET | Refills: 0 | Status: SHIPPED | OUTPATIENT
Start: 2025-05-28 | End: 2025-06-07

## 2025-05-28 RX ORDER — PHENYLEPHRINE HCL IN 0.9% NACL 1 MG/10 ML
SYRINGE (ML) INTRAVENOUS PRN
Status: DISCONTINUED | OUTPATIENT
Start: 2025-05-28 | End: 2025-05-28 | Stop reason: SURG

## 2025-05-28 RX ORDER — OXYBUTYNIN CHLORIDE 10 MG/1
10 TABLET, EXTENDED RELEASE ORAL DAILY
Qty: 5 TABLET | Refills: 0 | Status: SHIPPED | OUTPATIENT
Start: 2025-05-28 | End: 2025-06-01

## 2025-05-28 RX ORDER — OXYBUTYNIN CHLORIDE 5 MG/1
5 TABLET, EXTENDED RELEASE ORAL DAILY
Status: DISCONTINUED | OUTPATIENT
Start: 2025-05-28 | End: 2025-05-29

## 2025-05-28 RX ORDER — EPHEDRINE SULFATE 50 MG/ML
5 INJECTION, SOLUTION INTRAVENOUS
Status: DISCONTINUED | OUTPATIENT
Start: 2025-05-28 | End: 2025-05-28 | Stop reason: HOSPADM

## 2025-05-28 RX ORDER — ACETAMINOPHEN 500 MG
1000 TABLET ORAL EVERY 8 HOURS
Status: DISCONTINUED | OUTPATIENT
Start: 2025-05-28 | End: 2025-06-01 | Stop reason: HOSPADM

## 2025-05-28 RX ORDER — OXYCODONE HYDROCHLORIDE 5 MG/1
5 TABLET ORAL EVERY 8 HOURS PRN
Qty: 9 TABLET | Refills: 0 | Status: SHIPPED | OUTPATIENT
Start: 2025-05-28 | End: 2025-06-04

## 2025-05-28 RX ADMIN — ROCURONIUM BROMIDE 25 MG: 10 INJECTION INTRAVENOUS at 09:31

## 2025-05-28 RX ADMIN — CEFAZOLIN SODIUM 1 G: 1 INJECTION, SOLUTION INTRAVENOUS at 22:36

## 2025-05-28 RX ADMIN — KETOROLAC TROMETHAMINE 15 MG: 15 INJECTION, SOLUTION INTRAMUSCULAR; INTRAVENOUS at 18:00

## 2025-05-28 RX ADMIN — ACETAMINOPHEN 1000 MG: 500 TABLET ORAL at 14:10

## 2025-05-28 RX ADMIN — SODIUM CHLORIDE, POTASSIUM CHLORIDE, SODIUM LACTATE AND CALCIUM CHLORIDE: 600; 310; 30; 20 INJECTION, SOLUTION INTRAVENOUS at 07:29

## 2025-05-28 RX ADMIN — DOCUSATE SODIUM 50 MG AND SENNOSIDES 8.6 MG 2 TABLET: 8.6; 5 TABLET, FILM COATED ORAL at 17:29

## 2025-05-28 RX ADMIN — ONDANSETRON 4 MG: 2 INJECTION INTRAMUSCULAR; INTRAVENOUS at 10:52

## 2025-05-28 RX ADMIN — MIDAZOLAM HYDROCHLORIDE 2 MG: 1 INJECTION, SOLUTION INTRAMUSCULAR; INTRAVENOUS at 07:30

## 2025-05-28 RX ADMIN — CEFAZOLIN SODIUM 1 G: 1 INJECTION, SOLUTION INTRAVENOUS at 14:14

## 2025-05-28 RX ADMIN — Medication 100 MCG: at 07:41

## 2025-05-28 RX ADMIN — ONDANSETRON 4 MG: 2 INJECTION INTRAMUSCULAR; INTRAVENOUS at 07:41

## 2025-05-28 RX ADMIN — HEPARIN SODIUM 5000 UNITS: 5000 INJECTION, SOLUTION INTRAVENOUS; SUBCUTANEOUS at 14:17

## 2025-05-28 RX ADMIN — DEXAMETHASONE SODIUM PHOSPHATE 8 MG: 4 INJECTION INTRA-ARTICULAR; INTRALESIONAL; INTRAMUSCULAR; INTRAVENOUS; SOFT TISSUE at 07:41

## 2025-05-28 RX ADMIN — KETOROLAC TROMETHAMINE 15 MG: 15 INJECTION, SOLUTION INTRAMUSCULAR; INTRAVENOUS at 10:54

## 2025-05-28 RX ADMIN — SODIUM CHLORIDE, POTASSIUM CHLORIDE, SODIUM LACTATE AND CALCIUM CHLORIDE: 600; 310; 30; 20 INJECTION, SOLUTION INTRAVENOUS at 06:29

## 2025-05-28 RX ADMIN — PROPOFOL 25 MCG/KG/MIN: 10 INJECTION, EMULSION INTRAVENOUS at 07:40

## 2025-05-28 RX ADMIN — CEFAZOLIN 2 G: 1 INJECTION, POWDER, FOR SOLUTION INTRAMUSCULAR; INTRAVENOUS at 07:36

## 2025-05-28 RX ADMIN — FENTANYL CITRATE 50 MCG: 50 INJECTION, SOLUTION INTRAMUSCULAR; INTRAVENOUS at 12:37

## 2025-05-28 RX ADMIN — OXYCODONE HYDROCHLORIDE 10 MG: 5 SOLUTION ORAL at 12:30

## 2025-05-28 RX ADMIN — TAMSULOSIN HYDROCHLORIDE 0.4 MG: 0.4 CAPSULE ORAL at 20:57

## 2025-05-28 RX ADMIN — SUGAMMADEX 200 MG: 100 INJECTION, SOLUTION INTRAVENOUS at 11:19

## 2025-05-28 RX ADMIN — MINERAL OIL, AND WHITE PETROLATUM 1 APPLICATION: 425; 573 OINTMENT OPHTHALMIC at 07:34

## 2025-05-28 RX ADMIN — PROPOFOL 100 MG: 10 INJECTION, EMULSION INTRAVENOUS at 07:34

## 2025-05-28 RX ADMIN — Medication 150 MCG: at 07:48

## 2025-05-28 RX ADMIN — Medication 25 MG: at 07:58

## 2025-05-28 RX ADMIN — SODIUM CHLORIDE: 9 INJECTION, SOLUTION INTRAVENOUS at 14:14

## 2025-05-28 RX ADMIN — LIDOCAINE HYDROCHLORIDE 60 MG: 20 INJECTION, SOLUTION EPIDURAL; INFILTRATION; INTRACAUDAL; PERINEURAL at 07:34

## 2025-05-28 RX ADMIN — DEXMEDETOMIDINE 15 MCG: 100 INJECTION, SOLUTION INTRAVENOUS at 07:30

## 2025-05-28 RX ADMIN — LIDOCAINE HYDROCHLORIDE 3 ML: 20 JELLY TOPICAL at 07:34

## 2025-05-28 RX ADMIN — ROCURONIUM BROMIDE 25 MG: 10 INJECTION INTRAVENOUS at 08:30

## 2025-05-28 RX ADMIN — ROCURONIUM BROMIDE 100 MG: 10 INJECTION INTRAVENOUS at 07:34

## 2025-05-28 RX ADMIN — OXYCODONE HYDROCHLORIDE 10 MG: 10 TABLET ORAL at 14:10

## 2025-05-28 RX ADMIN — HYDROMORPHONE HYDROCHLORIDE 1 MG: 2 INJECTION INTRAMUSCULAR; INTRAVENOUS; SUBCUTANEOUS at 08:03

## 2025-05-28 RX ADMIN — HYDROMORPHONE HYDROCHLORIDE 1 MG: 2 INJECTION INTRAMUSCULAR; INTRAVENOUS; SUBCUTANEOUS at 07:34

## 2025-05-28 RX ADMIN — CARBIDOPA AND LEVODOPA 1 TABLET: 25; 100 TABLET ORAL at 14:11

## 2025-05-28 RX ADMIN — Medication 25 MG: at 07:34

## 2025-05-28 RX ADMIN — CARBIDOPA AND LEVODOPA 1 TABLET: 25; 100 TABLET ORAL at 17:28

## 2025-05-28 RX ADMIN — HEPARIN SODIUM 5000 UNITS: 5000 INJECTION, SOLUTION INTRAVENOUS; SUBCUTANEOUS at 20:59

## 2025-05-28 ASSESSMENT — PAIN DESCRIPTION - PAIN TYPE
TYPE: SURGICAL PAIN

## 2025-05-28 ASSESSMENT — LIFESTYLE VARIABLES
ON A TYPICAL DAY WHEN YOU DRINK ALCOHOL HOW MANY DRINKS DO YOU HAVE: 0
TOTAL SCORE: 0
DOES PATIENT WANT TO STOP DRINKING: NO
EVER FELT BAD OR GUILTY ABOUT YOUR DRINKING: NO
TOTAL SCORE: 0
HOW MANY TIMES IN THE PAST YEAR HAVE YOU HAD 5 OR MORE DRINKS IN A DAY: 0
HAVE PEOPLE ANNOYED YOU BY CRITICIZING YOUR DRINKING: NO
EVER HAD A DRINK FIRST THING IN THE MORNING TO STEADY YOUR NERVES TO GET RID OF A HANGOVER: NO
CONSUMPTION TOTAL: NEGATIVE
HAVE YOU EVER FELT YOU SHOULD CUT DOWN ON YOUR DRINKING: NO
ALCOHOL_USE: NO
TOTAL SCORE: 0
AVERAGE NUMBER OF DAYS PER WEEK YOU HAVE A DRINK CONTAINING ALCOHOL: 0

## 2025-05-28 ASSESSMENT — COGNITIVE AND FUNCTIONAL STATUS - GENERAL
DRESSING REGULAR LOWER BODY CLOTHING: A LITTLE
SUGGESTED CMS G CODE MODIFIER MOBILITY: CJ
MOBILITY SCORE: 21
STANDING UP FROM CHAIR USING ARMS: A LITTLE
CLIMB 3 TO 5 STEPS WITH RAILING: A LITTLE
SUGGESTED CMS G CODE MODIFIER DAILY ACTIVITY: CJ
WALKING IN HOSPITAL ROOM: A LITTLE
DAILY ACTIVITIY SCORE: 22
HELP NEEDED FOR BATHING: A LITTLE

## 2025-05-28 ASSESSMENT — PATIENT HEALTH QUESTIONNAIRE - PHQ9
1. LITTLE INTEREST OR PLEASURE IN DOING THINGS: NOT AT ALL
2. FEELING DOWN, DEPRESSED, IRRITABLE, OR HOPELESS: NOT AT ALL
SUM OF ALL RESPONSES TO PHQ9 QUESTIONS 1 AND 2: 0

## 2025-05-28 ASSESSMENT — SOCIAL DETERMINANTS OF HEALTH (SDOH)
WITHIN THE LAST YEAR, HAVE TO BEEN RAPED OR FORCED TO HAVE ANY KIND OF SEXUAL ACTIVITY BY YOUR PARTNER OR EX-PARTNER?: NO
WITHIN THE LAST YEAR, HAVE YOU BEEN HUMILIATED OR EMOTIONALLY ABUSED IN OTHER WAYS BY YOUR PARTNER OR EX-PARTNER?: NO
WITHIN THE LAST YEAR, HAVE YOU BEEN KICKED, HIT, SLAPPED, OR OTHERWISE PHYSICALLY HURT BY YOUR PARTNER OR EX-PARTNER?: NO
WITHIN THE PAST 12 MONTHS, YOU WORRIED THAT YOUR FOOD WOULD RUN OUT BEFORE YOU GOT THE MONEY TO BUY MORE: NEVER TRUE
WITHIN THE LAST YEAR, HAVE YOU BEEN AFRAID OF YOUR PARTNER OR EX-PARTNER?: NO
IN THE PAST 12 MONTHS, HAS THE ELECTRIC, GAS, OIL, OR WATER COMPANY THREATENED TO SHUT OFF SERVICE IN YOUR HOME?: NO
WITHIN THE PAST 12 MONTHS, THE FOOD YOU BOUGHT JUST DIDN'T LAST AND YOU DIDN'T HAVE MONEY TO GET MORE: NEVER TRUE

## 2025-05-28 ASSESSMENT — PAIN SCALES - GENERAL: PAIN_LEVEL: 0

## 2025-05-28 NOTE — H&P
"No changes to below H&P .    To OR for RALP.    I personally witnessed the patient turn off the DBS device. This will be turned back on after the surgery once he is in recovery.    Pre-op: SQH    /65   Pulse 65   Temp 36.1 °C (96.9 °F) (Temporal)   Resp 19   Ht 1.727 m (5' 8\")   Wt 57.1 kg (125 lb 14.1 oz)   SpO2 97%   BMI 19.14 kg/m²     Family History   Problem Relation Age of Onset    Heart Disease Mother     Hypertension Mother     Hyperlipidemia Mother     Psychiatric Illness Mother     Psychiatric Illness Father     Alcohol/Drug Father     Alcohol/Drug Sister     Sleep Apnea Neg Hx         Constitutional:       Appearance: Normal appearance.   HENT:      Head: Atraumatic.      Nose: Nose normal.      Mouth/Throat:      Pharynx: Oropharynx is clear.   Eyes:      Extraocular Movements: Extraocular movements intact.   Cardiovascular:      Pulses: Normal pulses.   Pulmonary:      Effort: Pulmonary effort is normal.   Abdominal:      General: Abdomen is flat.      Palpations: Abdomen is soft.   Genitourinary:     Deferred  Musculoskeletal:         General: Normal range of motion.      Cervical back: Normal range of motion.   Skin:     General: Skin is warm.   Neurological:      General: No focal deficit present.      Mental Status: He is alert.   Psychiatric:         Mood and Affect: Mood normal.     _______________________  Tj Cesar MD  Urologic Surgical Oncology  [facility] [place]     ---  pre op 15, 05-  PRE OP-RALP LC 04.08  Performed by NETTIE Carson, Urology, [phone]  Scribed by alexis  Reason for Visit  pre-op evaluation  Assessment & Plan  30 minutes spent on this patient encounter with greater than 50% spent face to face with the patient which additionally included non-direct time for chart review, documentation, and coordination of care.    Visit complexity inherent to evaluation and management associated with medical care services that serve as the continuing focal " point for all needed health care services and/or with medical care services that are part of ongoing care related to a patient's single, serious condition or a complex condition.  malignant neoplasm of prostate  Mr. Rakan Rader is a 58yo man with newly diagnosed prostate cancer (clinical stage T1c, [place] 3+4, PSA 4.35), consistent with NCCN favorable intermediate-risk disease. He has good performance status and life expectancy >10 years.    - Reviewed pre, isamar and post operative expectations including overnight and will need to be ambulating, passing gas, and tolerating foods prior to discharge.   - Discussed he will have a catheter for up to 7-10 days   - Reviewed pain is typically well managed with OTC medication however he will be provided small dose of opioids to take as needed.   - Discussed incontinence after surgery is normal and recommended starting kegel exercises now to better aid return of continence.   - Discussed incontinence tends to show the most improvement within the first 6 months and continued improvement up to 1 year post op.   - Advised he does not hold urine and recommended to do kegels instead.   - Dr ZAMBRANO contacted neurology and got recs from [employer] rep. Advised he bring DBS controller pre-operative to turn off stimulator prior to surgery. Will plan to turn it back on after surgery. Patient expressed understanding  Received urinalysis, dipstick results  Parkinson's disease  Clearance for surgery under anesthesia and with use of cautery from his neurologist and neurosurgery team given the internal pulse generator used for deep brain stimulation. Followed by neurosurgeon Dr. Ivania Castro of [place] and and neurologist Dr. Gualberto Taylor of [facility]. From my understanding the generator needs to be turned off prior to the surgery and confirmed it is off, monopolar electrocautery should be minimized and bipolar electrocautery preferred, if monopolar electrocautery is used it  should be at lower settings and the grounding pad is far away from the generator as possible, and the generator must be turned back on after surgery and interrogated  History of Present Illness  59yo M patient of Dr. Grande with newly diagnosed prostate cancer. S/p TRUS prostate biopsy 2025. Pathology returned [place] 3+4=7 in 7/14 cores and [place] 3+3=6, in 1/14 cores. PSA of 4.35 at the time of diagnosis. Patient is also followed for lower urinary tract symptoms of urinary frequency, urgency, and urge incontinence onset years ago. He is currently taking Flomax with control of symptoms. Still with occasional incontinence, but infrequent.     Patient presents today (2025) for robotic radical prostatectomy pre-op appointment. He is established with pelvic floor physical therapy, first apt will be 3-4 weeks after surgery. Denies chest pain, SOB, dyspnea on exertion, blood clots, issue with anesthesia, or any new onset of upper or lower extremity swelling. He reports nausea after prior surgeries.     Past Medical History: Parkinson's disease (followed by neurologists Dr. Ivania Castro of [place] and Dr. Gualberto Taylor of [facility])  Past Surgical History: deep brain stimulation (DBS), bilateral inguinal hernia repair with mesh   Social History:     IPSS Timeline:  2024: IPSS: 13/2  2023: IPSS: 14/2    PVR Timeline:   2025: PVR: 24cc   2024: PVR: 2cc  2023: Urocuff: high pressure, high flow    PSA Timeline:  2024: Total PSA: 4.35, %free 17, 4K score of 9.8.   2024: Total PSA: 5.1  2024: Total PSA: 3.1  2023: Total PSA: 3.5, %free PSA=15.1  2023: Total PSA: 4.1  2022: Total PSA: 2.91    Imagin2024: MRI, prostate: Focal Diffusion abnormality in the right peripheral zone toward the apex. Prostate gland volume of 38cc. No pelvic lymphadenopathy or suspicious lesions. Total PI-RADS of 4.  Review of Systems  ROS as  noted in the HPI  Screening  None recorded  Physical Exam  UN - Problem focused exam - MALE    Constitutional  General Appearance: well-nourished, healthy-appearing, cooperative, well groomed  Level of Distress: no acute distress    Respiratory  Respiratory Movements normal respiration effort, good air entry    Skin  General Appearance of extremities: no edema  Inspection and palpation: no rash    Neurological/Psychiatric  Neurological/Psychiatric: normal mood, normal affect, (normal) orientation: person, (normal) orientation: time, (normal) orientation: place  Procedure Documentation  None recorded

## 2025-05-28 NOTE — OR NURSING
Pt transported out of PACU via gurney with transport - on 2 L of oxygen via nasal cannula. VSS Afebrile.   Pt A&OX4. No complaints of nausea, pt tolerated sips of water.   IV and PO pain medication administered for pain to penis and urethra.  Juarez in place to down drain, stat lock in place to L upper leg. 100 cc of hazy light red urine out.  One clear bag of personal belongings on gurney and deep brain stimulator controller inside of black glasses case inside bag, labeled.   Pt's girlfriend, Meredith, updated with pt's room assignment.

## 2025-05-28 NOTE — ANESTHESIA POSTPROCEDURE EVALUATION
Patient: Rakan Rader III    Procedure Summary       Date: 05/28/25 Room / Location: [place] [address] / SURGERY [facility]    Anesthesia Start: 0729 Anesthesia Stop: 1127    Procedures:       ROBOTIC ASSISTED LAPAROSCOPIC PROSTATECTOMY (Abdomen)      LYSIS, ADHESIONS, LAPAROSCOPIC ROBOTIC (Abdomen) Diagnosis: (MALIGNANT TUMOR OF PROSTATE)    Surgeons: Tj WATT M.D. Responsible Provider: Shad Serrano M.D.    Anesthesia Type: general ASA Status: 2            Final Anesthesia Type: general  Last vitals  BP   Blood Pressure: 133/77    Temp   36.7 °C (98.1 °F)    Pulse   82   Resp   17    SpO2   100 %      Anesthesia Post Evaluation    Patient location during evaluation: PACU  Patient participation: complete - patient participated  Level of consciousness: awake and alert  Pain score: 0    Airway patency: patent  Anesthetic complications: no  Cardiovascular status: hemodynamically stable  Respiratory status: acceptable  Hydration status: euvolemic    PONV: none          No notable events documented.     Nurse Pain Score: 0 (NPRS)

## 2025-05-28 NOTE — OP REPORT
Full Operative Note    Patient Name: Rakan Rader III    MRN: [id number]    Date of Surgery: 05/28/25    Pre-operative diagnosis: Prostate cancer  Post-operative diagnosis: Same    Procedure:  1. ROBOTIC LAPAROSCOPIC PROSTATECTOMY RADICAL (CPT 11064)   2. ROBOTIC  LAPAROSCOPIC LYSIS OF ADHESIONS    Surgeon: Tj WATT M.D.    Assistant: Lindsay Felix PA-C    Anesthesiologist: Shad Serrano M.D.        Anesthesia Type: General    Estimated Blood Loss: 100 mL    Fluids in: Crystalloid    Specimen:  Permanent section: Prostate, periprostatic fat, right prostatic margin  Frozen section: None     Drains:  1. 20 [place] urethral catheter, with 10 mL in balloon    Indications for Prodecure: The patient is a 60 y.o. male with favorable intermediate risk prostate cancer. He has elected to proceed with surgery and presents today for a robot-assisted laparoscopic radical prostatectomy and possible bilateral pelvic lymph node dissection. Plan will be for slightly wider possibly partial nerve sparing resection on the right given clinical findings on that side. The risks, benefits and alternatives were explained to the patient in detail and he provided signed informed consent.     Procedure Findings:  1. There was no visual evidence of disease extending beyond the prostate. There was adhesion to the right neurovascular bundle at the mid-gland and there a slightly wider margin was taken at a focal point of adhesion and sent as a separate margin, with care to include fatty tissue beyond the prostate within this specimen.  2. Lymphadenectomy: None.  3. Left nerve sparing: Complete preservation indicating full nerve sparing.  4. Right nerve sparing: Near complete preservation indicating minor damage.  5. A right and left bladder neck reconstruction was performed to establish a bladder neck equivalent in size to the urethra. There was no evidence of urinary extravasation from the anastomosis on leak test with  150 mL of irrigant.   6. There were adhesions of omentum and bowel to the anterior abdominal wall that were taken down robotically    Description of Procedure:   The patient was brought back to the operating room. Perioperative antibiotics consisting of Cefazolin were administered. The patient was induced into general anesthesia. The patient was placed in the supine position with adequate padding at the pressure points, shoulder, back, legs and arms. He was then prepped and draped in a standard fashion. An 18-Nigerian urethral catheter was placed to gravity drainage. A Veress needle was placed through a periumbilical puncture and a pneumoperitoneum of 15 mmHg was obtained. An 8-mm port was placed above the umbilicus for the scope. Next, under direct vision, three 8-mm robotic ports, a 12-mm port in the right iliac fossa, and 5-mm port in the right paramedian area were placed. The patient was then placed in 25 degree Trendelenburg position and the robot was docked. The robot was then docked to the 8-mm robotic ports and each robotic arm and tower checked in relation to the patient's legs and hands to avoid inadvertent compression.     Evaluation of the intraperitoneal space revealed adhesions of omentum and bowel to the anterior abdominal wall primarily on the left. These were taken down robotically with bipolar electrocautery and sharply with the scissors. Once these were taken down, the entire pelvis was visualized and there was no evidence of unexpected pathology. The bladder was dropped with an inverted U-shaped incision that began laterally from the right medial umbilical ligament and extended high across the midline to the left umbilical ligament. The limbs of this incision extended to the level of the vas on both sides. The preperitoneal space and the space of Retzius was then developed. The prostate was defatted. The apex of the prostate was clearly delineated.     Cautery was used to dissect the bladder away  from the prostate. The anterior bladder neck was incised and the bladder entered. The posterior bladder neck was exposed and the ureteral orifices were identified. The posterior bladder neck was then incised and dissected away from the prostate. The bladder neck was noted to be normal and would require reconstruction. The vas deferens and seminal vesicles were then exposed and dissected to their insertions into the prostate. The posterior layer of the Denonvilliers fascia was incised to enter the plane between the prostate and the perirectal fat.     The puboprostatic ligament was incised at its insertion into the apex of the prostate.  A plane between the urethra and the dorsal venous complex was developed to expose the anterior urethral surface. Each lateral pedicle was controlled with clips and cautery for hemostasis. Nerve preservation was performed as described above. The dorsal venous complex was transected to expose the apex and anterior urethra. The anterior wall of the urethra was transected with the scissors a few mm distal to the apex of the prostate. The freed specimen was then placed in an EndoCatch specimen retrieval bag to be removed following the completion of the anastomosis.     A Stratafix 2-0 suture was used to ligate the deep dorsal venous complex to provide hemostasis. A right and left bladder neck reconstruction was performed to establish a bladder neck equivalent in size to the urethra.   A double-armed V-loc 3-0 6-inch suture anchored through the loop in the middle was used to complete a running circumferential anastomosis. The V-loc 3-0 suture on the dorsal venous complex was used to complete a urethropexy in a running fashion. A new 20-Thai Juarez catheter was introduced and inflated to 10 cc. The bladder was filled with 150 mL of saline to test integrity of the anastomosis. No leak was identified. A Tex drain was not required. The robotic instruments were removed. The 12-mm assistant  port in the right iliac fossa was removed and its fascia closed with 0-Vicryl. The remaining ports were removed under direct vision and there was no evidence of bleeding from the port sites. The umbilical port incision was enlarged transversely and the prostate specimen was removed. The umbilical fascia was closed with 0 Vicryl suture. All ports were closed with subcuticular stitch and skin glue. All sponge, instrument and needle counts were correct at the end of the case.     The patient tolerated the operation well. He was returned to the supine position, extubated and accompanied to the recovery room in stable condition.     Attestation: I was the attending for this case. I was present and participated for all aspects of the operation including insertion and removal of all endoscopic equipment. Please note that Lindsay eFlix PA-C, assisted me with this complex procedure, given there was no qualified physician to assist at the bedside.    Plan: CBC and BMP, as well as turn back on DBS device by patient in PACU. RALP pathway, with SQH while admitted, plan for Discharge POD1 with luz in place.     _______________________  Tj Cesar MD  Urologic Surgical Oncology  [facility] [place]

## 2025-05-28 NOTE — ANESTHESIA TIME REPORT
Anesthesia Start and Stop Event Times       Date Time Event    5/28/2025 0722 Ready for Procedure     0729 Anesthesia Start     1127 Anesthesia Stop          Responsible Staff  05/28/25      Name Role Begin End    Shad Serrano M.D. Anesth 0729 1127          Overtime Reason:  no overtime (within assigned shift)    Comments:

## 2025-05-28 NOTE — OR NURSING
Deep brain stimulator turned back on by pt.   Controller placed in black case and labeled, on top of chart.

## 2025-05-28 NOTE — PROGRESS NOTES
Virtual Nurse rounding and admission profile complete. Designated caregiver, ACT notified to bring JULIETTE.     Round Needs: No needs at this time.

## 2025-05-28 NOTE — PROGRESS NOTES
"Received report from PACU RN. Pt brought to unit via gurney with transport.    Assessment complete.  A&O x 4. Patient calls appropriately.  Patient ambulates with x1 assist and FWW.   Patient has 6/10 pain. Pain managed with prescribed medications per MAR.  Denies N&V. Tolerating full liquid diet.  Skin per flowsheets.  + void via luz, + flatus, + BM PTA.  Patient denies SOB on 2L O2 via NC.  SCD's on.  BP (!) 156/86   Pulse 85   Temp 36 °C (96.8 °F) (Temporal)   Resp 18   Ht 1.727 m (5' 8\")   Wt 57.1 kg (125 lb 14.1 oz)   SpO2 99%   BMI 19.14 kg/m²     Patient pleasant and cooperative throughout assessment.  Reviewed plan of care with patient, pt verbalizes understanding. Call light and personal belongings with in reach. Hourly rounding in place. All needs met at this time.    " 1.69

## 2025-05-28 NOTE — CARE PLAN
The patient is Stable - Low risk of patient condition declining or worsening    Shift Goals  Clinical Goals: [place] to Unit; Pain Control; Juarez Management  Patient Goals: Pain Control; Plan of Care    Progress made toward(s) clinical / shift goals:  Patient medicated per MAR. Non-pharmacologic comfort measures implemented. Safety discussed. Education provided. Ambulation and repositioning encouraged. IS use encouraged. Diet intake monitored.     Problem: Pain - Standard  Goal: Alleviation of pain or a reduction in pain to the patient’s comfort goal  Description: Target End Date:  Prior to discharge or change in level of careDocument on Vitals flowsheet1.  Document pain using the appropriate pain scale per order or unit policy2.  Educate and implement non-pharmacologic comfort measures (i.e. relaxation, distraction, massage, cold/heat therapy, etc.)3.  Pain management medications as ordered4.  Reassess pain after pain med administration per policy5.  If opiods administered assess patient's response to pain medication is appropriate per POSS sedation scale6.  Follow pain management plan developed in collaboration with patient and interdisciplinary team (including palliative care or pain specialists if applicable)  Outcome: Progressing     Problem: Knowledge Deficit - Standard  Goal: Patient and family/care givers will demonstrate understanding of plan of care, disease process/condition, diagnostic tests and medications  Description: Target End Date:  1-3 days or as soon as patient condition allowsDocument in Patient Education1.  Patient and family/caregiver oriented to unit, equipment, visitation policy and means for communicating concern2.  Complete/review Learning Assessment3.  Assess knowledge level of disease process/condition, treatment plan, diagnostic tests and medications4.  Explain disease process/condition, treatment plan, diagnostic tests and medications  Outcome: Progressing

## 2025-05-28 NOTE — PROGRESS NOTES
4 Eyes Skin Assessment Completed by YOAN Hirsch and YOAN Vergara.    Skin assessment is primarily focused on high risk bony prominences. Pay special attention to skin beneath and around medical devices, high risk bony prominences, skin to skin areas and areas where the patient lacks sensation to feel pain and areas where the patient previously had breakdown.     Head (Occipital):  WDL   Ears (Under Medical Devices): WDL   Nose (Under Medical Devices): WDL   Mouth:  WDL   Neck: WDL   Breast/Chest:  WDL (superficial medical device implanted)   Shoulder Blades:  WDL   Spine:   WDL   (R) Arm/Elbow/Hand: WDL   (L) Arm/Elbow/Hand: WDL   Abdomen: Incision x6 lap sits w/ DB   Pannus/Groin:  WDL   Sacrum/Coccyx:   WDL   (R) Ischial Tuberosity (Sit Bones):  WDL   (L) Ischial Tuberosity (Sit Bones):  WDL   (R) Leg:  WDL   (L) Leg:  WDL   (R) Heel:  WDL   (R) Foot/Toe: WDL   (L) Heel: WDL   (L) Foot/Toe:  WDL       DEVICES IN USE:   Respiratory Devices:  Nasal cannula  Feeding Devices:  N/A   Lines & BP Monitoring Devices:  Peripheral IV, BP cuff, and Pulse ox    Orthopedic Devices:  N/A  Miscellaneous Devices:  Juarez and SCDs    PROTOCOL INTERVENTIONS:   Standard/Trauma Bed:  Already in place  Nasal Cannula with Gray Foams:  Already in place    WOUND PHOTOS:   N/A no wounds identified    WOUND CONSULT:   N/A, no advanced wound care needs identified

## 2025-05-28 NOTE — ANESTHESIA PREPROCEDURE EVALUATION
Case: [id number] Date/Time: 05/28/25 0715    Procedure: ROBOTIC ASSISTED LAPAROSCOPIC PROSTATECTOMY    Pre-op diagnosis: MALIGNANT TUMOR OF PROSTATE    Location: [place] OR 18 / SURGERY [facility]    Surgeons: Tj WATT M.D.            Relevant Problems   ANESTHESIA   (positive) PONV (postoperative nausea and vomiting)      PULMONARY (within normal limits)      NEURO   (positive) Migraine with aura and without status migrainosus, not intractable      CARDIAC   (positive) Migraine with aura and without status migrainosus, not intractable      Other   (positive) Benign prostatic hyperplasia with urinary frequency   (positive) Dyskinesia due to Parkinson's disease (HCC)   (positive) History of tobacco abuse   (positive) Parkinson's disease with dyskinesia and fluctuating manifestations (HCC)   DBS turned off for surgery  Physical Exam    Airway   Mallampati: II  TM distance: >3 FB  Neck ROM: full       Cardiovascular - normal exam  Rhythm: regular  Rate: normal    (-) murmur     Dental    Pulmonary - normal examBreath sounds clear to auscultation     Abdominal    Neurological - normal exam                   Anesthesia Plan    ASA 2       Plan - general       Airway plan will be ETT          Induction: intravenous    Postoperative Plan: Postoperative administration of opioids is intended.    Pertinent diagnostic labs and testing reviewed    Informed Consent:    Anesthetic plan and risks discussed with patient.    Use of blood products discussed with: patient whom consented to blood products.

## 2025-05-28 NOTE — ANESTHESIA PROCEDURE NOTES
Airway    Date/Time: 5/28/2025 7:35 AM    Performed by: Shad Serrano M.D.  Authorized by: Shad Serrano M.D.    Location:  OR  Urgency:  Elective  Difficult Airway: No    Indications for Airway Management:  Anesthesia      Spontaneous Ventilation: absent    Sedation Level:  Deep  Preoxygenated: Yes    Patient Position:  Sniffing  Mask Difficulty Assessment:  0 - not attempted  Final Airway Type:  Endotracheal airway  Final Endotracheal Airway:  ETT  Cuffed: Yes    Technique Used for Successful ETT Placement:  Direct laryngoscopy    Insertion Site:  Oral  Blade Type:  Edouard  Laryngoscope Blade/Videolaryngoscope Blade Size:  4  ETT Size (mm):  8.0  Measured from:  Teeth  ETT to Teeth (cm):  23  Placement Verified by: auscultation and capnometry    Cormack-Lehane Classification:  Grade I - full view of glottis  Number of Attempts at Approach:  1

## 2025-05-28 NOTE — DISCHARGE INSTRUCTIONS
DR. LOAIZA'S DISCHARGE INSTRUCTIONS FOLLOWING   ROBOTIC RADICAL PROSTATECTOMY       DIET:  You can resume your regular diet. We encourage you to eat well-balanced and nutritious meals.      ACTIVITY:  Please restrain from strenuous activity or heavy lifting (more than 10 pounds) for the next 4 week(s). You should be able to resume light work after a week. Please walk daily as much as tolerated, making exercise a part of your daily life. Do not drive while using narcotics for pain control.     WOUND CARE:  1. For the next 3 to 4 weeks, please keep the incisions clean, dry and open to air when possible.  You have absorbable sutures that do not need to be removed.  You are advised not to pick, scratch or scrub the incisions.   2. Light showers are fine as long as the incisions are dabbed dry and there is no prolonged water exposure.  Please avoid bathing or submersion of the wounds for 4 weeks.    CATHETER CARE:  Take care of your urethral catheter (“luz”) as you were taught in the hospital.  The purpose of the luz catheter is to drain your bladder while the operated area heals. Your luz will be removed at your follow-up appointment approximately 7 days following your surgery.      MEDICATIONS:  1. Please use tylenol and/or advil as need for pain control. Please use oxycodone as prescribed for pain refractory to these meds. Use stool softeners (miralax and colace) as prescribed to prevent constipation.  2. If you are using aspirin, Plavix, or coumadin, please don’t restart these medications until two days after your discharge if you are not having large amounts of blood in the urine.  3. You have also been prescribed a medication called oxybutynin, which helps prevent bladder spasms while your catheter is in place. This can also cause constipation, so make sure you take your stool softeners/laxatives as prescribed while on this medication.       Medication List        START taking these medications         Instructions   oxybutynin SR 10 MG CR tablet  Commonly known as: Ditropan-XL   Doctor's comments: Meds to bed upon discharge  Take 1 Tablet by mouth every day for 5 days.  Dose: 10 mg     oxyCODONE immediate-release 5 MG Tabs  Commonly known as: Roxicodone   Doctor's comments: Meds to bed upon discharge  Take 1 Tablet by mouth every 8 hours as needed for Severe Pain for up to 3 days.  Dose: 5 mg     polyethylene glycol/lytes 17 g Pack  Commonly known as: Miralax   Doctor's comments: Meds to bed upon discharge.  Take 1 Packet by mouth every day for 10 days. Stop taking if having diarrhea.  Dose: 17 g            CONTINUE taking these medications        Instructions   B COMPLEX PO   Take  by mouth. Patient reports taking one tablet or capsule po daily     carbidopa-levodopa  MG Tabs  Commonly known as: Sinemet   Take 1 Tablet by mouth 3 times a day. For Parkinson's  Dose: 1 Tablet     ibuprofen 200 MG Tabs  Commonly known as: Motrin   Take 200 mg by mouth every 6 hours as needed.  Dose: 200 mg     MAGNESIUM PO   Take  by mouth. Patient reports taking one tablet or capsule po daily     OMEGA 3 PO   Take  by mouth. Patient reports taking one tablet or capsule po daily     vitamin D3 1000 Unit (25 mcg) Tabs  Commonly known as: Cholecalciferol   Take 4,000 Units by mouth every day.  Dose: 4,000 Units     VITAMIN K PO   Take  by mouth. Patient reports taking one tablet or capsule po daily            STOP taking these medications      tamsulosin 0.4 MG capsule  Commonly known as: Flomax              FOLLOW-UP:  We will call you to schedule your follow up appointment with Dr. Cesar or his physician assistant in 7-10 days. If you have not heard from us in 1-2 business days, please call [phone] to schedule your follow-up appointment. You may also contact this number if you have questions or concerns that can be answered by Dr. Cesar’s staff.      WARNING SIGNS:  Problems with luz catheter, Fever greater than 101  degrees F, chest pain, shortness of breath, chills, nausea or vomiting, Large amount of clots in urine that make it difficult  for urine to drain from luz, increasing pain, or abdominal swelling. If you are experiencing these symptoms, call the Urology Clinic or go to emergency room.    It is normal to see blood in your urine for up to 2 weeks even from surgery. The urine may clear up entirely, and then turn bloody again a few days later depending on your activity level; do not be alarmed. However, if you experience severe pain or tenderness, have a lot of increased bleeding, or find that you are unable to urinate because of large clots, please notify your doctor immediately     MEDICAL HELP DURING NORMAL BUSINESS HOURS:  Between the hours of 8 AM and 5 PM, please call [phone] to speak with Dr. Cesar’s staff.     MEDICAL HELP AFTER HOURS:  If you have a serious emergency such as chest pain, shortness of breath, relentless pain you should call 911. For other urgent problems after hours you may contact the urology physician on call by phoning the [phone]. You may also visit the Emergency room at local hospital for help.     For non-emergent problems such as prescription refills or routine questions, please do your best to contact us during normal business hours. This after-hours number should be used for urgent or emergent questions only.       Lindsay Felix P.A.-C.   [address] [address]   [phone]

## 2025-05-29 LAB
ANION GAP SERPL CALC-SCNC: 10 MMOL/L (ref 7–16)
BUN SERPL-MCNC: 14 MG/DL (ref 8–22)
CALCIUM SERPL-MCNC: 8.8 MG/DL (ref 8.5–10.5)
CHLORIDE SERPL-SCNC: 102 MMOL/L (ref 96–112)
CO2 SERPL-SCNC: 25 MMOL/L (ref 20–33)
CREAT SERPL-MCNC: 1.2 MG/DL (ref 0.5–1.4)
ERYTHROCYTE [DISTWIDTH] IN BLOOD BY AUTOMATED COUNT: 41.4 FL (ref 35.9–50)
GFR SERPLBLD CREATININE-BSD FMLA CKD-EPI: 70 ML/MIN/1.73 M 2
GLUCOSE SERPL-MCNC: 117 MG/DL (ref 65–99)
HCT VFR BLD AUTO: 35.1 % (ref 42–52)
HGB BLD-MCNC: 12 G/DL (ref 14–18)
MCH RBC QN AUTO: 30.5 PG (ref 27–33)
MCHC RBC AUTO-ENTMCNC: 34.2 G/DL (ref 32.3–36.5)
MCV RBC AUTO: 89.3 FL (ref 81.4–97.8)
PLATELET # BLD AUTO: 195 K/UL (ref 164–446)
PMV BLD AUTO: 10.1 FL (ref 9–12.9)
POTASSIUM SERPL-SCNC: 4.2 MMOL/L (ref 3.6–5.5)
RBC # BLD AUTO: 3.93 M/UL (ref 4.7–6.1)
SODIUM SERPL-SCNC: 137 MMOL/L (ref 135–145)
WBC # BLD AUTO: 10.5 K/UL (ref 4.8–10.8)

## 2025-05-29 PROCEDURE — 700105 HCHG RX REV CODE 258: Performed by: UROLOGY

## 2025-05-29 PROCEDURE — 36415 COLL VENOUS BLD VENIPUNCTURE: CPT

## 2025-05-29 PROCEDURE — 700102 HCHG RX REV CODE 250 W/ 637 OVERRIDE(OP): Performed by: UROLOGY

## 2025-05-29 PROCEDURE — 85027 COMPLETE CBC AUTOMATED: CPT

## 2025-05-29 PROCEDURE — 96376 TX/PRO/DX INJ SAME DRUG ADON: CPT

## 2025-05-29 PROCEDURE — A9270 NON-COVERED ITEM OR SERVICE: HCPCS | Performed by: UROLOGY

## 2025-05-29 PROCEDURE — 80048 BASIC METABOLIC PNL TOTAL CA: CPT

## 2025-05-29 PROCEDURE — 700102 HCHG RX REV CODE 250 W/ 637 OVERRIDE(OP): Performed by: STUDENT IN AN ORGANIZED HEALTH CARE EDUCATION/TRAINING PROGRAM

## 2025-05-29 PROCEDURE — A9270 NON-COVERED ITEM OR SERVICE: HCPCS | Performed by: STUDENT IN AN ORGANIZED HEALTH CARE EDUCATION/TRAINING PROGRAM

## 2025-05-29 PROCEDURE — 96375 TX/PRO/DX INJ NEW DRUG ADDON: CPT

## 2025-05-29 PROCEDURE — 700111 HCHG RX REV CODE 636 W/ 250 OVERRIDE (IP): Mod: JZ | Performed by: UROLOGY

## 2025-05-29 PROCEDURE — G0378 HOSPITAL OBSERVATION PER HR: HCPCS

## 2025-05-29 PROCEDURE — 96372 THER/PROPH/DIAG INJ SC/IM: CPT

## 2025-05-29 RX ORDER — CYCLOBENZAPRINE HCL 10 MG
5 TABLET ORAL 2 TIMES DAILY
Status: DISCONTINUED | OUTPATIENT
Start: 2025-05-29 | End: 2025-06-01 | Stop reason: HOSPADM

## 2025-05-29 RX ORDER — OXYBUTYNIN CHLORIDE 5 MG/1
10 TABLET, EXTENDED RELEASE ORAL DAILY
Status: DISCONTINUED | OUTPATIENT
Start: 2025-05-30 | End: 2025-06-01 | Stop reason: HOSPADM

## 2025-05-29 RX ADMIN — CARBIDOPA AND LEVODOPA 1 TABLET: 25; 100 TABLET ORAL at 17:43

## 2025-05-29 RX ADMIN — CARBIDOPA AND LEVODOPA 1 TABLET: 25; 100 TABLET ORAL at 04:22

## 2025-05-29 RX ADMIN — KETOROLAC TROMETHAMINE 15 MG: 15 INJECTION, SOLUTION INTRAMUSCULAR; INTRAVENOUS at 17:43

## 2025-05-29 RX ADMIN — HEPARIN SODIUM 5000 UNITS: 5000 INJECTION, SOLUTION INTRAVENOUS; SUBCUTANEOUS at 21:10

## 2025-05-29 RX ADMIN — KETOROLAC TROMETHAMINE 15 MG: 15 INJECTION, SOLUTION INTRAMUSCULAR; INTRAVENOUS at 11:25

## 2025-05-29 RX ADMIN — HEPARIN SODIUM 5000 UNITS: 5000 INJECTION, SOLUTION INTRAVENOUS; SUBCUTANEOUS at 14:21

## 2025-05-29 RX ADMIN — OXYCODONE HYDROCHLORIDE 10 MG: 10 TABLET ORAL at 04:21

## 2025-05-29 RX ADMIN — KETOROLAC TROMETHAMINE 15 MG: 15 INJECTION, SOLUTION INTRAMUSCULAR; INTRAVENOUS at 00:10

## 2025-05-29 RX ADMIN — OXYCODONE HYDROCHLORIDE 10 MG: 10 TABLET ORAL at 21:17

## 2025-05-29 RX ADMIN — SODIUM CHLORIDE: 9 INJECTION, SOLUTION INTRAVENOUS at 04:17

## 2025-05-29 RX ADMIN — DOCUSATE SODIUM 50 MG AND SENNOSIDES 8.6 MG 2 TABLET: 8.6; 5 TABLET, FILM COATED ORAL at 04:21

## 2025-05-29 RX ADMIN — POLYETHYLENE GLYCOL 3350 1 PACKET: 17 POWDER, FOR SOLUTION ORAL at 05:54

## 2025-05-29 RX ADMIN — HEPARIN SODIUM 5000 UNITS: 5000 INJECTION, SOLUTION INTRAVENOUS; SUBCUTANEOUS at 04:22

## 2025-05-29 RX ADMIN — CARBIDOPA AND LEVODOPA 1 TABLET: 25; 100 TABLET ORAL at 11:26

## 2025-05-29 RX ADMIN — OXYBUTYNIN CHLORIDE 5 MG: 5 TABLET, EXTENDED RELEASE ORAL at 04:21

## 2025-05-29 RX ADMIN — KETOROLAC TROMETHAMINE 15 MG: 15 INJECTION, SOLUTION INTRAMUSCULAR; INTRAVENOUS at 04:20

## 2025-05-29 RX ADMIN — DOCUSATE SODIUM 50 MG AND SENNOSIDES 8.6 MG 2 TABLET: 8.6; 5 TABLET, FILM COATED ORAL at 17:43

## 2025-05-29 RX ADMIN — CEFAZOLIN SODIUM 1 G: 1 INJECTION, SOLUTION INTRAVENOUS at 04:20

## 2025-05-29 RX ADMIN — HYDROMORPHONE HYDROCHLORIDE 0.5 MG: 1 INJECTION, SOLUTION INTRAMUSCULAR; INTRAVENOUS; SUBCUTANEOUS at 22:31

## 2025-05-29 RX ADMIN — OXYCODONE HYDROCHLORIDE 10 MG: 10 TABLET ORAL at 08:03

## 2025-05-29 RX ADMIN — CYCLOBENZAPRINE 5 MG: 10 TABLET, FILM COATED ORAL at 17:42

## 2025-05-29 RX ADMIN — OXYCODONE 5 MG: 5 TABLET ORAL at 14:21

## 2025-05-29 ASSESSMENT — ENCOUNTER SYMPTOMS
ABDOMINAL PAIN: 1
FEVER: 0
VOMITING: 0
CHILLS: 0
NAUSEA: 0
COUGH: 0

## 2025-05-29 ASSESSMENT — PAIN DESCRIPTION - PAIN TYPE
TYPE: ACUTE PAIN
TYPE: SURGICAL PAIN
TYPE: ACUTE PAIN

## 2025-05-29 NOTE — PROGRESS NOTES
Bedside report received.  Assessment complete.  A&O x 4. Patient calls appropriately.  Patient ambulates with standby assist with FWW. Bed alarm n/a.   Patient has 4/10 pain. Patient medicated per MAR.  Denies N&V. Tolerating regular diet.  Surgical lap sites CDI.  + output in luz - flatus, - BM.  Patient denies SOB.  SCD's on.  Patient calm and cooperative.  Review plan with of care with patient. Call light and personal belongings within reach. Hourly rounding in place. All needs met at this time.

## 2025-05-29 NOTE — CARE PLAN
The patient is Stable - Low risk of patient condition declining or worsening    Shift Goals  Clinical Goals: pain management, IV abx, luz management  Patient Goals: pain control, POC  Family Goals: POC    Progress made toward(s) clinical / shift goals: Plan of care discussed with patient, all questions answered. Patient medicated per MAR. IV abx infused. Luz monitored, output recorded. X6 lapsites with dermabond, DERECK, CDI. SCDs on. Patient intermittently sleeping throughout shift.       Problem: Pain - Standard  Goal: Alleviation of pain or a reduction in pain to the patient’s comfort goal  Outcome: Progressing     Problem: Knowledge Deficit - Standard  Goal: Patient and family/care givers will demonstrate understanding of plan of care, disease process/condition, diagnostic tests and medications  Outcome: Progressing       Patient is not progressing towards the following goals:

## 2025-05-29 NOTE — PROGRESS NOTES
Virtual Nurse rounding complete.    Round Needs: Pain Rating 7/10 abdominal spasms and pain, requesting to be medicated Bedside RN notified via voalte messaging.

## 2025-05-29 NOTE — PROGRESS NOTES
Note to reader: this note follows the APSO format rather than the historical SOAP format. Assessment and plan located at the top of the note for ease of use.    Chief Complaint  60 y.o. year old male here with     Assessment/Plan  Interval History     Plan:  Increase oxybutynin, work on pain control  Work on mobility, ambulation   ADAT, slow for n/v.   Will keep overnight for obs   Urology to follow       Case discussed with Dr. Tali LACEY  Patient seen and examined    5/29- POD1 pt with bladder spasms this AM. This is limiting his ability to ambulate or tolerate diet. Will work on pain control and continue to work on goals for d/c throughout the day. Labs WNL given surgery.          Review of Systems  Physical Exam   Review of Systems   Constitutional:  Negative for chills and fever.   Respiratory:  Negative for cough.    Cardiovascular:  Negative for chest pain.   Gastrointestinal:  Positive for abdominal pain. Negative for nausea and vomiting.   Genitourinary:  Positive for hematuria.   Skin:  Negative for rash.   All other systems reviewed and are negative.    Vitals:    05/29/25 0152 05/29/25 0346 05/29/25 0700 05/29/25 0727   BP:  135/78  114/51   Pulse:  96 97    Resp:  17  16   Temp:  36.8 °C (98.2 °F)  36.3 °C (97.3 °F)   TempSrc:  Temporal  Temporal   SpO2:  95% 94%    Weight: 57.1 kg (125 lb 14.1 oz)      Height:         Physical Exam  Vitals and nursing note reviewed. Exam conducted with a chaperone present.   Constitutional:       Appearance: Normal appearance.   HENT:      Mouth/Throat:      Mouth: Mucous membranes are moist.   Eyes:      Pupils: Pupils are equal, round, and reactive to light.   Abdominal:      General: Abdomen is flat.      Comments: Appropriately tender, surgical sites c/d/I, LLQ with ecchymosis.    Genitourinary:     Comments: Juarez in place, clear to cherry red UOP, some clots   Skin:     General: Skin is warm and dry.   Neurological:      Mental Status: He is alert.    Psychiatric:         Mood and Affect: Mood normal.          Hematology Chemistry   Lab Results   Component Value Date/Time    WBC 10.5 05/29/2025 12:56 AM    HEMOGLOBIN 12.0 (L) 05/29/2025 12:56 AM    HEMATOCRIT 35.1 (L) 05/29/2025 12:56 AM    PLATELETCT 195 05/29/2025 12:56 AM     Lab Results   Component Value Date/Time    SODIUM 137 05/29/2025 12:56 AM    POTASSIUM 4.2 05/29/2025 12:56 AM    CHLORIDE 102 05/29/2025 12:56 AM    CO2 25 05/29/2025 12:56 AM    GLUCOSE 117 (H) 05/29/2025 12:56 AM    BUN 14 05/29/2025 12:56 AM    CREATININE 1.20 05/29/2025 12:56 AM         Labs not explicitly included in this progress note were reviewed by the author.   Radiology/imaging not explicitly included in this progress note was reviewed by the author.     Medications reviewed, Labs reviewed and Radiology images reviewed                          Lizz Boland, P.A.-CMalathi   [address] [address]   [phone]

## 2025-05-29 NOTE — PROGRESS NOTES
Received report from previous shift RN  Assessment complete.  A&O x 4. Patient calls appropriately.  Patient ambulates with standby assist.   Patient has 7/10 pain. Pain managed with prescribed medications.  Denies N&V. Tolerating  diet.  Skin per flowsheets.  + void via luz, + flatus, - BM.  Patient denies SOB.  SCD's on.  Patient pleasant and cooperative with plan of care.  Review plan with of care with patient. Call light and personal belongings with in reach. Hourly rounding in place. All needs met at this time.

## 2025-05-30 PROCEDURE — 96372 THER/PROPH/DIAG INJ SC/IM: CPT

## 2025-05-30 PROCEDURE — 96376 TX/PRO/DX INJ SAME DRUG ADON: CPT

## 2025-05-30 PROCEDURE — A9270 NON-COVERED ITEM OR SERVICE: HCPCS | Performed by: UROLOGY

## 2025-05-30 PROCEDURE — 700111 HCHG RX REV CODE 636 W/ 250 OVERRIDE (IP): Mod: JZ | Performed by: UROLOGY

## 2025-05-30 PROCEDURE — 700102 HCHG RX REV CODE 250 W/ 637 OVERRIDE(OP): Performed by: STUDENT IN AN ORGANIZED HEALTH CARE EDUCATION/TRAINING PROGRAM

## 2025-05-30 PROCEDURE — A9270 NON-COVERED ITEM OR SERVICE: HCPCS | Performed by: STUDENT IN AN ORGANIZED HEALTH CARE EDUCATION/TRAINING PROGRAM

## 2025-05-30 PROCEDURE — 700102 HCHG RX REV CODE 250 W/ 637 OVERRIDE(OP): Performed by: UROLOGY

## 2025-05-30 PROCEDURE — G0378 HOSPITAL OBSERVATION PER HR: HCPCS

## 2025-05-30 RX ADMIN — DOCUSATE SODIUM 50 MG AND SENNOSIDES 8.6 MG 2 TABLET: 8.6; 5 TABLET, FILM COATED ORAL at 05:24

## 2025-05-30 RX ADMIN — CYCLOBENZAPRINE 5 MG: 10 TABLET, FILM COATED ORAL at 05:23

## 2025-05-30 RX ADMIN — KETOROLAC TROMETHAMINE 15 MG: 15 INJECTION, SOLUTION INTRAMUSCULAR; INTRAVENOUS at 00:28

## 2025-05-30 RX ADMIN — OXYCODONE 5 MG: 5 TABLET ORAL at 05:23

## 2025-05-30 RX ADMIN — HEPARIN SODIUM 5000 UNITS: 5000 INJECTION, SOLUTION INTRAVENOUS; SUBCUTANEOUS at 21:38

## 2025-05-30 RX ADMIN — OXYCODONE HYDROCHLORIDE 10 MG: 10 TABLET ORAL at 00:36

## 2025-05-30 RX ADMIN — KETOROLAC TROMETHAMINE 15 MG: 15 INJECTION, SOLUTION INTRAMUSCULAR; INTRAVENOUS at 05:23

## 2025-05-30 RX ADMIN — POLYETHYLENE GLYCOL 3350 1 PACKET: 17 POWDER, FOR SOLUTION ORAL at 05:24

## 2025-05-30 RX ADMIN — DOCUSATE SODIUM 50 MG AND SENNOSIDES 8.6 MG 2 TABLET: 8.6; 5 TABLET, FILM COATED ORAL at 17:48

## 2025-05-30 RX ADMIN — ACETAMINOPHEN 1000 MG: 500 TABLET ORAL at 14:13

## 2025-05-30 RX ADMIN — HEPARIN SODIUM 5000 UNITS: 5000 INJECTION, SOLUTION INTRAVENOUS; SUBCUTANEOUS at 05:23

## 2025-05-30 RX ADMIN — OXYBUTYNIN CHLORIDE 10 MG: 5 TABLET, EXTENDED RELEASE ORAL at 05:24

## 2025-05-30 RX ADMIN — CYCLOBENZAPRINE 5 MG: 10 TABLET, FILM COATED ORAL at 17:48

## 2025-05-30 RX ADMIN — CARBIDOPA AND LEVODOPA 1 TABLET: 25; 100 TABLET ORAL at 12:35

## 2025-05-30 RX ADMIN — CARBIDOPA AND LEVODOPA 1 TABLET: 25; 100 TABLET ORAL at 20:01

## 2025-05-30 RX ADMIN — HYDROMORPHONE HYDROCHLORIDE 0.5 MG: 1 INJECTION, SOLUTION INTRAMUSCULAR; INTRAVENOUS; SUBCUTANEOUS at 21:44

## 2025-05-30 RX ADMIN — OXYCODONE HYDROCHLORIDE 10 MG: 10 TABLET ORAL at 20:01

## 2025-05-30 RX ADMIN — CARBIDOPA AND LEVODOPA 1 TABLET: 25; 100 TABLET ORAL at 05:24

## 2025-05-30 RX ADMIN — HEPARIN SODIUM 5000 UNITS: 5000 INJECTION, SOLUTION INTRAVENOUS; SUBCUTANEOUS at 14:13

## 2025-05-30 ASSESSMENT — PAIN DESCRIPTION - PAIN TYPE
TYPE: ACUTE PAIN

## 2025-05-30 ASSESSMENT — ENCOUNTER SYMPTOMS
ABDOMINAL PAIN: 1
CHILLS: 0
FEVER: 0
NAUSEA: 0
COUGH: 0
VOMITING: 0

## 2025-05-30 NOTE — PROGRESS NOTES
Bedside report received.  Assessment complete.  A&O x 4. Patient calls appropriately.  Patient ambulates with standby assist with FWW. Bed alarm n/a.   Patient has 8/10 pain. Patient medicated per MAR.  Denies N&V. Tolerating regular diet.  Surgical lap sites with dermabond CDI.  + output via luz, + flatus, - BM.  Patient denies SOB.  SCD's on.  Patient calm and cooperative.  Review plan with of care with patient. Call light and personal belongings within reach. Hourly rounding in place. All needs met at this time.

## 2025-05-30 NOTE — CARE PLAN
The patient is Stable - Low risk of patient condition declining or worsening    Shift Goals  Clinical Goals: pain management, luz management, mobility, rest  Patient Goals: pain management, rest  Family Goals: POC    Progress made toward(s) clinical / shift goals:  Plan of care discussed with patient, all questions answered. Patient's pain medicated per MAR. Luz monitored, output recorded. X6 abdominal lap sites with dermabond CDI. Bruising noted to the left abdomin/flank. Patient intermittently sleeping throughout shift.       Problem: Pain - Standard  Goal: Alleviation of pain or a reduction in pain to the patient’s comfort goal  Outcome: Progressing     Problem: Knowledge Deficit - Standard  Goal: Patient and family/care givers will demonstrate understanding of plan of care, disease process/condition, diagnostic tests and medications  Outcome: Progressing       Patient is not progressing towards the following goals:

## 2025-05-31 VITALS
OXYGEN SATURATION: 97 % | WEIGHT: 125.88 LBS | DIASTOLIC BLOOD PRESSURE: 76 MMHG | RESPIRATION RATE: 16 BRPM | HEART RATE: 89 BPM | BODY MASS INDEX: 19.08 KG/M2 | TEMPERATURE: 99 F | HEIGHT: 68 IN | SYSTOLIC BLOOD PRESSURE: 118 MMHG

## 2025-05-31 LAB
ANION GAP SERPL CALC-SCNC: 8 MMOL/L (ref 7–16)
BASOPHILS # BLD AUTO: 0.6 % (ref 0–1.8)
BASOPHILS # BLD: 0.05 K/UL (ref 0–0.12)
BUN SERPL-MCNC: 16 MG/DL (ref 8–22)
CALCIUM SERPL-MCNC: 8.5 MG/DL (ref 8.5–10.5)
CHLORIDE SERPL-SCNC: 103 MMOL/L (ref 96–112)
CO2 SERPL-SCNC: 26 MMOL/L (ref 20–33)
CREAT SERPL-MCNC: 0.87 MG/DL (ref 0.5–1.4)
EOSINOPHIL # BLD AUTO: 0.2 K/UL (ref 0–0.51)
EOSINOPHIL NFR BLD: 2.4 % (ref 0–6.9)
ERYTHROCYTE [DISTWIDTH] IN BLOOD BY AUTOMATED COUNT: 42.7 FL (ref 35.9–50)
ERYTHROCYTE [DISTWIDTH] IN BLOOD BY AUTOMATED COUNT: 42.8 FL (ref 35.9–50)
GFR SERPLBLD CREATININE-BSD FMLA CKD-EPI: 100 ML/MIN/1.73 M 2
GLUCOSE SERPL-MCNC: 100 MG/DL (ref 65–99)
HCT VFR BLD AUTO: 21.5 % (ref 42–52)
HCT VFR BLD AUTO: 22 % (ref 42–52)
HCT VFR BLD AUTO: 22.5 % (ref 42–52)
HCT VFR BLD AUTO: 25.2 % (ref 42–52)
HGB BLD-MCNC: 7.1 G/DL (ref 14–18)
HGB BLD-MCNC: 7.2 G/DL (ref 14–18)
HGB BLD-MCNC: 7.3 G/DL (ref 14–18)
HGB BLD-MCNC: 8.2 G/DL (ref 14–18)
HOLDING TUBE BB 8507: NORMAL
IMM GRANULOCYTES # BLD AUTO: 0.03 K/UL (ref 0–0.11)
IMM GRANULOCYTES NFR BLD AUTO: 0.4 % (ref 0–0.9)
LYMPHOCYTES # BLD AUTO: 1.19 K/UL (ref 1–4.8)
LYMPHOCYTES NFR BLD: 14.3 % (ref 22–41)
MCH RBC QN AUTO: 29.9 PG (ref 27–33)
MCH RBC QN AUTO: 30 PG (ref 27–33)
MCHC RBC AUTO-ENTMCNC: 32.4 G/DL (ref 32.3–36.5)
MCHC RBC AUTO-ENTMCNC: 32.7 G/DL (ref 32.3–36.5)
MCV RBC AUTO: 91.7 FL (ref 81.4–97.8)
MCV RBC AUTO: 92.2 FL (ref 81.4–97.8)
MONOCYTES # BLD AUTO: 0.86 K/UL (ref 0–0.85)
MONOCYTES NFR BLD AUTO: 10.3 % (ref 0–13.4)
NEUTROPHILS # BLD AUTO: 6 K/UL (ref 1.82–7.42)
NEUTROPHILS NFR BLD: 72 % (ref 44–72)
NRBC # BLD AUTO: 0 K/UL
NRBC BLD-RTO: 0 /100 WBC (ref 0–0.2)
PLATELET # BLD AUTO: 157 K/UL (ref 164–446)
PLATELET # BLD AUTO: 159 K/UL (ref 164–446)
PMV BLD AUTO: 10 FL (ref 9–12.9)
PMV BLD AUTO: 9.8 FL (ref 9–12.9)
POTASSIUM SERPL-SCNC: 4.7 MMOL/L (ref 3.6–5.5)
RBC # BLD AUTO: 2.4 M/UL (ref 4.7–6.1)
RBC # BLD AUTO: 2.44 M/UL (ref 4.7–6.1)
SODIUM SERPL-SCNC: 137 MMOL/L (ref 135–145)
WBC # BLD AUTO: 8 K/UL (ref 4.8–10.8)
WBC # BLD AUTO: 8.3 K/UL (ref 4.8–10.8)

## 2025-05-31 PROCEDURE — 770001 HCHG ROOM/CARE - MED/SURG/GYN PRIV*

## 2025-05-31 PROCEDURE — 85018 HEMOGLOBIN: CPT

## 2025-05-31 PROCEDURE — 700111 HCHG RX REV CODE 636 W/ 250 OVERRIDE (IP): Mod: JZ | Performed by: UROLOGY

## 2025-05-31 PROCEDURE — 700102 HCHG RX REV CODE 250 W/ 637 OVERRIDE(OP): Performed by: UROLOGY

## 2025-05-31 PROCEDURE — 700102 HCHG RX REV CODE 250 W/ 637 OVERRIDE(OP): Performed by: STUDENT IN AN ORGANIZED HEALTH CARE EDUCATION/TRAINING PROGRAM

## 2025-05-31 PROCEDURE — 85014 HEMATOCRIT: CPT

## 2025-05-31 PROCEDURE — 80048 BASIC METABOLIC PNL TOTAL CA: CPT

## 2025-05-31 PROCEDURE — A9270 NON-COVERED ITEM OR SERVICE: HCPCS | Performed by: STUDENT IN AN ORGANIZED HEALTH CARE EDUCATION/TRAINING PROGRAM

## 2025-05-31 PROCEDURE — A9270 NON-COVERED ITEM OR SERVICE: HCPCS | Performed by: UROLOGY

## 2025-05-31 PROCEDURE — 96376 TX/PRO/DX INJ SAME DRUG ADON: CPT

## 2025-05-31 PROCEDURE — 85027 COMPLETE CBC AUTOMATED: CPT

## 2025-05-31 PROCEDURE — 36415 COLL VENOUS BLD VENIPUNCTURE: CPT

## 2025-05-31 PROCEDURE — 85025 COMPLETE CBC W/AUTO DIFF WBC: CPT

## 2025-05-31 RX ADMIN — ACETAMINOPHEN 1000 MG: 500 TABLET ORAL at 21:37

## 2025-05-31 RX ADMIN — CARBIDOPA AND LEVODOPA 1 TABLET: 25; 100 TABLET ORAL at 12:08

## 2025-05-31 RX ADMIN — OXYBUTYNIN CHLORIDE 10 MG: 5 TABLET, EXTENDED RELEASE ORAL at 05:59

## 2025-05-31 RX ADMIN — KETOROLAC TROMETHAMINE 15 MG: 15 INJECTION, SOLUTION INTRAMUSCULAR; INTRAVENOUS at 05:58

## 2025-05-31 RX ADMIN — CARBIDOPA AND LEVODOPA 1 TABLET: 25; 100 TABLET ORAL at 20:30

## 2025-05-31 RX ADMIN — DOCUSATE SODIUM 50 MG AND SENNOSIDES 8.6 MG 2 TABLET: 8.6; 5 TABLET, FILM COATED ORAL at 17:12

## 2025-05-31 RX ADMIN — TAMSULOSIN HYDROCHLORIDE 0.4 MG: 0.4 CAPSULE ORAL at 20:29

## 2025-05-31 RX ADMIN — OXYCODONE HYDROCHLORIDE 10 MG: 10 TABLET ORAL at 20:30

## 2025-05-31 RX ADMIN — CYCLOBENZAPRINE 5 MG: 10 TABLET, FILM COATED ORAL at 17:12

## 2025-05-31 RX ADMIN — KETOROLAC TROMETHAMINE 15 MG: 15 INJECTION, SOLUTION INTRAMUSCULAR; INTRAVENOUS at 00:50

## 2025-05-31 RX ADMIN — CYCLOBENZAPRINE 5 MG: 10 TABLET, FILM COATED ORAL at 05:58

## 2025-05-31 RX ADMIN — CARBIDOPA AND LEVODOPA 1 TABLET: 25; 100 TABLET ORAL at 05:58

## 2025-05-31 ASSESSMENT — ENCOUNTER SYMPTOMS
NAUSEA: 0
ABDOMINAL PAIN: 1
VOMITING: 0
COUGH: 0
CHILLS: 0
FEVER: 0

## 2025-05-31 ASSESSMENT — PAIN DESCRIPTION - PAIN TYPE
TYPE: ACUTE PAIN

## 2025-05-31 NOTE — PROGRESS NOTES
Note to reader: this note follows the APSO format rather than the historical SOAP format. Assessment and plan located at the top of the note for ease of use.    Chief Complaint  60 y.o. year old male s/p RALP by Dr. Cesar    Assessment/Plan  Interval History     EBL at surgery 100cc  Afebrile, VSS, does have low BP at baseline  Hgb currently stable in the 7-range  Urine red-tinged, as expected post-op RALP  Ecchymosis and swelling of L abdomen/side noted    Plan:  - Serial H/H q6hrs  - Bed rest today  - Encourage PO intake, regular diet and fluids  - Monitor hemodynamics  - Discontinued fluids, anticoagulation  - Continue pain control    Case discussed with patient, family at bedside, RN, and urology team.  : patient seen and examined    5/31. POD3. Updated overnight with Hgb 7-range, now stable. Heparin stopped. Patient doing well on exam. Reports +flatus, +BM overnight. Working on PO intake. Has been ambulatory. Reports low blood pressures at baseline. Minimal pain, controlled. Abdomen soft on exam with no distension. Ecchymosis L side stable from yesterday.     5/30. POD2 patient with improved pain and bladder spasms, though concerned for feeling of SOB. AFVSS. N/V improved today. Reports +flatus. 800cc clear UOP. Significant ecchymosis to L side, provided reassurance. Dr. Cesar updated.     5/29. POD1 pt with bladder spasms this AM. This is limiting his ability to ambulate or tolerate diet. Will work on pain control and continue to work on goals for d/c throughout the day. Labs WNL given surgery.          Review of Systems  Physical Exam   Review of Systems   Constitutional:  Negative for chills and fever.   Respiratory:  Negative for cough.    Cardiovascular:  Negative for chest pain.   Gastrointestinal:  Positive for abdominal pain. Negative for nausea and vomiting.        Appropriately tender at incision sites   Genitourinary:  Positive for hematuria.   Skin:  Negative for rash.   All other systems  reviewed and are negative.    Vitals:    05/30/25 1934 05/31/25 0423 05/31/25 0534 05/31/25 0753   BP: 118/76 103/57  99/66   Pulse: 90 81  93   Resp: 16 16 16 16   Temp: 36.9 °C (98.4 °F) 36.4 °C (97.5 °F)  37.2 °C (99 °F)   TempSrc: Temporal Temporal Temporal Temporal   SpO2: 96% 93%  93%   Weight:       Height:         Physical Exam  Vitals and nursing note reviewed. Exam conducted with a chaperone present.   Constitutional:       Appearance: Normal appearance.   HENT:      Mouth/Throat:      Mouth: Mucous membranes are moist.   Eyes:      Pupils: Pupils are equal, round, and reactive to light.   Abdominal:      General: Abdomen is flat.      Comments: Appropriately tender, surgical sites c/d/I, LLQ with ecchymosis.    Genitourinary:     Comments: Juarez in place, clear urine, slightly red-tinged  Skin:     General: Skin is warm and dry.   Neurological:      Mental Status: He is alert.   Psychiatric:         Mood and Affect: Mood normal.          Hematology Chemistry   Lab Results   Component Value Date/Time    WBC 8.3 05/31/2025 08:23 AM    HEMOGLOBIN 7.2 (L) 05/31/2025 08:23 AM    HEMATOCRIT 22.0 (L) 05/31/2025 08:23 AM    PLATELETCT 157 (L) 05/31/2025 08:23 AM     Lab Results   Component Value Date/Time    SODIUM 137 05/31/2025 12:14 AM    POTASSIUM 4.7 05/31/2025 12:14 AM    CHLORIDE 103 05/31/2025 12:14 AM    CO2 26 05/31/2025 12:14 AM    GLUCOSE 100 (H) 05/31/2025 12:14 AM    BUN 16 05/31/2025 12:14 AM    CREATININE 0.87 05/31/2025 12:14 AM         Labs not explicitly included in this progress note were reviewed by the author.   Radiology/imaging not explicitly included in this progress note was reviewed by the author.     Medications reviewed and Labs reviewed                          Nicole Byrd P.A.-C.   [address] [address]   [phone]

## 2025-05-31 NOTE — PROGRESS NOTES
Patients hbg dropped from 12.0 to 7.3, confirmed with redraw. Dr. Todd notified with orders to hold next heparin dose. Bruise on patients L flank area outlined and picture documented in epic. Patient is asymptomatic. Vital signs stable.

## 2025-05-31 NOTE — PROGRESS NOTES
Bedside report received.  Assessment complete.  A&O x 4. Patient calls appropriately.  Patient ambulates with standby assist. Bed alarm n/a.   Patient has 7/10 pain. Patient medicated per MAR.  Denies N&V. Tolerating regular diet.  Surgical lap sites with dermabond CDI. .  + output in luz + flatus, + BM.  Patient denies SOB.  SCD's on.  Patient calm and cooperative.  Review plan with of care with patient. Call light and personal belongings within reach. Hourly rounding in place. All needs met at this time.

## 2025-05-31 NOTE — PROGRESS NOTES
Note to reader: this note follows the APSO format rather than the historical SOAP format. Assessment and plan located at the top of the note for ease of use.    Chief Complaint  60 y.o. year old male s/p RALP by Dr. Cesar    Assessment/Plan  Interval History     Plan:  Continue oxybutynin and pain control  Work on mobility, ambulation   ADAT  Will keep overnight to monitor vitals  Will check AM labs    Case discussed with Dr. Cesar.   : patient seen and examined    5/30. POD2 patient with improved pain and bladder spasms, though concerned for feeling of SOB. AFVSS. N/V improved today. Reports +flatus. 800cc clear UOP. Significant ecchymosis to L side, provided reassurance. Dr. Cesar updated.     5/29. POD1 pt with bladder spasms this AM. This is limiting his ability to ambulate or tolerate diet. Will work on pain control and continue to work on goals for d/c throughout the day. Labs WNL given surgery.          Review of Systems  Physical Exam   Review of Systems   Constitutional:  Negative for chills and fever.   Respiratory:  Negative for cough.    Cardiovascular:  Negative for chest pain.   Gastrointestinal:  Positive for abdominal pain. Negative for nausea and vomiting.   Genitourinary:  Positive for hematuria.   Skin:  Negative for rash.   All other systems reviewed and are negative.    Vitals:    05/29/25 2332 05/30/25 0333 05/30/25 0807 05/30/25 1607   BP: 117/67 105/62 99/55 118/65   Pulse: 93 85 90 89   Resp: 16 16 16 16   Temp: 37.1 °C (98.8 °F) 36.7 °C (98.1 °F) 36.8 °C (98.2 °F) 37.1 °C (98.8 °F)   TempSrc: Temporal Temporal Temporal Temporal   SpO2: 91% 95% 94% 97%   Weight:       Height:         Physical Exam  Vitals and nursing note reviewed. Exam conducted with a chaperone present.   Constitutional:       Appearance: Normal appearance.   HENT:      Mouth/Throat:      Mouth: Mucous membranes are moist.   Eyes:      Pupils: Pupils are equal, round, and reactive to light.   Abdominal:       General: Abdomen is flat.      Comments: Appropriately tender, surgical sites c/d/I, LLQ with ecchymosis.    Genitourinary:     Comments: Juarez in place, clear yellow urine  Skin:     General: Skin is warm and dry.   Neurological:      Mental Status: He is alert.   Psychiatric:         Mood and Affect: Mood normal.          Hematology Chemistry   Lab Results   Component Value Date/Time    WBC 10.5 05/29/2025 12:56 AM    HEMOGLOBIN 12.0 (L) 05/29/2025 12:56 AM    HEMATOCRIT 35.1 (L) 05/29/2025 12:56 AM    PLATELETCT 195 05/29/2025 12:56 AM     Lab Results   Component Value Date/Time    SODIUM 137 05/29/2025 12:56 AM    POTASSIUM 4.2 05/29/2025 12:56 AM    CHLORIDE 102 05/29/2025 12:56 AM    CO2 25 05/29/2025 12:56 AM    GLUCOSE 117 (H) 05/29/2025 12:56 AM    BUN 14 05/29/2025 12:56 AM    CREATININE 1.20 05/29/2025 12:56 AM         Labs not explicitly included in this progress note were reviewed by the author.   Radiology/imaging not explicitly included in this progress note was reviewed by the author.     Labs reviewed, Radiology images reviewed and Medications reviewed                          Nicole Byrd P.A.-C.   [address] [address]   [phone]

## 2025-05-31 NOTE — CARE PLAN
The patient is Stable - Low risk of patient condition declining or worsening    Shift Goals  Clinical Goals: pain management, ulz management, rest  Patient Goals: pain control, rest  Family Goals: POC    Progress made toward(s) clinical / shift goals:  Plan of care discussed with patient, all questions answered. L flank bruise outlined to show growth pattern. Patient's pain medicated per MAR. X6 lap sites with dermabond DERECK CDI. Luz monitored, output recorded. Patient intermittently sleeping throughout shift. +BM.  Notified of hbg drop.      Problem: Pain - Standard  Goal: Alleviation of pain or a reduction in pain to the patient’s comfort goal  Outcome: Progressing     Problem: Knowledge Deficit - Standard  Goal: Patient and family/care givers will demonstrate understanding of plan of care, disease process/condition, diagnostic tests and medications  Outcome: Progressing       Patient is not progressing towards the following goals:

## 2025-05-31 NOTE — PROGRESS NOTES
Received report from previous shift RN  Assessment complete.  A&O x 4. Patient calls appropriately.  Patient ambulates with standby assist.   Patient has 7/10 pain. Pain managed with prescribed medications.  Denies N&V. Tolerating  diet.  Skin per flowsheets.  + void via luz, + flatus, - BM.  Patient denies SOB.  SCD's on.  Patient pleasant and cooperative with plan of care.  Review plan with of care with patient. Call light and personal belongings with in reach. Hourly rounding in place. All needs met at this time

## 2025-06-01 ENCOUNTER — PHARMACY VISIT (OUTPATIENT)
Dept: PHARMACY | Facility: MEDICAL CENTER | Age: 60
End: 2025-06-01
Payer: COMMERCIAL

## 2025-06-01 VITALS
HEIGHT: 68 IN | SYSTOLIC BLOOD PRESSURE: 125 MMHG | OXYGEN SATURATION: 96 % | HEART RATE: 67 BPM | DIASTOLIC BLOOD PRESSURE: 72 MMHG | TEMPERATURE: 98.1 F | WEIGHT: 125.88 LBS | RESPIRATION RATE: 16 BRPM | BODY MASS INDEX: 19.08 KG/M2

## 2025-06-01 LAB
HCT VFR BLD AUTO: 22.9 % (ref 42–52)
HCT VFR BLD AUTO: 23.8 % (ref 42–52)
HGB BLD-MCNC: 7.4 G/DL (ref 14–18)
HGB BLD-MCNC: 7.9 G/DL (ref 14–18)

## 2025-06-01 PROCEDURE — 85014 HEMATOCRIT: CPT

## 2025-06-01 PROCEDURE — RXMED WILLOW AMBULATORY MEDICATION CHARGE: Performed by: STUDENT IN AN ORGANIZED HEALTH CARE EDUCATION/TRAINING PROGRAM

## 2025-06-01 PROCEDURE — 700102 HCHG RX REV CODE 250 W/ 637 OVERRIDE(OP): Performed by: UROLOGY

## 2025-06-01 PROCEDURE — A9270 NON-COVERED ITEM OR SERVICE: HCPCS | Performed by: STUDENT IN AN ORGANIZED HEALTH CARE EDUCATION/TRAINING PROGRAM

## 2025-06-01 PROCEDURE — 85018 HEMOGLOBIN: CPT | Mod: 91

## 2025-06-01 PROCEDURE — 700102 HCHG RX REV CODE 250 W/ 637 OVERRIDE(OP): Performed by: STUDENT IN AN ORGANIZED HEALTH CARE EDUCATION/TRAINING PROGRAM

## 2025-06-01 PROCEDURE — A9270 NON-COVERED ITEM OR SERVICE: HCPCS | Performed by: UROLOGY

## 2025-06-01 PROCEDURE — 36415 COLL VENOUS BLD VENIPUNCTURE: CPT

## 2025-06-01 RX ORDER — OXYBUTYNIN CHLORIDE 10 MG/1
10 TABLET, EXTENDED RELEASE ORAL DAILY
Qty: 2 TABLET | Refills: 0 | Status: SHIPPED | OUTPATIENT
Start: 2025-06-01 | End: 2025-06-03

## 2025-06-01 RX ORDER — OXYCODONE HYDROCHLORIDE 5 MG/1
5 TABLET ORAL EVERY 8 HOURS PRN
Qty: 9 TABLET | Refills: 0 | Status: CANCELLED | OUTPATIENT
Start: 2025-06-01 | End: 2025-06-04

## 2025-06-01 RX ADMIN — CYCLOBENZAPRINE 5 MG: 10 TABLET, FILM COATED ORAL at 04:48

## 2025-06-01 RX ADMIN — ACETAMINOPHEN 1000 MG: 500 TABLET ORAL at 04:49

## 2025-06-01 RX ADMIN — OXYBUTYNIN CHLORIDE 10 MG: 5 TABLET, EXTENDED RELEASE ORAL at 04:48

## 2025-06-01 RX ADMIN — DOCUSATE SODIUM 50 MG AND SENNOSIDES 8.6 MG 2 TABLET: 8.6; 5 TABLET, FILM COATED ORAL at 04:48

## 2025-06-01 RX ADMIN — CARBIDOPA AND LEVODOPA 1 TABLET: 25; 100 TABLET ORAL at 04:48

## 2025-06-01 ASSESSMENT — PAIN DESCRIPTION - PAIN TYPE
TYPE: ACUTE PAIN

## 2025-06-01 NOTE — DISCHARGE SUMMARY
Discharge Summary  Reason for Admission  Prostate cancer (HCC)     Admission Date  5/28/2025    CODE STATUS  Full Code    HPI & HOSPITAL COURSE  This is a 60 y.o. male here with history of prostate cancer who is now POD#4 s/p ROBOTIC LAPAROSCOPIC PROSTATECTOMY RADICAL and ROBOTIC  LAPAROSCOPIC LYSIS OF ADHESIONS by Dr. Cesar.     POD1 had increased bladder spasms and pain, kept additional night for ongoing need for pain control  POD2 ongoing pain control, monitored vitals for soft blood pressures  POD3 morning Hgb in 7-range, bed rest with SCDs, d/c heparin, kept overnight for q6hrs H/H monitoring  POD4 labs stable, vitals stable, pain controlled    Today, patient is resting comfortably in bed. He reports +flatus, +BM. He is tolerating a regular diet without N/V. PO intake has improved over the last 2 days. Pain is controlled on PO medications. He is now ambulatory off of bed rest.    Abdomen is soft, incisions remain C/D/I. Significant ecchymosis to L side, appears stable. Urine is draining clear yellow from luz catheter.     Discussed POC with patient, family at bedside, RN, and urology team.     Anticipate discharge this afternoon in fair and stable condition to home with close outpatient follow-up if patient remains in good and stable condition.    Discharge Date  06/01/2025    FOLLOW UP ITEMS POST DISCHARGE  Our office will call to arrange luz catheter management and routine post-operative care visits    DISCHARGE DIAGNOSES  Principal Problem:    Prostate cancer (HCC) (POA: Yes)  Active Problems:    PONV (postoperative nausea and vomiting) (POA: Unknown)  Resolved Problems:    * No resolved hospital problems. *      FOLLOW UP  Future Appointments   Date Time Provider Department Center   7/9/2025  7:20 AM SHAHANA Razo None     No follow-up provider specified.    MEDICATIONS ON DISCHARGE     Medication List        START taking these medications        Instructions   oxybutynin SR 10 MG CR  tablet  Commonly known as: Ditropan-XL   Doctor's comments: Meds to bed upon discharge  Take 1 Tablet by mouth every day for 5 days.  Dose: 10 mg     polyethylene glycol/lytes 17 g Pack  Commonly known as: Miralax   Doctor's comments: Meds to bed upon discharge.  Take 1 Packet by mouth every day for 10 days. Stop taking if having diarrhea.  Dose: 17 g            CONTINUE taking these medications        Instructions   B COMPLEX PO   Take  by mouth. Patient reports taking one tablet or capsule po daily     carbidopa-levodopa  MG Tabs  Commonly known as: Sinemet   Take 1 Tablet by mouth 3 times a day. For Parkinson's  Dose: 1 Tablet     ibuprofen 200 MG Tabs  Commonly known as: Motrin   Take 200 mg by mouth every 6 hours as needed.  Dose: 200 mg     MAGNESIUM PO   Take  by mouth. Patient reports taking one tablet or capsule po daily     OMEGA 3 PO   Take  by mouth. Patient reports taking one tablet or capsule po daily     vitamin D3 1000 Unit (25 mcg) Tabs  Commonly known as: Cholecalciferol   Take 4,000 Units by mouth every day.  Dose: 4,000 Units     VITAMIN K PO   Take  by mouth. Patient reports taking one tablet or capsule po daily            STOP taking these medications      tamsulosin 0.4 MG capsule  Commonly known as: Flomax            ASK your doctor about these medications        Instructions   oxyCODONE immediate-release 5 MG Tabs  Commonly known as: Roxicodone  Ask about: Should I take this medication?   Doctor's comments: Meds to bed upon discharge  Take 1 Tablet by mouth every 8 hours as needed for Severe Pain for up to 3 days.  Dose: 5 mg              Allergies  Allergies[1]    DIET  Orders Placed This Encounter   Procedures    Diet Order Diet: Regular     Standing Status:   Standing     Number of Occurrences:   1     Diet::   Regular [1]       ACTIVITY  As tolerated.  10-lb lifting restriction, standard post-operative restrictions for 4-6 weeks or until cleared by   Zlatev    CONSULTATIONS  none    LABORATORY  Lab Results   Component Value Date    SODIUM 137 05/31/2025    POTASSIUM 4.7 05/31/2025    CHLORIDE 103 05/31/2025    CO2 26 05/31/2025    GLUCOSE 100 (H) 05/31/2025    BUN 16 05/31/2025    CREATININE 0.87 05/31/2025        Lab Results   Component Value Date    WBC 8.3 05/31/2025    HEMOGLOBIN 7.9 (L) 06/01/2025    HEMATOCRIT 23.8 (L) 06/01/2025    PLATELETCT 157 (L) 05/31/2025        Total time of the discharge process exceeds 30 minutes.    Nicole Byrd PA-C  [facility]       [1] No Known Allergies

## 2025-06-01 NOTE — PROGRESS NOTES
Patient discharged from unit. AVS education provided, patient verbalized understanding. Juarez education provided,supplies provided. IV removed tip intact. Patient discharged with wife.

## 2025-06-01 NOTE — PROGRESS NOTES
Received report from previous shift RN  Assessment complete.  A&O x 4. Patient calls appropriately.  Patient ambulates with stand by assist..   Patient has 4/10 pain. Declines interventions  Denies N&V. Tolerating regular diet.  Surgical x6 lap sites dermabond DERECK, L abdominal hematoma.  + void via luz, + flatus, last BM 6/1 per patient  Patient denies SOB.  SCD's refused..  Review plan with of care with patient. Call light and personal belongings with in reach. Hourly rounding in place. All needs met at this time.

## 2025-06-01 NOTE — PROGRESS NOTES
Bedside report received, assessment completed    A&O x  4, pt calls appropriately  Mobility: Up with SBA, Assistive Devices: FWW  Fall Risk Assessment: Low risk  Pain Assessment / Reassessment completed, medication provided per MAR  Diet: Regular  LDA:   IV Access: 18 L FA, CDI/ flushed/ SL  Juarez: +, Indication urology      GI/: + UO, + flatus, 5/31 BM  DVT Prophylaxis: SCD's on    Reviewed plan of care with patient, bed in lowest position and locked, pt resting comfortably now, call light within reach, all needs met at this time. Interventions will be executed per plan of care

## 2025-06-01 NOTE — CARE PLAN
The patient is Stable - Low risk of patient condition declining or worsening    Shift Goals  Clinical Goals: Pain control, luz management, and monitor hgb  Patient Goals: Pain control and rest  Family Goals: POC    Progress made toward(s) clinical / shift goals:  Pain controlled per MAR. Pt pain will be 3/10 by end of shift. Intermittently monitoring luz and output. Intermittently monitoring hgb and signs of bleeding. Pt slept intermittently throughout shift.       Problem: Hemodynamics  Goal: Patient's hemodynamics, fluid balance and neurologic status will be stable or improve  Outcome: Progressing     Problem: Urinary Elimination  Goal: Establish and maintain regular urinary output  Outcome: Progressing

## 2025-06-08 ENCOUNTER — APPOINTMENT (OUTPATIENT)
Dept: RADIOLOGY | Facility: MEDICAL CENTER | Age: 60
DRG: 698 | End: 2025-06-08
Attending: EMERGENCY MEDICINE
Payer: MEDICARE

## 2025-06-08 ENCOUNTER — HOSPITAL ENCOUNTER (INPATIENT)
Facility: MEDICAL CENTER | Age: 60
LOS: 12 days | DRG: 698 | End: 2025-06-20
Attending: EMERGENCY MEDICINE | Admitting: FAMILY MEDICINE
Payer: MEDICARE

## 2025-06-08 DIAGNOSIS — E87.29 INCREASED ANION GAP METABOLIC ACIDOSIS: ICD-10-CM

## 2025-06-08 DIAGNOSIS — E87.5 HYPERKALEMIA: ICD-10-CM

## 2025-06-08 DIAGNOSIS — E87.1 HYPONATREMIA: ICD-10-CM

## 2025-06-08 DIAGNOSIS — G20.B1 DYSKINESIA DUE TO PARKINSON'S DISEASE (HCC): ICD-10-CM

## 2025-06-08 DIAGNOSIS — D64.9 ANEMIA, UNSPECIFIED TYPE: ICD-10-CM

## 2025-06-08 DIAGNOSIS — R65.10 SIRS (SYSTEMIC INFLAMMATORY RESPONSE SYNDROME) (HCC): ICD-10-CM

## 2025-06-08 DIAGNOSIS — J96.01 ACUTE HYPOXIC RESPIRATORY FAILURE (HCC): ICD-10-CM

## 2025-06-08 DIAGNOSIS — C61 PROSTATE CANCER (HCC): ICD-10-CM

## 2025-06-08 DIAGNOSIS — R11.2 PONV (POSTOPERATIVE NAUSEA AND VOMITING): ICD-10-CM

## 2025-06-08 DIAGNOSIS — R35.0 BENIGN PROSTATIC HYPERPLASIA WITH URINARY FREQUENCY: ICD-10-CM

## 2025-06-08 DIAGNOSIS — Z87.891 HISTORY OF TOBACCO ABUSE: ICD-10-CM

## 2025-06-08 DIAGNOSIS — D75.839 THROMBOCYTOSIS: ICD-10-CM

## 2025-06-08 DIAGNOSIS — N99.89 URINE LEAKAGE FROM SURGICAL INCISION: ICD-10-CM

## 2025-06-08 DIAGNOSIS — K76.9 LIVER LESION: ICD-10-CM

## 2025-06-08 DIAGNOSIS — E78.00 ELEVATED CHOLESTEROL: ICD-10-CM

## 2025-06-08 DIAGNOSIS — N17.9 ACUTE RENAL FAILURE, UNSPECIFIED ACUTE RENAL FAILURE TYPE (HCC): Primary | ICD-10-CM

## 2025-06-08 DIAGNOSIS — G43.109 MIGRAINE WITH AURA AND WITHOUT STATUS MIGRAINOSUS, NOT INTRACTABLE: ICD-10-CM

## 2025-06-08 DIAGNOSIS — G47.09 OTHER INSOMNIA: ICD-10-CM

## 2025-06-08 DIAGNOSIS — Z98.890 PONV (POSTOPERATIVE NAUSEA AND VOMITING): ICD-10-CM

## 2025-06-08 DIAGNOSIS — F41.9 ANXIETY: ICD-10-CM

## 2025-06-08 DIAGNOSIS — G20.B2 PARKINSON'S DISEASE WITH DYSKINESIA AND FLUCTUATING MANIFESTATIONS (HCC): ICD-10-CM

## 2025-06-08 DIAGNOSIS — N40.1 BENIGN PROSTATIC HYPERPLASIA WITH URINARY FREQUENCY: ICD-10-CM

## 2025-06-08 DIAGNOSIS — Z45.42 ENCOUNTER FOR ADJUSTMENT AND MANAGEMENT OF NEUROSTIMULATOR: ICD-10-CM

## 2025-06-08 DIAGNOSIS — G47.20 NIGHT-WAKING DISORDER: ICD-10-CM

## 2025-06-08 DIAGNOSIS — R10.9 ABDOMINAL PAIN, UNSPECIFIED ABDOMINAL LOCATION: ICD-10-CM

## 2025-06-08 DIAGNOSIS — K20.90 ESOPHAGITIS DETERMINED BY ENDOSCOPY: ICD-10-CM

## 2025-06-08 PROBLEM — D72.829 LEUKOCYTOSIS: Status: ACTIVE | Noted: 2025-06-08

## 2025-06-08 LAB
ABO GROUP BLD: NORMAL
ALBUMIN SERPL BCP-MCNC: 3.9 G/DL (ref 3.2–4.9)
ALBUMIN/GLOB SERPL: 1 G/DL
ALP SERPL-CCNC: 130 U/L (ref 30–99)
ALT SERPL-CCNC: 65 U/L (ref 2–50)
ANION GAP SERPL CALC-SCNC: 18 MMOL/L (ref 7–16)
ANION GAP SERPL CALC-SCNC: 9 MMOL/L (ref 7–16)
APPEARANCE UR: ABNORMAL
APTT PPP: 29.3 SEC (ref 24.7–36)
AST SERPL-CCNC: 108 U/L (ref 12–45)
BACTERIA #/AREA URNS HPF: ABNORMAL /HPF
BASOPHILS # BLD AUTO: 0.2 % (ref 0–1.8)
BASOPHILS # BLD: 0.03 K/UL (ref 0–0.12)
BILIRUB SERPL-MCNC: 1.3 MG/DL (ref 0.1–1.5)
BILIRUB UR QL STRIP.AUTO: NEGATIVE
BLD GP AB SCN SERPL QL: NORMAL
BUN SERPL-MCNC: 61 MG/DL (ref 8–22)
BUN SERPL-MCNC: 74 MG/DL (ref 8–22)
CALCIUM ALBUM COR SERPL-MCNC: 9.9 MG/DL (ref 8.5–10.5)
CALCIUM SERPL-MCNC: 9 MG/DL (ref 8.5–10.5)
CALCIUM SERPL-MCNC: 9.8 MG/DL (ref 8.5–10.5)
CASTS URNS QL MICRO: ABNORMAL /LPF (ref 0–2)
CHLORIDE SERPL-SCNC: 102 MMOL/L (ref 96–112)
CHLORIDE SERPL-SCNC: 94 MMOL/L (ref 96–112)
CO2 SERPL-SCNC: 18 MMOL/L (ref 20–33)
CO2 SERPL-SCNC: 22 MMOL/L (ref 20–33)
COLOR UR: ABNORMAL
CREAT SERPL-MCNC: 2.48 MG/DL (ref 0.5–1.4)
CREAT SERPL-MCNC: 4.98 MG/DL (ref 0.5–1.4)
EKG IMPRESSION: NORMAL
EOSINOPHIL # BLD AUTO: 0 K/UL (ref 0–0.51)
EOSINOPHIL NFR BLD: 0 % (ref 0–6.9)
EPITHELIAL CELLS 1715: ABNORMAL /HPF (ref 0–5)
ERYTHROCYTE [DISTWIDTH] IN BLOOD BY AUTOMATED COUNT: 45.1 FL (ref 35.9–50)
GFR SERPLBLD CREATININE-BSD FMLA CKD-EPI: 13 ML/MIN/1.73 M 2
GFR SERPLBLD CREATININE-BSD FMLA CKD-EPI: 29 ML/MIN/1.73 M 2
GLOBULIN SER CALC-MCNC: 4.1 G/DL (ref 1.9–3.5)
GLUCOSE BLD STRIP.AUTO-MCNC: 103 MG/DL (ref 65–99)
GLUCOSE BLD STRIP.AUTO-MCNC: 114 MG/DL (ref 65–99)
GLUCOSE BLD STRIP.AUTO-MCNC: 117 MG/DL (ref 65–99)
GLUCOSE BLD STRIP.AUTO-MCNC: 124 MG/DL (ref 65–99)
GLUCOSE BLD STRIP.AUTO-MCNC: 130 MG/DL (ref 65–99)
GLUCOSE BLD STRIP.AUTO-MCNC: 183 MG/DL (ref 65–99)
GLUCOSE SERPL-MCNC: 123 MG/DL (ref 65–99)
GLUCOSE SERPL-MCNC: 124 MG/DL (ref 65–99)
GLUCOSE UR STRIP.AUTO-MCNC: NEGATIVE MG/DL
GRAM STN SPEC: NORMAL
HCT VFR BLD AUTO: 31 % (ref 42–52)
HGB BLD-MCNC: 10.2 G/DL (ref 14–18)
IMM GRANULOCYTES # BLD AUTO: 0.11 K/UL (ref 0–0.11)
IMM GRANULOCYTES NFR BLD AUTO: 0.9 % (ref 0–0.9)
INR PPP: 1.14 (ref 0.87–1.13)
KETONES UR STRIP.AUTO-MCNC: NEGATIVE MG/DL
LACTATE SERPL-SCNC: 0.9 MMOL/L (ref 0.5–2)
LACTATE SERPL-SCNC: 2 MMOL/L (ref 0.5–2)
LEUKOCYTE ESTERASE UR QL STRIP.AUTO: ABNORMAL
LIPASE SERPL-CCNC: 10 U/L (ref 11–82)
LYMPHOCYTES # BLD AUTO: 0.15 K/UL (ref 1–4.8)
LYMPHOCYTES NFR BLD: 1.2 % (ref 22–41)
MCH RBC QN AUTO: 29.7 PG (ref 27–33)
MCHC RBC AUTO-ENTMCNC: 32.9 G/DL (ref 32.3–36.5)
MCV RBC AUTO: 90.4 FL (ref 81.4–97.8)
MICRO URNS: ABNORMAL
MONOCYTES # BLD AUTO: 0.24 K/UL (ref 0–0.85)
MONOCYTES NFR BLD AUTO: 1.9 % (ref 0–13.4)
NEUTROPHILS # BLD AUTO: 11.85 K/UL (ref 1.82–7.42)
NEUTROPHILS NFR BLD: 95.8 % (ref 44–72)
NITRITE UR QL STRIP.AUTO: NEGATIVE
NRBC # BLD AUTO: 0 K/UL
NRBC BLD-RTO: 0 /100 WBC (ref 0–0.2)
NT-PROBNP SERPL IA-MCNC: 317 PG/ML (ref 0–125)
PH UR STRIP.AUTO: 5 [PH] (ref 5–8)
PLATELET # BLD AUTO: 532 K/UL (ref 164–446)
PMV BLD AUTO: 9.5 FL (ref 9–12.9)
POTASSIUM SERPL-SCNC: 5.7 MMOL/L (ref 3.6–5.5)
POTASSIUM SERPL-SCNC: 6.1 MMOL/L (ref 3.6–5.5)
PROT SERPL-MCNC: 8 G/DL (ref 6–8.2)
PROT UR QL STRIP: 100 MG/DL
PROTHROMBIN TIME: 14.6 SEC (ref 12–14.6)
RBC # BLD AUTO: 3.43 M/UL (ref 4.7–6.1)
RBC # URNS HPF: ABNORMAL /HPF (ref 0–2)
RBC UR QL AUTO: ABNORMAL
RH BLD: NORMAL
SIGNIFICANT IND 70042: NORMAL
SITE SITE: NORMAL
SODIUM SERPL-SCNC: 130 MMOL/L (ref 135–145)
SODIUM SERPL-SCNC: 133 MMOL/L (ref 135–145)
SOURCE SOURCE: NORMAL
SP GR UR STRIP.AUTO: 1.02
TROPONIN T SERPL-MCNC: 9 NG/L (ref 6–19)
UROBILINOGEN UR STRIP.AUTO-MCNC: 0.2 EU/DL
WBC # BLD AUTO: 12.4 K/UL (ref 4.8–10.8)
WBC #/AREA URNS HPF: ABNORMAL /HPF

## 2025-06-08 PROCEDURE — 303105 HCHG CATHETER EXTRA

## 2025-06-08 PROCEDURE — 770020 HCHG ROOM/CARE - TELE (206)

## 2025-06-08 PROCEDURE — 96376 TX/PRO/DX INJ SAME DRUG ADON: CPT

## 2025-06-08 PROCEDURE — 700111 HCHG RX REV CODE 636 W/ 250 OVERRIDE (IP)

## 2025-06-08 PROCEDURE — 99222 1ST HOSP IP/OBS MODERATE 55: CPT | Mod: GC | Performed by: FAMILY MEDICINE

## 2025-06-08 PROCEDURE — 0T9B80Z DRAINAGE OF BLADDER WITH DRAINAGE DEVICE, VIA NATURAL OR ARTIFICIAL OPENING ENDOSCOPIC: ICD-10-PCS | Performed by: UROLOGY

## 2025-06-08 PROCEDURE — 74176 CT ABD & PELVIS W/O CONTRAST: CPT

## 2025-06-08 PROCEDURE — 85610 PROTHROMBIN TIME: CPT

## 2025-06-08 PROCEDURE — 99285 EMERGENCY DEPT VISIT HI MDM: CPT

## 2025-06-08 PROCEDURE — 36415 COLL VENOUS BLD VENIPUNCTURE: CPT

## 2025-06-08 PROCEDURE — 86850 RBC ANTIBODY SCREEN: CPT

## 2025-06-08 PROCEDURE — 700105 HCHG RX REV CODE 258

## 2025-06-08 PROCEDURE — 96375 TX/PRO/DX INJ NEW DRUG ADDON: CPT

## 2025-06-08 PROCEDURE — C1769 GUIDE WIRE: HCPCS

## 2025-06-08 PROCEDURE — 85730 THROMBOPLASTIN TIME PARTIAL: CPT

## 2025-06-08 PROCEDURE — 81001 URINALYSIS AUTO W/SCOPE: CPT

## 2025-06-08 PROCEDURE — 71045 X-RAY EXAM CHEST 1 VIEW: CPT

## 2025-06-08 PROCEDURE — 86900 BLOOD TYPING SEROLOGIC ABO: CPT

## 2025-06-08 PROCEDURE — 96365 THER/PROPH/DIAG IV INF INIT: CPT

## 2025-06-08 PROCEDURE — 51702 INSERT TEMP BLADDER CATH: CPT

## 2025-06-08 PROCEDURE — 80048 BASIC METABOLIC PNL TOTAL CA: CPT

## 2025-06-08 PROCEDURE — 85025 COMPLETE CBC W/AUTO DIFF WBC: CPT

## 2025-06-08 PROCEDURE — 700105 HCHG RX REV CODE 258: Performed by: UROLOGY

## 2025-06-08 PROCEDURE — 700101 HCHG RX REV CODE 250: Performed by: EMERGENCY MEDICINE

## 2025-06-08 PROCEDURE — 700102 HCHG RX REV CODE 250 W/ 637 OVERRIDE(OP)

## 2025-06-08 PROCEDURE — 84484 ASSAY OF TROPONIN QUANT: CPT

## 2025-06-08 PROCEDURE — 700111 HCHG RX REV CODE 636 W/ 250 OVERRIDE (IP): Mod: JZ | Performed by: FAMILY MEDICINE

## 2025-06-08 PROCEDURE — 700111 HCHG RX REV CODE 636 W/ 250 OVERRIDE (IP): Mod: JZ

## 2025-06-08 PROCEDURE — 87086 URINE CULTURE/COLONY COUNT: CPT

## 2025-06-08 PROCEDURE — 87205 SMEAR GRAM STAIN: CPT

## 2025-06-08 PROCEDURE — 80053 COMPREHEN METABOLIC PANEL: CPT

## 2025-06-08 PROCEDURE — 86901 BLOOD TYPING SEROLOGIC RH(D): CPT

## 2025-06-08 PROCEDURE — 302140 GU CART: Performed by: EMERGENCY MEDICINE

## 2025-06-08 PROCEDURE — 700111 HCHG RX REV CODE 636 W/ 250 OVERRIDE (IP): Mod: JZ | Performed by: RADIOLOGY

## 2025-06-08 PROCEDURE — 700105 HCHG RX REV CODE 258: Performed by: FAMILY MEDICINE

## 2025-06-08 PROCEDURE — 700111 HCHG RX REV CODE 636 W/ 250 OVERRIDE (IP): Mod: JZ | Performed by: UROLOGY

## 2025-06-08 PROCEDURE — 82962 GLUCOSE BLOOD TEST: CPT | Mod: 91

## 2025-06-08 PROCEDURE — 83690 ASSAY OF LIPASE: CPT

## 2025-06-08 PROCEDURE — 87070 CULTURE OTHR SPECIMN AEROBIC: CPT

## 2025-06-08 PROCEDURE — 93005 ELECTROCARDIOGRAM TRACING: CPT | Mod: TC | Performed by: EMERGENCY MEDICINE

## 2025-06-08 PROCEDURE — 0W9G30Z DRAINAGE OF PERITONEAL CAVITY WITH DRAINAGE DEVICE, PERCUTANEOUS APPROACH: ICD-10-PCS | Performed by: RADIOLOGY

## 2025-06-08 PROCEDURE — A9270 NON-COVERED ITEM OR SERVICE: HCPCS

## 2025-06-08 PROCEDURE — 700101 HCHG RX REV CODE 250

## 2025-06-08 PROCEDURE — 700111 HCHG RX REV CODE 636 W/ 250 OVERRIDE (IP): Performed by: EMERGENCY MEDICINE

## 2025-06-08 PROCEDURE — 83880 ASSAY OF NATRIURETIC PEPTIDE: CPT

## 2025-06-08 PROCEDURE — 87040 BLOOD CULTURE FOR BACTERIA: CPT

## 2025-06-08 PROCEDURE — 700105 HCHG RX REV CODE 258: Performed by: EMERGENCY MEDICINE

## 2025-06-08 PROCEDURE — 83605 ASSAY OF LACTIC ACID: CPT

## 2025-06-08 RX ORDER — MIDAZOLAM HYDROCHLORIDE 1 MG/ML
.5-2 INJECTION INTRAMUSCULAR; INTRAVENOUS PRN
Status: CANCELLED | OUTPATIENT
Start: 2025-06-08 | End: 2025-06-08

## 2025-06-08 RX ORDER — ACETAMINOPHEN 500 MG
1000 TABLET ORAL EVERY 6 HOURS
Status: DISCONTINUED | OUTPATIENT
Start: 2025-06-08 | End: 2025-06-10

## 2025-06-08 RX ORDER — ONDANSETRON 2 MG/ML
4 INJECTION INTRAMUSCULAR; INTRAVENOUS PRN
Status: CANCELLED | OUTPATIENT
Start: 2025-06-08 | End: 2025-06-08

## 2025-06-08 RX ORDER — OXYCODONE HYDROCHLORIDE 10 MG/1
10 TABLET ORAL
Refills: 0 | Status: DISCONTINUED | OUTPATIENT
Start: 2025-06-08 | End: 2025-06-10

## 2025-06-08 RX ORDER — DEXTROSE MONOHYDRATE 25 G/50ML
25 INJECTION, SOLUTION INTRAVENOUS ONCE
Status: DISCONTINUED | OUTPATIENT
Start: 2025-06-08 | End: 2025-06-08

## 2025-06-08 RX ORDER — ONDANSETRON 2 MG/ML
4 INJECTION INTRAMUSCULAR; INTRAVENOUS EVERY 4 HOURS PRN
Status: DISCONTINUED | OUTPATIENT
Start: 2025-06-08 | End: 2025-06-20 | Stop reason: HOSPADM

## 2025-06-08 RX ORDER — OXYBUTYNIN CHLORIDE 10 MG/1
10 TABLET, EXTENDED RELEASE ORAL DAILY
Status: ON HOLD | COMMUNITY
End: 2025-07-03

## 2025-06-08 RX ORDER — CALCIUM GLUCONATE 20 MG/ML
2 INJECTION, SOLUTION INTRAVENOUS ONCE
Status: COMPLETED | OUTPATIENT
Start: 2025-06-08 | End: 2025-06-08

## 2025-06-08 RX ORDER — HYDROMORPHONE HYDROCHLORIDE 1 MG/ML
1 INJECTION, SOLUTION INTRAMUSCULAR; INTRAVENOUS; SUBCUTANEOUS ONCE
Status: COMPLETED | OUTPATIENT
Start: 2025-06-08 | End: 2025-06-08

## 2025-06-08 RX ORDER — SODIUM CHLORIDE 9 MG/ML
1000 INJECTION, SOLUTION INTRAVENOUS ONCE
Status: COMPLETED | OUTPATIENT
Start: 2025-06-08 | End: 2025-06-08

## 2025-06-08 RX ORDER — SODIUM CHLORIDE 9 MG/ML
500 INJECTION, SOLUTION INTRAVENOUS
Status: CANCELLED | OUTPATIENT
Start: 2025-06-08 | End: 2025-06-08

## 2025-06-08 RX ORDER — DEXTROSE MONOHYDRATE 25 G/50ML
25 INJECTION, SOLUTION INTRAVENOUS ONCE
Status: COMPLETED | OUTPATIENT
Start: 2025-06-08 | End: 2025-06-08

## 2025-06-08 RX ORDER — SODIUM CHLORIDE, SODIUM LACTATE, POTASSIUM CHLORIDE, CALCIUM CHLORIDE 600; 310; 30; 20 MG/100ML; MG/100ML; MG/100ML; MG/100ML
INJECTION, SOLUTION INTRAVENOUS CONTINUOUS
Status: DISCONTINUED | OUTPATIENT
Start: 2025-06-08 | End: 2025-06-08

## 2025-06-08 RX ORDER — ACETAMINOPHEN 500 MG
1000 TABLET ORAL EVERY 6 HOURS PRN
Status: DISCONTINUED | OUTPATIENT
Start: 2025-06-13 | End: 2025-06-10

## 2025-06-08 RX ORDER — OXYCODONE HYDROCHLORIDE 5 MG/1
5 TABLET ORAL EVERY 8 HOURS PRN
Status: ON HOLD | COMMUNITY
End: 2025-06-19

## 2025-06-08 RX ORDER — VITAMIN B COMPLEX
4000 TABLET ORAL DAILY
Status: DISCONTINUED | OUTPATIENT
Start: 2025-06-08 | End: 2025-06-20 | Stop reason: HOSPADM

## 2025-06-08 RX ORDER — OXYCODONE HYDROCHLORIDE 5 MG/1
5 TABLET ORAL
Refills: 0 | Status: DISCONTINUED | OUTPATIENT
Start: 2025-06-08 | End: 2025-06-10

## 2025-06-08 RX ORDER — CARBIDOPA AND LEVODOPA 25; 100 MG/1; MG/1
1 TABLET ORAL 3 TIMES DAILY
Status: DISCONTINUED | OUTPATIENT
Start: 2025-06-08 | End: 2025-06-20 | Stop reason: HOSPADM

## 2025-06-08 RX ORDER — HYDROMORPHONE HYDROCHLORIDE 1 MG/ML
0.5 INJECTION, SOLUTION INTRAMUSCULAR; INTRAVENOUS; SUBCUTANEOUS
Status: DISCONTINUED | OUTPATIENT
Start: 2025-06-08 | End: 2025-06-10

## 2025-06-08 RX ORDER — MORPHINE SULFATE 4 MG/ML
4 INJECTION INTRAVENOUS ONCE
Status: COMPLETED | OUTPATIENT
Start: 2025-06-08 | End: 2025-06-08

## 2025-06-08 RX ADMIN — INSULIN HUMAN 5 UNITS: 100 INJECTION, SOLUTION PARENTERAL at 16:29

## 2025-06-08 RX ADMIN — FENTANYL CITRATE 50 MCG: 50 INJECTION, SOLUTION INTRAMUSCULAR; INTRAVENOUS at 14:11

## 2025-06-08 RX ADMIN — HYDROMORPHONE HYDROCHLORIDE 1 MG: 1 INJECTION, SOLUTION INTRAMUSCULAR; INTRAVENOUS; SUBCUTANEOUS at 07:45

## 2025-06-08 RX ADMIN — PIPERACILLIN AND TAZOBACTAM 4.5 G: 4; .5 INJECTION, POWDER, FOR SOLUTION INTRAVENOUS at 15:35

## 2025-06-08 RX ADMIN — SODIUM CHLORIDE 1000 ML: 9 INJECTION, SOLUTION INTRAVENOUS at 05:42

## 2025-06-08 RX ADMIN — DEXTROSE MONOHYDRATE 25 G: 25 INJECTION, SOLUTION INTRAVENOUS at 06:59

## 2025-06-08 RX ADMIN — PIPERACILLIN AND TAZOBACTAM 4.5 G: 4; .5 INJECTION, POWDER, FOR SOLUTION INTRAVENOUS at 17:31

## 2025-06-08 RX ADMIN — CARBIDOPA AND LEVODOPA 1 TABLET: 25; 100 TABLET ORAL at 10:26

## 2025-06-08 RX ADMIN — CALCIUM GLUCONATE 2 G: 20 INJECTION, SOLUTION INTRAVENOUS at 07:08

## 2025-06-08 RX ADMIN — FENTANYL CITRATE 50 MCG: 50 INJECTION, SOLUTION INTRAMUSCULAR; INTRAVENOUS at 14:15

## 2025-06-08 RX ADMIN — INSULIN HUMAN 5 UNITS: 100 INJECTION, SOLUTION PARENTERAL at 06:58

## 2025-06-08 RX ADMIN — HYDROMORPHONE HYDROCHLORIDE 1 MG: 1 INJECTION, SOLUTION INTRAMUSCULAR; INTRAVENOUS; SUBCUTANEOUS at 08:31

## 2025-06-08 RX ADMIN — DEXTROSE MONOHYDRATE 25 G: 25 INJECTION, SOLUTION INTRAVENOUS at 16:30

## 2025-06-08 RX ADMIN — CARBIDOPA AND LEVODOPA 1 TABLET: 25; 100 TABLET ORAL at 19:58

## 2025-06-08 RX ADMIN — SODIUM CHLORIDE 1000 ML: 9 INJECTION, SOLUTION INTRAVENOUS at 07:49

## 2025-06-08 RX ADMIN — CARBIDOPA AND LEVODOPA 1 TABLET: 25; 100 TABLET ORAL at 15:35

## 2025-06-08 RX ADMIN — ACETAMINOPHEN 1000 MG: 500 TABLET ORAL at 11:02

## 2025-06-08 RX ADMIN — HYDROMORPHONE HYDROCHLORIDE 1 MG: 1 INJECTION, SOLUTION INTRAMUSCULAR; INTRAVENOUS; SUBCUTANEOUS at 06:26

## 2025-06-08 RX ADMIN — CEFTRIAXONE SODIUM 1000 MG: 10 INJECTION, POWDER, FOR SOLUTION INTRAVENOUS at 10:26

## 2025-06-08 RX ADMIN — OXYCODONE HYDROCHLORIDE 10 MG: 10 TABLET ORAL at 12:18

## 2025-06-08 RX ADMIN — MORPHINE SULFATE 4 MG: 4 INJECTION INTRAVENOUS at 05:47

## 2025-06-08 RX ADMIN — ACETAMINOPHEN 1000 MG: 500 TABLET ORAL at 17:27

## 2025-06-08 RX ADMIN — Medication 4000 UNITS: at 10:26

## 2025-06-08 ASSESSMENT — PAIN DESCRIPTION - PAIN TYPE
TYPE: ACUTE PAIN
TYPE: SURGICAL PAIN

## 2025-06-08 ASSESSMENT — LIFESTYLE VARIABLES
TOTAL SCORE: 0
AVERAGE NUMBER OF DAYS PER WEEK YOU HAVE A DRINK CONTAINING ALCOHOL: 0
CONSUMPTION TOTAL: NEGATIVE
HAVE PEOPLE ANNOYED YOU BY CRITICIZING YOUR DRINKING: NO
HAVE YOU EVER FELT YOU SHOULD CUT DOWN ON YOUR DRINKING: NO
EVER FELT BAD OR GUILTY ABOUT YOUR DRINKING: NO
ON A TYPICAL DAY WHEN YOU DRINK ALCOHOL HOW MANY DRINKS DO YOU HAVE: 0
ALCOHOL_USE: NO
TOTAL SCORE: 0
HOW MANY TIMES IN THE PAST YEAR HAVE YOU HAD 5 OR MORE DRINKS IN A DAY: 0
DOES PATIENT WANT TO STOP DRINKING: NO
TOTAL SCORE: 0
EVER HAD A DRINK FIRST THING IN THE MORNING TO STEADY YOUR NERVES TO GET RID OF A HANGOVER: NO

## 2025-06-08 ASSESSMENT — PATIENT HEALTH QUESTIONNAIRE - PHQ9
SUM OF ALL RESPONSES TO PHQ9 QUESTIONS 1 AND 2: 0
1. LITTLE INTEREST OR PLEASURE IN DOING THINGS: NOT AT ALL
2. FEELING DOWN, DEPRESSED, IRRITABLE, OR HOPELESS: NOT AT ALL

## 2025-06-08 ASSESSMENT — SOCIAL DETERMINANTS OF HEALTH (SDOH)

## 2025-06-08 ASSESSMENT — FIBROSIS 4 INDEX: FIB4 SCORE: 1.46

## 2025-06-08 NOTE — ASSESSMENT & PLAN NOTE
Chronic, has deep brain stimulator, battery implanted at chest.   Continue home carbidopa-levodopa

## 2025-06-08 NOTE — ASSESSMENT & PLAN NOTE
S/p robotic prostatectomy 5/28/2025, [place] score 7 (4+3) per pathology report confirmed to be completely removed by surgery

## 2025-06-08 NOTE — ED PROVIDER NOTES
ER Provider Note    Scribed for Chidi Ochoa Ii, M.d. by Trace Turner. 6/8/2025  5:29 AM    Primary Care Provider: Emeterio Dave M.D.    CHIEF COMPLAINT   Chief Complaint   Patient presents with    Post-Op Complications     Abdominal pain with nausea and vomiting since pt had surgery on 5/28 and the pain is progressively worsening  + dark brown vomitus (small amount)  + no stool for 4 days  + abdominal tenderness  + shortness of breath    Denied any fever, chest pain    5/28/2025: History of ROBOTIC ASSISTED LAPAROSCOPIC PROSTATECTOMY (Abdomen)  LYSIS, ADHESIONS, LAPAROSCOPIC ROBOTIC (Abdomen)        Abdominal Pain    Nausea    Shortness of Breath     EXTERNAL RECORDS REVIEWED  Reviewed surgery notes from Dr. Mcginnis of on [facility].  There was a notable hemoglobin drop following surgery but it stabilized between 7 and 8.    HPI/ROS  LIMITATION TO HISTORY   Select: : None  OUTSIDE HISTORIAN(S):  Significant other was present at bedside.    Rakan Rader III is a 60 y.o. male who presents to the ED for post-op complications onset 11 days ago. The patient explains he had prostate surgery on 5/28 due to prostate cancer that was done by Dr. Cesar. Since then, he reports having shortness of breath. He adds he has not had bowel movement for four days now and endorses generalized abdominal pain with pain primarily around his groin area. He confirms passing gas since. Earlier today, patient had a small episode of dark brown vomit, prompting his visit today. He denies fever or chest pain.     PAST MEDICAL HISTORY  Past Medical History[1]    SURGICAL HISTORY  Past Surgical History[2]    FAMILY HISTORY  Family History   Problem Relation Age of Onset    Heart Disease Mother     Hypertension Mother     Hyperlipidemia Mother     Psychiatric Illness Mother     Psychiatric Illness Father     Alcohol/Drug Father     Alcohol/Drug Sister     Sleep Apnea Neg Hx        SOCIAL HISTORY   reports that he quit smoking about 37 years  "ago. His smoking use included cigarettes. He started smoking about 42 years ago. He has a 5 pack-year smoking history. He has quit using smokeless tobacco.  His smokeless tobacco use included chew. He reports that he does not drink alcohol and does not use drugs.    CURRENT MEDICATIONS  Current Discharge Medication List        CONTINUE these medications which have NOT CHANGED    Details   oxybutynin SR (DITROPAN-XL) 10 MG CR tablet Take 10 mg by mouth every day. 2 day course dispensed 06/01/2025  COMPLETED      oxyCODONE immediate-release (ROXICODONE) 5 MG Tab Take 5 mg by mouth every 8 hours as needed for Severe Pain. 3 day supply dispensed 06/01/2025  COMPLETED      vitamin D3 (CHOLECALCIFEROL) 1000 Unit (25 mcg) Tab Take 4,000 Units by mouth 2 times a day.      Omega-3 Fatty Acids (OMEGA 3 PO) Take 1 Capsule by mouth every day.      B Complex Vitamins (B COMPLEX PO) Take 1 Tablet by mouth every day.      MAGNESIUM PO Take 1 Tablet by mouth every day.      VITAMIN K PO Take 1 Tablet by mouth every day.      ibuprofen (MOTRIN) 200 MG Tab Take 200 mg by mouth every 6 hours as needed for Mild Pain.      carbidopa-levodopa (SINEMET)  MG Tab Take 1 Tablet by mouth 3 times a day. For Parkinson's  Qty: 270 Tablet, Refills: 3    Associated Diagnoses: Parkinson's disease with dyskinesia and fluctuating manifestations (HCC)             ALLERGIES  Patient has no known allergies.    PHYSICAL EXAM  BP (!) 130/90   Pulse (!) 117   Temp 36.3 °C (97.4 °F) (Temporal)   Resp 16   Ht 1.727 m (5' 8\")   Wt 59 kg (130 lb)   SpO2 97%   BMI 19.77 kg/m²   Physical Exam  Vitals and nursing note reviewed.   Constitutional:       Comments: Ill-appearing 60-year-old man, appears to be in pain   HENT:      Head: Normocephalic and atraumatic.      Mouth/Throat:      Mouth: Mucous membranes are dry.   Eyes:      General: No scleral icterus.     Extraocular Movements: Extraocular movements intact.      Pupils: Pupils are equal, " round, and reactive to light.   Cardiovascular:      Rate and Rhythm: Regular rhythm. Tachycardia present.   Pulmonary:      Comments: Increased respiratory rate.  Lungs clear  Abdominal:      Comments: Distended and tender   Musculoskeletal:      Right lower leg: No edema.      Left lower leg: No edema.   Neurological:      General: No focal deficit present.      Mental Status: He is oriented to person, place, and time.      Motor: No weakness.   Psychiatric:      Comments: Appears to be in pain, anxious           DIAGNOSTIC STUDIES    EKG/LABS  Results for orders placed or performed during the hospital encounter of 25   EKG    Collection Time: 25  5:25 AM   Result Value Ref Range    Report       [facility]    Test Date:  2025  Pt Name:    LURDES VIZCARRA                   Department: ER  MRN:        [id number]                      Room:  Gender:     Male                         Technician: [id number]  :        [id number]                   Requested By:ER TRIAGE PROTOCOL  Order #:    [id number]                    Reading MD:    Measurements  Intervals                                Axis  Rate:       111                          P:          80  UT:         142                          QRS:        68  QRSD:       62                           T:          37  QT:         271  QTc:        368    Interpretive Statements  Poor quality data, interpretation may be affected  Sinus tachycardia  Right atrial enlargement  Consider left ventricular hypertrophy  Lateral infarct, acute  Artifact in lead(s) I,II,III,aVR,aVL,aVF,V1,V2,V3,V4,V5,V6  No previous ECG available for comparison     CBC with Differential    Collection Time: 25  5:33 AM   Result Value Ref Range    WBC 12.4 (H) 4.8 - 10.8 K/uL    RBC 3.43 (L) 4.70 - 6.10 M/uL    Hemoglobin 10.2 (L) 14.0 - 18.0 g/dL    Hematocrit 31.0 (L) 42.0 - 52.0 %    MCV 90.4 81.4 - 97.8 fL    MCH 29.7 27.0 - 33.0 pg    MCHC 32.9 32.3 -  36.5 g/dL    RDW 45.1 35.9 - 50.0 fL    Platelet Count 532 (H) 164 - 446 K/uL    MPV 9.5 9.0 - 12.9 fL    Neutrophils-Polys 95.80 (H) 44.00 - 72.00 %    Lymphocytes 1.20 (L) 22.00 - 41.00 %    Monocytes 1.90 0.00 - 13.40 %    Eosinophils 0.00 0.00 - 6.90 %    Basophils 0.20 0.00 - 1.80 %    Immature Granulocytes 0.90 0.00 - 0.90 %    Nucleated RBC 0.00 0.00 - 0.20 /100 WBC    Neutrophils (Absolute) 11.85 (H) 1.82 - 7.42 K/uL    Lymphs (Absolute) 0.15 (L) 1.00 - 4.80 K/uL    Monos (Absolute) 0.24 0.00 - 0.85 K/uL    Eos (Absolute) 0.00 0.00 - 0.51 K/uL    Baso (Absolute) 0.03 0.00 - 0.12 K/uL    Immature Granulocytes (abs) 0.11 0.00 - 0.11 K/uL    NRBC (Absolute) 0.00 K/uL   Comp Metabolic Panel    Collection Time: 06/08/25  5:33 AM   Result Value Ref Range    Sodium 130 (L) 135 - 145 mmol/L    Potassium 6.1 (H) 3.6 - 5.5 mmol/L    Chloride 94 (L) 96 - 112 mmol/L    Co2 18 (L) 20 - 33 mmol/L    Anion Gap 18.0 (H) 7.0 - 16.0    Glucose 124 (H) 65 - 99 mg/dL    Bun 74 (H) 8 - 22 mg/dL    Creatinine 4.98 (HH) 0.50 - 1.40 mg/dL    Calcium 9.8 8.5 - 10.5 mg/dL    Correct Calcium 9.9 8.5 - 10.5 mg/dL    AST(SGOT) 108 (H) 12 - 45 U/L    ALT(SGPT) 65 (H) 2 - 50 U/L    Alkaline Phosphatase 130 (H) 30 - 99 U/L    Total Bilirubin 1.3 0.1 - 1.5 mg/dL    Albumin 3.9 3.2 - 4.9 g/dL    Total Protein 8.0 6.0 - 8.2 g/dL    Globulin 4.1 (H) 1.9 - 3.5 g/dL    A-G Ratio 1.0 g/dL   proBrain Natriuretic Peptide, NT    Collection Time: 06/08/25  5:33 AM   Result Value Ref Range    NT-proBNP 317 (H) 0 - 125 pg/mL   Troponin    Collection Time: 06/08/25  5:33 AM   Result Value Ref Range    Troponin T 9 6 - 19 ng/L   LIPASE    Collection Time: 06/08/25  5:33 AM   Result Value Ref Range    Lipase 10 (L) 11 - 82 U/L   LACTIC ACID    Collection Time: 06/08/25  5:33 AM   Result Value Ref Range    Lactic Acid 2.0 0.5 - 2.0 mmol/L   ESTIMATED GFR    Collection Time: 06/08/25  5:33 AM   Result Value Ref Range    GFR (CKD-EPI) 13 (A) >60 mL/min/1.73  m 2   PTT    Collection Time: 06/08/25  5:33 AM   Result Value Ref Range    APTT 29.3 24.7 - 36.0 sec   PT/INR    Collection Time: 06/08/25  5:33 AM   Result Value Ref Range    PT 14.6 12.0 - 14.6 sec    INR 1.14 (H) 0.87 - 1.13   Blood Culture - Draw one from central line and one from peripheral site    Collection Time: 06/08/25  5:40 AM    Specimen: Line; Blood   Result Value Ref Range    Significant Indicator NEG     Source BLD     Site Peripheral     Culture Result       No Growth  Note: Blood cultures are incubated for 5 days and  are monitored continuously.Positive blood cultures  are called to the RN and reported as soon as  they are identified.     Blood Culture - Draw one from central line and one from peripheral site    Collection Time: 06/08/25  5:47 AM    Specimen: Peripheral; Blood   Result Value Ref Range    Significant Indicator NEG     Source BLD     Site PERIPHERAL     Culture Result       No Growth  Note: Blood cultures are incubated for 5 days and  are monitored continuously.Positive blood cultures  are called to the RN and reported as soon as  they are identified.     POCT glucose device results    Collection Time: 06/08/25  6:57 AM   Result Value Ref Range    POC Glucose, Blood 117 (H) 65 - 99 mg/dL   COD - Adult (Type and Screen)    Collection Time: 06/08/25  7:06 AM   Result Value Ref Range    ABO Grouping Only O     Rh Grouping Only POS     Antibody Screen-Cod NEG    POCT glucose device results    Collection Time: 06/08/25  7:31 AM   Result Value Ref Range    POC Glucose, Blood 183 (H) 65 - 99 mg/dL     I have independently interpreted this EKG    RADIOLOGY/PROCEDURES   The attending emergency physician has independently interpreted the diagnostic imaging associated with this visit and am waiting the final reading from the radiologist.   My preliminary interpretation is a follows: Free fluid in abdomen    Radiologist interpretation:  CT-ABDOMEN-PELVIS W/O   Final Result         1. 7 cm right  pelvic high density mass may be a hematoma. This displaces the bladder to the left.      2. Large free peritoneal fluid in abdomen and pelvis.      3. Bladder empty. No hydronephrosis.      4. Mild ileus-type fluid in stomach and small bowel. Fluid-filled distal esophagus suggests reflux. No colon distention. Normal appendix.      5. Possible sludge in gallbladder. No inflammation or ductal dilatation.      6. Small indeterminate 8 mm liver lesion may be a benign cyst.                  DX-CHEST-PORTABLE (1 VIEW)   Final Result         1. No acute abnormalities evident.      2. Right chest wall battery pack with wire extending to the neck.                     IR-CONSULT AND TREAT    (Results Pending)     CRITICAL CARE  The very real possibilty of a deterioration of this patient's condition required the highest level of my preparedness for sudden, emergent intervention.  I provided critical care services, which included medication orders, frequent reevaluations of the patient's condition and response to treatment, ordering and reviewing test results, and discussing the case with various consultants.  The critical care time associated with the care of the patient was 35 minutes. Review chart for interventions. This time is exclusive of any other billable procedures.       COURSE & MEDICAL DECISION MAKING     ASSESSMENT, COURSE AND PLAN  Care Narrative: This is an emergency department evaluation of a 60 year old male who presents for post-op complications. Patient had a prostatectomy done on 5/28 due to prostate cancer. Since then he has been having shortness of breath with constipation. He had a dark brown episode of vomiting today. Patient will be medicated with morphine 4 mg/ml injection 4 mg, and Bolus 0.9% infusion 1,000 mL for their symptoms. I will order for Urinalysis, lipase, troponin, proBrain Natriuretic peptide, CMP, eGFR, lactic acid, urine culture, blood culture, DX-chest-portable, CT-Abdomen-pelvis w/o  and EKG to evaluate.  Concerns for possible sepsis, renal failure, obstruction, bleeding, or other surgical complication.  I suspect there may be some underlying acidosis given his increased respiratory rate.    6:36 AM- Patient has a very abnormal metabolic panel. Creatinine 4.98, BUN 74, potassium of 6.1. Getting IV fluids. Will do insulin, glucose and calcium to treat hyperkalemia.      6:53 AM - CT was done and reviewed. It showed extensive free fluid present. Looked at labs after surgery. He had significant hemoglobin drop that stabilized. Concerns he may have further bleeding which is why we are seeing signs of organ failure. Paged for Dr. Cesar. CBC still pending.     7:30 AM  I was able to speak with Dr. Villafuerte.  He had reviewed imaging and history.  Is concerning for urine leak.  He is going to come to bedside to further assess, place urinary catheter.  Requested IR involvement for peritoneal drain.  Patient will need to be n.p.o. for potential operative intervention.    Hydration: Based on the patient's presentation of Dehydration the patient was given IV fluids. IV Hydration was used because oral hydration was not adequate alone. Upon recheck following hydration, the patient was vitals are unchanged.      CRITICAL CARE  The very real possibilty of a deterioration of this patient's condition required the highest level of my preparedness for sudden, emergent intervention.  I provided critical care services, which included medication orders, frequent reevaluations of the patient's condition and response to treatment, ordering and reviewing test results, and discussing the case with various consultants.  The critical care time associated with the care of the patient was 40 minutes. Review chart for interventions. This time is exclusive of any other billable procedures.        PROBLEM LIST  #Acute renal failure   - Looks like failure on labs, could be increased reabsorption of creatinine from urine leak as  well    -Because of dehydrated appearance, tachycardia he was given 2 L IV fluids    # Hyperkalemia   - Treated with insulin, glucose, calcium.  No EKG changes.    # Possible peritoneal urine leak from surgery    # Anemia   - Improved since discharge.  Unlikely continued bleeding    DISPOSITION AND DISCUSSIONS  I have discussed management of the patient with the following physicians and RITO's: Christo(urology), [facility] family medicine to admit, Aj (IR        Barriers to care at this time, including but not limited to: None.       DISPOSITION:  Patient will be hospitalized by Dr. Parker ([facility])    FINAL DIANGOSIS  1. Acute renal failure, unspecified acute renal failure type (HCC)    2. Hyperkalemia    3. Urine leakage from surgical incision    4. Anemia, unspecified type        I, Trace Turner (Scribben), betsey scribing for, and in the presence of, EDWIN Montenegro II.    Electronically signed by: Trace Turner (Scribben), 6/8/2025    IChidi II, M* personally performed the services described in this documentation, as scribed by Trace Turner in my presence, and it is both accurate and complete.     The note accurately reflects work and decisions made by me.  Chidi Ochoa II, M.D.  6/8/2025  9:40 AM         [1]   Past Medical History:  Diagnosis Date    Anesthesia     PONV    Anxiety     Bronchitis     Remote    Parkinson's disease (HCC)     PONV (postoperative nausea and vomiting)     Status post deep brain stimulator placement 06/2024    Followed by Dr Taylor of [facility] and Dr. Ivania Castro at [facility]    Urinary bladder disorder     Difficulty urinating   [2]   Past Surgical History:  Procedure Laterality Date    PROSTATECTOMY ROBOTIC XI N/A 5/28/2025    Procedure: ROBOTIC ASSISTED LAPAROSCOPIC PROSTATECTOMY;  Surgeon: Tj WATT M.D.;  Location: SURGERY [facility];  Service: Uro Robotic    LAPAROSCOPIC LYSIS OF ADHESIONS N/A 5/28/2025    Procedure: LYSIS, ADHESIONS,  LAPAROSCOPIC ROBOTIC;  Surgeon: Tj WATT M.D.;  Location: SURGERY [facility];  Service: Uro Robotic    OTHER  06/2024    Deep brain stimulator    INGUINAL HERNIA REPAIR ROBOTIC Bilateral 11/22/2017    Procedure: INGUINAL HERNIA REPAIR ROBOTIC/ WITH MESH PLACEMENT;  Surgeon: Zackary Ponce M.D.;  Location: [facility];  Service: General    LAMINOTOMY      c5-7    ORIF, ELBOW      right as a child    OTHER NEUROLOGICAL SURG      cervical fusion    OTHER ORTHOPEDIC SURGERY      finger surgery as child    SEPTOPLASTY, NOSE, WITH TURBINOPLASTY      SHOULDER ARTHROSCOPY W/ SLAP / LABRAL REPAIR      left    SINUSCOPY      maxillary sinuses

## 2025-06-08 NOTE — ASSESSMENT & PLAN NOTE
Incidental liver lesion 8 mm on CT this admission  - Discussed with urology, unlikely to be related to prostate cancer given path confirmation of total tumor removal  - Recommend outpatient follow-up

## 2025-06-08 NOTE — ASSESSMENT & PLAN NOTE
Acute, Resolving.  Likely atelectasis in the setting of immobilization.    Plan  - Oxygen supplementation to keep oxygen saturation greater than 90%, presently on RA with O2 available  - PT/OT for mobilization  - Incentive spirometry  - Wean oxygen as tolerated

## 2025-06-08 NOTE — ASSESSMENT & PLAN NOTE
Resolved.  Sodium 130 initially, likely hypertonic hyponatremia especially in the setting of urine ascites. Up to 143.  -Trend

## 2025-06-08 NOTE — ASSESSMENT & PLAN NOTE
Acute, improved.   Pt is post op s/p cysto, on table cystogram, complex catheterization and laparoscopic washout of hematoma and abdominal cavity on 6/10/25 with Dr. Cesar    Plan:  - DC NGT, continue regular diet with thin liquids   - Pain control with PRN Oxycodone - not requiring   - Antiemetics with Zofran  - Carafate  - PPI BID   - Bowel regimen   - Urology following, appreciate recommendations:  -Ditropan 10mg ER daily for bladder spasms  -Juarez and CRIS drain to remain for a minimum of 3 weeks and then a fluoroscopic cystogram will be performed to assess for resolution of leak.  - CRIS drain removed at bedside with Urology's approval 6/19 with subsequent copious drainage of >800mL serosanguinous fluid from drain site. Found to be clot in pulled CRIS drain tube. Painless   - Stat CT abd/pelvis with contrast ordered   - Stat IR consult placed for replacement of drain   - NPO now  -Continue Augmentin 6/14-6/24  -Anticipate discharge to SNF in 1-2 days if remains clinically stable

## 2025-06-08 NOTE — PROGRESS NOTES
4 Eyes Skin Assessment Completed by YOAN Carney and YOAN Trimble.    Head WDL  Ears WDL  Nose WDL  Mouth WDL  Neck WDL  Breast/Chest WDL  Shoulder Blades WDL  Spine WDL  (R) Arm/Elbow/Hand WDL, redness and blanching  (L) Arm/Elbow/Hand WDL, redness and blanching  Abdomen surgical incision and bruising  Groin WDL  Scrotum/Coccyx/Buttocks WDL  (R) Leg WDL  (L) Leg WDL  (R) Heel/Foot/Toe Redness and Blanching  (L) Heel/Foot/Toe Redness and Blanching          Devices In Places Tele Box, Blood Pressure Cuff, and Pulse Ox      Interventions In Place Pillows    Possible Skin Injury No    Pictures Uploaded Into Epic Yes  Wound Consult Placed N/A  RN Wound Prevention Protocol Ordered No

## 2025-06-08 NOTE — ED NOTES
Medication history reviewed with PT at bedside    [facility] is complete per PT reporting    Allergies reviewed.     Patient denies any outpatient antibiotics in the last 30 days.     Patient is not taking anticoagulants.    Dispense history is partially available in Epic.    PT reports that he has been hold his supplements as instructed for a surgery procedure he had on 05/28/2025.    Preferred pharmacy for this encounter - [facility] on [address] ([phone])

## 2025-06-08 NOTE — ED TRIAGE NOTES
Chief Complaint   Patient presents with    Post-Op Complications     Abdominal pain with nausea and vomiting since pt had surgery on 5/28 and the pain is progressively worsening  + dark brown vomitus (small amount)  + no stool for 4 days  + abdominal tenderness  + shortness of breath    Denied any fever, chest pain    5/28/2025: History of ROBOTIC ASSISTED LAPAROSCOPIC PROSTATECTOMY (Abdomen)  LYSIS, ADHESIONS, LAPAROSCOPIC ROBOTIC (Abdomen)        Abdominal Pain    Nausea    Shortness of Breath     Appearance: pt appeared weak and ill  Pain:  9/10  Ambulatory:  on wheelchair  Alert and Oriented: x 4  Oxygen Treatment: No    Pt came in to triage for the above complaints accompanied by significant other    Pt is speaking in full sentences, follows commands and responds appropriately to questions.     Respirations are even and unlabored.    Pt placed in lobby. Pt educated on triage process.     Pt encouraged to inform staff for any changes in condition or if needs help while waiting to be room in.    Vitals:    06/08/25 0503   BP: (!) 130/90   Pulse: (!) 117   Resp: 16   Temp: 36.3 °C (97.4 °F)   SpO2: 97%

## 2025-06-08 NOTE — ASSESSMENT & PLAN NOTE
Acute, resolving. SIRS criteria identified on my evaluation include:  Tachycardia, with heart rate greater than 90 BPM, Tachypnea, with respirations greater than 20 per minute, and Leukocytosis, with WBC greater than 12,000  WBC 12.4 on admission, also with tachycardia, increased RR, on 2L O2.     Plan:  -Trend vitals q4h  -Follow blood cultures: NGTD at 72 hours   -Continue IV Zosyn at least 48 hours post-operatively   -Urinalysis with culture: NGTD   -Abdominal fluid culture: NGTD

## 2025-06-08 NOTE — OP REPORT
DATE OF SERVICE:  06/08/2025     NAME OF OPERATION:  Bedside cystoscopy with placement of Juarez catheter over   wire.     PREOPERATIVE DIAGNOSIS:  Urinary leak post prostatectomy.     POSTOPERATIVE DIAGNOSIS:  Urinary leak post prostatectomy.     PRIMARY SURGEON:  Johny Todd MD     ANESTHESIOLOGIST:  IV pain medicine and local anesthesia.     INDICATIONS:  Briefly, the patient is a 60-year-old gentleman status post   radical prostatectomy with an apparent urine leak.  His surgery was 10 days   ago.  There is a significant amount of urinary ascites by CT scan.  I have   recommended direct visual placement of catheter to which the patient consents.     OPERATION IN DETAIL: The patient was laid supine.  Genitals were prepped and   draped sterilely.  A 15-English flexible cystoscope was passed into the   urethra.  Urethra was within normal limits.  Membranous urethra was intact.    Just inside, I could see the bladder without any evidence of significant   anastomotic disruption.  Wire was passed under direct visual inspection into   the bladder.  Over the wire, a 16-English Sun'aq-tip catheter passed easily   and the wire was removed.  The balloon inflated and he was placed to gravity   drainage.     He tolerated the procedure well and will be admitted for ongoing medical   attention for his conditions.        ______________________________  Johny Todd MD MCM/MCKENZIE    DD:  06/08/2025 09:01  DT:  06/08/2025 10:13    Job#:  [id number]

## 2025-06-08 NOTE — OR NURSING
Pt presents to CT 4.  Pt placed on monitor, prepped and draped in a sterile fashion. Vitals were taken every 5 minutes and remained stable during procedure (see doc flow sheet for results). CO2 waveform capnography was monitored and remained WNL throughout procedure. Report called to floor RN. Pt transported by stretcher with RN to floor.     Specimen: fluid culture in styringe hand delivered to lab.    [facility] [id number]  REF: [id number]  LOT: [id number]  EXP: 3-21-28

## 2025-06-08 NOTE — CONSULTS
DATE OF SERVICE:  06/08/2025     UROLOGY CONSULT NOTE     Urology service was consulted by Dr. Mejia of the Emergency Department for   advice and opinion regarding this postoperative patient with apparent urinary   ascites.     HISTORY OF PRESENT ILLNESS: The patient is 60 years old and suffers from   Parkinson's and was recently diagnosed with prostate cancer for which he   underwent a robot-assisted laparoscopic prostatectomy on 05/28/2025 with Dr. Cesar.  The patient's postoperative course was somewhat remarkable for a   right-sided bruising and hemoglobin drop consistent with acute postoperative   bleed.  His anemia stabilized prior to discharge.  His Juarez catheter was   removed in typical fashion approximately 1 week postoperatively.  Since that   time, the patient states he has only been dribbling into diapers.  It appears   he has not been having any volitional voids.  He has also had gradual   increasing abdominal distention and abdominal pain, for which he presented   today to the Emergency Department.     CT scan revealed fairly significant ascites and a right pelvic hematoma with a   moderately decompressed bladder.     For a detailed account of the patient's past medical, surgical, social   history, and review of systems, please see Dr. Mejia's history and physical   data today in the patient's chart.     He does have a deep brain stimulator for his Parkinson's disease.     FOCUSED PHYSICAL EXAMINATION:  GENERAL:  Gentleman lying in a hospital [facility], in no acute distress.  VITAL SIGNS:  Pulse 115.  Blood pressure 155/74.  Temperature 36.3.  ABDOMEN:  Abdomen is slightly distended and tense.  Abdominal incisions are in   place and are not leaking.  He has a condom catheter with a small amount of   yellow urine having drained into a drainage bag.     LABORATORY DATA:  Hemoglobin 10.2, hematocrit 31 (up from 7.9/23.8 one week   ago).  White blood cell count 12.4, creatinine 4.98, potassium 6.1,  sodium   130.     DIAGNOSTIC DATA:  CT scan images as above.     IMPRESSION AND PLAN:  A 60-year-old gentleman approximately 10 days postop   from robotic assisted laparoscopic radical prostatectomy.  Since his catheter   removal, he has had difficulty voiding.  It appears that he has had a leak of   urine into his abdominal cavity as suggested by the rising creatinine and the   CT scan findings.  I explained our intention to place a Juarez catheter as well   as an abdominal drain, to be performed by Interventional Radiology.  Given   the concern for disruption of the anastomosis, I have recommended that a   catheter be placed under direct visual inspection with bedside cystoscopy.    The patient and his family member are with him at the bedside expressed   adequate understanding of our plan and wished to proceed.  The procedure will   be dictated under a separate note.  Ultimately, the catheter was placed   successfully without any evidence of significant disruption to the   anastomosis.  He will be admitted for ongoing observation, placement of drain   by IR, and Juarez catheter drainage.  We will also recommend beginning him on   broad spectrum antibiotics anticipating drain placement.        ______________________________  Johny Todd MD MCM/MCKENZIE    DD:  06/08/2025 08:59  DT:  06/08/2025 12:09    Job#:  [id number]

## 2025-06-08 NOTE — ASSESSMENT & PLAN NOTE
Acute, resolving.  Potassium 6.1 in ED, treated with calcium gluconate and insulin with dextrose, Lokelma initiated overnight,  Repeat K to 5.0, discontinue Lokelma. Progress to 4.7    Plan:   -Trend potassium  - Trend renal function

## 2025-06-08 NOTE — PROGRESS NOTES
Transported patient from ED to his room. AO x4, afebrile, not in distress. Vital signs taken and recorded. Connected to tele monitor, four eyes completed. Bed alarms connected and at lowest position. Safety precautions in place, patient and family updated with POC.

## 2025-06-08 NOTE — ASSESSMENT & PLAN NOTE
Resolved. WBC 12.4 on admission, also with tachycardia, increased RR, on 2L O2. WBC now 5.2, on RA > 3.8  Suspect reactive    Plan:   - Follow infectious workup, Blood cx and fluid cx NGTD  - Trend leukocytosis

## 2025-06-08 NOTE — ASSESSMENT & PLAN NOTE
Acute blood loss anemia in the setting of robotic prostatectomy 5/28, hemoglobin postoperatively ~7. Now up to 10, downtrended throughout hospitalization slowly. Extensive flank and LE bruising. Abdominal drain has minimal output  Currently on heparin for VTE prophylaxis    Plan:  -Trend H&H, transfusion threshold less than 7

## 2025-06-08 NOTE — ASSESSMENT & PLAN NOTE
History of tobacco use as a former smoker at least 30 years ago, 5 pack years.  Currently chewing tobacco, last use 1 week ago.  Patient reports minimal cravings.  - Can order nicotine patch if needed for cravings  - Tobacco use cessation counseling

## 2025-06-08 NOTE — OR SURGEON
Immediate Post- Operative Note        Findings: Ascites, suspected urine leak following prostatectomy.       Procedure(s): CT guided abdominal drain placement.       Estimated Blood Loss: Less than 5 ml        Complications: None            6/8/2025     1424 PM     Manuel Rocha M.D.

## 2025-06-08 NOTE — CONSULTS
UROLOGY CONSULT (dictated)    Requested by:    Reason:  urinary ascites    Recommendations:  luz catheter placement and IR placement of abdominal drain.

## 2025-06-08 NOTE — ED NOTES
Urologist at bedside to place luz catheter using a scope. Pt requesting pain meds prior to insertion. Urologist Dr. Todd gave orders for dilaudid. Medicated pt per MAR.  Luz placed by urologist.

## 2025-06-08 NOTE — H&P
FAMILY MEDICINE HISTORY AND PHYSICAL       PATIENT ID:  NAME:  Rakan Rader  MRN:               [id number]  YOB: 1965    Date of Admission: 6/8/2025     Attending: Albertina Parker M.d.     Resident: Porsha Dye D.O. (PGY-2)    Primary Care Physician:  Emeterio Dave M.D.    CC:    Chief Complaint   Patient presents with    Post-Op Complications     Abdominal pain with nausea and vomiting since pt had surgery on 5/28 and the pain is progressively worsening  + dark brown vomitus (small amount)  + no stool for 4 days  + abdominal tenderness  + shortness of breath    Denied any fever, chest pain    5/28/2025: History of ROBOTIC ASSISTED LAPAROSCOPIC PROSTATECTOMY (Abdomen)  LYSIS, ADHESIONS, LAPAROSCOPIC ROBOTIC (Abdomen)        Abdominal Pain    Nausea    Shortness of Breath       HPI: Rakan Rader is a 60 y.o. male with a history of of PD with brain stimulator who presented with abdominal pain.     Patient underwent robotic prostatectomy on 5/28 with Dr. Cesar for prostate cancer. He states that he has had intermittent abdominal pain since then and it has gotten progressive worse over the past 3-4 days. He has had associated nausea and decreased appetite, has not had a BM for 4 days but has not had anything to eat for the past 3 days. He has been urinating, wearing depends due to some incontinence which he states is chronic d/t PD. Some hematuria which he was told was normal post operatively. He denies fever. Did have a couple episodes of small emesis today that were dark brown, prompting them to come to the ED.     Patient currently reports 3-4/10 abdominal pain presently. Denies any current chest pain but has been short of breath over the past few days. He denies any history of clots or clotting disorders. Does not take any blood thinners. No history of cardiac disease.     Has been taking daily medications including oxycodone post operatively.     ER Course:  In the ED, patient's vital  signs revealed tachycardia up to 117, pt afebrile, RR 16-46, O2 initially on RA but ultimately placed on 3.5L.   Labs were remarkable for leukocytosis to 12.4, hemoglobin 10.2 from 7.4, platelet count 532, sodium 130, potassium 6.1, chloride 94, CO2 18, anion gap 18, glucose 124, BUN 74 and creatinine acutely 4.98, , ALT 65, , , troponin 9, lipase negative, LA 2.0. Blood cultures obtained.     CXR negative and CT showed 7 cm right pelvic high density mass may be a hematoma. This displaces the bladder to the left. Large free peritoneal fluid in abdomen and pelvis. Bladder empty. No hydronephrosis. Mild ileus-type fluid in stomach and small bowel. Fluid-filled distal esophagus suggests reflux. No colon distention. Normal appendix. Possible sludge in gallbladder. No inflammation or ductal dilatation. Small indeterminate 8 mm liver lesion may be a benign cyst.     Urology was consulted in the ED as well as IR for possible surgical intervention given free fluid and hematoma in the abdomen.     In ED the patient received 2L IVF, calcium gluconate 2g, insulin 5u with dextrose, 1 dose of dilaudid.     REVIEW OF SYSTEMS:   Ten systems reviewed and were negative except as noted in the HPI.                PAST MEDICAL HISTORY:   has a past medical history of Anesthesia, Anxiety, Bronchitis, Parkinson's disease (HCC), PONV (postoperative nausea and vomiting), Status post deep brain stimulator placement (06/2024), and Urinary bladder disorder.     PAST SURGICAL HISTORY:   has a past surgical history that includes orif, elbow; septoplasty, nose, with turbinoplasty; sinuscopy; other neurological surg; shoulder arthroscopy w/ slap / labral repair; other orthopedic surgery; inguinal hernia repair robotic (Bilateral, 11/22/2017); laminotomy; other (06/2024); prostatectomy robotic xi (N/A, 5/28/2025); and laparoscopic lysis of adhesions (N/A, 5/28/2025).     FAMILY HISTORY:  family history includes Alcohol/Drug in  "his father and sister; Heart Disease in his mother; Hyperlipidemia in his mother; Hypertension in his mother; Psychiatric Illness in his father and mother.     SOCIAL HISTORY:   Living Situation: Lives in [place] with Roommate   Smoking: History of smoking 5 years, quit 30 years ago. Currently chews tobacco, has not used nicotine in 1 week.   Etoh: Denies  Recreational Drug: Denies     DIET:   Orders Placed This Encounter   Procedures    Diet NPO Restrict to: Sips with Medications     Standing Status:   Standing     Number of Occurrences:   1     Diet NPO Restrict to::   Sips with Medications [3]       ALLERGIES:  Allergies[1]    OUTPATIENT MEDICATIONS:  Medications - Current, Listed Continuously[2]    PHYSICAL EXAM:  Vitals:    25 0800 25 0900 25 0944 25 0952   BP: (!) 161/95 (!) 151/89 (!) 158/104    Pulse: (!) 117 (!) 115     Resp: 18 13     Temp:    36.5 °C (97.7 °F)   TempSrc:   Temporal Temporal   SpO2: 97% 97% 98%    Weight:   58 kg (127 lb 13.9 oz)    Height:   1.727 m (5' 8\")    , Temp (24hrs), Av.4 °C (97.6 °F), Min:36.3 °C (97.4 °F), Max:36.5 °C (97.7 °F)  , Pulse Oximetry: 98 %, O2 (LPM): 2, O2 Delivery Device: Nasal Cannula    Physical Exam  Constitutional:       Appearance: He is ill-appearing (pale, drowsy). He is not diaphoretic.   HENT:      Head: Normocephalic and atraumatic.      Nose: Nose normal. No congestion.      Mouth/Throat:      Mouth: Mucous membranes are moist.      Pharynx: Oropharynx is clear.   Eyes:      General: No scleral icterus.     Conjunctiva/sclera: Conjunctivae normal.      Pupils: Pupils are equal, round, and reactive to light.   Cardiovascular:      Rate and Rhythm: Tachycardia present.      Heart sounds: No murmur heard.  Pulmonary:      Effort: Pulmonary effort is normal.      Breath sounds: No wheezing, rhonchi or rales.   Abdominal:      General: There is no distension.      Tenderness: There is abdominal tenderness (diffuse). There is guarding. "      Comments: rigid   Musculoskeletal:         General: No swelling, tenderness or deformity.   Skin:     General: Skin is warm and dry.      Capillary Refill: Capillary refill takes less than 2 seconds.   Neurological:      General: No focal deficit present.         LAB TESTS:   Results for orders placed or performed during the hospital encounter of 06/08/25   CBC with Differential    Collection Time: 06/08/25  5:33 AM   Result Value Ref Range    WBC 12.4 (H) 4.8 - 10.8 K/uL    RBC 3.43 (L) 4.70 - 6.10 M/uL    Hemoglobin 10.2 (L) 14.0 - 18.0 g/dL    Hematocrit 31.0 (L) 42.0 - 52.0 %    MCV 90.4 81.4 - 97.8 fL    MCH 29.7 27.0 - 33.0 pg    MCHC 32.9 32.3 - 36.5 g/dL    RDW 45.1 35.9 - 50.0 fL    Platelet Count 532 (H) 164 - 446 K/uL    MPV 9.5 9.0 - 12.9 fL    Neutrophils-Polys 95.80 (H) 44.00 - 72.00 %    Lymphocytes 1.20 (L) 22.00 - 41.00 %    Monocytes 1.90 0.00 - 13.40 %    Eosinophils 0.00 0.00 - 6.90 %    Basophils 0.20 0.00 - 1.80 %    Immature Granulocytes 0.90 0.00 - 0.90 %    Nucleated RBC 0.00 0.00 - 0.20 /100 WBC    Neutrophils (Absolute) 11.85 (H) 1.82 - 7.42 K/uL    Lymphs (Absolute) 0.15 (L) 1.00 - 4.80 K/uL    Monos (Absolute) 0.24 0.00 - 0.85 K/uL    Eos (Absolute) 0.00 0.00 - 0.51 K/uL    Baso (Absolute) 0.03 0.00 - 0.12 K/uL    Immature Granulocytes (abs) 0.11 0.00 - 0.11 K/uL    NRBC (Absolute) 0.00 K/uL   Comp Metabolic Panel    Collection Time: 06/08/25  5:33 AM   Result Value Ref Range    Sodium 130 (L) 135 - 145 mmol/L    Potassium 6.1 (H) 3.6 - 5.5 mmol/L    Chloride 94 (L) 96 - 112 mmol/L    Co2 18 (L) 20 - 33 mmol/L    Anion Gap 18.0 (H) 7.0 - 16.0    Glucose 124 (H) 65 - 99 mg/dL    Bun 74 (H) 8 - 22 mg/dL    Creatinine 4.98 (HH) 0.50 - 1.40 mg/dL    Calcium 9.8 8.5 - 10.5 mg/dL    Correct Calcium 9.9 8.5 - 10.5 mg/dL    AST(SGOT) 108 (H) 12 - 45 U/L    ALT(SGPT) 65 (H) 2 - 50 U/L    Alkaline Phosphatase 130 (H) 30 - 99 U/L    Total Bilirubin 1.3 0.1 - 1.5 mg/dL    Albumin 3.9 3.2 - 4.9  g/dL    Total Protein 8.0 6.0 - 8.2 g/dL    Globulin 4.1 (H) 1.9 - 3.5 g/dL    A-G Ratio 1.0 g/dL   proBrain Natriuretic Peptide, NT    Collection Time: 06/08/25  5:33 AM   Result Value Ref Range    NT-proBNP 317 (H) 0 - 125 pg/mL   Troponin    Collection Time: 06/08/25  5:33 AM   Result Value Ref Range    Troponin T 9 6 - 19 ng/L   LIPASE    Collection Time: 06/08/25  5:33 AM   Result Value Ref Range    Lipase 10 (L) 11 - 82 U/L   LACTIC ACID    Collection Time: 06/08/25  5:33 AM   Result Value Ref Range    Lactic Acid 2.0 0.5 - 2.0 mmol/L   ESTIMATED GFR    Collection Time: 06/08/25  5:33 AM   Result Value Ref Range    GFR (CKD-EPI) 13 (A) >60 mL/min/1.73 m 2   PTT    Collection Time: 06/08/25  5:33 AM   Result Value Ref Range    APTT 29.3 24.7 - 36.0 sec   PT/INR    Collection Time: 06/08/25  5:33 AM   Result Value Ref Range    PT 14.6 12.0 - 14.6 sec    INR 1.14 (H) 0.87 - 1.13   Blood Culture - Draw one from central line and one from peripheral site    Collection Time: 06/08/25  5:40 AM    Specimen: Line; Blood   Result Value Ref Range    Significant Indicator NEG     Source BLD     Site Peripheral     Culture Result       No Growth  Note: Blood cultures are incubated for 5 days and  are monitored continuously.Positive blood cultures  are called to the RN and reported as soon as  they are identified.     Blood Culture - Draw one from central line and one from peripheral site    Collection Time: 06/08/25  5:47 AM    Specimen: Peripheral; Blood   Result Value Ref Range    Significant Indicator NEG     Source BLD     Site PERIPHERAL     Culture Result       No Growth  Note: Blood cultures are incubated for 5 days and  are monitored continuously.Positive blood cultures  are called to the RN and reported as soon as  they are identified.     POCT glucose device results    Collection Time: 06/08/25  6:57 AM   Result Value Ref Range    POC Glucose, Blood 117 (H) 65 - 99 mg/dL   COD - Adult (Type and Screen)     Collection Time: 25  7:06 AM   Result Value Ref Range    ABO Grouping Only O     Rh Grouping Only POS     Antibody Screen-Cod NEG    POCT glucose device results    Collection Time: 25  7:31 AM   Result Value Ref Range    POC Glucose, Blood 183 (H) 65 - 99 mg/dL   EKG    Collection Time: 25  9:35 AM   Result Value Ref Range    Report       [facility] Emergency Dept.    Test Date:  2025  Pt Name:    LURDES VIZCARRA                   Department: ER  MRN:        [id number]                      Room:       [facility]  Gender:     Male                         Technician: [id number]  :        1965                   Requested By:ER TRIAGE PROTOCOL  Order #:    [id number]                    Reading MD: Chidi Ochoa II, MD    Measurements  Intervals                                Axis  Rate:       111                          P:          80  SD:         142                          QRS:        68  QRSD:       62                           T:          37  QT:         271  QTc:        368    Interpretive Statements  Poor quality data, interpretation may be affected  Sinus tachycardia  Right atrial enlargement  Consider left ventricular hypertrophy  Lateral infarct, acute. No peak twaves  Impression: sinus tachycardia.  Artifact in V1 likely secondary to nerve  stimulator  No previous ECG available for comparison   Electronically Signed On 2025 09:35:56 PDT by Chidi Ochoa II, MD          CULTURES:   Results       Procedure Component Value Units Date/Time    Blood Culture - Draw one from central line and one from peripheral site [[id number]] Collected: 25 0547    Order Status: Completed Specimen: Blood from Peripheral Updated: 25 08     Significant Indicator NEG     Source BLD     Site PERIPHERAL     Culture Result No Growth  Note: Blood cultures are incubated for 5 days and  are monitored continuously.Positive blood cultures  are called to the RN and reported as soon  as  they are identified.      Blood Culture - Draw one from central line and one from peripheral site [[id number]] Collected: 06/08/25 0540    Order Status: Completed Specimen: Blood from Line Updated: 06/08/25 0808     Significant Indicator NEG     Source BLD     Site Peripheral     Culture Result No Growth  Note: Blood cultures are incubated for 5 days and  are monitored continuously.Positive blood cultures  are called to the RN and reported as soon as  they are identified.      Urinalysis [[id number]]     Order Status: Canceled Specimen: Urine, Clean Catch     Urine Culture (New) [[id number]]     Order Status: Sent Specimen: Urine, Clean Catch     URINALYSIS [[id number]]     Order Status: Sent Specimen: Urine             IMAGES:  CT-ABDOMEN-PELVIS W/O   Final Result         1. 7 cm right pelvic high density mass may be a hematoma. This displaces the bladder to the left.      2. Large free peritoneal fluid in abdomen and pelvis.      3. Bladder empty. No hydronephrosis.      4. Mild ileus-type fluid in stomach and small bowel. Fluid-filled distal esophagus suggests reflux. No colon distention. Normal appendix.      5. Possible sludge in gallbladder. No inflammation or ductal dilatation.      6. Small indeterminate 8 mm liver lesion may be a benign cyst.                  DX-CHEST-PORTABLE (1 VIEW)   Final Result         1. No acute abnormalities evident.      2. Right chest wall battery pack with wire extending to the neck.                     IR-CONSULT AND TREAT    (Results Pending)        CONSULTS:   Urology  IR    ASSESSMENT/PLAN:   60 y.o. male admitted for:       * Abdominal pain  Assessment & Plan  Worsening generalized abdominal pain for the past 3 to 4 days in the setting of robotic prostatectomy 5/28/2025 for prostate cancer.  CT shows 7 cm right pelvic mass, likely hematoma with peritoneal free fluid.  No hydronephrosis.  Mild ileus appearing findings in the stomach and small bowel.  Incidental 8 mm liver  lesion noted.  Pain likely secondary to post operative hematoma, now with development of urine ascites likely due to urinary tract trauma/irritation from hematoma leading pseudo renal failure.     Plan:  -Serial abdominal exams  -IR consult for peritoneal drain  -Urology following, appreciate ongoing recommendations  -Continue luz catheter  -Pain control with oxycodone, IV dilaudid     SIRS (systemic inflammatory response syndrome) (HCC)  Assessment & Plan  SIRS criteria identified on my evaluation include:  Tachycardia, with heart rate greater than 90 BPM, Tachypnea, with respirations greater than 20 per minute, and Leukocytosis, with WBC greater than 12,000  SIRS is non-infectious, the patient does not have sepsis  WBC 12.4 on admission, also with tachycardia, increased RR, on 2L O2.   Sepsis protocol initiated in the ED but low suspicion for infectious source at his time   Pt received 2L IVF in ED  Blood cultures x2 drawn  Ceftriaxone ordered by Urology   CXR neg  CT with 7cm mass, likely hematoma R pelvis with free fluid in the pelvis     Plan:  -Trend vitals q4h  -Follow blood cultures  -Continue IV C3 for now  -Urinalysis with culture     Acute hypoxic respiratory failure (HCC)  Assessment & Plan  Acute, on room air initially in the ED but then placed up to 4 L.  Currently on 2 L by nasal cannula.  Not on any oxygen at home.  Leukocytosis 12.4, trop neg, EKG tachycardia  BNP 300s, no prior   Chest x-ray negative  Well score for PE 4 points for tachycardia, immobilization/surgery in the previous 4 weeks, and malignancy within the past 6 months (prostate cancer s/p prostatectomy)   Patient was shortness of breath in the ED, difficult to get a deep breath.  Likely secondary to abdominal pain causing respiratory splinting.    Plan  -Admit to telemetry  - Oxygen supplementation to keep oxygen saturation greater than 90%, presently on 2 L per nasal cannula  - RT consult, continuous oxygen saturation  - Low  threshold for CT PE if remains tachycardic or with ongoing oxygen saturation or decompensation  - Wean oxygen as tolerated    Anemia  Assessment & Plan  Acute blood loss anemia in the setting of robotic prostatectomy 5/28, hemoglobin postoperatively ~7. Last hemoglobin 1 week ago at 7.4.   Hemoglobin in ED 10.2  CT with 7cm mass in the right pelvis, likely hematoma which fits picture of bleeding  Bleeding appears otherwise stable at this time with improvement in hemoglobin however patient tachycardic which also could be secondary to pain and peritoneal fluid with urine ascites  Blood pressure stable    Plan:  -Trend H&H, transfusion threshold less than 7  -Vital signs every 4 hours    Hyperkalemia  Assessment & Plan  Acute.  Potassium 6.1 in ED, treated with calcium gluconate and insulin with dextrose.  -Trend potassium  - Trend renal function    Acute renal failure (ARF) (HCC)- (present on admission)  Assessment & Plan  Acute renal failure with creatinine 4.98 in the setting of robotic prostatectomy on 5/28 secondary to prostate cancer.   BUN/creatinine ratio 14.8, intrarenal versus postrenal. CT abdomen shows 7cm mass likely hematoma in the right pelvis, displaces left bladder. Possibly causing obstructive picture. Per ERP some concern from urology regarding urine leak leading to urine ascited if mass abuts and has irritated the ureter.   Did consider intravascular depletion due to dehydration and/or post operative bleeding given post-op anemia with hemoglobin of ~7, though improved to 10.2 today. Acute postoperative anemia supports likely hematoma formation.   ARF with concomitant anion gap acidosis and hyperkalemia.   IVF, insulin and dextrose, calcium gluconate given in ED.     Plan:  -Admit to telemetry   -Continue LR @ 100cc/hr   -Urology consulted in ED, Dr. Johny Todd recommends luz catheter placement and IR placement of abdominal drain   -Appreciate ongoing urology recommendations   -Trend renal  "function  -Trend electrolytes; calcium gluconate, insulin with dextrose as needed for hyperkalemia   -No indication for dialysis at this time given suspected cause  -Continue to monitor     Increased anion gap metabolic acidosis  Assessment & Plan  Acute, likely due to ARF.   Lactate 2.0   Trend BMP         Hyponatremia- (present on admission)  Assessment & Plan  Acute.  Sodium 130 from 138 1 week ago.  Less likely postoperative hyponatremia, more likely hypertonic hyponatremia especially in the setting of urine ascites.   -Trend    Thrombocytosis  Assessment & Plan  Likely reactive in the setting of recent surgery and acute illness  -Trend     Leukocytosis  Assessment & Plan  WBC 12.4 on admission, also with tachycardia, increased RR, on 2L O2.   See \"Sirs\" above  Suspect reactive  Follow infectious workup  Trend leukocytosis     Prostate cancer (HCC)- (present on admission)  Assessment & Plan  S/p robotic prostatectomy 5/28/2025    History of tobacco abuse- (present on admission)  Assessment & Plan  History of tobacco use as a former smoker at least 30 years ago, 5 pack years.  Currently chewing tobacco, last use 1 week ago.  Patient reports minimal cravings.  - Can order nicotine patch if needed for cravings  - Tobacco use cessation counseling    Liver lesion  Assessment & Plan  Incidental liver lesion 8 mm on CT this admission  - Recommend outpatient follow-up    Parkinson's disease with dyskinesia and fluctuating manifestations (HCC)- (present on admission)  Assessment & Plan  Chronic, has deep brain stimulator, battery implanted at chest.   Continue home carbidopa-levodopa           Core Measures:  Fluids: LR @100cc/hr   Lines: PIV   Abx: C3  Diet: NPO  PPX: SCDs    Dispo: inpatient     CODE Status: Full Code      Porsha Dye D.O.   PGY-2  [facility]            [1] No Known Allergies  [2]   Current Facility-Administered Medications:     acetaminophen (Tylenol) tablet 1,000 mg, 1,000 mg, Oral, " Q6HRS **FOLLOWED BY** [START ON 6/13/2025] acetaminophen (Tylenol) tablet 1,000 mg, 1,000 mg, Oral, Q6HRS PRN, AMAN GarzaO.    oxyCODONE immediate-release (Roxicodone) tablet 5 mg, 5 mg, Oral, Q3HRS PRN **OR** oxyCODONE immediate release (Roxicodone) tablet 10 mg, 10 mg, Oral, Q3HRS PRN **OR** HYDROmorphone (Dilaudid) injection 0.5 mg, 0.5 mg, Intravenous, Q3HRS PRN, AMAN GarzaO.    ondansetron (Zofran) syringe/vial injection 4 mg, 4 mg, Intravenous, Q4HRS PRN, KARL Garza.O.    carbidopa-levodopa (Sinemet)  MG tablet 1 Tablet, 1 Tablet, Oral, TID, Siena Oakley D.O., 1 Tablet at 06/08/25 1026    vitamin D3 (Cholecalciferol) tablet 4,000 Units, 4,000 Units, Oral, DAILY, Siena Oakley D.O., 4,000 Units at 06/08/25 1026    cefTRIAXone (Rocephin) syringe 1,000 mg, 1,000 mg, Intravenous, Q24HRS, Johny Todd M.D., 1,000 mg at 06/08/25 1026

## 2025-06-09 LAB
ANION GAP SERPL CALC-SCNC: 14 MMOL/L (ref 7–16)
BASOPHILS # BLD AUTO: 0 % (ref 0–1.8)
BASOPHILS # BLD: 0 K/UL (ref 0–0.12)
BUN SERPL-MCNC: 40 MG/DL (ref 8–22)
CALCIUM SERPL-MCNC: 8.7 MG/DL (ref 8.5–10.5)
CHLORIDE SERPL-SCNC: 100 MMOL/L (ref 96–112)
CO2 SERPL-SCNC: 21 MMOL/L (ref 20–33)
CREAT SERPL-MCNC: 1.19 MG/DL (ref 0.5–1.4)
CREAT UR-MCNC: 16.8 MG/DL
EOSINOPHIL # BLD AUTO: 0 K/UL (ref 0–0.51)
EOSINOPHIL NFR BLD: 0 % (ref 0–6.9)
ERYTHROCYTE [DISTWIDTH] IN BLOOD BY AUTOMATED COUNT: 45.5 FL (ref 35.9–50)
GFR SERPLBLD CREATININE-BSD FMLA CKD-EPI: 71 ML/MIN/1.73 M 2
GLUCOSE SERPL-MCNC: 95 MG/DL (ref 65–99)
HCT VFR BLD AUTO: 29.8 % (ref 42–52)
HGB BLD-MCNC: 9.8 G/DL (ref 14–18)
LYMPHOCYTES # BLD AUTO: 0.05 K/UL (ref 1–4.8)
LYMPHOCYTES NFR BLD: 0.9 % (ref 22–41)
MANUAL DIFF BLD: NORMAL
MCH RBC QN AUTO: 30.2 PG (ref 27–33)
MCHC RBC AUTO-ENTMCNC: 32.9 G/DL (ref 32.3–36.5)
MCV RBC AUTO: 91.7 FL (ref 81.4–97.8)
MONOCYTES # BLD AUTO: 0.1 K/UL (ref 0–0.85)
MONOCYTES NFR BLD AUTO: 2.6 % (ref 0–13.4)
MORPHOLOGY BLD-IMP: NORMAL
NEUTROPHILS # BLD AUTO: 5.11 K/UL (ref 1.82–7.42)
NEUTROPHILS NFR BLD: 93.9 % (ref 44–72)
NEUTS BAND NFR BLD MANUAL: 2.6 % (ref 0–10)
NRBC # BLD AUTO: 0 K/UL
NRBC BLD-RTO: 0 /100 WBC (ref 0–0.2)
PLATELET # BLD AUTO: 397 K/UL (ref 164–446)
PLATELET BLD QL SMEAR: NORMAL
PMV BLD AUTO: 9.5 FL (ref 9–12.9)
POTASSIUM SERPL-SCNC: 5 MMOL/L (ref 3.6–5.5)
PROCALCITONIN SERPL-MCNC: 1.66 NG/ML
RBC # BLD AUTO: 3.25 M/UL (ref 4.7–6.1)
RBC BLD AUTO: NORMAL
SODIUM SERPL-SCNC: 135 MMOL/L (ref 135–145)
WBC # BLD AUTO: 5.3 K/UL (ref 4.8–10.8)

## 2025-06-09 PROCEDURE — 700101 HCHG RX REV CODE 250: Performed by: NURSE PRACTITIONER

## 2025-06-09 PROCEDURE — 700102 HCHG RX REV CODE 250 W/ 637 OVERRIDE(OP)

## 2025-06-09 PROCEDURE — A9270 NON-COVERED ITEM OR SERVICE: HCPCS

## 2025-06-09 PROCEDURE — 85027 COMPLETE CBC AUTOMATED: CPT

## 2025-06-09 PROCEDURE — 36415 COLL VENOUS BLD VENIPUNCTURE: CPT

## 2025-06-09 PROCEDURE — 99232 SBSQ HOSP IP/OBS MODERATE 35: CPT | Mod: GC | Performed by: FAMILY MEDICINE

## 2025-06-09 PROCEDURE — 84145 PROCALCITONIN (PCT): CPT

## 2025-06-09 PROCEDURE — 700105 HCHG RX REV CODE 258: Performed by: FAMILY MEDICINE

## 2025-06-09 PROCEDURE — 700111 HCHG RX REV CODE 636 W/ 250 OVERRIDE (IP): Mod: JZ

## 2025-06-09 PROCEDURE — 80048 BASIC METABOLIC PNL TOTAL CA: CPT

## 2025-06-09 PROCEDURE — 700111 HCHG RX REV CODE 636 W/ 250 OVERRIDE (IP): Mod: JZ | Performed by: FAMILY MEDICINE

## 2025-06-09 PROCEDURE — 770020 HCHG ROOM/CARE - TELE (206)

## 2025-06-09 PROCEDURE — 82570 ASSAY OF URINE CREATININE: CPT

## 2025-06-09 PROCEDURE — 85007 BL SMEAR W/DIFF WBC COUNT: CPT

## 2025-06-09 PROCEDURE — 97162 PT EVAL MOD COMPLEX 30 MIN: CPT

## 2025-06-09 PROCEDURE — 97167 OT EVAL HIGH COMPLEX 60 MIN: CPT

## 2025-06-09 RX ORDER — SODIUM CHLORIDE 0.9 % (FLUSH) 0.9 %
10 SYRINGE (ML) INJECTION TWICE DAILY
Status: DISCONTINUED | OUTPATIENT
Start: 2025-06-09 | End: 2025-06-15

## 2025-06-09 RX ORDER — AMOXICILLIN 250 MG
2 CAPSULE ORAL EVERY EVENING
Status: DISCONTINUED | OUTPATIENT
Start: 2025-06-09 | End: 2025-06-12

## 2025-06-09 RX ORDER — ONDANSETRON 2 MG/ML
4 INJECTION INTRAMUSCULAR; INTRAVENOUS EVERY 4 HOURS PRN
Status: CANCELLED | OUTPATIENT
Start: 2025-06-09

## 2025-06-09 RX ORDER — POLYETHYLENE GLYCOL 3350 17 G/17G
1 POWDER, FOR SOLUTION ORAL DAILY
Status: DISCONTINUED | OUTPATIENT
Start: 2025-06-09 | End: 2025-06-12

## 2025-06-09 RX ORDER — BACLOFEN 10 MG/1
5 TABLET ORAL 3 TIMES DAILY PRN
Status: DISCONTINUED | OUTPATIENT
Start: 2025-06-09 | End: 2025-06-17

## 2025-06-09 RX ADMIN — OXYCODONE 5 MG: 5 TABLET ORAL at 04:46

## 2025-06-09 RX ADMIN — PIPERACILLIN AND TAZOBACTAM 4.5 G: 4; .5 INJECTION, POWDER, FOR SOLUTION INTRAVENOUS at 21:59

## 2025-06-09 RX ADMIN — CARBIDOPA AND LEVODOPA 1 TABLET: 25; 100 TABLET ORAL at 15:37

## 2025-06-09 RX ADMIN — BACLOFEN 5 MG: 10 TABLET ORAL at 17:49

## 2025-06-09 RX ADMIN — CARBIDOPA AND LEVODOPA 1 TABLET: 25; 100 TABLET ORAL at 21:52

## 2025-06-09 RX ADMIN — Medication 4000 UNITS: at 04:46

## 2025-06-09 RX ADMIN — ACETAMINOPHEN 1000 MG: 500 TABLET ORAL at 21:52

## 2025-06-09 RX ADMIN — SODIUM CHLORIDE, PRESERVATIVE FREE 10 ML: 5 INJECTION INTRAVENOUS at 17:50

## 2025-06-09 RX ADMIN — SODIUM ZIRCONIUM CYCLOSILICATE 10 G: 10 POWDER, FOR SUSPENSION ORAL at 00:11

## 2025-06-09 RX ADMIN — CARBIDOPA AND LEVODOPA 1 TABLET: 25; 100 TABLET ORAL at 09:55

## 2025-06-09 RX ADMIN — ONDANSETRON 4 MG: 2 INJECTION INTRAMUSCULAR; INTRAVENOUS at 07:57

## 2025-06-09 RX ADMIN — SODIUM ZIRCONIUM CYCLOSILICATE 10 G: 10 POWDER, FOR SUSPENSION ORAL at 07:52

## 2025-06-09 RX ADMIN — HYDROMORPHONE HYDROCHLORIDE 0.5 MG: 1 INJECTION, SOLUTION INTRAMUSCULAR; INTRAVENOUS; SUBCUTANEOUS at 07:57

## 2025-06-09 RX ADMIN — PIPERACILLIN AND TAZOBACTAM 4.5 G: 4; .5 INJECTION, POWDER, FOR SOLUTION INTRAVENOUS at 14:37

## 2025-06-09 RX ADMIN — PIPERACILLIN AND TAZOBACTAM 4.5 G: 4; .5 INJECTION, POWDER, FOR SOLUTION INTRAVENOUS at 04:52

## 2025-06-09 RX ADMIN — POLYETHYLENE GLYCOL 3350 1 PACKET: 17 POWDER, FOR SOLUTION ORAL at 09:55

## 2025-06-09 RX ADMIN — ACETAMINOPHEN 1000 MG: 500 TABLET ORAL at 02:15

## 2025-06-09 RX ADMIN — DOCUSATE SODIUM 50 MG AND SENNOSIDES 8.6 MG 2 TABLET: 8.6; 5 TABLET, FILM COATED ORAL at 17:49

## 2025-06-09 RX ADMIN — ACETAMINOPHEN 1000 MG: 500 TABLET ORAL at 09:55

## 2025-06-09 RX ADMIN — HYDROMORPHONE HYDROCHLORIDE 0.5 MG: 1 INJECTION, SOLUTION INTRAMUSCULAR; INTRAVENOUS; SUBCUTANEOUS at 01:36

## 2025-06-09 RX ADMIN — ACETAMINOPHEN 1000 MG: 500 TABLET ORAL at 15:37

## 2025-06-09 ASSESSMENT — PAIN DESCRIPTION - PAIN TYPE
TYPE: ACUTE PAIN
TYPE: ACUTE PAIN;SURGICAL PAIN
TYPE: ACUTE PAIN
TYPE: ACUTE PAIN

## 2025-06-09 ASSESSMENT — COGNITIVE AND FUNCTIONAL STATUS - GENERAL
DAILY ACTIVITIY SCORE: 15
MOBILITY SCORE: 21
TURNING FROM BACK TO SIDE WHILE IN FLAT BAD: A LITTLE
EATING MEALS: A LITTLE
PERSONAL GROOMING: A LITTLE
DRESSING REGULAR UPPER BODY CLOTHING: A LITTLE
MOVING FROM LYING ON BACK TO SITTING ON SIDE OF FLAT BED: A LITTLE
HELP NEEDED FOR BATHING: A LOT
DRESSING REGULAR LOWER BODY CLOTHING: A LOT
CLIMB 3 TO 5 STEPS WITH RAILING: A LITTLE
SUGGESTED CMS G CODE MODIFIER DAILY ACTIVITY: CK
TOILETING: A LOT
SUGGESTED CMS G CODE MODIFIER MOBILITY: CJ

## 2025-06-09 ASSESSMENT — ENCOUNTER SYMPTOMS
CHILLS: 0
NAUSEA: 0
PALPITATIONS: 0
FEVER: 0
SHORTNESS OF BREATH: 0
WEAKNESS: 0
HEADACHES: 0
ABDOMINAL PAIN: 1
VOMITING: 0

## 2025-06-09 ASSESSMENT — GAIT ASSESSMENTS
GAIT LEVEL OF ASSIST: STANDBY ASSIST
ASSISTIVE DEVICE: FRONT WHEEL WALKER
DISTANCE (FEET): 100

## 2025-06-09 ASSESSMENT — ACTIVITIES OF DAILY LIVING (ADL): TOILETING: INDEPENDENT

## 2025-06-09 ASSESSMENT — FIBROSIS 4 INDEX: FIB4 SCORE: 1.46

## 2025-06-09 NOTE — THERAPY
Occupational Therapy   Initial Evaluation     Patient Name:  Rakan Rader III  Age:  60 y.o., Sex:  male  Medical Record #:  [id number]  Today's Date:  6/9/2025     Precautions  Medical: Fall Risk    Assessment    Patient is 60 y.o. male admitted with abdominal pain after undergoing RA laparoscopic prostatectomy found to have urinary ascites, R pelvic hematoma now s/p IR drain and catheter placement. Pt normally independent with functional mobility and ADLs living in a [facility] with roommate who patient states can assist with IADLs. Pt required Kevin for bed mobility and transfers, maxA for LB ADLs. Will continue to see for skilled therapy while admitted as well as recommend post-acute placement.     Plan    Occupational Therapy Initial Treatment Plan   Treatment Interventions: (P) Self Care / Activities of Daily Living, Adaptive Equipment, Community Re-Integration, Manual Therapy Techniques, Neuro Re-Education / Balance, Therapeutic Exercises, Therapeutic Activity, Family / Caregiver Training  Treatment Frequency: (P) 3 Times per Week  Duration: (P) Until Therapy Goals Met    DC Equipment Recommendations: (P) Unable to determine at this time  Discharge Recommendations: (P) Recommend post-acute placement for additional occupational therapy services prior to discharge home     Objective       06/09/25 1024   Prior Living Situation   Prior Services Home-Independent   Housing / Facility 1 Story House   Steps Into Home 1   Bathroom Set up Walk In Shower;Shower Chair   Equipment Owned Front-Wheel Walker;Single Point Cane;Tub / Shower Seat   Lives with - Patient's Self Care Capacity Unrelated Adult   Prior Level of ADL Function   Self Feeding Independent   Grooming / Hygiene Independent   Bathing Independent   Dressing Independent   Toileting Independent   Prior Level of IADL Function   Medication Management Independent   Laundry Independent   Kitchen Mobility Independent   Finances Independent   Home Management Independent    Shopping Independent   Prior Level Of Mobility Independent Without Device in Community   Precautions   Medical Fall Risk   Vitals   O2 Delivery Device None - Room Air   Pain 0 - 10 Group   Therapist Pain Assessment Post Activity Pain Same as Prior to Activity;Nurse Notified   Cognition    Cognition / Consciousness X   Attention Impaired   Initiation Impaired   Active ROM Upper Body   Active ROM Upper Body  WDL   Strength Upper Body   Upper Body Strength  X   Gross Strength Generalized Weakness, Equal Bilaterally.    Sensation Upper Body   Upper Extremity Sensation  WDL   Upper Body Muscle Tone   Upper Body Muscle Tone  WDL   Neurological Concerns   Neurological Concerns No   Coordination Upper Body   Coordination WDL   Balance Assessment   Sitting Balance (Static) Fair   Sitting Balance (Dynamic) Fair   Standing Balance (Static) Fair   Standing Balance (Dynamic) Fair -   Weight Shift Sitting Fair   Weight Shift Standing Fair   Comments w/ FWW   Bed Mobility    Supine to Sit Minimal Assist   Scooting Minimal Assist   Rolling Minimal Assist to Rt.   ADL Assessment   Grooming Supervision;Seated   Upper Body Dressing Supervision   Lower Body Dressing Maximal Assist   Toileting Maximal Assist   How much help from another person does the patient currently need...   Putting on and taking off regular lower body clothing? 2   Bathing (including washing, rinsing, and drying)? 2   Toileting, which includes using a toilet, bedpan, or urinal? 2   Putting on and taking off regular upper body clothing? 3   Taking care of personal grooming such as brushing teeth? 3   Eating meals? 3   6 Clicks Daily Activity Score 15   Functional Mobility   Sit to Stand Contact Guard Assist   Bed, Chair, Wheelchair Transfer Contact Guard Assist   Transfer Method Stand Step   Mobility bed mobility, seated ADLs, mobility, left in chair   Comments w/ FWW   Activity Tolerance   Sitting in Chair left seated in chair   Standing 10 min   Short Term  Goals   Short Term Goal # 1 Pt will complete ADL transfers with supervision   Short Term Goal # 2 Pt will complete LB dressing with supervision   Short Term Goal # 3 Pt will complete toileting with supervision   Education Group   Education Provided Role of Occupational Therapist   Role of Occupational Therapist Patient Response Patient;Acceptance;Explanation   Occupational Therapy Initial Treatment Plan    Treatment Interventions Self Care / Activities of Daily Living;Adaptive Equipment;Community Re-Integration;Manual Therapy Techniques;Neuro Re-Education / Balance;Therapeutic Exercises;Therapeutic Activity;Family / Caregiver Training   Treatment Frequency 3 Times per Week   Duration Until Therapy Goals Met   Problem List   Problem List Decreased Active Daily Living Skills;Decreased Homemaking Skills;Decreased Upper Extremity Strength Right;Decreased Upper Extremity Strength Left;Decreased Functional Mobility;Decreased Activity Tolerance;Safety Awareness Deficits / Cognition;Impaired Postural Control / Balance;Impaired Cognitive Function;Impaired Posture / Trunk Alignment   Anticipated Discharge Equipment and Recommendations   DC Equipment Recommendations Unable to determine at this time   Discharge Recommendations Recommend post-acute placement for additional occupational therapy services prior to discharge home   Interdisciplinary Plan of Care Collaboration   IDT Collaboration with  Nursing;Physical Therapist   Patient Position at End of Therapy Seated;Chair Alarm On;Call Light within Reach;Tray Table within Reach;Phone within Reach   Collaboration Comments RN updated

## 2025-06-09 NOTE — CARE PLAN
The patient is Stable - Low risk of patient condition declining or worsening    Shift Goals  Clinical Goals: abx therapy, pain control, VSS  Patient Goals: rest, feel better  Family Goals: updates    Progress made toward(s) clinical / shift goals:      Problem: Pain - Standard  Goal: Alleviation of pain or a reduction in pain to the patient’s comfort goal  Outcome: Progressing     Problem: Knowledge Deficit - Standard  Goal: Patient and family/care givers will demonstrate understanding of plan of care, disease process/condition, diagnostic tests and medications  Outcome: Progressing     Problem: Fall Risk  Goal: Patient will remain free from falls  Outcome: Progressing       Patient is not progressing towards the following goals:

## 2025-06-09 NOTE — PROGRESS NOTES
Attending:   New Mccullough M.d.    Resident:   Siena Oakley D.O.    PATIENT:   Rakan Rader; [id number]; 1965    ID:   60 y.o. male with hx Parkinson's Disease with DBS, prostate cancer presented with abdominal pain after underwent RA laparoscopic prostatectomy 5/28; found to have urinary ascites and 7cm R pelvic hematoma    SUBJECTIVE:   No acute events overnight, noted to continue to have significant drain output from peritoneal drain placed yesterday.     This morning he reports continued abdominal pain, worse due to persistent hiccups, and difficulty taking a deep breath due to pain however this is mildly improved compared to yesterday. He reports feeling nauseous and had not had a BM since Thurs, he reports passing minimal flatus.       Hospital Course:  6/8: admitted, Urology placed luz, IR placed drain    OBJECTIVE:  Vitals:    06/09/25 0125 06/09/25 0356 06/09/25 0802 06/09/25 1230   BP: 139/83 135/83 136/84 (!) 129/94   Pulse: 99 94 (!) 105 99   Resp: 18 18 18 18   Temp: 36.9 °C (98.4 °F) 37.4 °C (99.3 °F) 37.3 °C (99.2 °F) 37.1 °C (98.8 °F)   TempSrc: Temporal Temporal Temporal Temporal   SpO2: 93% 94% 94% 94%   Weight:  57.2 kg (126 lb 1.7 oz)     Height:           Intake/Output Summary (Last 24 hours) at 6/9/2025 1308  Last data filed at 6/9/2025 1219  Gross per 24 hour   Intake 840 ml   Output 5480 ml   Net -4640 ml       PHYSICAL EXAM:  General: No acute distress, afebrile, resting   HEENT: NC/AT. EOMI.   Cardiovascular: RRR without murmurs. Normal capillary refill   Respiratory: CTAB  Abdomen: softer compared to prior, nondistended, no masses, diffuse tenderness to palpation  EXT:  RODRIGUEZ, no edema  Skin: No erythema/lesions   Neuro: Non-focal    LABS:  Recent Labs     06/08/25  0533 06/09/25  0437   WBC 12.4* 5.3   RBC 3.43* 3.25*   HEMOGLOBIN 10.2* 9.8*   HEMATOCRIT 31.0* 29.8*   MCV 90.4 91.7   MCH 29.7 30.2   RDW 45.1 45.5   PLATELETCT 532* 397   MPV 9.5 9.5   NEUTSPOLYS 95.80* 93.90*    LYMPHOCYTES 1.20* 0.90*   MONOCYTES 1.90 2.60   EOSINOPHILS 0.00 0.00   BASOPHILS 0.20 0.00   RBCMORPHOLO  --  Normal     Recent Labs     06/08/25  0533 06/08/25  1516 06/09/25  0437   SODIUM 130* 133* 135   POTASSIUM 6.1* 5.7* 5.0   CHLORIDE 94* 102 100   CO2 18* 22 21   BUN 74* 61* 40*   CREATININE 4.98* 2.48* 1.19   CALCIUM 9.8 9.0 8.7   ALBUMIN 3.9  --   --      Estimated GFR/CRCL = Estimated Creatinine Clearance: 54.7 mL/min (by C-G formula based on SCr of 1.19 mg/dL).  Recent Labs     06/08/25 0533 06/08/25 1516 06/09/25  0437   GLUCOSE 124* 123* 95     Recent Labs     06/08/25 0533   ASTSGOT 108*   ALTSGPT 65*   TBILIRUBIN 1.3   ALKPHOSPHAT 130*   GLOBULIN 4.1*   INR 1.14*             Recent Labs     06/08/25 0533   INR 1.14*   APTT 29.3         IMAGING:  CT-ABDOMEN-PELVIS W/O   Final Result         1. 7 cm right pelvic high density mass may be a hematoma. This displaces the bladder to the left.      2. Large free peritoneal fluid in abdomen and pelvis.      3. Bladder empty. No hydronephrosis.      4. Mild ileus-type fluid in stomach and small bowel. Fluid-filled distal esophagus suggests reflux. No colon distention. Normal appendix.      5. Possible sludge in gallbladder. No inflammation or ductal dilatation.      6. Small indeterminate 8 mm liver lesion may be a benign cyst.                  DX-CHEST-PORTABLE (1 VIEW)   Final Result         1. No acute abnormalities evident.      2. Right chest wall battery pack with wire extending to the neck.                     CT-IMAGE-GUIDED DRAIN PERITONEAL    (Results Pending)       MEDS:  Current Facility-Administered Medications   Medication Last Admin    senna-docusate (Pericolace Or Senokot S) 8.6-50 MG per tablet 2 Tablet      And    polyethylene glycol/lytes (Miralax) Packet 1 Packet 1 Packet at 06/09/25 0955    acetaminophen (Tylenol) tablet 1,000 mg 1,000 mg at 06/09/25 0955    Followed by    [START ON 6/13/2025] acetaminophen (Tylenol) tablet 1,000 mg       oxyCODONE immediate-release (Roxicodone) tablet 5 mg 5 mg at 06/09/25 0446    Or    oxyCODONE immediate release (Roxicodone) tablet 10 mg 10 mg at 06/08/25 1218    Or    HYDROmorphone (Dilaudid) injection 0.5 mg 0.5 mg at 06/09/25 0757    ondansetron (Zofran) syringe/vial injection 4 mg 4 mg at 06/09/25 0757    carbidopa-levodopa (Sinemet)  MG tablet 1 Tablet 1 Tablet at 06/09/25 0955    vitamin D3 (Cholecalciferol) tablet 4,000 Units 4,000 Units at 06/09/25 0446    piperacillin-tazobactam (Zosyn) 4.5 g in  mL IVPB Stopped at 06/09/25 0852       ASSESSMENT/PLAN:    * Abdominal pain  Assessment & Plan  Worsening generalized abdominal pain for the past 3 to 4 days in the setting of robotic prostatectomy 5/28/2025 for prostate cancer.  CT shows 7 cm right pelvic mass, likely hematoma with peritoneal free fluid.  No hydronephrosis.  Mild ileus appearing findings in the stomach and small bowel.  Incidental 8 mm liver lesion noted.  Pain likely secondary to post operative hematoma, now with development of urine ascites likely due to urinary tract trauma/irritation from hematoma leading pseudo renal failure. S/p IR drain placement    Plan:  -Serial abdominal exams  -IR to manage drain, appreciate ongoing recommendations  -Urology following, appreciate ongoing recommendations   - To undergo cystogram 6/10  -Continue luz catheter  -Pain control with oxycodone, IV dilaudid     SIRS (systemic inflammatory response syndrome) (HCC)  Assessment & Plan  Acute, resolving. SIRS criteria identified on my evaluation include:  Tachycardia, with heart rate greater than 90 BPM, Tachypnea, with respirations greater than 20 per minute, and Leukocytosis, with WBC greater than 12,000  SIRS is non-infectious, the patient does not have sepsis  WBC 12.4 on admission, also with tachycardia, increased RR, on 2L O2.   Sepsis protocol initiated in the ED but low suspicion for infectious source at his time   Pt received 2L IVF in  ED  Blood cultures x2 drawn  Ceftriaxone ordered by Urology   CXR neg  CT with 7cm mass, likely hematoma R pelvis with free fluid in the pelvis     Plan:  -Trend vitals q4h  -Follow blood cultures  -Continue IV Zosyn  -Urinalysis with culture   -Abdominal fluid culture    Liver lesion  Assessment & Plan  Incidental liver lesion 8 mm on CT this admission  - Recommend outpatient follow-up    Acute hypoxic respiratory failure (HCC)  Assessment & Plan  Acute, Resolving.  on room air initially in the ED but then placed up to 4 L, down to 2 L by nasal cannula.  Not on any oxygen at home. Back to RA with good saturations and O2 available.   Leukocytosis 12.4, trop neg, EKG tachycardia  BNP 300s, no prior   Chest x-ray negative  Well score for PE 4 points for tachycardia, immobilization/surgery in the previous 4 weeks, and malignancy within the past 6 months (prostate cancer s/p prostatectomy)   Patient was shortness of breath in the ED, difficult to get a deep breath.  Likely secondary to abdominal pain causing respiratory splinting.    Plan  - Oxygen supplementation to keep oxygen saturation greater than 90%, presently on RA with O2 available  - RT consult, continuous oxygen saturation  - Low threshold for CT PE if remains tachycardic or with ongoing oxygen saturation or decompensation  - Wean oxygen as tolerated    Increased anion gap metabolic acidosis  Assessment & Plan  Resolved, Acute, likely due to ARF. Lactate 2.0   - Trend BMP         Hyponatremia- (present on admission)  Assessment & Plan  Acute, Improving.  Sodium 130 from 138 1 week ago.  Less likely postoperative hyponatremia, more likely hypertonic hyponatremia especially in the setting of urine ascites. Up to 135 hospital day 1  -Trend    Thrombocytosis  Assessment & Plan  Likely reactive in the setting of recent surgery and acute illness  -Trend     Anemia  Assessment & Plan  Acute blood loss anemia in the setting of robotic prostatectomy 5/28, hemoglobin  postoperatively ~7. Last hemoglobin 1 week ago at 7.4.   Hemoglobin in ED 10.2 > 9.8  CT with 7cm mass in the right pelvis, likely hematoma which fits picture of bleeding  Bleeding appears otherwise stable at this time with improvement in hemoglobin however patient tachycardic which also could be secondary to pain and peritoneal fluid with urine ascites  Blood pressure stable    Plan:  -Trend H&H, transfusion threshold less than 7  -Vital signs every 4 hours  - Monitor abdominal drain output    Leukocytosis  Assessment & Plan  Resolved. WBC 12.4 on admission, also with tachycardia, increased RR, on 2L O2. WBC now 5.2, on RA  Suspect reactive    Plan:   - Follow infectious workup  - Trend leukocytosis     Hyperkalemia  Assessment & Plan  Acute, resolving.  Potassium 6.1 in ED, treated with calcium gluconate and insulin with dextrose, Lokelma initiated overnight,  Repeat K to 5.0, discontinue Lokelma    Plan:   -Trend potassium  - Trend renal function    Acute renal failure (ARF) (HCC)- (present on admission)  Assessment & Plan  Improving, Acute renal failure with creatinine 4.98 in the setting of robotic prostatectomy on 5/28 secondary to prostate cancer.   BUN/creatinine ratio 14.8, intrarenal versus postrenal. CT abdomen shows 7cm mass likely hematoma in the right pelvis, displaces left bladder. Possibly causing obstructive picture. Per ERP some concern from urology regarding urine leak leading to urine ascited if mass abuts and has irritated the ureter.   Did consider intravascular depletion due to dehydration and/or post operative bleeding given post-op anemia with hemoglobin of ~7, though improved to 10.2 today. Acute postoperative anemia supports likely hematoma formation.   ARF with concomitant anion gap acidosis and hyperkalemia.   IVF, insulin and dextrose, calcium gluconate given in ED.  Cr improved to 1.19     Plan:  -PO fluid hydration  -Appreciate ongoing urology recommendations   -Trend renal  function  -Trend electrolytes  -No indication for dialysis at this time given suspected cause  -Continue to monitor     Prostate cancer (HCC)- (present on admission)  Assessment & Plan  S/p robotic prostatectomy 5/28/2025    History of tobacco abuse- (present on admission)  Assessment & Plan  History of tobacco use as a former smoker at least 30 years ago, 5 pack years.  Currently chewing tobacco, last use 1 week ago.  Patient reports minimal cravings.  - Can order nicotine patch if needed for cravings  - Tobacco use cessation counseling    Parkinson's disease with dyskinesia and fluctuating manifestations (HCC)- (present on admission)  Assessment & Plan  Chronic, has deep brain stimulator, battery implanted at chest.   Continue home carbidopa-levodopa          Core Measures:  Fluids: PO  Lines: PIV, abdominal drain  Abx: Zosyn  Diet: Clear Liquid  PPX: SCDs, held chemoprophylaxis  Wound care: none    DISPO: Inpatient pending further management of abdominal fluid      CODE STATUS: Full Code    Siena Oakley D.O.  PGY-1  [facility]

## 2025-06-09 NOTE — DISCHARGE PLANNING
Care Transition Team Assessment  Patient is a 60 year-old male admitted for post op complications. Please see pt's H&P for prior medical history. RNCM met with pt at bedside to complete assessment. Pt A&Ox4 and able to verify the information on the face sheet. Pt lives with roommate in a single-story. Emergency contact is sister Nora Chacon [phone]. Prior to admission patient is independent with ADL's and IADL’s. Pt has a FWW, drives, denies home O2 use. Pt reported that S/O is good support. Pt reports, pt is emplyed FT with [employer] and makes about $1600/mo. The patient's PCP is Dr. Dave. Patient's preferred pharmacy is [facility]. Patient denies a history of SNF/IPR nor HHC use in the past. Pt denies any SA or MH concerns. Patients confirmed medical coverage with Medicare and [employer]. Patient has means to transportation and girlfriend will provide transport once medically stable for discharge. RNCM discussed discharge planning. Patient reported pt's goal is to return home once medically stable.      Information Source  Orientation Level: Oriented X4  Information Given By: Patient  Who is responsible for making decisions for patient? : Patient    Readmission Evaluation  Is this a readmission?: Yes - unplanned readmission  Why do you think you were readmitted?: post op complications    Elopement Risk  Legal Hold: No  Ambulatory or Self Mobile in Wheelchair: No-Not an Elopement Risk    Interdisciplinary Discharge Planning  Patient or legal guardian wants to designate a caregiver: No    Discharge Preparedness  What is your plan after discharge?: Home with help  What are your discharge supports?: Partner  Prior Functional Level: Ambulatory, Drives Self, Independent with Activities of Daily Living, Independent with Medication Management, Uses Walker  Difficulity with ADLs: Walking  Difficulty with ADLs Comment: fww  Difficulity with IADLs: None    Functional Assesment  Prior Functional Level:  Ambulatory, Drives Self, Independent with Activities of Daily Living, Independent with Medication Management, Uses Walker    Finances  Financial Barriers to Discharge: No    Vision / Hearing Impairment  Vision Impairment : Yes  Right Eye Vision: Impaired, Wears Glasses  Left Eye Vision: Impaired, Wears Glasses  Hearing Impairment : No    Advance Directive  Advance Directive?: DPOA for Health Care  Durable Power of  Name and Contact : per patient, sister Nora Chacon [phone]    Domestic Abuse  Have you ever been the victim of abuse or violence?: No  Possible Abuse/Neglect Reported to:: Not Applicable    Psychological Assessment  History of Substance Abuse: None  History of Psychiatric Problems: No  Non-compliant with Treatment: No  Newly Diagnosed Illness: No    Anticipated Discharge Information  Discharge Disposition: Discharged to home/self care (01)  Discharge Address: [facility] [address] [place] [address]  Discharge Contact Phone Number: [phone]  Case Management Discharge Planning    Admission Date: 6/8/2025  GMLOS: 4.8  ALOS: 1    6-Clicks ADL Score:    6-Clicks Mobility Score:        Anticipated Discharge Dispo: Discharge Disposition: Discharged to home/self care (01)  Discharge Address: [facility] [address] [place] [address]  Discharge Contact Phone Number: [phone]    DME Needed: No    Action(s) Taken: DC Assessment Complete (See below)    Escalations Completed: None    Medically Clear: No    Next Steps: RNCM to continue to follow patient for DCP needs.     Barriers to Discharge: Medical clearance    Is the patient up for discharge tomorrow: No

## 2025-06-09 NOTE — PROGRESS NOTES
Radiology Progress Note   Author: BA Sanchez Date & Time created: 6/9/2025  9:27 AM   Date of admission  6/8/2025  Note to reader: this note follows the APSO format rather than the historical SOAP format. Assessment and plan located at the top of the note for ease of use.    Chief Complaint  60 y.o. male admitted 6/8/2025 with   Chief Complaint   Patient presents with    Post-Op Complications     Abdominal pain with nausea and vomiting since pt had surgery on 5/28 and the pain is progressively worsening  + dark brown vomitus (small amount)  + no stool for 4 days  + abdominal tenderness  + shortness of breath    Denied any fever, chest pain    5/28/2025: History of ROBOTIC ASSISTED LAPAROSCOPIC PROSTATECTOMY (Abdomen)  LYSIS, ADHESIONS, LAPAROSCOPIC ROBOTIC (Abdomen)        Abdominal Pain    Nausea    Shortness of Breath         HPI  60-year-old male with past medical history significant for Parkinson's disease, prostate cancer s/p robotic prostatectomy 05/28/25 with Dr. Cesar presented with intermittent abdominal pain x 3 to 4 days.  CT revealed abdominal ascites and a 7 cm right pelvic mass concerning for hematoma.  Urology consulted for suspected urinary leak post prostatectomy and performed bedside cystoscopy with Juarez catheter placement over wire. IR was then consulted and patient underwent abdominal drain placement with IR Dr Rocha on 06/08/25.      Interval History:   06/09/25 - Abdominal drain to RLQ with 2900 mL serosanguineous output in the last 24 hours.  All labs reviewed by me including WBC 5.3, H&H 9.8/29.8, creatinine 1.19. Cultures pending; NGTD.  On [facility] medical team.  I reviewed all IDT notes, flushed drain with 10 mL NS, placed drain to gravity bag, and discussed plan of care with surgical urology NETTIE Larsen.     Assessment/Plan     Principal Problem:    Abdominal pain  Active Problems:    Parkinson's disease with dyskinesia and fluctuating manifestations (HCC)    History of  tobacco abuse    Prostate cancer (HCC)    Acute renal failure (ARF) (HCC)    Hyperkalemia    Leukocytosis    Anemia    Thrombocytosis    Hyponatremia    Increased anion gap metabolic acidosis    Acute hypoxic respiratory failure (HCC)    Liver lesion    SIRS (systemic inflammatory response syndrome) (HCC)      Plan IR  - Order placed to irrigate RLQ drain with 10 ml of sterile saline each shift  - Fluid cultures pending; NGTD  - Fluid creatinine pending  - Urology following   - Consider follow up CT scan in 5-7 days (06/13) if output decreases  - Continue to monitor drains, VS, and labs    -  -Thank you for allowing Interventional Radiology team to participate in the patients care, if any additional care or requests are needed in the future please do not hesitate to call or place IR order           Review of Systems  Physical Exam   Review of Systems   Constitutional:  Positive for malaise/fatigue. Negative for chills and fever.   Respiratory:  Negative for shortness of breath.    Cardiovascular:  Negative for chest pain and palpitations.   Gastrointestinal:  Positive for abdominal pain. Negative for nausea and vomiting.   Neurological:  Negative for weakness and headaches.      Vitals:    06/09/25 0802   BP: 136/84   Pulse: (!) 105   Resp: 18   Temp: 37.3 °C (99.2 °F)   SpO2: 94%        Physical Exam  Vitals and nursing note reviewed.   Constitutional:       General: He is not in acute distress.  Cardiovascular:      Rate and Rhythm: Tachycardia present.   Pulmonary:      Effort: Pulmonary effort is normal. No respiratory distress.   Abdominal:      General: There is no distension.      Tenderness: There is abdominal tenderness.      Comments: IR drain to RLQ   Skin:     General: Skin is warm and dry.   Neurological:      General: No focal deficit present.      Mental Status: He is alert and oriented to person, place, and time.   Psychiatric:         Mood and Affect: Mood normal.         Behavior: Behavior normal.  "            Labs    Recent Labs     06/08/25 0533 06/09/25  0437   WBC 12.4* 5.3   RBC 3.43* 3.25*   HEMOGLOBIN 10.2* 9.8*   HEMATOCRIT 31.0* 29.8*   MCV 90.4 91.7   MCH 29.7 30.2   MCHC 32.9 32.9   RDW 45.1 45.5   PLATELETCT 532* 397   MPV 9.5 9.5     Recent Labs     06/08/25 0533 06/08/25  1516   SODIUM 130* 133*   POTASSIUM 6.1* 5.7*   CHLORIDE 94* 102   CO2 18* 22   GLUCOSE 124* 123*   BUN 74* 61*   CREATININE 4.98* 2.48*   CALCIUM 9.8 9.0     Recent Labs     06/08/25 0533 06/08/25  1516   ALBUMIN 3.9  --    TBILIRUBIN 1.3  --    ALKPHOSPHAT 130*  --    TOTPROTEIN 8.0  --    ALTSGPT 65*  --    ASTSGOT 108*  --    CREATININE 4.98* 2.48*     CT-ABDOMEN-PELVIS W/O   Final Result         1. 7 cm right pelvic high density mass may be a hematoma. This displaces the bladder to the left.      2. Large free peritoneal fluid in abdomen and pelvis.      3. Bladder empty. No hydronephrosis.      4. Mild ileus-type fluid in stomach and small bowel. Fluid-filled distal esophagus suggests reflux. No colon distention. Normal appendix.      5. Possible sludge in gallbladder. No inflammation or ductal dilatation.      6. Small indeterminate 8 mm liver lesion may be a benign cyst.                  DX-CHEST-PORTABLE (1 VIEW)   Final Result         1. No acute abnormalities evident.      2. Right chest wall battery pack with wire extending to the neck.                     CT-IMAGE-GUIDED DRAIN PERITONEAL    (Results Pending)     INR   Date Value Ref Range Status   06/08/2025 1.14 (H) 0.87 - 1.13 Final     Comment:     INR - Non-therapeutic Reference Range: 0.87-1.13  INR - Therapeutic Reference Range: 2.0-4.0       No results found for: \"POCINR\"     Intake/Output Summary (Last 24 hours) at 6/9/2025 0927  Last data filed at 6/9/2025 0822  Gross per 24 hour   Intake 840 ml   Output 3100 ml   Net -2260 ml      I have personally reviewed the above labs and imaging      I have performed a physical exam and reviewed and updated ROS and " Plan today (6/9/2025).     54 minutes in directly providing and coordinating care and extensive data review.  No time overlap and excludes procedures.    Consent 2/Introductory Paragraph: Mohs surgery was explained to the patient and consent was obtained. The risks, benefits and alternatives to therapy were discussed in detail. Specifically, the risks of infection, scarring, bleeding, prolonged wound healing, incomplete removal, allergy to anesthesia, nerve injury and recurrence were addressed. Prior to the procedure, the treatment site was clearly identified and confirmed by the patient. All components of Universal Protocol/PAUSE Rule completed.

## 2025-06-09 NOTE — THERAPY
Physical Therapy   Initial Evaluation     Patient Name:  Rakan Rader III  Age:  60 y.o., Sex:  male  Medical Record #:  [id number]  Today's Date: 6/9/2025          Assessment  Patient is 60 y.o. male w/ hx of Parkinson's w/ brain stim, robotic prostatectomy on 5/28/2025 2/2 prostate CA.  Admitted w/ abdominal pain since his sx in May.  Now s/p drain placed.  He lives in a single story house, no steps to enter.  He owns both a fww and cane, but was not using either PTA.  Today, he was rec'd alert in bed, w/ c/o pain.  Nsg aware.  He needs min assist to sit eob.  And SBA to stand and ambulate.  Gait is exceedingly slow, 2/2 pain, but w/o loss of balance or need of assist.  PT will follow and address goals noted below.   Plan    Physical Therapy Initial Treatment Plan   Treatment Plan : Bed Mobility, Gait Training, Therapeutic Activities  Treatment Frequency: 4 Times per Week  Duration: Until Therapy Goals Met    DC Equipment Recommendations: Unable to determine at this time  Discharge Recommendations: Recommend post-acute placement for additional physical therapy services prior to discharge home          Objective       06/09/25 1021   Prior Living Situation   Housing / Facility [facility]   Steps Into Home 0   Steps In Home 0   Equipment Owned Front-Wheel Walker;Single Point Cane  (wasnt using AD PTA)   Lives with - Patient's Self Care Capacity Unrelated Adult   Comments Roommate works days   Prior Level of Functional Mobility   Bed Mobility Independent   Transfer Status Independent   Ambulation Independent   Assistive Devices Used None   Strength Lower Body   Comments grossly 4/5   Balance Assessment   Sitting Balance (Static) Fair   Sitting Balance (Dynamic) Fair   Standing Balance (Static) Fair   Standing Balance (Dynamic) Fair -   Weight Shift Sitting Fair   Weight Shift Standing Fair   Comments w/ fww   Bed Mobility    Supine to Sit Minimal Assist   Gait Analysis   Gait Level Of Assist Standby Assist    Assistive Device Front Wheel Walker   Distance (Feet) 100   Functional Mobility   Sit to Stand Standby Assist   Bed, Chair, Wheelchair Transfer Standby Assist   Short Term Goals    Short Term Goal # 1 Pt to move supine to/from eob w/ spv in 6 visits   Short Term Goal # 2 Pt to move sit to/from stand w/ spv in 6 visits   Short Term Goal # 3 Pt to ambulate 150 ft w/ fww and spv in 6 visits   Physical Therapy Initial Treatment Plan    Treatment Plan  Bed Mobility;Gait Training;Therapeutic Activities   Treatment Frequency 4 Times per Week   Duration Until Therapy Goals Met   Problem List    Problems Impaired Bed Mobility;Pain;Impaired Ambulation;Decreased Activity Tolerance   Anticipated Discharge Equipment and Recommendations   DC Equipment Recommendations Unable to determine at this time   Discharge Recommendations Recommend post-acute placement for additional physical therapy services prior to discharge home

## 2025-06-09 NOTE — PROGRESS NOTES
Monitor Summary  Rhythm: ST  Rate: 102-109  Ectopy: none  Measurements: 0.13/0.07/0.31      ---12 hr Chart Review---

## 2025-06-09 NOTE — PROGRESS NOTES
"   Urology Progress Note    Post op Day # 1. S/p cysto luz placement over a wire on 6/8/25 performed by Dr. Todd and IR abdominal drain placement on 6/8. Pt is lying comfortably in bed in NAD. IR APN at bedside assessing CRIS drain. Tmax 99.2, pt is tachycardic at 105. Normotensive at 136/84. Documented abdominal drain output 3100mL/24hrs, drainage is dark/old SS. He remains on Zosyn. Luz catheter is drainage clear yellow urine, good UOP on exam. WBC: 5.3, Hgb: 9.8, No updated creatinine as of yet, however his creatinine from 6/8/25 is 2.48 (4.79). Pt denies f/c/n/v     Overnight Events: None    S:    O:   /84   Pulse (!) 105   Temp 37.3 °C (99.2 °F) (Temporal)   Resp 18   Ht 1.727 m (5' 8\")   Wt 57.2 kg (126 lb 1.7 oz)   SpO2 94%   Recent Labs     06/08/25  0533 06/08/25  1516   SODIUM 130* 133*   POTASSIUM 6.1* 5.7*   CHLORIDE 94* 102   CO2 18* 22   GLUCOSE 124* 123*   BUN 74* 61*   CREATININE 4.98* 2.48*   CALCIUM 9.8 9.0     Recent Labs     06/08/25  0533 06/09/25  0437   WBC 12.4* 5.3   RBC 3.43* 3.25*   HEMOGLOBIN 10.2* 9.8*   HEMATOCRIT 31.0* 29.8*   MCV 90.4 91.7   MCH 29.7 30.2   MCHC 32.9 32.9   RDW 45.1 45.5   PLATELETCT 532* 397   MPV 9.5 9.5         Intake/Output Summary (Last 24 hours) at 6/9/2025 1008  Last data filed at 6/9/2025 0822  Gross per 24 hour   Intake 840 ml   Output 3100 ml   Net -2260 ml       Exam:    Abdomen soft, benign, non-distended. Prior RALP incisions are clean, dry, intact. No evidence of erythema, drainage or discharge.  Urine: yellow and clear via luz.       A/P:    Active Hospital Problems    Diagnosis     Acute renal failure (ARF) (HCC) [N17.9]     Hyperkalemia [E87.5]     Leukocytosis [D72.829]     Anemia [D64.9]     Thrombocytosis [D75.839]     Hyponatremia [E87.1]     Increased anion gap metabolic acidosis [E87.29]     Acute hypoxic respiratory failure (HCC) [J96.01]     Liver lesion [K76.9]     SIRS (systemic inflammatory response syndrome) (HCC) " [R65.10]     Abdominal pain [R10.9]     Prostate cancer (HCC) [C61]     History of tobacco abuse [Z87.891]     Parkinson's disease with dyskinesia and fluctuating manifestations (HCC) [G20.B2]      -Continue to trend renal function.   -Monitor urine output and abdominal drain output.   -Urology appreciates IR managing abdominal drain.   -Urology will continue to follow the patient during this hospital stay.   -Patient's case was discussed with Dr. Cesar who recommended proceeding with a CT cystogram on 6/10/25. A CRIS drain creatinine was also recommended.     Case discussed with Dr. Cesar.    KALEY Wakefield-C.   [id number]  [place] [id number]   [phone]

## 2025-06-09 NOTE — PROGRESS NOTES
Bedside report received from off going RN/tech: Jennifer, assumed care of patient.     Fall Risk Score: HIGH RISK  Fall risk interventions in place: Place yellow fall risk ID band on patient, Provide patient/family education based on risk assessment, Educate patient/family to call staff for assistance when getting out of bed, Place fall precaution signage outside patient door, Place patient in room close to nursing station, Utilize bed/chair fall alarm, and Bed alarm connected correctly  Bed type: Regular (Cecilio Score less than 17 interventions in place)  Patient on cardiac monitor: Yes  IVF/IV medications: Infusion per MAR (List Med(s)) zosyn  Oxygen: Room Air  Bedside sitter: Not Applicable   Isolation: Not applicable

## 2025-06-10 ENCOUNTER — ANESTHESIA EVENT (OUTPATIENT)
Dept: SURGERY | Facility: MEDICAL CENTER | Age: 60
DRG: 698 | End: 2025-06-10
Payer: MEDICARE

## 2025-06-10 ENCOUNTER — SURGERY (OUTPATIENT)
Age: 60
End: 2025-06-10
Payer: MEDICARE

## 2025-06-10 ENCOUNTER — APPOINTMENT (OUTPATIENT)
Dept: RADIOLOGY | Facility: MEDICAL CENTER | Age: 60
DRG: 698 | End: 2025-06-10
Payer: MEDICARE

## 2025-06-10 ENCOUNTER — APPOINTMENT (OUTPATIENT)
Dept: RADIOLOGY | Facility: MEDICAL CENTER | Age: 60
DRG: 698 | End: 2025-06-10
Attending: UROLOGY
Payer: MEDICARE

## 2025-06-10 ENCOUNTER — ANESTHESIA (OUTPATIENT)
Dept: SURGERY | Facility: MEDICAL CENTER | Age: 60
DRG: 698 | End: 2025-06-10
Payer: MEDICARE

## 2025-06-10 PROBLEM — D75.839 THROMBOCYTOSIS: Status: RESOLVED | Noted: 2025-06-08 | Resolved: 2025-06-10

## 2025-06-10 LAB
ANION GAP SERPL CALC-SCNC: 12 MMOL/L (ref 7–16)
BACTERIA UR CULT: NORMAL
BUN SERPL-MCNC: 55 MG/DL (ref 8–22)
CALCIUM SERPL-MCNC: 8.5 MG/DL (ref 8.5–10.5)
CHLORIDE SERPL-SCNC: 97 MMOL/L (ref 96–112)
CO2 SERPL-SCNC: 24 MMOL/L (ref 20–33)
CREAT SERPL-MCNC: 1.63 MG/DL (ref 0.5–1.4)
ERYTHROCYTE [DISTWIDTH] IN BLOOD BY AUTOMATED COUNT: 46.1 FL (ref 35.9–50)
GFR SERPLBLD CREATININE-BSD FMLA CKD-EPI: 48 ML/MIN/1.73 M 2
GLUCOSE SERPL-MCNC: 122 MG/DL (ref 65–99)
HCT VFR BLD AUTO: 31.5 % (ref 42–52)
HGB BLD-MCNC: 10.4 G/DL (ref 14–18)
MCH RBC QN AUTO: 30 PG (ref 27–33)
MCHC RBC AUTO-ENTMCNC: 33 G/DL (ref 32.3–36.5)
MCV RBC AUTO: 90.8 FL (ref 81.4–97.8)
PLATELET # BLD AUTO: 366 K/UL (ref 164–446)
PMV BLD AUTO: 9.7 FL (ref 9–12.9)
POTASSIUM SERPL-SCNC: 4.7 MMOL/L (ref 3.6–5.5)
RBC # BLD AUTO: 3.47 M/UL (ref 4.7–6.1)
SIGNIFICANT IND 70042: NORMAL
SITE SITE: NORMAL
SODIUM SERPL-SCNC: 133 MMOL/L (ref 135–145)
SOURCE SOURCE: NORMAL
WBC # BLD AUTO: 3.8 K/UL (ref 4.8–10.8)

## 2025-06-10 PROCEDURE — 700117 HCHG RX CONTRAST REV CODE 255

## 2025-06-10 PROCEDURE — 700105 HCHG RX REV CODE 258: Performed by: UROLOGY

## 2025-06-10 PROCEDURE — 160028 HCHG SURGERY MINUTES - 1ST 30 MINS LEVEL 3: Performed by: UROLOGY

## 2025-06-10 PROCEDURE — 160195 HCHG PACU COMPLEX - 1ST 60 MINS: Performed by: UROLOGY

## 2025-06-10 PROCEDURE — 700111 HCHG RX REV CODE 636 W/ 250 OVERRIDE (IP): Mod: JZ | Performed by: ANESTHESIOLOGY

## 2025-06-10 PROCEDURE — 700105 HCHG RX REV CODE 258: Performed by: ANESTHESIOLOGY

## 2025-06-10 PROCEDURE — 160048 HCHG OR STATISTICAL LEVEL 1-5: Performed by: UROLOGY

## 2025-06-10 PROCEDURE — 80048 BASIC METABOLIC PNL TOTAL CA: CPT

## 2025-06-10 PROCEDURE — 160192 HCHG ANESTHESIA COMPLEX: Performed by: UROLOGY

## 2025-06-10 PROCEDURE — 700111 HCHG RX REV CODE 636 W/ 250 OVERRIDE (IP): Mod: JZ

## 2025-06-10 PROCEDURE — 700111 HCHG RX REV CODE 636 W/ 250 OVERRIDE (IP): Mod: JZ | Performed by: FAMILY MEDICINE

## 2025-06-10 PROCEDURE — 72192 CT PELVIS W/O DYE: CPT

## 2025-06-10 PROCEDURE — 700117 HCHG RX CONTRAST REV CODE 255: Performed by: UROLOGY

## 2025-06-10 PROCEDURE — 99232 SBSQ HOSP IP/OBS MODERATE 35: CPT | Mod: GC | Performed by: FAMILY MEDICINE

## 2025-06-10 PROCEDURE — 160015 HCHG STAT PREOP MINUTES: Performed by: UROLOGY

## 2025-06-10 PROCEDURE — 160039 HCHG SURGERY MINUTES - EA ADDL 1 MIN LEVEL 3: Performed by: UROLOGY

## 2025-06-10 PROCEDURE — 0DH67UZ INSERTION OF FEEDING DEVICE INTO STOMACH, VIA NATURAL OR ARTIFICIAL OPENING: ICD-10-PCS | Performed by: UROLOGY

## 2025-06-10 PROCEDURE — 700101 HCHG RX REV CODE 250: Performed by: UROLOGY

## 2025-06-10 PROCEDURE — 160002 HCHG RECOVERY MINUTES (STAT): Performed by: UROLOGY

## 2025-06-10 PROCEDURE — 85027 COMPLETE CBC AUTOMATED: CPT

## 2025-06-10 PROCEDURE — 700101 HCHG RX REV CODE 250: Performed by: ANESTHESIOLOGY

## 2025-06-10 PROCEDURE — 110371 HCHG SHELL REV 272: Performed by: UROLOGY

## 2025-06-10 PROCEDURE — 700102 HCHG RX REV CODE 250 W/ 637 OVERRIDE(OP)

## 2025-06-10 PROCEDURE — 770020 HCHG ROOM/CARE - TELE (206)

## 2025-06-10 PROCEDURE — 700101 HCHG RX REV CODE 250: Performed by: NURSE PRACTITIONER

## 2025-06-10 PROCEDURE — 3E1M48Z IRRIGATION OF PERITONEAL CAVITY USING IRRIGATING SUBSTANCE, PERCUTANEOUS ENDOSCOPIC APPROACH: ICD-10-PCS | Performed by: UROLOGY

## 2025-06-10 PROCEDURE — 700105 HCHG RX REV CODE 258

## 2025-06-10 PROCEDURE — 700111 HCHG RX REV CODE 636 W/ 250 OVERRIDE (IP): Performed by: UROLOGY

## 2025-06-10 PROCEDURE — A9270 NON-COVERED ITEM OR SERVICE: HCPCS | Performed by: ANESTHESIOLOGY

## 2025-06-10 PROCEDURE — 700102 HCHG RX REV CODE 250 W/ 637 OVERRIDE(OP): Performed by: ANESTHESIOLOGY

## 2025-06-10 PROCEDURE — A9270 NON-COVERED ITEM OR SERVICE: HCPCS

## 2025-06-10 PROCEDURE — 700105 HCHG RX REV CODE 258: Performed by: FAMILY MEDICINE

## 2025-06-10 PROCEDURE — 74018 RADEX ABDOMEN 1 VIEW: CPT

## 2025-06-10 PROCEDURE — 36415 COLL VENOUS BLD VENIPUNCTURE: CPT

## 2025-06-10 PROCEDURE — 160196 HCHG PACU COMPLEX - EA ADDL 30 MINS: Performed by: UROLOGY

## 2025-06-10 PROCEDURE — C1769 GUIDE WIRE: HCPCS | Performed by: UROLOGY

## 2025-06-10 RX ORDER — SODIUM CHLORIDE 9 MG/ML
INJECTION, SOLUTION INTRAVENOUS CONTINUOUS
Status: DISCONTINUED | OUTPATIENT
Start: 2025-06-10 | End: 2025-06-14

## 2025-06-10 RX ORDER — HYDROMORPHONE HYDROCHLORIDE 1 MG/ML
0.4 INJECTION, SOLUTION INTRAMUSCULAR; INTRAVENOUS; SUBCUTANEOUS
Status: DISCONTINUED | OUTPATIENT
Start: 2025-06-10 | End: 2025-06-11 | Stop reason: HOSPADM

## 2025-06-10 RX ORDER — LIDOCAINE HYDROCHLORIDE 20 MG/ML
INJECTION, SOLUTION EPIDURAL; INFILTRATION; INTRACAUDAL; PERINEURAL PRN
Status: DISCONTINUED | OUTPATIENT
Start: 2025-06-10 | End: 2025-06-11 | Stop reason: SURG

## 2025-06-10 RX ORDER — OXYCODONE HCL 5 MG/5 ML
5 SOLUTION, ORAL ORAL
Status: DISCONTINUED | OUTPATIENT
Start: 2025-06-10 | End: 2025-06-11 | Stop reason: HOSPADM

## 2025-06-10 RX ORDER — DEXAMETHASONE SODIUM PHOSPHATE 4 MG/ML
INJECTION, SOLUTION INTRA-ARTICULAR; INTRALESIONAL; INTRAMUSCULAR; INTRAVENOUS; SOFT TISSUE PRN
Status: DISCONTINUED | OUTPATIENT
Start: 2025-06-10 | End: 2025-06-11 | Stop reason: SURG

## 2025-06-10 RX ORDER — IPRATROPIUM BROMIDE AND ALBUTEROL SULFATE 2.5; .5 MG/3ML; MG/3ML
3 SOLUTION RESPIRATORY (INHALATION)
Status: DISCONTINUED | OUTPATIENT
Start: 2025-06-10 | End: 2025-06-11 | Stop reason: HOSPADM

## 2025-06-10 RX ORDER — SODIUM CHLORIDE, SODIUM LACTATE, POTASSIUM CHLORIDE, CALCIUM CHLORIDE 600; 310; 30; 20 MG/100ML; MG/100ML; MG/100ML; MG/100ML
INJECTION, SOLUTION INTRAVENOUS CONTINUOUS
Status: DISCONTINUED | OUTPATIENT
Start: 2025-06-10 | End: 2025-06-11 | Stop reason: HOSPADM

## 2025-06-10 RX ORDER — CEFAZOLIN SODIUM 1 G/3ML
INJECTION, POWDER, FOR SOLUTION INTRAMUSCULAR; INTRAVENOUS
Status: DISCONTINUED | OUTPATIENT
Start: 2025-06-10 | End: 2025-06-10 | Stop reason: HOSPADM

## 2025-06-10 RX ORDER — ROCURONIUM BROMIDE 10 MG/ML
INJECTION, SOLUTION INTRAVENOUS PRN
Status: DISCONTINUED | OUTPATIENT
Start: 2025-06-10 | End: 2025-06-11 | Stop reason: SURG

## 2025-06-10 RX ORDER — HYDROMORPHONE HYDROCHLORIDE 1 MG/ML
0.2 INJECTION, SOLUTION INTRAMUSCULAR; INTRAVENOUS; SUBCUTANEOUS
Status: DISCONTINUED | OUTPATIENT
Start: 2025-06-10 | End: 2025-06-11 | Stop reason: HOSPADM

## 2025-06-10 RX ORDER — METOCLOPRAMIDE HYDROCHLORIDE 5 MG/ML
10 INJECTION INTRAMUSCULAR; INTRAVENOUS EVERY 4 HOURS PRN
Status: DISCONTINUED | OUTPATIENT
Start: 2025-06-10 | End: 2025-06-11

## 2025-06-10 RX ORDER — SODIUM CHLORIDE, SODIUM LACTATE, POTASSIUM CHLORIDE, CALCIUM CHLORIDE 600; 310; 30; 20 MG/100ML; MG/100ML; MG/100ML; MG/100ML
INJECTION, SOLUTION INTRAVENOUS
Status: DISCONTINUED | OUTPATIENT
Start: 2025-06-10 | End: 2025-06-11 | Stop reason: SURG

## 2025-06-10 RX ORDER — DIPHENHYDRAMINE HYDROCHLORIDE 50 MG/ML
12.5 INJECTION, SOLUTION INTRAMUSCULAR; INTRAVENOUS
Status: DISCONTINUED | OUTPATIENT
Start: 2025-06-10 | End: 2025-06-11 | Stop reason: HOSPADM

## 2025-06-10 RX ORDER — ONDANSETRON 2 MG/ML
4 INJECTION INTRAMUSCULAR; INTRAVENOUS
Status: DISCONTINUED | OUTPATIENT
Start: 2025-06-10 | End: 2025-06-11 | Stop reason: HOSPADM

## 2025-06-10 RX ORDER — HYDROMORPHONE HYDROCHLORIDE 1 MG/ML
0.5 INJECTION, SOLUTION INTRAMUSCULAR; INTRAVENOUS; SUBCUTANEOUS
Status: DISCONTINUED | OUTPATIENT
Start: 2025-06-10 | End: 2025-06-12

## 2025-06-10 RX ORDER — ONDANSETRON 2 MG/ML
INJECTION INTRAMUSCULAR; INTRAVENOUS PRN
Status: DISCONTINUED | OUTPATIENT
Start: 2025-06-10 | End: 2025-06-11 | Stop reason: SURG

## 2025-06-10 RX ORDER — SODIUM CHLORIDE, SODIUM LACTATE, POTASSIUM CHLORIDE, CALCIUM CHLORIDE 600; 310; 30; 20 MG/100ML; MG/100ML; MG/100ML; MG/100ML
INJECTION, SOLUTION INTRAVENOUS
Status: COMPLETED | OUTPATIENT
Start: 2025-06-10 | End: 2025-06-10

## 2025-06-10 RX ORDER — HYDROMORPHONE HYDROCHLORIDE 1 MG/ML
0.1 INJECTION, SOLUTION INTRAMUSCULAR; INTRAVENOUS; SUBCUTANEOUS
Status: DISCONTINUED | OUTPATIENT
Start: 2025-06-10 | End: 2025-06-11 | Stop reason: HOSPADM

## 2025-06-10 RX ORDER — ONDANSETRON 2 MG/ML
INJECTION INTRAMUSCULAR; INTRAVENOUS
Status: COMPLETED
Start: 2025-06-10 | End: 2025-06-10

## 2025-06-10 RX ORDER — MAGNESIUM HYDROXIDE 1200 MG/15ML
LIQUID ORAL
Status: COMPLETED | OUTPATIENT
Start: 2025-06-10 | End: 2025-06-10

## 2025-06-10 RX ORDER — HYDROMORPHONE HYDROCHLORIDE 2 MG/ML
INJECTION, SOLUTION INTRAMUSCULAR; INTRAVENOUS; SUBCUTANEOUS PRN
Status: DISCONTINUED | OUTPATIENT
Start: 2025-06-10 | End: 2025-06-11 | Stop reason: SURG

## 2025-06-10 RX ORDER — OXYCODONE HCL 5 MG/5 ML
10 SOLUTION, ORAL ORAL
Status: DISCONTINUED | OUTPATIENT
Start: 2025-06-10 | End: 2025-06-11 | Stop reason: HOSPADM

## 2025-06-10 RX ORDER — BUPIVACAINE HYDROCHLORIDE AND EPINEPHRINE 2.5; 5 MG/ML; UG/ML
INJECTION, SOLUTION EPIDURAL; INFILTRATION; INTRACAUDAL; PERINEURAL
Status: DISCONTINUED | OUTPATIENT
Start: 2025-06-10 | End: 2025-06-10 | Stop reason: HOSPADM

## 2025-06-10 RX ADMIN — ONDANSETRON 4 MG: 2 INJECTION INTRAMUSCULAR; INTRAVENOUS at 17:28

## 2025-06-10 RX ADMIN — ONDANSETRON 4 MG: 2 INJECTION INTRAMUSCULAR; INTRAVENOUS at 13:03

## 2025-06-10 RX ADMIN — ONDANSETRON 4 MG: 2 INJECTION INTRAMUSCULAR; INTRAVENOUS at 21:01

## 2025-06-10 RX ADMIN — IOHEXOL 50 ML: 240 INJECTION, SOLUTION INTRATHECAL; INTRAVASCULAR; INTRAVENOUS; ORAL at 19:45

## 2025-06-10 RX ADMIN — PROPOFOL 150 MG: 10 INJECTION, EMULSION INTRAVENOUS at 19:12

## 2025-06-10 RX ADMIN — HYDROMORPHONE HYDROCHLORIDE 0.5 MG: 1 INJECTION, SOLUTION INTRAMUSCULAR; INTRAVENOUS; SUBCUTANEOUS at 13:03

## 2025-06-10 RX ADMIN — MINERAL OIL, AND WHITE PETROLATUM 1 APPLICATION: 425; 573 OINTMENT OPHTHALMIC at 20:53

## 2025-06-10 RX ADMIN — ONDANSETRON 4 MG: 2 INJECTION INTRAMUSCULAR; INTRAVENOUS at 05:54

## 2025-06-10 RX ADMIN — PROPOFOL 100 MG: 10 INJECTION, EMULSION INTRAVENOUS at 20:13

## 2025-06-10 RX ADMIN — CEFAZOLIN 1 G: 1 INJECTION, POWDER, FOR SOLUTION INTRAMUSCULAR; INTRAVENOUS at 20:26

## 2025-06-10 RX ADMIN — SODIUM CHLORIDE, PRESERVATIVE FREE 10 ML: 5 INJECTION INTRAVENOUS at 06:00

## 2025-06-10 RX ADMIN — PROPOFOL 50 MG: 10 INJECTION, EMULSION INTRAVENOUS at 19:38

## 2025-06-10 RX ADMIN — PROPOFOL 50 MCG/KG/MIN: 10 INJECTION, EMULSION INTRAVENOUS at 19:21

## 2025-06-10 RX ADMIN — WATER 1000 ML: 100 IRRIGANT IRRIGATION at 19:28

## 2025-06-10 RX ADMIN — SODIUM CHLORIDE, PRESERVATIVE FREE 10 ML: 5 INJECTION INTRAVENOUS at 18:00

## 2025-06-10 RX ADMIN — SODIUM CHLORIDE, POTASSIUM CHLORIDE, SODIUM LACTATE AND CALCIUM CHLORIDE 1000 ML: 600; 310; 30; 20 INJECTION, SOLUTION INTRAVENOUS at 20:05

## 2025-06-10 RX ADMIN — ROCURONIUM BROMIDE 50 MG: 10 INJECTION INTRAVENOUS at 19:41

## 2025-06-10 RX ADMIN — SODIUM CHLORIDE 1000 ML: 900 IRRIGANT IRRIGATION at 20:26

## 2025-06-10 RX ADMIN — LIDOCAINE HYDROCHLORIDE 60 MG: 20 INJECTION, SOLUTION EPIDURAL; INFILTRATION; INTRACAUDAL; PERINEURAL at 19:12

## 2025-06-10 RX ADMIN — WATER 3000 ML: 100 IRRIGANT IRRIGATION at 19:28

## 2025-06-10 RX ADMIN — ROCURONIUM BROMIDE 50 MG: 10 INJECTION INTRAVENOUS at 19:12

## 2025-06-10 RX ADMIN — DEXAMETHASONE SODIUM PHOSPHATE 8 MG: 4 INJECTION INTRA-ARTICULAR; INTRALESIONAL; INTRAMUSCULAR; INTRAVENOUS; SOFT TISSUE at 19:12

## 2025-06-10 RX ADMIN — PIPERACILLIN AND TAZOBACTAM 4.5 G: 4; .5 INJECTION, POWDER, FOR SOLUTION INTRAVENOUS at 13:11

## 2025-06-10 RX ADMIN — HYDROMORPHONE HYDROCHLORIDE 0.5 MG: 2 INJECTION INTRAMUSCULAR; INTRAVENOUS; SUBCUTANEOUS at 20:13

## 2025-06-10 RX ADMIN — HYDROMORPHONE HYDROCHLORIDE 0.5 MG: 2 INJECTION INTRAMUSCULAR; INTRAVENOUS; SUBCUTANEOUS at 19:11

## 2025-06-10 RX ADMIN — PIPERACILLIN AND TAZOBACTAM 4.5 G: 4; .5 INJECTION, POWDER, FOR SOLUTION INTRAVENOUS at 20:41

## 2025-06-10 RX ADMIN — NOREPINEPHRINE BITARTRATE 0.1 MCG/KG/MIN: 1 INJECTION, SOLUTION, CONCENTRATE INTRAVENOUS at 19:21

## 2025-06-10 RX ADMIN — Medication 25 MG: at 19:10

## 2025-06-10 RX ADMIN — SUGAMMADEX 200 MG: 100 INJECTION, SOLUTION INTRAVENOUS at 21:01

## 2025-06-10 RX ADMIN — BUPIVACAINE HYDROCHLORIDE AND EPINEPHRINE BITARTRATE 15 ML: 2.5; .0091 INJECTION, SOLUTION EPIDURAL; INFILTRATION; INTRACAUDAL; PERINEURAL at 20:56

## 2025-06-10 RX ADMIN — ONDANSETRON 4 MG: 2 INJECTION INTRAMUSCULAR; INTRAVENOUS at 19:05

## 2025-06-10 RX ADMIN — SODIUM CHLORIDE, POTASSIUM CHLORIDE, SODIUM LACTATE AND CALCIUM CHLORIDE: 600; 310; 30; 20 INJECTION, SOLUTION INTRAVENOUS at 19:05

## 2025-06-10 RX ADMIN — PIPERACILLIN AND TAZOBACTAM 4.5 G: 4; .5 INJECTION, POWDER, FOR SOLUTION INTRAVENOUS at 05:51

## 2025-06-10 RX ADMIN — SODIUM CHLORIDE: 9 INJECTION, SOLUTION INTRAVENOUS at 10:15

## 2025-06-10 RX ADMIN — IOHEXOL 25 ML: 300 INJECTION, SOLUTION INTRAVENOUS at 00:53

## 2025-06-10 RX ADMIN — BACLOFEN 5 MG: 10 TABLET ORAL at 13:03

## 2025-06-10 RX ADMIN — HYDROMORPHONE HYDROCHLORIDE 0.5 MG: 1 INJECTION, SOLUTION INTRAMUSCULAR; INTRAVENOUS; SUBCUTANEOUS at 16:03

## 2025-06-10 ASSESSMENT — ENCOUNTER SYMPTOMS
FEVER: 0
SHORTNESS OF BREATH: 0
ABDOMINAL PAIN: 1
CHILLS: 0
PALPITATIONS: 0
HEADACHES: 0
NAUSEA: 0
WEAKNESS: 0
VOMITING: 0

## 2025-06-10 ASSESSMENT — PAIN DESCRIPTION - PAIN TYPE
TYPE: ACUTE PAIN
TYPE: ACUTE PAIN
TYPE: SURGICAL PAIN
TYPE: ACUTE PAIN

## 2025-06-10 ASSESSMENT — FIBROSIS 4 INDEX: FIB4 SCORE: 1.96

## 2025-06-10 NOTE — CARE PLAN
The patient is Unstable - High likelihood or risk of patient condition declining or worsening    Shift Goals  Clinical Goals: hemodynamic stability  Patient Goals: rest, comfort  Family Goals: updates    Progress made toward(s) clinical / shift goals:    Problem: Pain - Standard  Goal: Alleviation of pain or a reduction in pain to the patient’s comfort goal  Outcome: Progressing     Problem: Knowledge Deficit - Standard  Goal: Patient and family/care givers will demonstrate understanding of plan of care, disease process/condition, diagnostic tests and medications  Outcome: Progressing     Problem: Fall Risk  Goal: Patient will remain free from falls  Outcome: Progressing       Patient is not progressing towards the following goals:

## 2025-06-10 NOTE — PROGRESS NOTES
Monitor Summary  Rhythm: SR  Rate: 56-63  Ectopy: rare pvc, rare pac, rare couplets  .18 / .07 / .38  Strip not available

## 2025-06-10 NOTE — PROGRESS NOTES
Radiology Progress Note   Author: BA Sanchez Date & Time created: 6/10/2025  10:42 AM   Date of admission  6/8/2025  Note to reader: this note follows the APSO format rather than the historical SOAP format. Assessment and plan located at the top of the note for ease of use.    Chief Complaint  60 y.o. male admitted 6/8/2025 with   Chief Complaint   Patient presents with    Post-Op Complications     Abdominal pain with nausea and vomiting since pt had surgery on 5/28 and the pain is progressively worsening  + dark brown vomitus (small amount)  + no stool for 4 days  + abdominal tenderness  + shortness of breath    Denied any fever, chest pain    5/28/2025: History of ROBOTIC ASSISTED LAPAROSCOPIC PROSTATECTOMY (Abdomen)  LYSIS, ADHESIONS, LAPAROSCOPIC ROBOTIC (Abdomen)        Abdominal Pain    Nausea    Shortness of Breath         HPI  60-year-old male with past medical history significant for Parkinson's disease, prostate cancer s/p robotic prostatectomy 05/28/25 with Dr. Cesar presented with intermittent abdominal pain x 3 to 4 days.  CT revealed abdominal ascites and a 7 cm right pelvic mass concerning for hematoma.  Urology consulted for suspected urinary leak post prostatectomy and performed bedside cystoscopy with Juarez catheter placement over wire. IR was then consulted and patient underwent abdominal drain placement with IR Dr Rocha on 06/08/25.      Interval History:   06/09/25 - Abdominal drain to RLQ with 2900 mL serosanguineous output in the last 24 hours.  All labs reviewed by me including WBC 5.3, H&H 9.8/29.8, creatinine 1.19. Cultures pending; NGTD.  On [facility] medical team.  I reviewed all IDT notes, flushed drain with 10 mL NS, placed drain to gravity bag, and discussed plan of care with surgical urology NETTIE Larsen.     06/10/25 - Abdominal drain to RLQ with 1185 mL serosanguineous output in the last 24 hours.  All labs reviewed by me including WBC 3.8, H&H 10.4/31.5, serum creatinine  1.63, fluid creatinine 16.8, cultures pending; NGTD.  On [facility] through medical team.  CT 06/10/25 compatible with intraperitoneal bladder rupture. I reviewed all IDT notes and flushed drain with 10 mL NS.    Assessment/Plan     Principal Problem:    Abdominal pain  Active Problems:    Parkinson's disease with dyskinesia and fluctuating manifestations (HCC)    History of tobacco abuse    Prostate cancer (HCC)    Acute renal failure (ARF) (HCC)    Hyperkalemia    Leukocytosis    Anemia    Hyponatremia    Increased anion gap metabolic acidosis    Acute hypoxic respiratory failure (HCC)    Liver lesion    SIRS (systemic inflammatory response syndrome) (HCC)      Plan IR  - Continue drain  - Awaiting urology recommendation for bladder leak  - Continue to irrigate RLQ drain with 10 ml of sterile saline each shift  - Fluid cultures pending; NGTD  - Fluid creatinine 16.3, consistent with bladder leak  - Urology following   - Continue to monitor drains, VS, and labs    -  -Thank you for allowing Interventional Radiology team to participate in the patients care, if any additional care or requests are needed in the future please do not hesitate to call or place IR order           Review of Systems  Physical Exam   Review of Systems   Constitutional:  Positive for malaise/fatigue. Negative for chills and fever.   Respiratory:  Negative for shortness of breath.    Cardiovascular:  Negative for chest pain and palpitations.   Gastrointestinal:  Positive for abdominal pain. Negative for nausea and vomiting.   Neurological:  Negative for weakness and headaches.      Vitals:    06/10/25 0310   BP: (!) 146/88   Pulse: 96   Resp: 18   Temp: 36.8 °C (98.3 °F)   SpO2: 93%        Physical Exam  Vitals and nursing note reviewed.   Constitutional:       General: He is not in acute distress.  Cardiovascular:      Rate and Rhythm: Normal rate.   Pulmonary:      Effort: Pulmonary effort is normal. No respiratory distress.   Abdominal:       General: There is no distension.      Tenderness: There is abdominal tenderness.      Comments: IR drain to RLQ   Skin:     General: Skin is warm and dry.   Neurological:      General: No focal deficit present.      Mental Status: He is alert and oriented to person, place, and time.   Psychiatric:         Mood and Affect: Mood normal.         Behavior: Behavior normal.             Labs    Recent Labs     06/08/25  0533 06/09/25  0437 06/10/25  0255   WBC 12.4* 5.3 3.8*   RBC 3.43* 3.25* 3.47*   HEMOGLOBIN 10.2* 9.8* 10.4*   HEMATOCRIT 31.0* 29.8* 31.5*   MCV 90.4 91.7 90.8   MCH 29.7 30.2 30.0   MCHC 32.9 32.9 33.0   RDW 45.1 45.5 46.1   PLATELETCT 532* 397 366   MPV 9.5 9.5 9.7     Recent Labs     06/08/25  1516 06/09/25 0437 06/10/25  0255   SODIUM 133* 135 133*   POTASSIUM 5.7* 5.0 4.7   CHLORIDE 102 100 97   CO2 22 21 24   GLUCOSE 123* 95 122*   BUN 61* 40* 55*   CREATININE 2.48* 1.19 1.63*   CALCIUM 9.0 8.7 8.5     Recent Labs     06/08/25 0533 06/08/25  1516 06/09/25 0437 06/10/25  0255   ALBUMIN 3.9  --   --   --    TBILIRUBIN 1.3  --   --   --    ALKPHOSPHAT 130*  --   --   --    TOTPROTEIN 8.0  --   --   --    ALTSGPT 65*  --   --   --    ASTSGOT 108*  --   --   --    CREATININE 4.98* 2.48* 1.19 1.63*     CT-Cystogram   Final Result         1.  Diffuse bladder wall thickening, consider changes of cystitis.   2.  Contrast within the peritoneal cavity, compatible with intraperitoneal bladder rupture.   3.  Small foci of intra-abdominal free air, likely related to bladder rupture although radiographic appearance cannot exclude viscus perforation.   4.  Diffuse small bowel wall thickening, appearance suggests changes of enteritis.      These findings were discussed with the patient's clinician, Siena Oakley, on 6/10/2025 6:57 AM.      CT-ABDOMEN-PELVIS W/O   Final Result         1. 7 cm right pelvic high density mass may be a hematoma. This displaces the bladder to the left.      2. Large free peritoneal  "fluid in abdomen and pelvis.      3. Bladder empty. No hydronephrosis.      4. Mild ileus-type fluid in stomach and small bowel. Fluid-filled distal esophagus suggests reflux. No colon distention. Normal appendix.      5. Possible sludge in gallbladder. No inflammation or ductal dilatation.      6. Small indeterminate 8 mm liver lesion may be a benign cyst.                  DX-CHEST-PORTABLE (1 VIEW)   Final Result         1. No acute abnormalities evident.      2. Right chest wall battery pack with wire extending to the neck.                     CT-IMAGE-GUIDED DRAIN PERITONEAL    (Results Pending)     INR   Date Value Ref Range Status   06/08/2025 1.14 (H) 0.87 - 1.13 Final     Comment:     INR - Non-therapeutic Reference Range: 0.87-1.13  INR - Therapeutic Reference Range: 2.0-4.0       No results found for: \"POCINR\"     Intake/Output Summary (Last 24 hours) at 6/9/2025 0927  Last data filed at 6/9/2025 0822  Gross per 24 hour   Intake 840 ml   Output 3100 ml   Net -2260 ml      I have personally reviewed the above labs and imaging      I have performed a physical exam and reviewed and updated ROS and Plan today (6/10/2025).     38 minutes in directly providing and coordinating care and extensive data review.  No time overlap and excludes procedures.   "

## 2025-06-10 NOTE — PROGRESS NOTES
Attending:   New Mccullough M.d.      Resident:   Siena Oakley D.O.    PATIENT:   Rakan Rader; [id number]; 1965    ID:   60 y.o. male with hx Parkinson's Disease with DBS, prostate cancer presented with abdominal pain after underwent RA laparoscopic prostatectomy 5/28; found to have urinary ascites and 7cm R pelvic hematoma, now with concern for bladder rupture. On hospital day 2    SUBJECTIVE:   No acute events overnight, however did have a BM. He continues to be nauseous, and is having difficulty taking his PO medications including the Carbidopa-Levodopa. He is modifying his DBS stimulator through his camelia to aid with controlling his symptoms.    Hospital Course:  6/8: Admitted, luz placement, IR drain placement  6/9: Significant drain output  6/10: Cystogram completed, concern for bladder rupture    OBJECTIVE:  Vitals:    06/09/25 2012 06/09/25 2351 06/10/25 0310 06/10/25 1205   BP: (!) 146/91 (!) 149/91 (!) 146/88 (!) 148/94   Pulse: 99 (!) 106 96 90   Resp: 18 18 18 17   Temp: 36.6 °C (97.9 °F) 36.7 °C (98.1 °F) 36.8 °C (98.3 °F) 37.2 °C (98.9 °F)   TempSrc: Temporal Temporal Temporal Temporal   SpO2: 92% 93% 93% 91%   Weight:   58.3 kg (128 lb 8.5 oz)    Height:           Intake/Output Summary (Last 24 hours) at 6/10/2025 1239  Last data filed at 6/10/2025 0300  Gross per 24 hour   Intake 0 ml   Output 1005 ml   Net -1005 ml       PHYSICAL EXAM:  General: afebrile, resting, ill-appearing  HEENT: NC/AT. EOMI.   Cardiovascular: RRR without murmurs. Normal capillary refill   Respiratory: CTAB  Abdomen: soft, tender, nondistended, no masses  EXT:  RODRIGUEZ, no edema  Skin: No erythema/lesions   Neuro: Non-focal    LABS:  Recent Labs     06/08/25  0533 06/09/25  0437 06/10/25  0255   WBC 12.4* 5.3 3.8*   RBC 3.43* 3.25* 3.47*   HEMOGLOBIN 10.2* 9.8* 10.4*   HEMATOCRIT 31.0* 29.8* 31.5*   MCV 90.4 91.7 90.8   MCH 29.7 30.2 30.0   RDW 45.1 45.5 46.1   PLATELETCT 532* 397 366   MPV 9.5 9.5 9.7   NEUTSPOLYS 95.80*  93.90*  --    LYMPHOCYTES 1.20* 0.90*  --    MONOCYTES 1.90 2.60  --    EOSINOPHILS 0.00 0.00  --    BASOPHILS 0.20 0.00  --    RBCMORPHOLO  --  Normal  --      Recent Labs     06/08/25  0533 06/08/25  1516 06/09/25  0437 06/10/25  0255   SODIUM 130* 133* 135 133*   POTASSIUM 6.1* 5.7* 5.0 4.7   CHLORIDE 94* 102 100 97   CO2 18* 22 21 24   BUN 74* 61* 40* 55*   CREATININE 4.98* 2.48* 1.19 1.63*   CALCIUM 9.8 9.0 8.7 8.5   ALBUMIN 3.9  --   --   --      Estimated GFR/CRCL = Estimated Creatinine Clearance: 40.7 mL/min (A) (by C-G formula based on SCr of 1.63 mg/dL (H)).  Recent Labs     06/08/25  1516 06/09/25  0437 06/10/25  0255   GLUCOSE 123* 95 122*     Recent Labs     06/08/25  0533   ASTSGOT 108*   ALTSGPT 65*   TBILIRUBIN 1.3   ALKPHOSPHAT 130*   GLOBULIN 4.1*   INR 1.14*             Recent Labs     06/08/25  0533   INR 1.14*   APTT 29.3         IMAGING:  CT-Cystogram   Final Result         1.  Diffuse bladder wall thickening, consider changes of cystitis.   2.  Contrast within the peritoneal cavity, compatible with intraperitoneal bladder rupture.   3.  Small foci of intra-abdominal free air, likely related to bladder rupture although radiographic appearance cannot exclude viscus perforation.   4.  Diffuse small bowel wall thickening, appearance suggests changes of enteritis.      These findings were discussed with the patient's clinician, Siena Oakley, on 6/10/2025 6:57 AM.      CT-IMAGE-GUIDED DRAIN PERITONEAL   Final Result      1.  CT GUIDED PLACEMENT OF A PERCUTANEOUS DRAINAGE CATHETER INTO THE PERITONEAL SPACE.   2.  THE CURRENT PLAN IS TO MONITOR DRAINAGE OUTPUT AND OBTAIN A FOLLOWUP CT SCAN IN 5-7 DAYS IF CLINICALLY INDICATED.      CT-ABDOMEN-PELVIS W/O   Final Result         1. 7 cm right pelvic high density mass may be a hematoma. This displaces the bladder to the left.      2. Large free peritoneal fluid in abdomen and pelvis.      3. Bladder empty. No hydronephrosis.      4. Mild ileus-type fluid  in stomach and small bowel. Fluid-filled distal esophagus suggests reflux. No colon distention. Normal appendix.      5. Possible sludge in gallbladder. No inflammation or ductal dilatation.      6. Small indeterminate 8 mm liver lesion may be a benign cyst.                  DX-CHEST-PORTABLE (1 VIEW)   Final Result         1. No acute abnormalities evident.      2. Right chest wall battery pack with wire extending to the neck.                         MEDS:  Current Facility-Administered Medications   Medication Last Admin    NS infusion New Bag at 06/10/25 1015    HYDROmorphone (Dilaudid) injection 0.5 mg      metoclopramide (Reglan) injection 10 mg      senna-docusate (Pericolace Or Senokot S) 8.6-50 MG per tablet 2 Tablet 2 Tablet at 06/09/25 1749    And    polyethylene glycol/lytes (Miralax) Packet 1 Packet 1 Packet at 06/09/25 0955    baclofen (Lioresal) tablet 5 mg 5 mg at 06/09/25 1749    normal saline flush 0.9 % SOLN 10 mL 10 mL at 06/10/25 0600    ondansetron (Zofran) syringe/vial injection 4 mg 4 mg at 06/10/25 0554    carbidopa-levodopa (Sinemet)  MG tablet 1 Tablet 1 Tablet at 06/09/25 2152    vitamin D3 (Cholecalciferol) tablet 4,000 Units 4,000 Units at 06/09/25 0446    piperacillin-tazobactam (Zosyn) 4.5 g in  mL IVPB Stopped at 06/10/25 0951       ASSESSMENT/PLAN:    * Abdominal pain  Assessment & Plan  Worsening generalized abdominal pain for 3 to 4 days prior to admission in the setting of robotic prostatectomy 5/28/2025 for prostate cancer.  CT shows 7 cm right pelvic mass, likely hematoma with peritoneal free fluid.  No hydronephrosis.  Mild ileus appearing findings in the stomach and small bowel.  Incidental 8 mm liver lesion noted.  Pain likely secondary to post operative hematoma, now with development of urine ascites likely due to urinary tract trauma/irritation from hematoma leading pseudo renal failure. S/p IR drain placement  S/p Cystogram, shows concern for bladder  rupture    Plan:  -Serial abdominal exams  -IR to manage drain, appreciate ongoing recommendations  -Urology following, appreciate ongoing recommendations   - To undergo operative bladder repair 6/10   - Continue Zosyn  -Continue luz catheter  -Pain control with IV dilaudid as not tolerating PO at this time, continue to modify   -Antiemetics with Zofran, Reglan    SIRS (systemic inflammatory response syndrome) (HCC)  Assessment & Plan  Acute, resolving. SIRS criteria identified on my evaluation include:  Tachycardia, with heart rate greater than 90 BPM, Tachypnea, with respirations greater than 20 per minute, and Leukocytosis, with WBC greater than 12,000  SIRS is non-infectious, the patient does not have sepsis  WBC 12.4 on admission, also with tachycardia, increased RR, on 2L O2.   Sepsis protocol initiated in the ED but low suspicion for infectious source at his time   Pt received 2L IVF in ED  Blood cultures x2 drawn  Ceftriaxone ordered by Urology   CXR neg  CT with 7cm mass, likely hematoma R pelvis with free fluid in the pelvis     Plan:  -Trend vitals q4h  -Follow blood cultures  -Continue IV Zosyn  -Urinalysis with culture   -Abdominal fluid culture    Liver lesion  Assessment & Plan  Incidental liver lesion 8 mm on CT this admission  - Recommend outpatient follow-up    Acute hypoxic respiratory failure (HCC)  Assessment & Plan  Acute, Resolving.  on room air initially in the ED but then placed up to 4 L, down to 2 L by nasal cannula.  Not on any oxygen at home. Back to RA with good saturations and O2 available.   Leukocytosis 12.4, trop neg, EKG tachycardia  BNP 300s, no prior   Chest x-ray negative  Well score for PE 4 points for tachycardia, immobilization/surgery in the previous 4 weeks, and malignancy within the past 6 months (prostate cancer s/p prostatectomy)   Patient was shortness of breath in the ED, difficult to get a deep breath.  Likely secondary to abdominal pain causing respiratory  splinting.    Plan  - Oxygen supplementation to keep oxygen saturation greater than 90%, presently on RA with O2 available  - RT consult, continuous oxygen saturation  - Low threshold for CT PE if remains tachycardic or with ongoing oxygen saturation or decompensation  - Wean oxygen as tolerated    Increased anion gap metabolic acidosis  Assessment & Plan  Resolved, Acute, likely due to ARF. Lactate 2.0   - Trend BMP         Hyponatremia- (present on admission)  Assessment & Plan  Acute, Improving.  Sodium 130 from 138 1 week ago.  Less likely postoperative hyponatremia, more likely hypertonic hyponatremia especially in the setting of urine ascites. Up to 135 hospital day 1  -Trend    Anemia  Assessment & Plan  Acute blood loss anemia in the setting of robotic prostatectomy 5/28, hemoglobin postoperatively ~7. Last hemoglobin 1 week ago at 7.4.   Hemoglobin in ED 10.2 > 9.8  CT with 7cm mass in the right pelvis, likely hematoma which fits picture of bleeding  Bleeding appears otherwise stable at this time with improvement in hemoglobin however patient tachycardic which also could be secondary to pain and peritoneal fluid with urine ascites  Blood pressure stable    Plan:  -Trend H&H, transfusion threshold less than 7  -Vital signs every 4 hours  - Monitor abdominal drain output    Leukocytosis  Assessment & Plan  Resolved. WBC 12.4 on admission, also with tachycardia, increased RR, on 2L O2. WBC now 5.2, on RA > 3.8  Suspect reactive    Plan:   - Follow infectious workup, Blood cx and fluid cx NGTD  - Trend leukocytosis     Hyperkalemia  Assessment & Plan  Acute, resolving.  Potassium 6.1 in ED, treated with calcium gluconate and insulin with dextrose, Lokelma initiated overnight,  Repeat K to 5.0, discontinue Lokelma. Progress to 4.7    Plan:   -Trend potassium  - Trend renal function    Acute renal failure (ARF) (HCC)- (present on admission)  Assessment & Plan  Improving, Acute renal failure with creatinine 4.98  in the setting of robotic prostatectomy on 5/28 secondary to prostate cancer.   BUN/creatinine ratio 14.8, intrarenal versus postrenal. CT abdomen shows 7cm mass likely hematoma in the right pelvis, displaces left bladder. Possibly causing obstructive picture. Per ERP some concern from urology regarding urine leak leading to urine ascited if mass abuts and has irritated the ureter.   Did consider intravascular depletion due to dehydration and/or post operative bleeding given post-op anemia with hemoglobin of ~7, though improved to 10.2. Acute postoperative anemia supports likely hematoma formation.   ARF with concomitant anion gap acidosis and hyperkalemia.   IVF, insulin and dextrose, calcium gluconate given in ED.  Cr improved to 1.19 > 1.63    Plan:  -PO fluid hydration  -Appreciate ongoing urology recommendations   -Trend renal function  -Trend electrolytes  -No indication for dialysis at this time given suspected cause  -Continue to monitor     Prostate cancer (HCC)- (present on admission)  Assessment & Plan  S/p robotic prostatectomy 5/28/2025    History of tobacco abuse- (present on admission)  Assessment & Plan  History of tobacco use as a former smoker at least 30 years ago, 5 pack years.  Currently chewing tobacco, last use 1 week ago.  Patient reports minimal cravings.  - Can order nicotine patch if needed for cravings  - Tobacco use cessation counseling    Parkinson's disease with dyskinesia and fluctuating manifestations (HCC)- (present on admission)  Assessment & Plan  Chronic, has deep brain stimulator, battery implanted at chest.   Continue home carbidopa-levodopa          Core Measures:  Fluids: NS 83/hr   Lines: PIV, Drain, Juarez  Abx: Zosyn  Diet: NPO  PPX: SCDs  Wound care: none    DISPO: Inpatient for surgery      CODE STATUS: Full Code    Siena Oakley D.O.  PGY-1  [facility]

## 2025-06-10 NOTE — PROGRESS NOTES
"   Urology Progress Note      6/10- Pt is lying comfortably in bed in NAD. He is afebrile. UOP 2100cc/24hrs, drain output 1185cc/24hrs. He remain on Zosyn and is NPO. CT cystogram on 6/10 showed contrast within the peritoneal cavity compatible with an intraperitoneal bladder rupture. WBC: 3.8, Hgb: 10.4, Hct: 31.5, creatinine increased, now 1.63 (1.19). Pt is schedule to go to the OR today with Dr. Cesar for a bladder repair.     6/9-S/p cysto luz placement over a wire on 6/8/25 performed by Dr. Todd and IR abdominal drain placement on 6/8. Pt is lying comfortably in bed in NAD. IR APN at bedside assessing CRIS drain. Tmax 99.2, pt is tachycardic at 105. Normotensive at 136/84. Documented abdominal drain output 3100mL/24hrs, drainage is dark/old SS. He remains on Zosyn. Luz catheter is drainage clear yellow urine, good UOP on exam. WBC: 5.3, Hgb: 9.8, No updated creatinine as of yet, however his creatinine from 6/8/25 is 2.48 (4.79). Pt denies f/c/n/v     Overnight Events: None    S:    O:   BP (!) 146/88   Pulse 96   Temp 36.8 °C (98.3 °F) (Temporal)   Resp 18   Ht 1.727 m (5' 8\")   Wt 58.3 kg (128 lb 8.5 oz)   SpO2 93%   Recent Labs     06/08/25  1516 06/09/25  0437 06/10/25  0255   SODIUM 133* 135 133*   POTASSIUM 5.7* 5.0 4.7   CHLORIDE 102 100 97   CO2 22 21 24   GLUCOSE 123* 95 122*   BUN 61* 40* 55*   CREATININE 2.48* 1.19 1.63*   CALCIUM 9.0 8.7 8.5     Recent Labs     06/08/25  0533 06/09/25  0437 06/10/25  0255   WBC 12.4* 5.3 3.8*   RBC 3.43* 3.25* 3.47*   HEMOGLOBIN 10.2* 9.8* 10.4*   HEMATOCRIT 31.0* 29.8* 31.5*   MCV 90.4 91.7 90.8   MCH 29.7 30.2 30.0   MCHC 32.9 32.9 33.0   RDW 45.1 45.5 46.1   PLATELETCT 532* 397 366   MPV 9.5 9.5 9.7         Intake/Output Summary (Last 24 hours) at 6/9/2025 1008  Last data filed at 6/9/2025 0822  Gross per 24 hour   Intake 840 ml   Output 3100 ml   Net -2260 ml       Exam:    Abdomen soft, benign, non-distended. Prior RALP incisions are clean, dry, " intact. No evidence of erythema, drainage or discharge.  Urine: light pink urine in luz.       A/P:    Active Hospital Problems    Diagnosis     Acute renal failure (ARF) (HCC) [N17.9]     Hyperkalemia [E87.5]     Leukocytosis [D72.829]     Anemia [D64.9]     Hyponatremia [E87.1]     Increased anion gap metabolic acidosis [E87.29]     Acute hypoxic respiratory failure (HCC) [J96.01]     Liver lesion [K76.9]     SIRS (systemic inflammatory response syndrome) (HCC) [R65.10]     Abdominal pain [R10.9]     Prostate cancer (HCC) [C61]     History of tobacco abuse [Z87.891]     Parkinson's disease with dyskinesia and fluctuating manifestations (HCC) [G20.B2]      -Continue to trend renal function.   -Pt to remain NPO today for urologic surgical intervention for bladder repair with Dr. Cesar. Pt is amenable to proceeding with intervention.   -Monitor urine output and abdominal drain output.   -Urology appreciates IR managing abdominal drain.   -Urology will continue to follow the patient during this hospital stay.   -Will follow up on pending CRIS drain creatinine.     -Patient's case was discussed with Dr. Grande and Dr. Cesar who have directed the plan of care for the patient.      LISA WakefieldA.-CONRAD.   [address] [address]   [phone]

## 2025-06-10 NOTE — PROGRESS NOTES
4 Eyes Skin Assessment Completed by YOAN Vasquez and YOAN Galeano.    Skin assessment is primarily focused on high risk bony prominences. Pay special attention to skin beneath and around medical devices, high risk bony prominences, skin to skin areas and areas where the patient lacks sensation to feel pain and areas where the patient previously had breakdown.     Head (Occipital):  WDL   Ears (Under Medical Devices): Red and Blanching   Nose (Under Medical Devices): WDL   Mouth:  Scab   Neck: WDL   Breast/Chest:  Pink, Red, and Bruising   Shoulder Blades:  WDL   Spine:   WDL   (R) Arm/Elbow/Hand: Pink, Red, and Purple/maroon   (L) Arm/Elbow/Hand: Bruising   Abdomen: WDL   Pannus/Groin:  WDL   Sacrum/Coccyx:   Red and Blanching   (R) Ischial Tuberosity (Sit Bones):  WDL   (L) Ischial Tuberosity (Sit Bones):  WDL   (R) Leg:  Abrasion, Pink, Red, and Bruising   (L) Leg:  Pink, Red, and Bruising   (R) Heel:  Red and Blanching   (R) Foot/Toe: fragile   (L) Heel: Red and Blanching   (L) Foot/Toe:  Bruising       DEVICES IN USE:   Respiratory Devices:  Nasal cannula and Pulse ox  Feeding Devices:  N/A   Lines & BP Monitoring Devices:  BP cuff and Pulse ox    Orthopedic Devices:  N/A  Miscellaneous Devices:  N/A    PROTOCOL INTERVENTIONS:   Standard/Trauma Bed:  Already in place  Offloading Dressing - Sacrum:  Already in place  Offloading Dressing - Heel:  Already in place  Float Heels with Pillows:  Already in place    WOUND PHOTOS:   Completed and in EPIC     WOUND CONSULT:   Consult ordered for the following areas R elbow possible DTI

## 2025-06-10 NOTE — ANESTHESIA PREPROCEDURE EVALUATION
" Case: [id number] Date/Time: 06/10/25 1800    Procedures:       BLADDER REPAIR AND EXPLORATION ROBOT-ASSISTED, USING DA ELLIS XI      CYSTOSCOPY    Location: [facility] OR  / SURGERY [facility]    Surgeons: Tj WATT M.D.            Past Medical History[1]    Past Surgical History[2]  DBS off for surgery    Current Outpatient Medications   Medication Instructions   • B Complex Vitamins (B COMPLEX PO) 1 Tablet, Oral, DAILY   • carbidopa-levodopa (SINEMET)  MG Tab 1 Tablet, Oral, 3 TIMES DAILY, For Parkinson's   • ibuprofen (MOTRIN) 200 mg, Oral, EVERY 6 HOURS PRN   • MAGNESIUM PO 1 Tablet, Oral, DAILY   • Omega-3 Fatty Acids (OMEGA 3 PO) 1 Capsule, Oral, DAILY   • oxybutynin SR (DITROPAN-XL) 10 mg, DAILY   • oxyCODONE immediate-release (ROXICODONE) 5 mg, EVERY 8 HOURS PRN   • vitamin D3 (CHOLECALCIFEROL) 4,000 Units, Oral, 2 TIMES DAILY   • VITAMIN K PO 1 Tablet, Oral, DAILY       Social History[3]    BP (!) 148/94   Pulse 90   Temp 37.2 °C (98.9 °F) (Temporal)   Resp 17   Ht 1.727 m (5' 8\")   Wt 58.3 kg (128 lb 8.5 oz)   SpO2 91%     Allergies[4]    Lab Results   Component Value Date/Time    SODIUM 133 (L) 06/10/2025 02:55 AM    POTASSIUM 4.7 06/10/2025 02:55 AM    CHLORIDE 97 06/10/2025 02:55 AM    GLUCOSE 122 (H) 06/10/2025 02:55 AM    BUN 55 (H) 06/10/2025 02:55 AM    CREATININE 1.63 (H) 06/10/2025 02:55 AM        Lab Results   Component Value Date/Time    WBC 3.8 (L) 06/10/2025 02:55 AM    RBC 3.47 (L) 06/10/2025 02:55 AM    HEMOGLOBIN 10.4 (L) 06/10/2025 02:55 AM    HEMATOCRIT 31.5 (L) 06/10/2025 02:55 AM    PLATELETCT 366 06/10/2025 02:55 AM        Relevant Problems   ANESTHESIA   (positive) PONV (postoperative nausea and vomiting)      NEURO   (positive) Migraine with aura and without status migrainosus, not intractable      CARDIAC   (positive) Migraine with aura and without status migrainosus, not intractable         (positive) Acute renal failure (ARF) (HCC)   (positive) Liver lesion "       Physical Exam    Airway   Mallampati: II  TM distance: >3 FB  Neck ROM: full       Cardiovascular - normal exam  Rhythm: regular  Rate: normal    (-) murmur     Dental         Very poor dentition     Pulmonary - normal examBreath sounds clear to auscultation     Abdominal    Neurological - normal exam                 Anesthesia Plan    ASA 3 (Acute renal failure and bladder rupture)- EMERGENT   ASA physical status emergent criteria: sepsis and compromised vital organ, limb or tissue    Plan - general       Airway plan will be ETT    (Possible arterial line)      Induction: intravenous    Postoperative Plan: Postoperative administration of opioids is intended.    Pertinent diagnostic labs and testing reviewed    Informed Consent:    Anesthetic plan and risks discussed with patient.    Use of blood products discussed with: patient whom consented to blood products.     Anesthetic procedure and risks discussed with patient in detail.  Risks include but are not limited to PONV, pain, sore throat, damage to teeth/lips/gums, aspiration, positioning injury, allergic reaction, vocal cord injury, prolonged intubation, stroke, and/or cardiopulmonary problems up to and including death.  Patient indicates complete understanding. All questions fully answered and they agree to proceed as planned above.             [1]  Past Medical History:  Diagnosis Date   • Anesthesia     PONV   • Anxiety    • Bronchitis     Remote   • Parkinson's disease (HCC)    • PONV (postoperative nausea and vomiting)    • Status post deep brain stimulator placement 06/2024    Follow[facility] and Dr. Ivania Castro at [facility]   • Urinary bladder disorder     Difficulty urinating   [2]  Past Surgical History:  Procedure Laterality Date   • PROSTATECTOMY ROBOTIC XI N/A 5/28/2025    Procedure: ROBOTIC ASSISTED LAPAROSCOPIC PROSTATECTOMY;  Surgeon: Tj WATT M.D.;  Location: SURGERY [facility];  Service: Uro Robotic    • LAPAROSCOPIC LYSIS OF ADHESIONS N/A 2025    Procedure: LYSIS, ADHESIONS, LAPAROSCOPIC ROBOTIC;  Surgeon: Tj WATT M.D.;  Location: SURGERY [facility];  Service: Uro Robotic   • OTHER  2024    Deep brain stimulator   • INGUINAL HERNIA REPAIR ROBOTIC Bilateral 2017    Procedure: INGUINAL HERNIA REPAIR ROBOTIC/ WITH MESH PLACEMENT;  Surgeon: Zackary Ponce M.D.;  Location: SURGERY [facility];  Service: General   • LAMINOTOMY      c5-7   • ORIF, ELBOW      right as a child   • OTHER NEUROLOGICAL SURG      cervical fusion   • OTHER ORTHOPEDIC SURGERY      finger surgery as child   • SEPTOPLASTY, NOSE, WITH TURBINOPLASTY     • SHOULDER ARTHROSCOPY W/ SLAP / LABRAL REPAIR      left   • SINUSCOPY      maxillary sinuses   [3]  Social History  Tobacco Use   • Smoking status: Former     Current packs/day: 0.00     Average packs/day: 1 pack/day for 5.0 years (5.0 ttl pk-yrs)     Types: Cigarettes     Start date: 1982     Quit date: 1987     Years since quittin.5   • Smokeless tobacco: Former     Types: Chew   • Tobacco comments:     quit at age 23   Vaping Use   • Vaping status: Never Used   Substance Use Topics   • Alcohol use: No     Comment: stopped drinking  @ 20   • Drug use: No   [4]  No Known Allergies

## 2025-06-10 NOTE — CARE PLAN
"The patient is Watcher - Medium risk of patient condition declining or worsening    Shift Goals  Clinical Goals: monitor drain output and renal function  Patient Goals: pain control, have BM  Family Goals: updates    Progress made toward(s) clinical / shift goals:  Scheduled Tylenol administered for \"constipation pain,\" pt complains of mild surgical pain with movement. Pt bed alarm on      Problem: Pain - Standard  Goal: Alleviation of pain or a reduction in pain to the patient’s comfort goal  Outcome: Progressing     Problem: Knowledge Deficit - Standard  Goal: Patient and family/care givers will demonstrate understanding of plan of care, disease process/condition, diagnostic tests and medications  Outcome: Progressing     Problem: Fall Risk  Goal: Patient will remain free from falls  Outcome: Progressing           "

## 2025-06-10 NOTE — PROGRESS NOTES
Bedside report received from off going RN/tech: Christina, assumed care of patient.     Fall Risk Score: HIGH RISK  Fall risk interventions in place: Place yellow fall risk ID band on patient, Provide patient/family education based on risk assessment, Educate patient/family to call staff for assistance when getting out of bed, Place fall precaution signage outside patient door, Place patient in room close to nursing station, and Utilize bed/chair fall alarm  Bed type: Regular (Cecilio Score less than 17 interventions in place)  Patient on cardiac monitor: Yes  IVF/IV medications: Not Applicable   Oxygen: Room Air  Bedside sitter: Not Applicable   Isolation: Not applicable

## 2025-06-10 NOTE — DISCHARGE PLANNING
Care Transition Team Discharge Planning    Anticipated Discharge Information  Discharge Disposition: D/T to SNF with Medicare cert in anticipation of skilled care (03)  Discharge Address: [facility] [place] [address]  Discharge Contact Phone Number: [phone]    Discharge Plan: PT/OT recommending post acute placement. SNF referral placed. PASRR completed per protocol.     PASRR: [id number]    1200: RNCM met with patient to discuss DCP. Patient reports he would possibly be agreeable to post acute placement. Provided patient with brochures for accepting facilities.

## 2025-06-11 PROBLEM — R74.8 ELEVATED LIVER ENZYMES: Status: ACTIVE | Noted: 2025-06-11

## 2025-06-11 PROBLEM — E87.5 HYPERKALEMIA: Status: RESOLVED | Noted: 2025-06-08 | Resolved: 2025-06-11

## 2025-06-11 PROBLEM — D72.829 LEUKOCYTOSIS: Status: RESOLVED | Noted: 2025-06-08 | Resolved: 2025-06-11

## 2025-06-11 PROBLEM — E87.29 INCREASED ANION GAP METABOLIC ACIDOSIS: Status: RESOLVED | Noted: 2025-06-08 | Resolved: 2025-06-11

## 2025-06-11 PROBLEM — R65.10 SIRS (SYSTEMIC INFLAMMATORY RESPONSE SYNDROME) (HCC): Status: RESOLVED | Noted: 2025-06-08 | Resolved: 2025-06-11

## 2025-06-11 LAB
ALBUMIN SERPL BCP-MCNC: 2.5 G/DL (ref 3.2–4.9)
ALBUMIN SERPL BCP-MCNC: 2.8 G/DL (ref 3.2–4.9)
ALBUMIN/GLOB SERPL: 0.8 G/DL
ALBUMIN/GLOB SERPL: 0.9 G/DL
ALP SERPL-CCNC: 167 U/L (ref 30–99)
ALP SERPL-CCNC: 169 U/L (ref 30–99)
ALT SERPL-CCNC: 318 U/L (ref 2–50)
ALT SERPL-CCNC: 408 U/L (ref 2–50)
AMMONIA PLAS-SCNC: 26 UMOL/L (ref 11–45)
ANION GAP SERPL CALC-SCNC: 12 MMOL/L (ref 7–16)
ANION GAP SERPL CALC-SCNC: 13 MMOL/L (ref 7–16)
AST SERPL-CCNC: 740 U/L (ref 12–45)
AST SERPL-CCNC: 834 U/L (ref 12–45)
BACTERIA FLD AEROBE CULT: NORMAL
BILIRUB SERPL-MCNC: 0.6 MG/DL (ref 0.1–1.5)
BILIRUB SERPL-MCNC: 0.6 MG/DL (ref 0.1–1.5)
BUN SERPL-MCNC: 72 MG/DL (ref 8–22)
BUN SERPL-MCNC: 73 MG/DL (ref 8–22)
CALCIUM ALBUM COR SERPL-MCNC: 9.1 MG/DL (ref 8.5–10.5)
CALCIUM ALBUM COR SERPL-MCNC: 9.1 MG/DL (ref 8.5–10.5)
CALCIUM SERPL-MCNC: 7.9 MG/DL (ref 8.5–10.5)
CALCIUM SERPL-MCNC: 8.1 MG/DL (ref 8.5–10.5)
CHLORIDE SERPL-SCNC: 101 MMOL/L (ref 96–112)
CHLORIDE SERPL-SCNC: 104 MMOL/L (ref 96–112)
CO2 SERPL-SCNC: 23 MMOL/L (ref 20–33)
CO2 SERPL-SCNC: 24 MMOL/L (ref 20–33)
CREAT SERPL-MCNC: 1.97 MG/DL (ref 0.5–1.4)
CREAT SERPL-MCNC: 1.97 MG/DL (ref 0.5–1.4)
ERYTHROCYTE [DISTWIDTH] IN BLOOD BY AUTOMATED COUNT: 47.2 FL (ref 35.9–50)
GFR SERPLBLD CREATININE-BSD FMLA CKD-EPI: 39 ML/MIN/1.73 M 2
GFR SERPLBLD CREATININE-BSD FMLA CKD-EPI: 39 ML/MIN/1.73 M 2
GGT SERPL-CCNC: 101 U/L (ref 7–51)
GLOBULIN SER CALC-MCNC: 3 G/DL (ref 1.9–3.5)
GLOBULIN SER CALC-MCNC: 3.2 G/DL (ref 1.9–3.5)
GLUCOSE SERPL-MCNC: 131 MG/DL (ref 65–99)
GLUCOSE SERPL-MCNC: 137 MG/DL (ref 65–99)
GRAM STN SPEC: NORMAL
HCT VFR BLD AUTO: 34.9 % (ref 42–52)
HGB BLD-MCNC: 11.3 G/DL (ref 14–18)
MCH RBC QN AUTO: 29.6 PG (ref 27–33)
MCHC RBC AUTO-ENTMCNC: 32.4 G/DL (ref 32.3–36.5)
MCV RBC AUTO: 91.4 FL (ref 81.4–97.8)
PLATELET # BLD AUTO: 374 K/UL (ref 164–446)
PMV BLD AUTO: 9.9 FL (ref 9–12.9)
POTASSIUM SERPL-SCNC: 5 MMOL/L (ref 3.6–5.5)
POTASSIUM SERPL-SCNC: 5.3 MMOL/L (ref 3.6–5.5)
PROCALCITONIN SERPL-MCNC: 2.38 NG/ML
PROT SERPL-MCNC: 5.5 G/DL (ref 6–8.2)
PROT SERPL-MCNC: 6 G/DL (ref 6–8.2)
RBC # BLD AUTO: 3.82 M/UL (ref 4.7–6.1)
SIGNIFICANT IND 70042: NORMAL
SITE SITE: NORMAL
SODIUM SERPL-SCNC: 138 MMOL/L (ref 135–145)
SODIUM SERPL-SCNC: 139 MMOL/L (ref 135–145)
SOURCE SOURCE: NORMAL
WBC # BLD AUTO: 2.9 K/UL (ref 4.8–10.8)

## 2025-06-11 PROCEDURE — 700111 HCHG RX REV CODE 636 W/ 250 OVERRIDE (IP): Mod: JZ

## 2025-06-11 PROCEDURE — 700105 HCHG RX REV CODE 258

## 2025-06-11 PROCEDURE — 84145 PROCALCITONIN (PCT): CPT

## 2025-06-11 PROCEDURE — 700105 HCHG RX REV CODE 258: Performed by: FAMILY MEDICINE

## 2025-06-11 PROCEDURE — 82140 ASSAY OF AMMONIA: CPT

## 2025-06-11 PROCEDURE — 700102 HCHG RX REV CODE 250 W/ 637 OVERRIDE(OP)

## 2025-06-11 PROCEDURE — 700101 HCHG RX REV CODE 250: Performed by: NURSE PRACTITIONER

## 2025-06-11 PROCEDURE — 700111 HCHG RX REV CODE 636 W/ 250 OVERRIDE (IP)

## 2025-06-11 PROCEDURE — 80053 COMPREHEN METABOLIC PANEL: CPT

## 2025-06-11 PROCEDURE — A9270 NON-COVERED ITEM OR SERVICE: HCPCS

## 2025-06-11 PROCEDURE — 700111 HCHG RX REV CODE 636 W/ 250 OVERRIDE (IP): Mod: JZ | Performed by: FAMILY MEDICINE

## 2025-06-11 PROCEDURE — 85027 COMPLETE CBC AUTOMATED: CPT

## 2025-06-11 PROCEDURE — 82977 ASSAY OF GGT: CPT

## 2025-06-11 PROCEDURE — 36415 COLL VENOUS BLD VENIPUNCTURE: CPT

## 2025-06-11 PROCEDURE — 99232 SBSQ HOSP IP/OBS MODERATE 35: CPT | Mod: GC | Performed by: FAMILY MEDICINE

## 2025-06-11 PROCEDURE — 770006 HCHG ROOM/CARE - MED/SURG/GYN SEMI*

## 2025-06-11 RX ORDER — HEPARIN SODIUM 5000 [USP'U]/ML
5000 INJECTION, SOLUTION INTRAVENOUS; SUBCUTANEOUS EVERY 8 HOURS
Status: DISCONTINUED | OUTPATIENT
Start: 2025-06-11 | End: 2025-06-20 | Stop reason: HOSPADM

## 2025-06-11 RX ORDER — METOCLOPRAMIDE HYDROCHLORIDE 5 MG/ML
5 INJECTION INTRAMUSCULAR; INTRAVENOUS EVERY 6 HOURS PRN
Status: DISCONTINUED | OUTPATIENT
Start: 2025-06-11 | End: 2025-06-15

## 2025-06-11 RX ORDER — OXYBUTYNIN CHLORIDE 10 MG/1
10 TABLET, EXTENDED RELEASE ORAL DAILY
Status: DISPENSED | OUTPATIENT
Start: 2025-06-11 | End: 2025-06-18

## 2025-06-11 RX ADMIN — ONDANSETRON 4 MG: 2 INJECTION INTRAMUSCULAR; INTRAVENOUS at 20:33

## 2025-06-11 RX ADMIN — CARBIDOPA AND LEVODOPA 1 TABLET: 25; 100 TABLET ORAL at 20:34

## 2025-06-11 RX ADMIN — HYDROMORPHONE HYDROCHLORIDE 0.5 MG: 1 INJECTION, SOLUTION INTRAMUSCULAR; INTRAVENOUS; SUBCUTANEOUS at 16:35

## 2025-06-11 RX ADMIN — Medication 4000 UNITS: at 04:38

## 2025-06-11 RX ADMIN — HYDROMORPHONE HYDROCHLORIDE 0.5 MG: 1 INJECTION, SOLUTION INTRAMUSCULAR; INTRAVENOUS; SUBCUTANEOUS at 07:39

## 2025-06-11 RX ADMIN — HEPARIN SODIUM 5000 UNITS: 5000 INJECTION, SOLUTION INTRAVENOUS; SUBCUTANEOUS at 22:20

## 2025-06-11 RX ADMIN — ONDANSETRON 4 MG: 2 INJECTION INTRAMUSCULAR; INTRAVENOUS at 13:25

## 2025-06-11 RX ADMIN — HYDROMORPHONE HYDROCHLORIDE 0.5 MG: 1 INJECTION, SOLUTION INTRAMUSCULAR; INTRAVENOUS; SUBCUTANEOUS at 13:25

## 2025-06-11 RX ADMIN — HYDROMORPHONE HYDROCHLORIDE 0.5 MG: 1 INJECTION, SOLUTION INTRAMUSCULAR; INTRAVENOUS; SUBCUTANEOUS at 20:33

## 2025-06-11 RX ADMIN — PIPERACILLIN AND TAZOBACTAM 4.5 G: 4; .5 INJECTION, POWDER, FOR SOLUTION INTRAVENOUS at 22:21

## 2025-06-11 RX ADMIN — SODIUM CHLORIDE, PRESERVATIVE FREE 10 ML: 5 INJECTION INTRAVENOUS at 04:42

## 2025-06-11 RX ADMIN — ONDANSETRON 4 MG: 2 INJECTION INTRAMUSCULAR; INTRAVENOUS at 07:39

## 2025-06-11 RX ADMIN — HYDROMORPHONE HYDROCHLORIDE 0.5 MG: 1 INJECTION, SOLUTION INTRAMUSCULAR; INTRAVENOUS; SUBCUTANEOUS at 04:38

## 2025-06-11 RX ADMIN — HYDROMORPHONE HYDROCHLORIDE 0.5 MG: 1 INJECTION, SOLUTION INTRAMUSCULAR; INTRAVENOUS; SUBCUTANEOUS at 23:42

## 2025-06-11 RX ADMIN — CARBIDOPA AND LEVODOPA 1 TABLET: 25; 100 TABLET ORAL at 13:27

## 2025-06-11 RX ADMIN — SODIUM CHLORIDE: 9 INJECTION, SOLUTION INTRAVENOUS at 20:44

## 2025-06-11 RX ADMIN — PIPERACILLIN AND TAZOBACTAM 4.5 G: 4; .5 INJECTION, POWDER, FOR SOLUTION INTRAVENOUS at 04:41

## 2025-06-11 RX ADMIN — OXYBUTYNIN CHLORIDE 10 MG: 10 TABLET, EXTENDED RELEASE ORAL at 13:27

## 2025-06-11 RX ADMIN — PIPERACILLIN AND TAZOBACTAM 4.5 G: 4; .5 INJECTION, POWDER, FOR SOLUTION INTRAVENOUS at 13:25

## 2025-06-11 ASSESSMENT — ENCOUNTER SYMPTOMS
FEVER: 0
HEADACHES: 0
NAUSEA: 0
PALPITATIONS: 0
CHILLS: 0
SHORTNESS OF BREATH: 0
VOMITING: 0
ABDOMINAL PAIN: 1
WEAKNESS: 0

## 2025-06-11 ASSESSMENT — PAIN DESCRIPTION - PAIN TYPE
TYPE: SURGICAL PAIN
TYPE: ACUTE PAIN
TYPE: SURGICAL PAIN
TYPE: ACUTE PAIN
TYPE: ACUTE PAIN
TYPE: SURGICAL PAIN
TYPE: ACUTE PAIN

## 2025-06-11 ASSESSMENT — PATIENT HEALTH QUESTIONNAIRE - PHQ9
SUM OF ALL RESPONSES TO PHQ9 QUESTIONS 1 AND 2: 0
2. FEELING DOWN, DEPRESSED, IRRITABLE, OR HOPELESS: NOT AT ALL
1. LITTLE INTEREST OR PLEASURE IN DOING THINGS: NOT AT ALL

## 2025-06-11 ASSESSMENT — FIBROSIS 4 INDEX: FIB4 SCORE: 2.08

## 2025-06-11 ASSESSMENT — PAIN SCALES - GENERAL: PAIN_LEVEL: 5

## 2025-06-11 NOTE — OR NURSING
Per nursing assistant in pre-op , patient had bowel movement , and stool color is black. Dr. Cesar notified at 1910.

## 2025-06-11 NOTE — CARE PLAN
The patient is Unstable - High likelihood or risk of patient condition declining or worsening    Shift Goals  Clinical Goals: hemodynamic stability  Patient Goals: rest, comfort  Family Goals: HOLLAND    Progress made toward(s) clinical / shift goals:    Problem: Pain - Standard  Goal: Alleviation of pain or a reduction in pain to the patient’s comfort goal  Outcome: Progressing     Problem: Knowledge Deficit - Standard  Goal: Patient and family/care givers will demonstrate understanding of plan of care, disease process/condition, diagnostic tests and medications  Outcome: Progressing     Problem: Fall Risk  Goal: Patient will remain free from falls  Outcome: Progressing     Problem: Skin Integrity  Goal: Skin integrity is maintained or improved  Outcome: Progressing       Patient is not progressing towards the following goals:

## 2025-06-11 NOTE — PROGRESS NOTES
4 Eyes Skin Assessment Completed by YOAN Westbrook and YOAN Joseph.    Skin assessment is primarily focused on high risk bony prominences. Pay special attention to skin beneath and around medical devices, high risk bony prominences, skin to skin areas and areas where the patient lacks sensation to feel pain and areas where the patient previously had breakdown.     Head (Occipital):  WDL   Ears (Under Medical Devices): WDL   Nose (Under Medical Devices): WDL   Mouth:  WDL   Neck: WDL   Breast/Chest:  WDL   Shoulder Blades:  WDL   Spine:   WDL   (R) Arm/Elbow/Hand: WDL    (L) Arm/Elbow/Hand WDL   Abdomen: Incision   Pannus/Groin:  WDL   Sacrum/Coccyx:   WDL   (R) Ischial Tuberosity (Sit Bones):  WDL   (L) Ischial Tuberosity (Sit Bones):  WDL   (R) Leg:  WDL   (L) Leg:  WDL   (R) Heel:  WDL   (R) Foot/Toe: WDL   (L) Heel: WDL   (L) Foot/Toe:   (L) upper side   lower extremity hip WDL  bruising       DEVICES IN USE:   Respiratory Devices:  Nasal cannula and Pulse ox  Feeding Devices:  N/A   Lines & BP Monitoring Devices:    Orthopedic Devices:  N/A  Miscellaneous Devices:  Telemetry monitor and Juarez    PROTOCOL INTERVENTIONS:   Standard/Trauma Bed:  Reinforced/reapplied    WOUND PHOTOS:   Completed and in EPIC     WOUND CONSULT:   N/A, no advanced wound care needs identified

## 2025-06-11 NOTE — CARE PLAN
Problem: Pain - Standard  Goal: Alleviation of pain or a reduction in pain to the patient’s comfort goal  Outcome: Progressing     Problem: Knowledge Deficit - Standard  Goal: Patient and family/care givers will demonstrate understanding of plan of care, disease process/condition, diagnostic tests and medications  Outcome: Progressing     Problem: Fall Risk  Goal: Patient will remain free from falls  Outcome: Progressing     Problem: Skin Integrity  Goal: Skin integrity is maintained or improved  Outcome: Progressing   The patient is Watcher - Medium risk of patient condition declining or worsening    Shift Goals  Clinical Goals: VSS, safety  Patient Goals: to get better  Family Goals: HOLLAND    Progress made toward(s) clinical / shift goals:      Patient is not progressing towards the following goals:

## 2025-06-11 NOTE — PROGRESS NOTES
Radiology Progress Note   Author: BA Sanchez Date & Time created: 6/11/2025  10:40 AM   Date of admission  6/8/2025  Note to reader: this note follows the APSO format rather than the historical SOAP format. Assessment and plan located at the top of the note for ease of use.    Chief Complaint  60 y.o. male admitted 6/8/2025 with   Chief Complaint   Patient presents with    Post-Op Complications     Abdominal pain with nausea and vomiting since pt had surgery on 5/28 and the pain is progressively worsening  + dark brown vomitus (small amount)  + no stool for 4 days  + abdominal tenderness  + shortness of breath    Denied any fever, chest pain    5/28/2025: History of ROBOTIC ASSISTED LAPAROSCOPIC PROSTATECTOMY (Abdomen)  LYSIS, ADHESIONS, LAPAROSCOPIC ROBOTIC (Abdomen)        Abdominal Pain    Nausea    Shortness of Breath         HPI  60-year-old male with past medical history significant for Parkinson's disease, prostate cancer s/p robotic prostatectomy 05/28/25 with Dr. Cesar presented with intermittent abdominal pain x 3 to 4 days.  CT revealed abdominal ascites and a 7 cm right pelvic mass concerning for hematoma.  Urology consulted for suspected urinary leak post prostatectomy and performed bedside cystoscopy with Juarez catheter placement over wire. IR was then consulted and patient underwent abdominal drain placement with IR Dr Rocha on 06/08/25.      Interval History:   06/09/25 - Abdominal drain to RLQ with 2900 mL serosanguineous output in the last 24 hours.  All labs reviewed by me including WBC 5.3, H&H 9.8/29.8, creatinine 1.19. Cultures pending; NGTD.  On ABX medical team.  I reviewed all IDT notes, flushed drain with 10 mL NS, placed drain to gravity bag, and discussed plan of care with surgical urology NETTIE Larsen.     06/10/25 - Abdominal drain to RLQ with 1185 mL serosanguineous output in the last 24 hours.  All labs reviewed by me including WBC 3.8, H&H 10.4/31.5, serum creatinine  1.63, fluid creatinine 16.8, cultures pending; NGTD.  On ABX through medical team.  CT 06/10/25 compatible with intraperitoneal bladder rupture. I reviewed all IDT notes and flushed drain with 10 mL NS.    06/11/25 - Urology performed cystoscopy yesterday which showed anastomotic leak; IR drain removed during procedure and surgical drain placed. All labs reviewed by me including WBC 2.9, H&H 11.3/34.9, serum creatinine 1.97, cultures negative.  I reviewed all IDT notes.    Assessment/Plan     Principal Problem:    Abdominal pain  Active Problems:    Parkinson's disease with dyskinesia and fluctuating manifestations (HCC)    History of tobacco abuse    Prostate cancer (HCC)    Acute renal failure (ARF) (HCC)    Hyperkalemia    Leukocytosis    Anemia    Hyponatremia    Increased anion gap metabolic acidosis    Acute hypoxic respiratory failure (HCC)    Liver lesion    SIRS (systemic inflammatory response syndrome) (HCC)      Plan IR  - IR drain removed by urology during cystoscopy yesterday evening.   - IR signing off.     -Thank you for allowing Interventional Radiology team to participate in the patients care, if any additional care or requests are needed in the future please do not hesitate to call or place IR order           Review of Systems  Physical Exam   Review of Systems   Constitutional:  Positive for malaise/fatigue. Negative for chills and fever.   Respiratory:  Negative for shortness of breath.    Cardiovascular:  Negative for chest pain and palpitations.   Gastrointestinal:  Positive for abdominal pain. Negative for nausea and vomiting.   Neurological:  Negative for weakness and headaches.      Vitals:    06/11/25 0712   BP: (!) 145/94   Pulse: 100   Resp: 17   Temp: 36.8 °C (98.2 °F)   SpO2: 90%        Physical Exam  Vitals and nursing note reviewed.   Constitutional:       General: He is not in acute distress.  Cardiovascular:      Rate and Rhythm: Normal rate.   Pulmonary:      Effort: Pulmonary  effort is normal. No respiratory distress.   Abdominal:      General: There is no distension.      Tenderness: There is abdominal tenderness.      Comments: Surgical drain to RLQ   Skin:     General: Skin is warm and dry.   Neurological:      General: No focal deficit present.      Mental Status: He is alert and oriented to person, place, and time.   Psychiatric:         Mood and Affect: Mood normal.         Behavior: Behavior normal.             Labs    Recent Labs     06/09/25  0437 06/10/25  0255 06/11/25  0252   WBC 5.3 3.8* 2.9*   RBC 3.25* 3.47* 3.82*   HEMOGLOBIN 9.8* 10.4* 11.3*   HEMATOCRIT 29.8* 31.5* 34.9*   MCV 91.7 90.8 91.4   MCH 30.2 30.0 29.6   MCHC 32.9 33.0 32.4   RDW 45.5 46.1 47.2   PLATELETCT 397 366 374   MPV 9.5 9.7 9.9     Recent Labs     06/09/25  0437 06/10/25  0255 06/11/25  0252   SODIUM 135 133* 138   POTASSIUM 5.0 4.7 5.3   CHLORIDE 100 97 101   CO2 21 24 24   GLUCOSE 95 122* 137*   BUN 40* 55* 73*   CREATININE 1.19 1.63* 1.97*   CALCIUM 8.7 8.5 8.1*     Recent Labs     06/09/25  0437 06/10/25  0255 06/11/25  0252   ALBUMIN  --   --  2.8*   TBILIRUBIN  --   --  0.6   ALKPHOSPHAT  --   --  167*   TOTPROTEIN  --   --  6.0   ALTSGPT  --   --  318*   ASTSGOT  --   --  740*   CREATININE 1.19 1.63* 1.97*     BA-ABGQMEW-3 VIEW   Final Result      Digitized intraoperative radiograph is submitted for review. This examination is not for diagnostic purpose but for guidance during a surgical procedure. Please see the patient's chart for full procedural details.         INTERPRETING LOCATION: [address] [place] [address], [id number]      DX-PORTABLE FLUOROSCOPY < 1 HOUR Reason For Exam: Main OR   Final Result      Portable fluoroscopy utilized for 6 seconds.         INTERPRETING LOCATION: [facility], [id number]      CT-Cystogram   Final Result         1.  Diffuse bladder wall thickening, consider changes of cystitis.   2.  Contrast within the peritoneal cavity, compatible with intraperitoneal bladder  "rupture.   3.  Small foci of intra-abdominal free air, likely related to bladder rupture although radiographic appearance cannot exclude viscus perforation.   4.  Diffuse small bowel wall thickening, appearance suggests changes of enteritis.      These findings were discussed with the patient's clinician, Siena Oakley, on 6/10/2025 6:57 AM.      CT-IMAGE-GUIDED DRAIN PERITONEAL   Final Result      1.  CT GUIDED PLACEMENT OF A PERCUTANEOUS DRAINAGE CATHETER INTO THE PERITONEAL SPACE.   2.  THE CURRENT PLAN IS TO MONITOR DRAINAGE OUTPUT AND OBTAIN A FOLLOWUP CT SCAN IN 5-7 DAYS IF CLINICALLY INDICATED.      CT-ABDOMEN-PELVIS W/O   Final Result         1. 7 cm right pelvic high density mass may be a hematoma. This displaces the bladder to the left.      2. Large free peritoneal fluid in abdomen and pelvis.      3. Bladder empty. No hydronephrosis.      4. Mild ileus-type fluid in stomach and small bowel. Fluid-filled distal esophagus suggests reflux. No colon distention. Normal appendix.      5. Possible sludge in gallbladder. No inflammation or ductal dilatation.      6. Small indeterminate 8 mm liver lesion may be a benign cyst.                  DX-CHEST-PORTABLE (1 VIEW)   Final Result         1. No acute abnormalities evident.      2. Right chest wall battery pack with wire extending to the neck.                       INR   Date Value Ref Range Status   06/08/2025 1.14 (H) 0.87 - 1.13 Final     Comment:     INR - Non-therapeutic Reference Range: 0.87-1.13  INR - Therapeutic Reference Range: 2.0-4.0       No results found for: \"POCINR\"     Intake/Output Summary (Last 24 hours) at 6/9/2025 0927  Last data filed at 6/9/2025 0822  Gross per 24 hour   Intake 840 ml   Output 3100 ml   Net -2260 ml      I have personally reviewed the above labs and imaging      I have performed a physical exam and reviewed and updated ROS and Plan today (6/11/2025).     37 minutes in directly providing and coordinating care and " extensive data review.  No time overlap and excludes procedures.

## 2025-06-11 NOTE — OR NURSING
Pt arrived to PACU from OR with anesthesiologist and OR RN. Oral airway D/C'd at 2230. NGT to R. Nare D/C'd at 2245 per MD Cesar. VSS. No c/o pain or nausea, AAOx4. Tremors to RUE and LUE intermittent. Able to move all extremities on command. Surgical incisions to abd x3 DERECK closed with dermabond. R. Side abd CRIS drain DSG CDI. Juarez in place per order. On 2L NC satting 96%. Pt resting comfortably. Pt remote to brain stimulator with pt labeled and placed in clear bag. Glasses with pt. Updated spouse Meredith via phone. Report given to YOAN Westbrook. Pt transported to  834-01 with transport, glasses, remote, and O2 2L NC. O2 tank at 600.

## 2025-06-11 NOTE — PROGRESS NOTES
Bedside report received from off going RN/tech: Dariana, assumed care of patient.     Fall Risk Score: HIGH RISK  Fall risk interventions in place: Place yellow fall risk ID band on patient, Provide patient/family education based on risk assessment, Educate patient/family to call staff for assistance when getting out of bed, Place fall precaution signage outside patient door, Place patient in room close to nursing station, Utilize bed/chair fall alarm, and Bed alarm connected correctly  Bed type: Regular (Cecilio Score less than 17 interventions in place)  Patient on cardiac monitor: Yes  IVF/IV medications: Not Applicable   Oxygen: How many liters 3L, Traced the line to wall oxygen, and No oxygen tank in room  Bedside sitter: Not Applicable   Isolation: Not applicable

## 2025-06-11 NOTE — ANESTHESIA TIME REPORT
Anesthesia Start and Stop Event Times       Date Time Event    6/10/2025 1844 Ready for Procedure     1905 Anesthesia Start     2128 Anesthesia Stop          Responsible Staff  06/10/25      Name Role Begin End    Shiva Rivera M.D. Anesth 1905 2128          Overtime Reason:  no overtime (within assigned shift)    Comments:

## 2025-06-11 NOTE — PROGRESS NOTES
FAMILY MEDICINE PROGRESS NOTE          PATIENT ID:  NAME:  Rakan Rader  MRN:               [id number]  YOB: 1965    Date of Admission: 6/8/2025     Attending: New Mccullough M.d.     Resident: Porsha Dye D.O. (PGY-2)    Primary Care Physician:  Emeterio Dave M.D.    HPI: Rakan Rader is a 60 y.o. male with hx Parkinson's Disease with DBS, prostate cancer presented with abdominal pain after underwent RA laparoscopic prostatectomy 5/28; found to have urinary ascites and 7cm R pelvic hematoma s/p diagnostic laparoscopy and hematoma washout 6/10 on hospital day 3    Interval Problem Update  6/11: POD1 s/p cysto, on table cystogram, complex catheterization and laparoscopic washout of hematoma and abdominal cavity on 6/10/25 with Dr. Cesar    SUBJECTIVE:   No acute events overnight, patient tachycardic intermittently overnight. He reports pain today but well controlled with the IV pain medications, patient reports that most bothersome today is a dry mouth.  Has been having bowel movements which are soft.  Has not had much to eat and is not hungry.    OBJECTIVE:  Temp:  [36.4 °C (97.5 °F)-36.8 °C (98.3 °F)] 36.8 °C (98.3 °F)  Pulse:  [] 105  Resp:  [12-19] 18  BP: (128-163)/() 141/91  SpO2:  [90 %-100 %] 93 %      Intake/Output Summary (Last 24 hours) at 6/11/2025 1319  Last data filed at 6/11/2025 0500  Gross per 24 hour   Intake 400 ml   Output 890 ml   Net -490 ml       Physical Exam:  Gen:  NAD, sleeping   HEENT: Mucous membranes are dry, PERRL, EOMI   Cardio: Borderline tachycardic, clear s1/s2, no murmur  Resp:  Decreased air movement bilaterally   GI/: Non distended, laparoscopic incisions intact, mild tenderness throughout, decreased bowel sounds  Neuro: Masked facies, tremor noted in right hand with mild rigidity. DBS to R chest. No acute deficits, alert and oriented x3   Skin/Extremities: Cap refill <3sec, warm/well perfused, no rash, normal extremities    LABS:  Recent  "Labs     06/09/25  0437 06/10/25  0255 06/11/25  0252   WBC 5.3 3.8* 2.9*   RBC 3.25* 3.47* 3.82*   HEMOGLOBIN 9.8* 10.4* 11.3*   HEMATOCRIT 29.8* 31.5* 34.9*   MCV 91.7 90.8 91.4   MCH 30.2 30.0 29.6   RDW 45.5 46.1 47.2   PLATELETCT 397 366 374   MPV 9.5 9.7 9.9   NEUTSPOLYS 93.90*  --   --    LYMPHOCYTES 0.90*  --   --    MONOCYTES 2.60  --   --    EOSINOPHILS 0.00  --   --    BASOPHILS 0.00  --   --    RBCMORPHOLO Normal  --   --      Recent Labs     06/10/25  0255 06/11/25 0252 06/11/25  1005   SODIUM 133* 138 139   POTASSIUM 4.7 5.3 5.0   CHLORIDE 97 101 104   CO2 24 24 23   BUN 55* 73* 72*   CREATININE 1.63* 1.97* 1.97*   CALCIUM 8.5 8.1* 7.9*   ALBUMIN  --  2.8* 2.5*     Estimated GFR/CRCL = Estimated Creatinine Clearance: 32.6 mL/min (A) (by C-G formula based on SCr of 1.97 mg/dL (H)).  Recent Labs     06/10/25  0255 06/11/25 0252 06/11/25  1005   GLUCOSE 122* 137* 131*     Recent Labs     06/11/25 0252 06/11/25  1005   ASTSGOT 740* 834*   ALTSGPT 318* 408*   TBILIRUBIN 0.6 0.6   ALKPHOSPHAT 167* 169*   GLOBULIN 3.2 3.0             No results for input(s): \"INR\", \"APTT\", \"DDIMER\", \"FIBRINOGEN\" in the last 72 hours.      IMAGING:  [id number] VIEW   Final Result      Digitized intraoperative radiograph is submitted for review. This examination is not for diagnostic purpose but for guidance during a surgical procedure. Please see the patient's chart for full procedural details.         INTERPRETING LOCATION: [address] [place] [address], [address]      DX-PORTABLE FLUOROSCOPY < 1 HOUR Reason For Exam: Main OR   Final Result      Portable fluoroscopy utilized for 6 seconds.         INTERPRETING LOCATION: [address], [place], [address]      CT-Cystogram   Final Result         1.  Diffuse bladder wall thickening, consider changes of cystitis.   2.  Contrast within the peritoneal cavity, compatible with intraperitoneal bladder rupture.   3.  Small foci of intra-abdominal free air, likely related to bladder rupture " although radiographic appearance cannot exclude viscus perforation.   4.  Diffuse small bowel wall thickening, appearance suggests changes of enteritis.      These findings were discussed with the patient's clinician, Siena Oakley, on 6/10/2025 6:57 AM.      CT-IMAGE-GUIDED DRAIN PERITONEAL   Final Result      1.  CT GUIDED PLACEMENT OF A PERCUTANEOUS DRAINAGE CATHETER INTO THE PERITONEAL SPACE.   2.  THE CURRENT PLAN IS TO MONITOR DRAINAGE OUTPUT AND OBTAIN A FOLLOWUP CT SCAN IN 5-7 DAYS IF CLINICALLY INDICATED.      CT-ABDOMEN-PELVIS W/O   Final Result         1. 7 cm right pelvic high density mass may be a hematoma. This displaces the bladder to the left.      2. Large free peritoneal fluid in abdomen and pelvis.      3. Bladder empty. No hydronephrosis.      4. Mild ileus-type fluid in stomach and small bowel. Fluid-filled distal esophagus suggests reflux. No colon distention. Normal appendix.      5. Possible sludge in gallbladder. No inflammation or ductal dilatation.      6. Small indeterminate 8 mm liver lesion may be a benign cyst.                  DX-CHEST-PORTABLE (1 VIEW)   Final Result         1. No acute abnormalities evident.      2. Right chest wall battery pack with wire extending to the neck.                         CULTURES:   Results       Procedure Component Value Units Date/Time    FLUID CULTURE W/GRAM STAIN [[id number]] Collected: 06/08/25 1415    Order Status: Completed Specimen: Body Fluid Updated: 06/11/25 0757     Significant Indicator NEG     Source BF     Site Peritoneal  drain aspiration     Culture Result No growth at 72 hours.     Gram Stain Result Moderate WBCs.  No organisms seen.      Urine Culture (New) [[id number]] Collected: 06/08/25 1255    Order Status: Completed Specimen: Urine, Clean Catch Updated: 06/10/25 1208     Significant Indicator NEG     Source UR     Site URINE, CLEAN CATCH     Culture Result No growth at 48 hours.    GRAM STAIN [[id number]] Collected: 06/08/25 1415     Order Status: Completed Specimen: Body Fluid Updated: 06/08/25 2050     Significant Indicator .     Source BF     Site Peritoneal  drain aspiration     Gram Stain Result Moderate WBCs.  No organisms seen.      URINALYSIS [[id number]]  (Abnormal) Collected: 06/08/25 1255    Order Status: Completed Specimen: Urine Updated: 06/08/25 1332     Color Orange     Character Turbid     Specific Gravity 1.020     Ph 5.0     Glucose Negative mg/dL      Ketones Negative mg/dL      Protein 100 mg/dL      Bilirubin Negative     Urobilinogen, Urine 0.2 EU/dL      Nitrite Negative     Leukocyte Esterase Small     Occult Blood Large     Micro Urine Req Microscopic    FLUID CULTURE W/GRAM STAIN [[id number]]     Order Status: No result Specimen: Other Body Fluid     Blood Culture - Draw one from central line and one from peripheral site [[id number]] Collected: 06/08/25 0547    Order Status: Completed Specimen: Blood from Peripheral Updated: 06/08/25 0808     Significant Indicator NEG     Source BLD     Site PERIPHERAL     Culture Result No Growth  Note: Blood cultures are incubated for 5 days and  are monitored continuously.Positive blood cultures  are called to the RN and reported as soon as  they are identified.      Blood Culture - Draw one from central line and one from peripheral site [[id number]] Collected: 06/08/25 0540    Order Status: Completed Specimen: Blood from Line Updated: 06/08/25 0808     Significant Indicator NEG     Source BLD     Site Peripheral     Culture Result No Growth  Note: Blood cultures are incubated for 5 days and  are monitored continuously.Positive blood cultures  are called to the RN and reported as soon as  they are identified.      Urinalysis [[id number]]     Order Status: Canceled Specimen: Urine, Clean Catch             MEDS:  Current Facility-Administered Medications   Medication Last Admin    oxybutynin SR (Ditropan-XL) tablet 10 mg      heparin injection 5,000 Units      NS infusion Rate Change at  06/11/25 0736    HYDROmorphone (Dilaudid) injection 0.5 mg 0.5 mg at 06/11/25 0739    metoclopramide (Reglan) injection 10 mg      senna-docusate (Pericolace Or Senokot S) 8.6-50 MG per tablet 2 Tablet 2 Tablet at 06/09/25 1749    And    polyethylene glycol/lytes (Miralax) Packet 1 Packet 1 Packet at 06/09/25 0955    baclofen (Lioresal) tablet 5 mg 5 mg at 06/10/25 1303    normal saline flush 0.9 % SOLN 10 mL 10 mL at 06/11/25 0442    ondansetron (Zofran) syringe/vial injection 4 mg 4 mg at 06/11/25 0739    carbidopa-levodopa (Sinemet)  MG tablet 1 Tablet 1 Tablet at 06/09/25 2152    vitamin D3 (Cholecalciferol) tablet 4,000 Units 4,000 Units at 06/11/25 0438    piperacillin-tazobactam (Zosyn) 4.5 g in  mL IVPB Stopped at 06/11/25 0841       ASSESSMENT/PLAN:  60 y.o. male admitted for:  * Abdominal pain  Assessment & Plan  Worsening generalized abdominal pain for 3 to 4 days prior to admission in the setting of robotic prostatectomy 5/28/2025 for prostate cancer.  CT with 7 cm right pelvic hematoma with peritoneal free fluid considered to be urine ascites on admission.   Inpatient cystogram with concern for anastomosis disruption causing leakage     Plan:  -Pt is POD1 s/p cysto, on table cystogram, complex catheterization and laparoscopic washout of hematoma and abdominal cavity on 6/10/25 with Dr. Cesar  -Serial abdominal exams  -IVF   -Pain control with IV dilaudid   -Antiemetics with Zofran, Reglan  -Bowel regimen   -Urology following, appreciate recommendations:  -Ditropan 10mg ER daily for bladder spasms  -Clear liquid diet to 1.5L daily.   -Continue luz catheter   -Antibiotics with Zosyn for at least another 24 hours, then transition to oral for 10 day course given intra-abdominal urine leak  -Discharge home with luz catheter and potentially CRIS drain  -Luz will remain for a minimum of 3 weeks and then a fluoroscopic cystogram will be performed to assess for resolution of leak.    Acute  hypoxic respiratory failure (HCC)  Assessment & Plan  Acute, Resolving.  Multifactorial including respiratory splinting due to urine ascites, now post-operative.     Plan  - Oxygen supplementation to keep oxygen saturation greater than 90%, presently on RA with O2 available  - PT/OT for mobilization  - Incentive spirometry  - Wean oxygen as tolerated    Anemia  Assessment & Plan  Acute blood loss anemia in the setting of robotic prostatectomy 5/28, hemoglobin postoperatively ~7. Last hemoglobin 1 week ago at 7.4.   Plan:  -Trend H&H, transfusion threshold less than 7  -Vital signs every 4 hours  - Monitor abdominal drain output    Acute renal failure (ARF) (HCC)- (present on admission)  Assessment & Plan  Improving, acute renal failure with creatinine 4.98 in the setting of robotic prostatectomy on 5/28 secondary to prostate cancer.   Initially with hyperkalemia requiring calcium gluconate, insulin and dextrose.     Plan:  -IV NS, wean with PO intake as diet is advanced   -Appreciate urology involvement  -Trend renal function  -Trend electrolytes  -Continue to monitor     Elevated liver enzymes  Assessment & Plan  Acute, worsening this hospitalization. , ALT 65,  on admission  New elevation in , , and  postop day #1 following cysto, on table cystogram, complex catheterization and laparoscopic washout of hematoma and abdominal cavity on 6/10/25 with Dr. Cesar  Likely cause of shock liver however considered intrahepatic/biliary injury from laparoscopy     Plan:  -Trend CMP  -Check INR, ammonia, GGT  -If continuing to rise may consider CT versus ultrasound +/- GI consult    Hyponatremia- (present on admission)  Assessment & Plan  Acute, Improving.  Sodium 130 from 138 1 week ago.  Less likely postoperative hyponatremia, more likely hypertonic hyponatremia especially in the setting of urine ascites. Up to 135 hospital day 1  -Trend    Prostate cancer (HCC)- (present on  admission)  Assessment & Plan  S/p robotic prostatectomy 5/28/2025    History of tobacco abuse- (present on admission)  Assessment & Plan  History of tobacco use as a former smoker at least 30 years ago, 5 pack years.  Currently chewing tobacco, last use 1 week ago.  Patient reports minimal cravings.  - Can order nicotine patch if needed for cravings  - Tobacco use cessation counseling    Liver lesion  Assessment & Plan  Incidental liver lesion 8 mm on CT this admission  - Recommend outpatient follow-up    Parkinson's disease with dyskinesia and fluctuating manifestations (HCC)- (present on admission)  Assessment & Plan  Chronic, has deep brain stimulator, battery implanted at chest.   Continue home carbidopa-levodopa          Core Measures:  Fluids: 0.9NS  Lines: Peripheral IV for intravenous access, luz, CRIS drain   Abx: IV Zosyn   Diet: clear liquids  PPX: SCDs/TEDs    CODE Status: Full Code      Disposition  Patient is not medically cleared for discharge.   Anticipate discharge to skilled nursing facility.  I have placed the appropriate orders for post-discharge needs.    I have personally seen and examined the patient at bedside. I discussed the plan of care with patient and  and New Mccullough M.d..      Porsha Dye D.O.   PGY-2  [facility]

## 2025-06-11 NOTE — PROGRESS NOTES
Attending Note: I saw the patient. Agree with below note. Recovering appropriately from surgical perspective, with good UOP and minimal drain output since surgery last night. Slight bump in Cr - likely delayed effect and expect this to improve over next few days. Had several small bowel movements today.     To supplement Nutrition, I have advanced diet to full liquid and ordered Ensure - chocolate flavor, give over lots of ice.     _______________________  Tj Cesar MD  Urologic Surgical Oncology  [facility]      --       Urology Progress Note    6/11-Pt lying comfortably in bed in NAD. RN at bedside. Pt is POD1 s/p cysto, on table cystogram, complex catheterization and laparoscopic washout of hematoma and abdominal cavity on 6/10/25 with Dr. Cesar. He is afebrile, hypertensive at 145/94, tachycardic 100, UOP 700cc/24hrs, WBC: 2.9, Hgb: 11.3, creatinine elevated 1.97. Pt notes his hiccups have resolved and feels that he is due for a BM today.     6/10- Pt is lying comfortably in bed in NAD. He is afebrile. UOP 2100cc/24hrs, drain output 1185cc/24hrs. He remain on Zosyn and is NPO. CT cystogram on 6/10 showed contrast within the peritoneal cavity compatible with an intraperitoneal bladder rupture. WBC: 3.8, Hgb: 10.4, Hct: 31.5, creatinine increased, now 1.63 (1.19). Pt is schedule to go to the OR today with Dr. Cesar for a bladder repair.     6/9-S/p cysto luz placement over a wire on 6/8/25 performed by Dr. Todd and IR abdominal drain placement on 6/8. Pt is lying comfortably in bed in NAD. IR APN at bedside assessing CRIS drain. Tmax 99.2, pt is tachycardic at 105. Normotensive at 136/84. Documented abdominal drain output 3100mL/24hrs, drainage is dark/old SS. He remains on Zosyn. Luz catheter is drainage clear yellow urine, good UOP on exam. WBC: 5.3, Hgb: 9.8, No updated creatinine as of yet, however his creatinine from 6/8/25 is 2.48 (4.79). Pt denies f/c/n/v     Overnight Events:  "None    S:    O:   BP (!) 145/94   Pulse 100   Temp 36.8 °C (98.2 °F) (Temporal)   Resp 17   Ht 1.727 m (5' 8\")   Wt 56.4 kg (124 lb 5.4 oz)   SpO2 90%   Recent Labs     06/10/25  0255 06/11/25  0252 06/11/25  1005   SODIUM 133* 138 139   POTASSIUM 4.7 5.3 5.0   CHLORIDE 97 101 104   CO2 24 24 23   GLUCOSE 122* 137* 131*   BUN 55* 73* 72*   CREATININE 1.63* 1.97* 1.97*   CALCIUM 8.5 8.1* 7.9*     Recent Labs     06/09/25  0437 06/10/25  0255 06/11/25  0252   WBC 5.3 3.8* 2.9*   RBC 3.25* 3.47* 3.82*   HEMOGLOBIN 9.8* 10.4* 11.3*   HEMATOCRIT 29.8* 31.5* 34.9*   MCV 91.7 90.8 91.4   MCH 30.2 30.0 29.6   MCHC 32.9 33.0 32.4   RDW 45.5 46.1 47.2   PLATELETCT 397 366 374   MPV 9.5 9.7 9.9         Intake/Output Summary (Last 24 hours) at 6/9/2025 1008  Last data filed at 6/9/2025 0822  Gross per 24 hour   Intake 840 ml   Output 3100 ml   Net -2260 ml       Exam:    Abdomen soft, benign, non-distended. Abdominal incisions are clean, dry, intact. No evidence of erythema, drainage or discharge.  Urine: luz draining light pink urine     A/P:    Active Hospital Problems    Diagnosis     Acute renal failure (ARF) (HCC) [N17.9]     Hyperkalemia [E87.5]     Leukocytosis [D72.829]     Anemia [D64.9]     Hyponatremia [E87.1]     Increased anion gap metabolic acidosis [E87.29]     Acute hypoxic respiratory failure (HCC) [J96.01]     Liver lesion [K76.9]     SIRS (systemic inflammatory response syndrome) (HCC) [R65.10]     Abdominal pain [R10.9]     Prostate cancer (HCC) [C61]     History of tobacco abuse [Z87.891]     Parkinson's disease with dyskinesia and fluctuating manifestations (HCC) [G20.B2]      -Continue to trend renal function.   -Will order Ditropan 10mg ER daily for bladder spasms  -Pt can now advance diet to CLEAR LIQUIDS with restriction to 1.5L daily.   -Monitor urine output and abdominal drain output.   -Urology appreciates IR managing abdominal drain.   -Urology will continue to follow the patient during " this hospital stay.     -Patient's case was discussed with Dr. Grande and Dr. Cesar who have directed the plan of care for the patient.      Suzie Larsen PMalathiA.-C.   [address] [address]   [phone]

## 2025-06-11 NOTE — PROGRESS NOTES
Received report from Radha MILTON. Pt arrived to unit at 0035 via hospital bed escorted by transport. Assessment complete. VSS.  Tele monitor in place. Monitor room notified. Pt c/o pain 0/10. Fall precautions and appropriate signs in place. Call light/phone system within reach. Personal belongings within reach. Pt educated regarding fall precautions. Bed alarm is on. Safety and fall  precautions in place. Pt denies any further needs at this time.

## 2025-06-11 NOTE — ANESTHESIA POSTPROCEDURE EVALUATION
Patient: Rakan Rader III    Procedure Summary       Date: 06/10/25 Room / Location: [place] [address] / SURGERY [facility]    Anesthesia Start: 1905 Anesthesia Stop: 2128    Procedures:       CYSTOSCOPY ON TABLE FLUOROSCOPIC CYSTOGRAM , COMPLEX URETHRAL CATHETERIZATION (Bladder)      DIAGNOSTIC LAPAROSCOPY, ABDOMINAL WASHOUT (Abdomen) Diagnosis: (Urinary Anastomotic Leak , Intraabdominal Hematoma, Abdomino Infectio)    Surgeons: Tj WATT M.D. Responsible Provider: Shiva Rivera M.D.    Anesthesia Type: general ASA Status: 3 - Emergent            Final Anesthesia Type: general  Last vitals  BP   Blood Pressure: (!) 145/94    Temp   36.8 °C (98.2 °F)    Pulse   100   Resp   17    SpO2   90 %      Anesthesia Post Evaluation    Patient location during evaluation: PACU  Patient participation: complete - patient participated  Level of consciousness: sleepy but conscious  Pain score: 5    Airway patency: patent  Anesthetic complications: no  Cardiovascular status: hemodynamically stable  Respiratory status: acceptable  Hydration status: balanced    PONV: none          There were no known notable events for this encounter.     Nurse Pain Score: 8 (NPRS)

## 2025-06-11 NOTE — OP REPORT
Immediate Op Note:    Findings:   Cystoscopy with very small posterior vesico-urethral opening between stitches. Anastomosis >95% circumferentially intact. Efflux of urine from each UO. Heavily trabeculated bladder.  On table fluoroscopic cystogram with no leak from bladder and moderate leak from the posterior anastomosis.  Complex urethral catheterization over wire, 20F Douglas catheter  Diagnostic laparoscopy with no evidence of bowel injury. Succus and pelvic hematoma suctioned out. Washout with 2L of sterile fluid, including 1L of Ancef antibiotic fluid.  19F drain in pelvis.  NG tube placed at end of case due to some reflux of bile colored fluid from stomach, with 200 ml of output.    Urology Recommendations:  Remove NG tube in PACU  Hydroxyzine PRN hiccups   NPO today, we will assess in AM, likely advance to clears tomorrow. Await return of bowel function before solid food intake  Antibiotics with Zosyn for at least another 48 hours, then transition to oral for 10 day course given intra-abdominal urine leak  Discharge home with luz catheter and potentially CRIS drain - Urology will decide.   Luz will remain for a minimum of 3 weeks and then a fluoroscopic cystogram will be performed to assess for resolution of leak.  Appreciate remainder of management by Hospitalist team. We will follow while patient admitted.     Full Op Note to follow.    _______________________  Tj Cesar MD  Urologic Surgical Oncology  [facility]

## 2025-06-11 NOTE — PROGRESS NOTES
Monitor Summary  Rhythm: Normal Sinus Rhythm and Sinus Tachycardia  Rate:   Ectopy: PVCs (R)  .14 / .10 / .36        12 hr Chart check.

## 2025-06-11 NOTE — ASSESSMENT & PLAN NOTE
Acute, Resolving. Likely shock liver due to illness. GI consulted and agreed. Recommended trending and no further investigation at this time.   Overall down-trending AST; stable ALP and slight elevation in ALT.   -Trend CMP  -Consider HIDA scan if not improving

## 2025-06-11 NOTE — ANESTHESIA PROCEDURE NOTES
Airway    Date/Time: 6/10/2025 7:13 PM    Performed by: Shiva Rivera M.D.  Authorized by: Shiva Rivera M.D.    Location:  OR  Urgency:  Elective  Indications for Airway Management:  Anesthesia      Spontaneous Ventilation: absent    Sedation Level:  Deep  Preoxygenated: Yes    Patient Position:  Sniffing  Final Airway Type:  Endotracheal airway  Final Endotracheal Airway:  ETT  Cuffed: Yes    Technique Used for Successful ETT Placement:  Direct laryngoscopy    Insertion Site:  Oral  Blade Type:  Edouard  Laryngoscope Blade/Videolaryngoscope Blade Size:  4  ETT Size (mm):  8.0  Measured from:  Teeth  ETT to Teeth (cm):  23  Placement Verified by: auscultation and capnometry    Cormack-Lehane Classification:  Grade I - full view of glottis  Number of Attempts at Approach:  1

## 2025-06-12 ENCOUNTER — APPOINTMENT (OUTPATIENT)
Dept: RADIOLOGY | Facility: MEDICAL CENTER | Age: 60
DRG: 698 | End: 2025-06-12
Payer: MEDICARE

## 2025-06-12 PROBLEM — D70.8 OTHER NEUTROPENIA (HCC): Status: ACTIVE | Noted: 2025-06-12

## 2025-06-12 LAB
ALBUMIN SERPL BCP-MCNC: 2.2 G/DL (ref 3.2–4.9)
ALBUMIN SERPL BCP-MCNC: 2.4 G/DL (ref 3.2–4.9)
ALBUMIN/GLOB SERPL: 0.9 G/DL
ALBUMIN/GLOB SERPL: 0.9 G/DL
ALP SERPL-CCNC: 157 U/L (ref 30–99)
ALP SERPL-CCNC: 166 U/L (ref 30–99)
ALT SERPL-CCNC: 102 U/L (ref 2–50)
ALT SERPL-CCNC: 127 U/L (ref 2–50)
ANION GAP SERPL CALC-SCNC: 5 MMOL/L (ref 7–16)
ANION GAP SERPL CALC-SCNC: 8 MMOL/L (ref 7–16)
AST SERPL-CCNC: 1146 U/L (ref 12–45)
AST SERPL-CCNC: 1163 U/L (ref 12–45)
BASOPHILS # BLD AUTO: 0 % (ref 0–1.8)
BASOPHILS # BLD AUTO: 0 % (ref 0–1.8)
BASOPHILS # BLD: 0 K/UL (ref 0–0.12)
BASOPHILS # BLD: 0 K/UL (ref 0–0.12)
BILIRUB SERPL-MCNC: 0.4 MG/DL (ref 0.1–1.5)
BILIRUB SERPL-MCNC: 0.5 MG/DL (ref 0.1–1.5)
BUN SERPL-MCNC: 43 MG/DL (ref 8–22)
BUN SERPL-MCNC: 55 MG/DL (ref 8–22)
CALCIUM ALBUM COR SERPL-MCNC: 8.6 MG/DL (ref 8.5–10.5)
CALCIUM ALBUM COR SERPL-MCNC: 8.6 MG/DL (ref 8.5–10.5)
CALCIUM SERPL-MCNC: 7.2 MG/DL (ref 8.5–10.5)
CALCIUM SERPL-MCNC: 7.3 MG/DL (ref 8.5–10.5)
CHLORIDE SERPL-SCNC: 111 MMOL/L (ref 96–112)
CHLORIDE SERPL-SCNC: 113 MMOL/L (ref 96–112)
CK SERPL-CCNC: 174 U/L (ref 0–154)
CO2 SERPL-SCNC: 24 MMOL/L (ref 20–33)
CO2 SERPL-SCNC: 25 MMOL/L (ref 20–33)
COMMENT NL1176: NORMAL
CREAT SERPL-MCNC: 1.16 MG/DL (ref 0.5–1.4)
CREAT SERPL-MCNC: 1.47 MG/DL (ref 0.5–1.4)
EOSINOPHIL # BLD AUTO: 0 K/UL (ref 0–0.51)
EOSINOPHIL # BLD AUTO: 0 K/UL (ref 0–0.51)
EOSINOPHIL NFR BLD: 0 % (ref 0–6.9)
EOSINOPHIL NFR BLD: 0 % (ref 0–6.9)
ERYTHROCYTE [DISTWIDTH] IN BLOOD BY AUTOMATED COUNT: 47.7 FL (ref 35.9–50)
ERYTHROCYTE [DISTWIDTH] IN BLOOD BY AUTOMATED COUNT: 48.1 FL (ref 35.9–50)
GFR SERPLBLD CREATININE-BSD FMLA CKD-EPI: 55 ML/MIN/1.73 M 2
GFR SERPLBLD CREATININE-BSD FMLA CKD-EPI: 73 ML/MIN/1.73 M 2
GLOBULIN SER CALC-MCNC: 2.4 G/DL (ref 1.9–3.5)
GLOBULIN SER CALC-MCNC: 2.6 G/DL (ref 1.9–3.5)
GLUCOSE SERPL-MCNC: 107 MG/DL (ref 65–99)
GLUCOSE SERPL-MCNC: 120 MG/DL (ref 65–99)
HAV IGM SERPL QL IA: NORMAL
HBV CORE IGM SER QL: NORMAL
HBV SURFACE AG SER QL: NORMAL
HCT VFR BLD AUTO: 29.1 % (ref 42–52)
HCT VFR BLD AUTO: 31 % (ref 42–52)
HCV AB SER QL: NORMAL
HGB BLD-MCNC: 10 G/DL (ref 14–18)
HGB BLD-MCNC: 9.2 G/DL (ref 14–18)
HYPOCHROMIA BLD QL SMEAR: ABNORMAL
INR PPP: 1.06 (ref 0.87–1.13)
LACTATE SERPL-SCNC: 1 MMOL/L (ref 0.5–2)
LYMPHOCYTES # BLD AUTO: 0.15 K/UL (ref 1–4.8)
LYMPHOCYTES # BLD AUTO: 0.16 K/UL (ref 1–4.8)
LYMPHOCYTES NFR BLD: 10.6 % (ref 22–41)
LYMPHOCYTES NFR BLD: 11.1 % (ref 22–41)
MANUAL DIFF BLD: NORMAL
MANUAL DIFF BLD: NORMAL
MCH RBC QN AUTO: 29 PG (ref 27–33)
MCH RBC QN AUTO: 29.4 PG (ref 27–33)
MCHC RBC AUTO-ENTMCNC: 31.6 G/DL (ref 32.3–36.5)
MCHC RBC AUTO-ENTMCNC: 32.3 G/DL (ref 32.3–36.5)
MCV RBC AUTO: 91.2 FL (ref 81.4–97.8)
MCV RBC AUTO: 91.8 FL (ref 81.4–97.8)
METAMYELOCYTES NFR BLD MANUAL: 0.8 %
METAMYELOCYTES NFR BLD MANUAL: 1.8 %
MONOCYTES # BLD AUTO: 0.1 K/UL (ref 0–0.85)
MONOCYTES # BLD AUTO: 0.1 K/UL (ref 0–0.85)
MONOCYTES NFR BLD AUTO: 4.3 % (ref 0–13.4)
MONOCYTES NFR BLD AUTO: 5.3 % (ref 0–13.4)
MORPHOLOGY BLD-IMP: NORMAL
MORPHOLOGY BLD-IMP: NORMAL
MYELOCYTES NFR BLD MANUAL: 2.6 %
NEUTROPHILS # BLD AUTO: 1.14 K/UL (ref 1.82–7.42)
NEUTROPHILS # BLD AUTO: 1.15 K/UL (ref 1.82–7.42)
NEUTROPHILS NFR BLD: 78.6 % (ref 44–72)
NEUTROPHILS NFR BLD: 79.6 % (ref 44–72)
NEUTS BAND NFR BLD MANUAL: 2.6 % (ref 0–10)
NEUTS BAND NFR BLD MANUAL: 2.7 % (ref 0–10)
NRBC # BLD AUTO: 0 K/UL
NRBC # BLD AUTO: 0 K/UL
NRBC BLD-RTO: 0 /100 WBC (ref 0–0.2)
NRBC BLD-RTO: 0 /100 WBC (ref 0–0.2)
OVALOCYTES BLD QL SMEAR: NORMAL
OVALOCYTES BLD QL SMEAR: NORMAL
PLATELET # BLD AUTO: 249 K/UL (ref 164–446)
PLATELET # BLD AUTO: 281 K/UL (ref 164–446)
PLATELET BLD QL SMEAR: NORMAL
PLATELET BLD QL SMEAR: NORMAL
PMV BLD AUTO: 9.6 FL (ref 9–12.9)
PMV BLD AUTO: 9.9 FL (ref 9–12.9)
POIKILOCYTOSIS BLD QL SMEAR: NORMAL
POIKILOCYTOSIS BLD QL SMEAR: NORMAL
POTASSIUM SERPL-SCNC: 4.2 MMOL/L (ref 3.6–5.5)
POTASSIUM SERPL-SCNC: 4.6 MMOL/L (ref 3.6–5.5)
PROT SERPL-MCNC: 4.6 G/DL (ref 6–8.2)
PROT SERPL-MCNC: 5 G/DL (ref 6–8.2)
PROTHROMBIN TIME: 13.8 SEC (ref 12–14.6)
RBC # BLD AUTO: 3.17 M/UL (ref 4.7–6.1)
RBC # BLD AUTO: 3.4 M/UL (ref 4.7–6.1)
RBC BLD AUTO: PRESENT
RBC BLD AUTO: PRESENT
SODIUM SERPL-SCNC: 143 MMOL/L (ref 135–145)
SODIUM SERPL-SCNC: 143 MMOL/L (ref 135–145)
TROPONIN T SERPL-MCNC: 9 NG/L (ref 6–19)
WBC # BLD AUTO: 1.4 K/UL (ref 4.8–10.8)
WBC # BLD AUTO: 1.4 K/UL (ref 4.8–10.8)

## 2025-06-12 PROCEDURE — A9270 NON-COVERED ITEM OR SERVICE: HCPCS

## 2025-06-12 PROCEDURE — 85610 PROTHROMBIN TIME: CPT

## 2025-06-12 PROCEDURE — 700105 HCHG RX REV CODE 258: Performed by: FAMILY MEDICINE

## 2025-06-12 PROCEDURE — 770006 HCHG ROOM/CARE - MED/SURG/GYN SEMI*

## 2025-06-12 PROCEDURE — 700111 HCHG RX REV CODE 636 W/ 250 OVERRIDE (IP): Mod: JZ | Performed by: FAMILY MEDICINE

## 2025-06-12 PROCEDURE — 80053 COMPREHEN METABOLIC PANEL: CPT

## 2025-06-12 PROCEDURE — 97530 THERAPEUTIC ACTIVITIES: CPT | Mod: CQ

## 2025-06-12 PROCEDURE — 99232 SBSQ HOSP IP/OBS MODERATE 35: CPT | Mod: GC | Performed by: FAMILY MEDICINE

## 2025-06-12 PROCEDURE — 83605 ASSAY OF LACTIC ACID: CPT

## 2025-06-12 PROCEDURE — 700102 HCHG RX REV CODE 250 W/ 637 OVERRIDE(OP)

## 2025-06-12 PROCEDURE — 700111 HCHG RX REV CODE 636 W/ 250 OVERRIDE (IP): Mod: JZ

## 2025-06-12 PROCEDURE — 700105 HCHG RX REV CODE 258

## 2025-06-12 PROCEDURE — 99222 1ST HOSP IP/OBS MODERATE 55: CPT | Performed by: INTERNAL MEDICINE

## 2025-06-12 PROCEDURE — 84484 ASSAY OF TROPONIN QUANT: CPT

## 2025-06-12 PROCEDURE — 85007 BL SMEAR W/DIFF WBC COUNT: CPT

## 2025-06-12 PROCEDURE — 82550 ASSAY OF CK (CPK): CPT

## 2025-06-12 PROCEDURE — 80074 ACUTE HEPATITIS PANEL: CPT

## 2025-06-12 PROCEDURE — 700111 HCHG RX REV CODE 636 W/ 250 OVERRIDE (IP)

## 2025-06-12 PROCEDURE — 85027 COMPLETE CBC AUTOMATED: CPT

## 2025-06-12 PROCEDURE — 36415 COLL VENOUS BLD VENIPUNCTURE: CPT

## 2025-06-12 PROCEDURE — 76705 ECHO EXAM OF ABDOMEN: CPT

## 2025-06-12 PROCEDURE — 700101 HCHG RX REV CODE 250: Performed by: NURSE PRACTITIONER

## 2025-06-12 PROCEDURE — 97116 GAIT TRAINING THERAPY: CPT | Mod: CQ

## 2025-06-12 RX ORDER — OXYCODONE HYDROCHLORIDE 5 MG/1
5 TABLET ORAL
Refills: 0 | Status: DISCONTINUED | OUTPATIENT
Start: 2025-06-12 | End: 2025-06-13

## 2025-06-12 RX ORDER — OXYCODONE HYDROCHLORIDE 10 MG/1
10 TABLET ORAL
Refills: 0 | Status: DISCONTINUED | OUTPATIENT
Start: 2025-06-12 | End: 2025-06-13

## 2025-06-12 RX ORDER — HYDROMORPHONE HYDROCHLORIDE 1 MG/ML
0.5 INJECTION, SOLUTION INTRAMUSCULAR; INTRAVENOUS; SUBCUTANEOUS
Status: DISCONTINUED | OUTPATIENT
Start: 2025-06-12 | End: 2025-06-13

## 2025-06-12 RX ADMIN — PIPERACILLIN AND TAZOBACTAM 4.5 G: 4; .5 INJECTION, POWDER, FOR SOLUTION INTRAVENOUS at 05:48

## 2025-06-12 RX ADMIN — HYDROMORPHONE HYDROCHLORIDE 0.5 MG: 1 INJECTION, SOLUTION INTRAMUSCULAR; INTRAVENOUS; SUBCUTANEOUS at 07:58

## 2025-06-12 RX ADMIN — HYDROMORPHONE HYDROCHLORIDE 0.5 MG: 1 INJECTION, SOLUTION INTRAMUSCULAR; INTRAVENOUS; SUBCUTANEOUS at 13:23

## 2025-06-12 RX ADMIN — BACLOFEN 5 MG: 10 TABLET ORAL at 11:27

## 2025-06-12 RX ADMIN — CARBIDOPA AND LEVODOPA 1 TABLET: 25; 100 TABLET ORAL at 08:18

## 2025-06-12 RX ADMIN — CARBIDOPA AND LEVODOPA 1 TABLET: 25; 100 TABLET ORAL at 16:25

## 2025-06-12 RX ADMIN — HEPARIN SODIUM 5000 UNITS: 5000 INJECTION, SOLUTION INTRAVENOUS; SUBCUTANEOUS at 21:11

## 2025-06-12 RX ADMIN — OXYCODONE HYDROCHLORIDE 10 MG: 10 TABLET ORAL at 21:11

## 2025-06-12 RX ADMIN — SODIUM CHLORIDE: 9 INJECTION, SOLUTION INTRAVENOUS at 21:18

## 2025-06-12 RX ADMIN — HEPARIN SODIUM 5000 UNITS: 5000 INJECTION, SOLUTION INTRAVENOUS; SUBCUTANEOUS at 13:24

## 2025-06-12 RX ADMIN — HYDROMORPHONE HYDROCHLORIDE 0.5 MG: 1 INJECTION, SOLUTION INTRAMUSCULAR; INTRAVENOUS; SUBCUTANEOUS at 04:52

## 2025-06-12 RX ADMIN — SODIUM CHLORIDE: 9 INJECTION, SOLUTION INTRAVENOUS at 12:15

## 2025-06-12 RX ADMIN — PIPERACILLIN AND TAZOBACTAM 4.5 G: 4; .5 INJECTION, POWDER, FOR SOLUTION INTRAVENOUS at 21:10

## 2025-06-12 RX ADMIN — Medication 4000 UNITS: at 04:51

## 2025-06-12 RX ADMIN — OXYBUTYNIN CHLORIDE 10 MG: 10 TABLET, EXTENDED RELEASE ORAL at 04:51

## 2025-06-12 RX ADMIN — HEPARIN SODIUM 5000 UNITS: 5000 INJECTION, SOLUTION INTRAVENOUS; SUBCUTANEOUS at 04:51

## 2025-06-12 RX ADMIN — SODIUM CHLORIDE: 9 INJECTION, SOLUTION INTRAVENOUS at 04:59

## 2025-06-12 RX ADMIN — CARBIDOPA AND LEVODOPA 1 TABLET: 25; 100 TABLET ORAL at 21:11

## 2025-06-12 RX ADMIN — PIPERACILLIN AND TAZOBACTAM 4.5 G: 4; .5 INJECTION, POWDER, FOR SOLUTION INTRAVENOUS at 13:36

## 2025-06-12 RX ADMIN — OXYCODONE HYDROCHLORIDE 10 MG: 10 TABLET ORAL at 11:53

## 2025-06-12 ASSESSMENT — COGNITIVE AND FUNCTIONAL STATUS - GENERAL
SUGGESTED CMS G CODE MODIFIER MOBILITY: CK
STANDING UP FROM CHAIR USING ARMS: A LOT
MOBILITY SCORE: 12
MOVING TO AND FROM BED TO CHAIR: A LITTLE
SUGGESTED CMS G CODE MODIFIER DAILY ACTIVITY: CL
MOVING FROM LYING ON BACK TO SITTING ON SIDE OF FLAT BED: A LITTLE
MOVING TO AND FROM BED TO CHAIR: A LOT
CLIMB 3 TO 5 STEPS WITH RAILING: A LOT
MOVING FROM LYING ON BACK TO SITTING ON SIDE OF FLAT BED: A LOT
EATING MEALS: A LOT
DRESSING REGULAR LOWER BODY CLOTHING: A LOT
TOILETING: A LOT
DRESSING REGULAR UPPER BODY CLOTHING: A LOT
WALKING IN HOSPITAL ROOM: A LITTLE
TURNING FROM BACK TO SIDE WHILE IN FLAT BAD: A LITTLE
WALKING IN HOSPITAL ROOM: A LOT
TURNING FROM BACK TO SIDE WHILE IN FLAT BAD: A LOT
SUGGESTED CMS G CODE MODIFIER MOBILITY: CL
DAILY ACTIVITIY SCORE: 12
HELP NEEDED FOR BATHING: A LOT
CLIMB 3 TO 5 STEPS WITH RAILING: A LOT
PERSONAL GROOMING: A LOT
STANDING UP FROM CHAIR USING ARMS: A LITTLE
MOBILITY SCORE: 17

## 2025-06-12 ASSESSMENT — PAIN DESCRIPTION - PAIN TYPE
TYPE: ACUTE PAIN
TYPE: ACUTE PAIN;SURGICAL PAIN
TYPE: ACUTE PAIN;SURGICAL PAIN

## 2025-06-12 ASSESSMENT — GAIT ASSESSMENTS
DISTANCE (FEET): 18
DEVIATION: SHUFFLED GAIT;BRADYKINETIC
GAIT LEVEL OF ASSIST: STANDBY ASSIST
ASSISTIVE DEVICE: FRONT WHEEL WALKER

## 2025-06-12 ASSESSMENT — FIBROSIS 4 INDEX: FIB4 SCORE: 21.3

## 2025-06-12 NOTE — ASSESSMENT & PLAN NOTE
Resolved. Acute drop with WBC 1.4, steadily improving, WBC 6.0.  Neutropenia and lymphopenia. May be secondary to developing sepsis, less likely bone marrow suppression given acute drop. Sepsis less likely in the setting of normal vitals, afebrile, normalizing labs.    -Trend

## 2025-06-12 NOTE — PROGRESS NOTES
"       Urology Progress Note    6/12-Pt is sleeping soundly on exam. Luz catheter in place draining watermelon urine, no clots. UOP 1200cc/24hrs. Abdominal drain 2100cc/24hrs of SS. Abdominal drain had minimal output on exam. Afebrile, WBC: 1.4, Hgb: 9.2, Hct: 29.1, creatinine improved,now 1.16. He is now on a full liquid diet along with ensure. Abdominal incision c/d/I.     6/11-Pt lying comfortably in bed in NAD. RN at bedside. Pt is POD1 s/p cysto, on table cystogram, complex catheterization and laparoscopic washout of hematoma and abdominal cavity on 6/10/25 with Dr. Cesar. He is afebrile, hypertensive at 145/94, tachycardic 100, UOP 700cc/24hrs, WBC: 2.9, Hgb: 11.3, creatinine elevated 1.97. Pt notes his hiccups have resolved and feels that he is due for a BM today.     6/10- Pt is lying comfortably in bed in NAD. He is afebrile. UOP 2100cc/24hrs, drain output 1185cc/24hrs. He remain on Zosyn and is NPO. CT cystogram on 6/10 showed contrast within the peritoneal cavity compatible with an intraperitoneal bladder rupture. WBC: 3.8, Hgb: 10.4, Hct: 31.5, creatinine increased, now 1.63 (1.19). Pt is schedule to go to the OR today with Dr. Cesar for a bladder repair.     6/9-S/p cysto luz placement over a wire on 6/8/25 performed by Dr. Todd and IR abdominal drain placement on 6/8. Pt is lying comfortably in bed in NAD. IR APN at bedside assessing CRIS drain. Tmax 99.2, pt is tachycardic at 105. Normotensive at 136/84. Documented abdominal drain output 3100mL/24hrs, drainage is dark/old SS. He remains on Zosyn. Luz catheter is drainage clear yellow urine, good UOP on exam. WBC: 5.3, Hgb: 9.8, No updated creatinine as of yet, however his creatinine from 6/8/25 is 2.48 (4.79). Pt denies f/c/n/v     Overnight Events: None    S:    O:   BP (!) 138/92   Pulse 91   Temp 36.4 °C (97.5 °F) (Oral)   Resp 20   Ht 1.727 m (5' 8\")   Wt 57.8 kg (127 lb 6.8 oz)   SpO2 96%   Recent Labs     06/11/25  1005 " 06/12/25  0052 06/12/25  0950   SODIUM 139 143 143   POTASSIUM 5.0 4.6 4.2   CHLORIDE 104 111 113*   CO2 23 24 25   GLUCOSE 131* 120* 107*   BUN 72* 55* 43*   CREATININE 1.97* 1.47* 1.16   CALCIUM 7.9* 7.3* 7.2*     Recent Labs     06/11/25  0252 06/12/25  0052 06/12/25  0950   WBC 2.9* 1.4* 1.4*   RBC 3.82* 3.40* 3.17*   HEMOGLOBIN 11.3* 10.0* 9.2*   HEMATOCRIT 34.9* 31.0* 29.1*   MCV 91.4 91.2 91.8   MCH 29.6 29.4 29.0   MCHC 32.4 32.3 31.6*   RDW 47.2 47.7 48.1   PLATELETCT 374 281 249   MPV 9.9 9.6 9.9         Intake/Output Summary (Last 24 hours) at 6/9/2025 1008  Last data filed at 6/9/2025 0822  Gross per 24 hour   Intake 840 ml   Output 3100 ml   Net -2260 ml       Exam:    Abdomen soft, benign, non-distended. Abdominal incisions are clean, dry, intact. No evidence of erythema, drainage or discharge. Gauze and tape present over CRIS drain site.   Urine: luz draining watermelon colored urine    A/P:    Active Hospital Problems    Diagnosis     Other neutropenia (HCC) [D70.8]     Elevated liver enzymes [R74.8]     Acute renal failure (ARF) (HCC) [N17.9]     Anemia [D64.9]     Hyponatremia [E87.1]     Acute hypoxic respiratory failure (HCC) [J96.01]     Liver lesion [K76.9]     Abdominal pain [R10.9]     Prostate cancer (HCC) [C61]     History of tobacco abuse [Z87.891]     Parkinson's disease with dyskinesia and fluctuating manifestations (HCC) [G20.B2]      -Continue to trend renal function.   -Continue Ditropan 10mg ER daily for bladder spasms  -Pt to continue on full liquid diet, will confirm with Dr. Cesar regarding advancing his diet.   -Monitor urine output and abdominal drain output.   -Urology appreciates IR managing abdominal drain.   -Urology will continue to follow the patient during this hospital stay.     -Patient's case was discussed with Dr. Grande and Dr. Cesar who have directed the plan of care for the patient.      KALEY Wakefield-CONRAD.   [address] [place] [address]   [phone]

## 2025-06-12 NOTE — PROGRESS NOTES
Bedside report received from off going RN/tech: Johny, assumed care of patient.     Fall Risk Score: HIGH RISK  Fall risk interventions in place: Place yellow fall risk ID band on patient, Provide patient/family education based on risk assessment, Educate patient/family to call staff for assistance when getting out of bed, Place fall precaution signage outside patient door, Utilize bed/chair fall alarm, and Bed alarm connected correctly  Bed type: Regular (Cecilio Score less than 17 interventions in place)  Patient on cardiac monitor: No   IVF/IV medications: Infusion per MAR (List Med(s))   Oxygen: How many liters 3L, Traced the line to wall oxygen, and No oxygen tank in room  Bedside sitter: Not Applicable   Isolation: Not applicable

## 2025-06-12 NOTE — CARE PLAN
The patient is Stable - Low risk of patient condition declining or worsening    Shift Goals  Clinical Goals: Skin integ, monitor labs, pain control, stool sample, ABX therapy  Patient Goals: Pain control  Family Goals: na    Progress made toward(s) clinical / shift goals:  Pt worked with PT, ambulated to bathroom, refer to mar for pain control.       Problem: Pain - Standard  Goal: Alleviation of pain or a reduction in pain to the patient’s comfort goal  Outcome: Progressing     Problem: Knowledge Deficit - Standard  Goal: Patient and family/care givers will demonstrate understanding of plan of care, disease process/condition, diagnostic tests and medications  Outcome: Progressing     Problem: Fall Risk  Goal: Patient will remain free from falls  Outcome: Progressing       Patient is not progressing towards the following goals:

## 2025-06-12 NOTE — CONSULTS
Date of Consultation:  6/12/2025    Patient: : Rakan Rader III  MRN: [id number]    Referring Physician:  Edmond Ayala M.D.  Attending      GI:Carmella Ricardo M.D.     Reason for Consultation: Elevated liver enzyme    History of Present Illness:   The patient 60 years old gentleman with medical history of deep brain stimulator placement, anxiety, Parkinson disease, prostate cancer, presented to inpatient GI service for elevated liver enzymes.    AST larger than ALT.  No evidence of synthetic function impairment.  Total bilirubin normal.  Imaging studies show gallstones without common bile duct pathology.    Saw the patient at bedside.  The patient was sleeping and getting ready to have his lunch.  Denies any discomfort from the epigastric or right upper quadrant area.  Denied nausea vomiting.    Reported no evidence of liver disease before.  No evidence of overuse of alcohol.      Past Medical History:   Diagnosis Date    Status post deep brain stimulator placement 06/2024    Followed by Dr Taylor of [facility] and Dr. Ivania Castro at [facility]    Anesthesia     PONV    Anxiety     Bronchitis     Remote    Parkinson's disease (HCC)     PONV (postoperative nausea and vomiting)     Urinary bladder disorder     Difficulty urinating         Past Surgical History[1]    Family History   Problem Relation Age of Onset    Heart Disease Mother     Hypertension Mother     Hyperlipidemia Mother     Psychiatric Illness Mother     Psychiatric Illness Father     Alcohol/Drug Father     Alcohol/Drug Sister     Sleep Apnea Neg Hx        Social History[2]        Physical Exam:  Vitals:    06/11/25 2000 06/11/25 2346 06/12/25 0346 06/12/25 0744   BP: (!) 147/98 126/73 (!) 136/91 (!) 138/92   Pulse: (!) 109 66 (!) 101 91   Resp: 20 18 20 20   Temp: 37.1 °C (98.7 °F) 36.2 °C (97.1 °F) 37.1 °C (98.8 °F) 36.4 °C (97.5 °F)   TempSrc: Temporal Temporal Oral Oral   SpO2: 91% 95% 97% 96%   Weight:   57.8 kg (127 lb 6.8 oz)     Height:           Constitutional: No fevers.  Eyes: No symptoms reported.   Ears/Nose/Throat/Mouth: No choking reported.  Cardiovascular: No chest pain reported.   Respiratory: Denies shortness of breath.  Gastrointestinal/Hepatic: Per H/P.   Skin/Breast: No new rash reported.   Psychiatric: No complaints    HEENT: grossly normal.  Cardiovascular: Please refer to primary team's daily assessment.   Lungs: Please refer to primary team's daily assessment.   Abdomen: Soft, No tenderness.  Skin: No erythema, No rash.  Neurologic: Alert & oriented x 3, Normal motor function, No focal deficits noted.  PSY: stable mood.         Labs:  Recent Labs     06/11/25  0252 06/12/25  0052 06/12/25  0950   WBC 2.9* 1.4* 1.4*   RBC 3.82* 3.40* 3.17*   HEMOGLOBIN 11.3* 10.0* 9.2*   HEMATOCRIT 34.9* 31.0* 29.1*   MCV 91.4 91.2 91.8   MCH 29.6 29.4 29.0   MCHC 32.4 32.3 31.6*   RDW 47.2 47.7 48.1   PLATELETCT 374 281 249   MPV 9.9 9.6 9.9     Recent Labs     06/11/25  1005 06/12/25  0052 06/12/25  0522 06/12/25  0950   SODIUM 139 143  --  143   POTASSIUM 5.0 4.6  --  4.2   CHLORIDE 104 111  --  113*   CO2 23 24  --  25   GLUCOSE 131* 120*  --  107*   BUN 72* 55*  --  43*   CPKTOTAL  --   --  174*  --      Recent Labs     06/12/25  0052   INR 1.06       Recent Labs     06/11/25  1005 06/11/25  1540 06/12/25  0052 06/12/25  0950   ASTSGOT 834*  --  1163* 1146*   ALTSGPT 408*  --  127* 102*   TBILIRUBIN 0.6  --  0.5 0.4   ALKPHOSPHAT 169*  --  166* 157*   GLOBULIN 3.0  --  2.6 2.4   INR  --   --  1.06  --    AMMONIA  --  26  --   --          Imaging:  US-RUQ  Narrative:   6/12/2025 3:58 AM    HISTORY/REASON FOR EXAM:  Abnormal Labs, Abdominal pain    TECHNIQUE/EXAM DESCRIPTION AND NUMBER OF VIEWS:  Real-time sonography of the liver and biliary tree.    COMPARISON: None    FINDINGS:    The liver is normal in contour. There is no evidence of solid mass lesion. The liver measures 17.16 cm.    Gallbladder sludge is seen. There is no evidence  of cholelithiasis.  The gallbladder wall thickness measures 5.20 mm. There is no pericholecystic fluid.    The common duct measures 4.30 mm.    The visualized pancreas is unremarkable.    The visualized aorta is normal in caliber.    Intrahepatic IVC is patent.    The portal vein is patent with hepatopetal flow. The MPV measures 0.92 cm cm.    The right kidney measures 10.27 cm.    Trace abdominal ascites is seen. Small right pleural effusion is seen.  Impression: 1.  Hepatomegaly  2.  Gallbladder sludge with gallbladder wall thickening, consider acalculous cholecystitis, could be further evaluated with HIDA scan as clinically appropriate.  3.  Trace abdominal ascites  4.  Small right pleural effusion            Impressions:  The patient 60 years old gentleman with medical history of deep brain stimulator placement, anxiety, Parkinson disease, prostate cancer, presented to inpatient GI service for elevated liver enzymes.    AST larger than ALT.  No evidence of synthetic function impairment.  Total bilirubin normal.  Imaging studies show gallstones without common bile duct pathology.    GI team agree with the medicine team's diagnosis of possible ischemic/perfusion liver injury.  Liver enzymes seems stabilized.  No evidence of further complication of the acute injury of liver.    GI team will recommend no further investigation/intervention at this time.  Likely the AST ALT will be lower tomorrow.    GI signs off.   Call us back if liver situation deteriorates.      This note was generated using voice recognition software which has a small chance of producing errors of grammar and possibly content. I have made every reasonable attempt to find and correct any obvious errors, but expect that some may not be found prior to finalization of this note.         [1]   Past Surgical History:  Procedure Laterality Date    [place] CYSTOURETHROSCOPY  6/10/2025    Procedure: CYSTOSCOPY ON TABLE FLUOROSCOPIC CYSTOGRAM , COMPLEX URETHRAL  CATHETERIZATION;  Surgeon: Tj WATT M.D.;  Location: SURGERY [facility];  Service: Urology    [place] LAP,DIAGNOSTIC ABDOMEN  6/10/2025    Procedure: DIAGNOSTIC LAPAROSCOPY, ABDOMINAL WASHOUT;  Surgeon: Tj WATT M.D.;  Location: SURGERY [facility];  Service: Urology    PROSTATECTOMY ROBOTIC XI N/A 2025    Procedure: ROBOTIC ASSISTED LAPAROSCOPIC PROSTATECTOMY;  Surgeon: Tj WATT M.D.;  Location: SURGERY [facility];  Service: Uro Robotic    LAPAROSCOPIC LYSIS OF ADHESIONS N/A 2025    Procedure: LYSIS, ADHESIONS, LAPAROSCOPIC ROBOTIC;  Surgeon: Tj WATT M.D.;  Location: SURGERY [facility];  Service: Uro Robotic    OTHER  2024    Deep brain stimulator    INGUINAL HERNIA REPAIR ROBOTIC Bilateral 2017    Procedure: INGUINAL HERNIA REPAIR ROBOTIC/ WITH MESH PLACEMENT;  Surgeon: Zackary Ponce M.D.;  Location: [facility];  Service: General    LAMINOTOMY      c5-7    ORIF, ELBOW      right as a child    OTHER NEUROLOGICAL SURG      cervical fusion    OTHER ORTHOPEDIC SURGERY      finger surgery as child    SEPTOPLASTY, NOSE, WITH TURBINOPLASTY      SHOULDER ARTHROSCOPY W/ SLAP / LABRAL REPAIR      left    SINUSCOPY      maxillary sinuses   [2]   Social History  Socioeconomic History    Marital status:     Highest education level: GED or equivalent   Tobacco Use    Smoking status: Former     Current packs/day: 0.00     Average packs/day: 1 pack/day for 5.0 years (5.0 ttl pk-yrs)     Types: Cigarettes     Start date: 1982     Quit date: 1987     Years since quittin.5    Smokeless tobacco: Former     Types: Chew    Tobacco comments:     quit at age 23   Vaping Use    Vaping status: Never Used   Substance and Sexual Activity    Alcohol use: No     Comment: stopped drinking  @ 20    Drug use: No    Sexual activity: Yes     Partners: Female     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      Social Drivers of  Health     Financial Resource Strain: Low Risk  (4/20/2022)    Overall Financial Resource Strain (CARDIA)     Difficulty of Paying Living Expenses: Not very hard   Food Insecurity: No Food Insecurity (5/28/2025)    Hunger Vital Sign     Worried About Running Out of Food in the Last Year: Never true     Ran Out of Food in the Last Year: Never true   Transportation Needs: No Transportation Needs (5/28/2025)    PRAPARE - Transportation     Lack of Transportation (Medical): No     Lack of Transportation (Non-Medical): No   Physical Activity: Sufficiently Active (4/20/2022)    Exercise Vital Sign     Days of Exercise per Week: 3 days     Minutes of Exercise per Session: 120 min   Stress: No Stress Concern Present (4/20/2022)    [place] [employer] - Occupational Stress Questionnaire     Feeling of Stress : Only a little   Social Connections: Socially Integrated (4/20/2022)    Social Connection and Isolation Panel [NHANES]     Frequency of Communication with Friends and Family: Three times a week     Frequency of Social Gatherings with Friends and Family: More than three times a week     Attends Hinduism Services: 1 to 4 times per year     Active Member of Clubs or Organizations: Yes     Attends Club or Organization Meetings: More than 4 times per year     Marital Status:    Intimate Partner Violence: Not At Risk (6/8/2025)    Humiliation, Afraid, Rape, and Kick questionnaire     Fear of Current or Ex-Partner: No     Emotionally Abused: No     Physically Abused: No     Sexually Abused: No   Housing Stability: Low Risk  (5/28/2025)    Housing Stability Vital Sign     Unable to Pay for Housing in the Last Year: No     Number of Times Moved in the Last Year: 0     Homeless in the Last Year: No

## 2025-06-12 NOTE — PROGRESS NOTES
FAMILY MEDICINE PROGRESS NOTE          PATIENT ID:  NAME:  Rakan Rader  MRN:               [id number]  YOB: 1965    Date of Admission: 6/8/2025     Attending: Edmond Ayala M.d.     Resident: Alessandro Leonardo MD (PGY-2)    Primary Care Physician:  Emeterio Dave M.D.    HPI: Rakan Rader is a 60 y.o. male with hx Parkinson's Disease with DBS, prostate cancer presented with abdominal pain after underwent RA laparoscopic prostatectomy 5/28; found to have urinary ascites and 7cm R pelvic hematoma s/p diagnostic laparoscopy and hematoma washout 6/10.       SUBJECTIVE:   No acute events overnight, patient reports moderate abdominal pain. Has been tolerating CLD, no N/V. RN reports he did have several episodes of watery diarrhea overnight.       OBJECTIVE:  Temp:  [36.2 °C (97.1 °F)-37.1 °C (98.8 °F)] 36.4 °C (97.5 °F)  Pulse:  [] 91  Resp:  [18-20] 20  BP: (126-147)/(73-98) 138/92  SpO2:  [91 %-97 %] 96 %      Intake/Output Summary (Last 24 hours) at 6/12/2025 1112  Last data filed at 6/12/2025 0800  Gross per 24 hour   Intake 300 ml   Output 3210 ml   Net -2910 ml       Physical Exam:  Gen:  NAD, appears ill   HEENT: Mucous membranes are dry, PERRL, EOMI   Cardio: RRR, clear s1/s2, no murmur  Resp:  Decreased air movement bilaterally   GI/: Soft, Non distended, laparoscopic incisions intact, TTP throughout, negative De La Torre sign. Abdominal bulb drain in place - minimal serosanguinous fluid   Neuro: No acute deficits, alert and oriented x3. Masked facies, resting tremor noted in right hand with mild rigidity.  Skin/Extremities: Cap refill <3sec, warm/well perfused, no rash, normal extremities. DBS to R chest.     LABS:  Recent Labs     06/11/25  0252 06/12/25  0052 06/12/25  0950   WBC 2.9* 1.4* 1.4*   RBC 3.82* 3.40* 3.17*   HEMOGLOBIN 11.3* 10.0* 9.2*   HEMATOCRIT 34.9* 31.0* 29.1*   MCV 91.4 91.2 91.8   MCH 29.6 29.4 29.0   RDW 47.2 47.7 48.1   PLATELETCT 374 281 249   MPV 9.9 9.6 9.9   NEUTSPOLYS   --  79.60* 78.60*   LYMPHOCYTES  --  10.60* 11.10*   MONOCYTES  --  5.30 4.30   EOSINOPHILS  --  0.00 0.00   BASOPHILS  --  0.00 0.00   RBCMORPHOLO  --  Present Present     Recent Labs     06/11/25  1005 06/12/25  0052 06/12/25  0950   SODIUM 139 143 143   POTASSIUM 5.0 4.6 4.2   CHLORIDE 104 111 113*   CO2 23 24 25   BUN 72* 55* 43*   CREATININE 1.97* 1.47* 1.16   CALCIUM 7.9* 7.3* 7.2*   ALBUMIN 2.5* 2.4* 2.2*     Estimated GFR/CRCL = Estimated Creatinine Clearance: 56.7 mL/min (by C-G formula based on SCr of 1.16 mg/dL).  Recent Labs     06/11/25  1005 06/12/25  0052 06/12/25  0950   GLUCOSE 131* 120* 107*     Recent Labs     06/11/25  1005 06/11/25  1540 06/12/25  0052 06/12/25  0950   ASTSGOT 834*  --  1163* 1146*   ALTSGPT 408*  --  127* 102*   TBILIRUBIN 0.6  --  0.5 0.4   ALKPHOSPHAT 169*  --  166* 157*   GLOBULIN 3.0  --  2.6 2.4   INR  --   --  1.06  --    AMMONIA  --  26  --   --      Recent Labs     06/12/25  0522   CPKTOTAL 174*         Recent Labs     06/12/25  0052   INR 1.06         IMAGING:  US-RUQ   Final Result         1.  Hepatomegaly   2.  Gallbladder sludge with gallbladder wall thickening, consider acalculous cholecystitis, could be further evaluated with HIDA scan as clinically appropriate.   3.  Trace abdominal ascites   4.  Small right pleural effusion      EZ-[id number]-4 VIEW   Final Result      Digitized intraoperative radiograph is submitted for review. This examination is not for diagnostic purpose but for guidance during a surgical procedure. Please see the patient's chart for full procedural details.         INTERPRETING LOCATION: [address] [place] [address], [id number]      DX-PORTABLE FLUOROSCOPY < 1 HOUR Reason For Exam: Main OR   Final Result      Portable fluoroscopy utilized for 6 seconds.         INTERPRETING LOCATION: [address] [place] [address], [id number]      CT-Cystogram   Final Result         1.  Diffuse bladder wall thickening, consider changes of cystitis.   2.  Contrast within the peritoneal  cavity, compatible with intraperitoneal bladder rupture.   3.  Small foci of intra-abdominal free air, likely related to bladder rupture although radiographic appearance cannot exclude viscus perforation.   4.  Diffuse small bowel wall thickening, appearance suggests changes of enteritis.      These findings were discussed with the patient's clinician, Siena Oakley, on 6/10/2025 6:57 AM.      CT-IMAGE-GUIDED DRAIN PERITONEAL   Final Result      1.  CT GUIDED PLACEMENT OF A PERCUTANEOUS DRAINAGE CATHETER INTO THE PERITONEAL SPACE.   2.  THE CURRENT PLAN IS TO MONITOR DRAINAGE OUTPUT AND OBTAIN A FOLLOWUP CT SCAN IN 5-7 DAYS IF CLINICALLY INDICATED.      CT-ABDOMEN-PELVIS W/O   Final Result         1. 7 cm right pelvic high density mass may be a hematoma. This displaces the bladder to the left.      2. Large free peritoneal fluid in abdomen and pelvis.      3. Bladder empty. No hydronephrosis.      4. Mild ileus-type fluid in stomach and small bowel. Fluid-filled distal esophagus suggests reflux. No colon distention. Normal appendix.      5. Possible sludge in gallbladder. No inflammation or ductal dilatation.      6. Small indeterminate 8 mm liver lesion may be a benign cyst.                  DX-CHEST-PORTABLE (1 VIEW)   Final Result         1. No acute abnormalities evident.      2. Right chest wall battery pack with wire extending to the neck.                         CULTURES:   Results       Procedure Component Value Units Date/Time    Cdiff By PCR Rflx Toxin [[id number]]     Order Status: Canceled Specimen: Stool     FLUID CULTURE W/GRAM STAIN [[id number]] Collected: 06/08/25 1415    Order Status: Completed Specimen: Body Fluid Updated: 06/11/25 0757     Significant Indicator NEG     Source BF     Site Peritoneal  drain aspiration     Culture Result No growth at 72 hours.     Gram Stain Result Moderate WBCs.  No organisms seen.      Urine Culture (New) [[id number]] Collected: 06/08/25 1255    Order Status:  Completed Specimen: Urine, Clean Catch Updated: 06/10/25 1208     Significant Indicator NEG     Source UR     Site URINE, CLEAN CATCH     Culture Result No growth at 48 hours.    GRAM STAIN [[id number]] Collected: 06/08/25 1415    Order Status: Completed Specimen: Body Fluid Updated: 06/08/25 2050     Significant Indicator .     Source BF     Site Peritoneal  drain aspiration     Gram Stain Result Moderate WBCs.  No organisms seen.      URINALYSIS [[id number]]  (Abnormal) Collected: 06/08/25 1255    Order Status: Completed Specimen: Urine Updated: 06/08/25 1332     Color Orange     Character Turbid     Specific Gravity 1.020     Ph 5.0     Glucose Negative mg/dL      Ketones Negative mg/dL      Protein 100 mg/dL      Bilirubin Negative     Urobilinogen, Urine 0.2 EU/dL      Nitrite Negative     Leukocyte Esterase Small     Occult Blood Large     Micro Urine Req Microscopic    FLUID CULTURE W/GRAM STAIN [[id number]]     Order Status: No result Specimen: Other Body Fluid     Blood Culture - Draw one from central line and one from peripheral site [[id number]] Collected: 06/08/25 0547    Order Status: Completed Specimen: Blood from Peripheral Updated: 06/08/25 0808     Significant Indicator NEG     Source BLD     Site PERIPHERAL     Culture Result No Growth  Note: Blood cultures are incubated for 5 days and  are monitored continuously.Positive blood cultures  are called to the RN and reported as soon as  they are identified.      Blood Culture - Draw one from central line and one from peripheral site [[id number]] Collected: 06/08/25 0540    Order Status: Completed Specimen: Blood from Line Updated: 06/08/25 0808     Significant Indicator NEG     Source BLD     Site Peripheral     Culture Result No Growth  Note: Blood cultures are incubated for 5 days and  are monitored continuously.Positive blood cultures  are called to the RN and reported as soon as  they are identified.      Urinalysis [[id number]]     Order Status:  Canceled Specimen: Urine, Clean Catch             MEDS:  Current Facility-Administered Medications   Medication Last Admin    Pharmacy Consult Request      oxybutynin SR (Ditropan-XL) tablet 10 mg 10 mg at 06/12/25 0451    heparin injection 5,000 Units 5,000 Units at 06/12/25 0451    metoclopramide (Reglan) injection 5 mg      NS infusion New Bag at 06/12/25 0459    HYDROmorphone (Dilaudid) injection 0.5 mg 0.5 mg at 06/12/25 0758    baclofen (Lioresal) tablet 5 mg 5 mg at 06/10/25 1303    normal saline flush 0.9 % SOLN 10 mL 10 mL at 06/11/25 0442    ondansetron (Zofran) syringe/vial injection 4 mg 4 mg at 06/11/25 2033    carbidopa-levodopa (Sinemet)  MG tablet 1 Tablet 1 Tablet at 06/12/25 0818    vitamin D3 (Cholecalciferol) tablet 4,000 Units 4,000 Units at 06/12/25 0451    piperacillin-tazobactam (Zosyn) 4.5 g in  mL IVPB 4.5 g at 06/12/25 0548       ASSESSMENT/PLAN:  60 y.o. male admitted for:  * Abdominal pain  Assessment & Plan  Worsening generalized abdominal pain for 3 to 4 days prior to admission in the setting of robotic prostatectomy 5/28/2025 for prostate cancer.  CT with 7 cm right pelvic hematoma with peritoneal free fluid considered to be urine ascites on admission.   Inpatient cystogram with concern for anastomosis disruption causing leakage   Pt is POD2 s/p cysto, on table cystogram, complex catheterization and laparoscopic washout of hematoma and abdominal cavity on 6/10/25 with Dr. Cesar    Plan:  -Serial abdominal exams  -IVF   -Pain control with IV dilaudid   -Antiemetics with Zofran, Reglan  -Bowel regimen   -Urology following, appreciate recommendations:  -Ditropan 10mg ER daily for bladder spasms  -Clear liquid diet to 1.5L daily.   -Continue luz catheter   -Antibiotics with Zosyn for at least another 24 hours, then transition to oral for 10 day course   -Discharge home with luz catheter and potentially CRIS drain  -Luz will remain for a minimum of 3 weeks and then a  fluoroscopic cystogram will be performed to assess for resolution of leak.  - Patient did have several episodes of watery diarrhea overnight, none this morning. If this continues will check for C. Diff.     Other neutropenia (HCC)  Assessment & Plan  Acute drop today patient with WBC 1.4.  Neutropenia and lymphopenia. May be secondary to developing sepsis, less likely bone marrow suppression given acute drop.     -CTM  - Cont IV abx and fluid resuscitation  - If patient spikes fever, will start neutropenic fever protocol    Elevated liver enzymes  Assessment & Plan  Acute, worsening this hospitalization. Initially , ALT 65,  on admission. Today up to AST 1163, , .  Transaminitis started on postop day #1.    > Hepatitis panel negative   > CPK mildly elevated but not enough to be rhabdo   > Right upper quadrant ultrasound shows hepatomegaly, gallbladder sludge and wall thickening     DDx includes shock liver, intrahepatic/biliary injury from laparoscopy, acalculous cholecystitis.  Less likely MI versus medication induced hepatotoxicity.    Plan:  -Trend CMP  -Obtain troponin to rule out MI  -Spoke with pharmacy, beta-lactamase inhibitors (he is on Tazobactam) can cause hepatotoxicity although not common.  If another etiology is not found consider switching antibiotic regimen.  - Will reach out for a GI consult today.  Considering CT abdomen pelvis versus HIDA scan for further workup.    Liver lesion  Assessment & Plan  Incidental liver lesion 8 mm on CT this admission  - Recommend outpatient follow-up    Acute hypoxic respiratory failure (HCC)  Assessment & Plan  Acute, Resolving.  Likely atelectasis in the setting of immobilization.    Plan  - Oxygen supplementation to keep oxygen saturation greater than 90%, presently on RA with O2 available  - PT/OT for mobilization  - Incentive spirometry  - Wean oxygen as tolerated    Hyponatremia- (present on admission)  Assessment & Plan  Resolved.   Sodium 130 initially, likely hypertonic hyponatremia especially in the setting of urine ascites. Up to 143.  -Trend    Anemia  Assessment & Plan  Acute blood loss anemia in the setting of robotic prostatectomy 5/28, hemoglobin postoperatively ~7. Now up to 10.  Plan:  -Trend H&H, transfusion threshold less than 7  -Vital signs every 4 hours    Acute renal failure (ARF) (HCC)- (present on admission)  Assessment & Plan  Improving, acute renal failure with creatinine 4.98 in the setting of robotic prostatectomy on 5/28 secondary to prostate cancer.  Today creatinine 1.47.    Plan:  -IV NS, wean with PO intake as diet is advanced   -Appreciate urology involvement  -Trend renal function  -Trend electrolytes  -Continue to monitor     Prostate cancer (HCC)- (present on admission)  Assessment & Plan  S/p robotic prostatectomy 5/28/2025    History of tobacco abuse- (present on admission)  Assessment & Plan  History of tobacco use as a former smoker at least 30 years ago, 5 pack years.  Currently chewing tobacco, last use 1 week ago.  Patient reports minimal cravings.  - Can order nicotine patch if needed for cravings  - Tobacco use cessation counseling    Parkinson's disease with dyskinesia and fluctuating manifestations (HCC)- (present on admission)  Assessment & Plan  Chronic, has deep brain stimulator, battery implanted at chest.   Continue home carbidopa-levodopa          Core Measures:  Fluids: 0.9NS 125cc/hr  Lines: Peripheral IV for intravenous access, luz, CRIS drain   Abx: IV Zosyn   Diet: clear liquids  PPX: SCDs/TEDs    CODE Status: Full Code      Disposition  Patient is not medically cleared for discharge.       Alessandro Leonardo MD  PGY-2  [facility]

## 2025-06-12 NOTE — CARE PLAN
The patient is Watcher - Medium risk of patient condition declining or worsening    Shift Goals  Clinical Goals: skin integrity, monitor vs and labs  Patient Goals: rest  Family Goals: ynes    Progress made toward(s) clinical / shift goals:    Problem: Knowledge Deficit - Standard  Goal: Patient and family/care givers will demonstrate understanding of plan of care, disease process/condition, diagnostic tests and medications  Outcome: Progressing  Note: Discussed plan of care, pt able to actively participate in the learning process and demonstrates understanding by return demonstration or return explanation. Improved ability to communicate regarding their healthcare and current admission. Improved ability to identify barriers to learning and care.       Problem: Fall Risk  Goal: Patient will remain free from falls  Outcome: Progressing  Note: Fall prevention measures in place, bed alarm on and connected correctly, call light within reach, hourly rounding, Education provided on fall prevention. Pt instructed to use call light prior to exiting the bed, educated on use of bed alarms, and risks and complications related to falls. Medication orders evaluated for fall risk, gait/mobility assessed, sensory deficits assessed, mental status assessed, assessed for hypotension, hypovolemia, weakness, fatigue, elimination, and confusion.

## 2025-06-12 NOTE — DISCHARGE PLANNING
Care Transition Team Discharge Planning    Anticipated Discharge Information  Discharge Disposition: D/T to SNF with Medicare cert in anticipation of skilled care (03)  Discharge Address: [facility] [place] [address]  Discharge Contact Phone Number: [phone]    Discharge Plan: RNCM met with patient at bedside to discuss DCP. Patient is agreeable to SNF placement at DC. Choice obtained for 1)[facility] 2) [facility]. Patient not wanting to select 3rd choice at this time. Faxed to [facility]. Patient inquiring about transportation, RNCM informed patient Medicare will cover transport to SNF. Per resident Malissa, patient pending transaminitis workup and not [facility] at this time.

## 2025-06-12 NOTE — PROGRESS NOTES
4 Eyes Skin Assessment Completed by YOAN Mcclain and YOAN Wood.    Skin assessment is primarily focused on high risk bony prominences. Pay special attention to skin beneath and around medical devices, high risk bony prominences, skin to skin areas and areas where the patient lacks sensation to feel pain and areas where the patient previously had breakdown.     Head (Occipital):  WDL   Ears (Under Medical Devices): WDL   Nose (Under Medical Devices): WDL   Mouth:  WDL   Neck: WDL   Breast/Chest:  WDL   Shoulder Blades:  WDL   Spine:   WDL   (R) Arm/Elbow/Hand: WDL   (L) Arm/Elbow/Hand: WDL   Abdomen: WDL   Pannus/Groin:  Light Purple   Sacrum/Coccyx:   Purple/maroon/ Blanches   (R) Ischial Tuberosity (Sit Bones):  Purple/maroon/ Blanches   (L) Ischial Tuberosity (Sit Bones):  Purple/maroon/ Blanches   (R) Leg:  WDL   (L) Leg:  WDL   (R) Heel:  WDL   (R) Foot/Toe: WDL   (L) Heel: WDL   (L) Foot/Toe:  WDL       DEVICES IN USE:   Respiratory Devices:  Nasal cannula  Feeding Devices:  N/A   Lines & BP Monitoring Devices:  BP cuff and Pulse ox    Orthopedic Devices:  N/A  Miscellaneous Devices:  Drains and Juarez    PROTOCOL INTERVENTIONS:   Standard/Trauma Bed:  Already in place  Q2 Turns with Wedges:  Already in place  Juarez:  Already in place  Nasal Cannula with Gray Foams:  Already in place    WOUND PHOTOS:   N/A no wounds identified    WOUND CONSULT:   N/A, no advanced wound care needs identified

## 2025-06-12 NOTE — THERAPY
"Physical Therapy   Daily Treatment     Patient Name:  Rakan Rader III  Age:  60 y.o., Sex:  male  Medical Record #:  [id number]  Today's Date: 6/12/2025     Precautions  Medical: Fall Risk  Surgical: Drain (RLQ)    Assessment    Pt greeted and seen for PT treatment. Pt needed Bandar for bed mobility and occasional Bandar for balance in static standing. He ambulated w/ FWW and SBA 15x2 and a very decreased pace. Pt currently limited by impaired balance, decreased strength and decreased activity tolerance which negatively impacts functional mobility. Pt will continue to benefit from skilled PT to address deficits.       Plan    Treatment Plan Status: Continue Current Treatment Plan  Type of Treatment: Bed Mobility, Gait Training, Therapeutic Activities  Treatment Frequency: 4 Times per Week  Treatment Duration: Until Therapy Goals Met    DC Equipment Recommendations: Unable to determine at this time  Discharge Recommendations: Recommend post-acute placement for additional physical therapy services prior to discharge home      Subjective  \"Please hold onto me\"       06/12/25 1413   Vitals   O2 (LPM) 2   O2 Delivery Device Silicone Nasal Cannula   Pain 0 - 10 Group   Therapist Pain Assessment 5;Post Activity;Nurse Notified  (pt pre-medicated)   Cognition    Level of Consciousness Alert   Comments anxious, moves at a very slow pace, very delayed response time   Balance   Sitting Balance (Static) Fair   Sitting Balance (Dynamic) Fair   Standing Balance (Static) Fair -   Standing Balance (Dynamic) Fair -   Weight Shift Sitting Fair   Weight Shift Standing Poor   Skilled Intervention Verbal Cuing;Sequencing   Comments w/ FWW   Bed Mobility    Supine to Sit Minimal Assist   Sit to Supine Minimal Assist   Scooting Minimal Assist   Rolling Minimal Assist to Rt.;Minimum Assist to Lt.   Skilled Intervention Verbal Cuing;Sequencing;Facilitation   Comments HOBE, decreased volition, quick to ask for assist   Gait Analysis   Gait " Level Of Assist Standby Assist   Assistive Device Front Wheel Walker   Distance (Feet) 18   # of Times Distance was Traveled 2   Deviation Shuffled Gait;Bradykinetic  (very slow pace)   Skilled Intervention Verbal Cuing;Compensatory Strategies   Comments no LOB during gait, min LOB in static standing. Pt pausing often   Functional Mobility   Sit to Stand Minimal Assist   Bed, Chair, Wheelchair Transfer Contact Guard Assist   Toilet Transfers Contact Guard Assist   Transfer Method Stand Step   Mobility bed<>toilet   Skilled Intervention Verbal Cuing;Sequencing;Facilitation   Comments Pt requesting assist for isamar cares but did on his own when cued to try.   Short Term Goals    Short Term Goal # 1 Pt to move supine to/from eob w/ spv in 6 visits   Goal Outcome # 1 goal not met   Short Term Goal # 2 Pt to move sit to/from stand w/ spv in 6 visits   Goal Outcome # 2 Goal not met   Short Term Goal # 3 Pt to ambulate 150 ft w/ fww and spv in 6 visits   Goal Outcome # 3 Goal not met   Supervising Physical Therapist (PTA Treatments Only)   Supervising Physical Therapist Faith Lopez

## 2025-06-13 LAB
ALBUMIN SERPL BCP-MCNC: 2.2 G/DL (ref 3.2–4.9)
ALBUMIN/GLOB SERPL: 0.9 G/DL
ALP SERPL-CCNC: 148 U/L (ref 30–99)
ALT SERPL-CCNC: 97 U/L (ref 2–50)
ANION GAP SERPL CALC-SCNC: 7 MMOL/L (ref 7–16)
ANISOCYTOSIS BLD QL SMEAR: ABNORMAL
AST SERPL-CCNC: 883 U/L (ref 12–45)
BACTERIA BLD CULT: NORMAL
BACTERIA BLD CULT: NORMAL
BASOPHILS # BLD AUTO: 0 % (ref 0–1.8)
BASOPHILS # BLD: 0 K/UL (ref 0–0.12)
BILIRUB SERPL-MCNC: 0.3 MG/DL (ref 0.1–1.5)
BUN SERPL-MCNC: 34 MG/DL (ref 8–22)
CALCIUM ALBUM COR SERPL-MCNC: 8.8 MG/DL (ref 8.5–10.5)
CALCIUM SERPL-MCNC: 7.4 MG/DL (ref 8.5–10.5)
CHLORIDE SERPL-SCNC: 117 MMOL/L (ref 96–112)
CO2 SERPL-SCNC: 23 MMOL/L (ref 20–33)
CREAT SERPL-MCNC: 1 MG/DL (ref 0.5–1.4)
EOSINOPHIL # BLD AUTO: 0 K/UL (ref 0–0.51)
EOSINOPHIL NFR BLD: 0 % (ref 0–6.9)
ERYTHROCYTE [DISTWIDTH] IN BLOOD BY AUTOMATED COUNT: 49.7 FL (ref 35.9–50)
GFR SERPLBLD CREATININE-BSD FMLA CKD-EPI: 87 ML/MIN/1.73 M 2
GLOBULIN SER CALC-MCNC: 2.4 G/DL (ref 1.9–3.5)
GLUCOSE SERPL-MCNC: 98 MG/DL (ref 65–99)
HCT VFR BLD AUTO: 28.9 % (ref 42–52)
HGB BLD-MCNC: 9.2 G/DL (ref 14–18)
HYPOCHROMIA BLD QL SMEAR: ABNORMAL
LYMPHOCYTES # BLD AUTO: 0.14 K/UL (ref 1–4.8)
LYMPHOCYTES NFR BLD: 5.1 % (ref 22–41)
MAGNESIUM SERPL-MCNC: 2.5 MG/DL (ref 1.5–2.5)
MANUAL DIFF BLD: NORMAL
MCH RBC QN AUTO: 29.5 PG (ref 27–33)
MCHC RBC AUTO-ENTMCNC: 31.8 G/DL (ref 32.3–36.5)
MCV RBC AUTO: 92.6 FL (ref 81.4–97.8)
METAMYELOCYTES NFR BLD MANUAL: 1.7 %
MICROCYTES BLD QL SMEAR: ABNORMAL
MONOCYTES # BLD AUTO: 0 K/UL (ref 0–0.85)
MONOCYTES NFR BLD AUTO: 0.8 % (ref 0–13.4)
MORPHOLOGY BLD-IMP: NORMAL
MYELOCYTES NFR BLD MANUAL: 0.9 %
NEUTROPHILS # BLD AUTO: 2.56 K/UL (ref 1.82–7.42)
NEUTROPHILS NFR BLD: 88.9 % (ref 44–72)
NEUTS BAND NFR BLD MANUAL: 2.6 % (ref 0–10)
NRBC # BLD AUTO: 0.02 K/UL
NRBC BLD-RTO: 0.7 /100 WBC (ref 0–0.2)
OVALOCYTES BLD QL SMEAR: NORMAL
PHOSPHATE SERPL-MCNC: 2.2 MG/DL (ref 2.5–4.5)
PLATELET # BLD AUTO: 230 K/UL (ref 164–446)
PLATELET BLD QL SMEAR: NORMAL
PMV BLD AUTO: 10.5 FL (ref 9–12.9)
POIKILOCYTOSIS BLD QL SMEAR: NORMAL
POTASSIUM SERPL-SCNC: 4 MMOL/L (ref 3.6–5.5)
PROT SERPL-MCNC: 4.6 G/DL (ref 6–8.2)
RBC # BLD AUTO: 3.12 M/UL (ref 4.7–6.1)
RBC BLD AUTO: PRESENT
SIGNIFICANT IND 70042: NORMAL
SIGNIFICANT IND 70042: NORMAL
SITE SITE: NORMAL
SITE SITE: NORMAL
SODIUM SERPL-SCNC: 147 MMOL/L (ref 135–145)
SOURCE SOURCE: NORMAL
SOURCE SOURCE: NORMAL
WBC # BLD AUTO: 2.8 K/UL (ref 4.8–10.8)

## 2025-06-13 PROCEDURE — 700102 HCHG RX REV CODE 250 W/ 637 OVERRIDE(OP)

## 2025-06-13 PROCEDURE — 700111 HCHG RX REV CODE 636 W/ 250 OVERRIDE (IP): Mod: JZ | Performed by: FAMILY MEDICINE

## 2025-06-13 PROCEDURE — A9270 NON-COVERED ITEM OR SERVICE: HCPCS

## 2025-06-13 PROCEDURE — 700111 HCHG RX REV CODE 636 W/ 250 OVERRIDE (IP): Mod: JZ

## 2025-06-13 PROCEDURE — 85027 COMPLETE CBC AUTOMATED: CPT

## 2025-06-13 PROCEDURE — 700105 HCHG RX REV CODE 258: Performed by: FAMILY MEDICINE

## 2025-06-13 PROCEDURE — 99232 SBSQ HOSP IP/OBS MODERATE 35: CPT | Mod: GC | Performed by: FAMILY MEDICINE

## 2025-06-13 PROCEDURE — 36415 COLL VENOUS BLD VENIPUNCTURE: CPT

## 2025-06-13 PROCEDURE — 84100 ASSAY OF PHOSPHORUS: CPT

## 2025-06-13 PROCEDURE — 770006 HCHG ROOM/CARE - MED/SURG/GYN SEMI*

## 2025-06-13 PROCEDURE — 700105 HCHG RX REV CODE 258

## 2025-06-13 PROCEDURE — 700101 HCHG RX REV CODE 250: Performed by: NURSE PRACTITIONER

## 2025-06-13 PROCEDURE — 85007 BL SMEAR W/DIFF WBC COUNT: CPT

## 2025-06-13 PROCEDURE — 80053 COMPREHEN METABOLIC PANEL: CPT

## 2025-06-13 PROCEDURE — 83735 ASSAY OF MAGNESIUM: CPT

## 2025-06-13 RX ORDER — OXYCODONE HYDROCHLORIDE 5 MG/1
5 TABLET ORAL
Refills: 0 | Status: DISCONTINUED | OUTPATIENT
Start: 2025-06-13 | End: 2025-06-20 | Stop reason: HOSPADM

## 2025-06-13 RX ORDER — OXYCODONE HYDROCHLORIDE 5 MG/1
2.5 TABLET ORAL
Refills: 0 | Status: DISCONTINUED | OUTPATIENT
Start: 2025-06-13 | End: 2025-06-20 | Stop reason: HOSPADM

## 2025-06-13 RX ORDER — SODIUM CHLORIDE, SODIUM LACTATE, POTASSIUM CHLORIDE, CALCIUM CHLORIDE 600; 310; 30; 20 MG/100ML; MG/100ML; MG/100ML; MG/100ML
INJECTION, SOLUTION INTRAVENOUS CONTINUOUS
Status: DISCONTINUED | OUTPATIENT
Start: 2025-06-13 | End: 2025-06-14

## 2025-06-13 RX ORDER — HYDROMORPHONE HYDROCHLORIDE 1 MG/ML
0.5 INJECTION, SOLUTION INTRAMUSCULAR; INTRAVENOUS; SUBCUTANEOUS
Status: DISCONTINUED | OUTPATIENT
Start: 2025-06-13 | End: 2025-06-18

## 2025-06-13 RX ORDER — SODIUM CHLORIDE, SODIUM LACTATE, POTASSIUM CHLORIDE, AND CALCIUM CHLORIDE .6; .31; .03; .02 G/100ML; G/100ML; G/100ML; G/100ML
1000 INJECTION, SOLUTION INTRAVENOUS ONCE
Status: COMPLETED | OUTPATIENT
Start: 2025-06-13 | End: 2025-06-13

## 2025-06-13 RX ADMIN — SODIUM CHLORIDE, POTASSIUM CHLORIDE, SODIUM LACTATE AND CALCIUM CHLORIDE 1000 ML: 600; 310; 30; 20 INJECTION, SOLUTION INTRAVENOUS at 21:06

## 2025-06-13 RX ADMIN — OXYCODONE 5 MG: 5 TABLET ORAL at 20:20

## 2025-06-13 RX ADMIN — OXYBUTYNIN CHLORIDE 10 MG: 10 TABLET, EXTENDED RELEASE ORAL at 05:12

## 2025-06-13 RX ADMIN — SODIUM CHLORIDE: 9 INJECTION, SOLUTION INTRAVENOUS at 05:23

## 2025-06-13 RX ADMIN — HYDROMORPHONE HYDROCHLORIDE 0.5 MG: 1 INJECTION, SOLUTION INTRAMUSCULAR; INTRAVENOUS; SUBCUTANEOUS at 21:54

## 2025-06-13 RX ADMIN — PIPERACILLIN AND TAZOBACTAM 4.5 G: 4; .5 INJECTION, POWDER, FOR SOLUTION INTRAVENOUS at 05:24

## 2025-06-13 RX ADMIN — SODIUM CHLORIDE, POTASSIUM CHLORIDE, SODIUM LACTATE AND CALCIUM CHLORIDE: 600; 310; 30; 20 INJECTION, SOLUTION INTRAVENOUS at 22:13

## 2025-06-13 RX ADMIN — CARBIDOPA AND LEVODOPA 1 TABLET: 25; 100 TABLET ORAL at 15:12

## 2025-06-13 RX ADMIN — OXYCODONE HYDROCHLORIDE 10 MG: 10 TABLET ORAL at 03:12

## 2025-06-13 RX ADMIN — CARBIDOPA AND LEVODOPA 1 TABLET: 25; 100 TABLET ORAL at 20:19

## 2025-06-13 RX ADMIN — BACLOFEN 5 MG: 10 TABLET ORAL at 05:12

## 2025-06-13 RX ADMIN — SODIUM CHLORIDE, PRESERVATIVE FREE 10 ML: 5 INJECTION INTRAVENOUS at 05:13

## 2025-06-13 RX ADMIN — Medication 4000 UNITS: at 05:12

## 2025-06-13 RX ADMIN — HEPARIN SODIUM 5000 UNITS: 5000 INJECTION, SOLUTION INTRAVENOUS; SUBCUTANEOUS at 21:55

## 2025-06-13 RX ADMIN — DIBASIC SODIUM PHOSPHATE, MONOBASIC POTASSIUM PHOSPHATE AND MONOBASIC SODIUM PHOSPHATE 250 MG: 852; 155; 130 TABLET ORAL at 20:19

## 2025-06-13 RX ADMIN — PIPERACILLIN AND TAZOBACTAM 4.5 G: 4; .5 INJECTION, POWDER, FOR SOLUTION INTRAVENOUS at 15:15

## 2025-06-13 RX ADMIN — CARBIDOPA AND LEVODOPA 1 TABLET: 25; 100 TABLET ORAL at 09:25

## 2025-06-13 RX ADMIN — HEPARIN SODIUM 5000 UNITS: 5000 INJECTION, SOLUTION INTRAVENOUS; SUBCUTANEOUS at 05:13

## 2025-06-13 ASSESSMENT — PAIN DESCRIPTION - PAIN TYPE
TYPE: ACUTE PAIN

## 2025-06-13 NOTE — CARE PLAN
The patient is Stable - Low risk of patient condition declining or worsening    Shift Goals  Clinical Goals: Pt will remain free from falls throughout shift. Pt will tolerate IV abx.  Patient Goals: Rest  Family Goals: HOLLAND    Pt is A&Ox4, on RA, and stable. Pt has tolerated IV abx. All needs are met and questions answered at this time.    Progress made toward(s) clinical / shift goals:    Problem: Pain - Standard  Goal: Alleviation of pain or a reduction in pain to the patient’s comfort goal  Outcome: Progressing  Note: Pt has remained at a comfortable pain level throughout shift.     Problem: Knowledge Deficit - Standard  Goal: Patient and family/care givers will demonstrate understanding of plan of care, disease process/condition, diagnostic tests and medications  Outcome: Progressing  Note: Pt verbalizes understanding of POC and status.      Problem: Fall Risk  Goal: Patient will remain free from falls  Outcome: Progressing  Note: Pt has remained free from falls throughout shift.      Problem: Skin Integrity  Goal: Skin integrity is maintained or improved  Outcome: Progressing  Note: Pt has maintained skin integrity throughout shift.        Patient is not progressing towards the following goals:

## 2025-06-13 NOTE — PROGRESS NOTES
Late Entry: Pt arrived to unit via hospital transport with all belongings around 2230. Report received from Ayesha MILTON.  Assessment completed, 2RN skin check complete  A&Ox4. Denies CP/SOB on 1.5L of oxygen via nasal cannula.   Reporting 2/10 pain. Declined intervention at this time.  Educated patient regarding pharmacologic and non pharmacologic modalities for pain management.  Skin: see 2RN skin note  Tolerating clear liquid diet with 1.5L fluid restriction. Denies N/V.  + void with luz catheter. Last BM 6/12  Pt ambulates x1 assist with FWW, high fall risk, frame alarm on due to low airloss mattress.  All needs met at this time. Call light within reach. Pt calls appropriately. Bed low and locked, non skid socks in place. Hourly rounding in place.

## 2025-06-13 NOTE — PROGRESS NOTES
Report received from [facility] YOAN Martinez and assumed patient care at 0700. Patient is A&Ox 4, on 1.5L via NC, and resting with unlabored breathing, even rise and fall of chest. Patient reporting no pain at this time. Patient is on 1.5L fluid restriction. Patient has a luz present. Call light within reach and bed in lowest position. Reinforced the need to call for assistance. Plan of care discussed and patient does not have any further needs at this time.

## 2025-06-13 NOTE — PROGRESS NOTES
"    FAMILY MEDICINE PROGRESS NOTE          PATIENT ID:  NAME:  Rakan Rader  MRN:               [id number]  YOB: 1965    Date of Admission: 6/8/2025     Attending: Edmond Ayala M.d.     Resident: Porsha Dye D.O. (PGY-2)    Primary Care Physician:  Emeterio Dave M.D.    HPI: Rakan \"Ray\" Prasanth is a 60 y.o. male with hx Parkinson's Disease with DBS, prostate cancer presented with abdominal pain after underwent RA laparoscopic prostatectomy 5/28; found to have urinary ascites and 7cm R pelvic hematoma s/p diagnostic laparoscopy and hematoma washout 6/10 on hospital day 5    Interval Problem Update  NAOE    SUBJECTIVE:   Patient is feeling well today.  Reports that he does not intruding interrupted at night for labs or vital signs.  His pain is improving slowly.  He has been eating and drinking okay.    OBJECTIVE:  Temp:  [36.6 °C (97.9 °F)-36.8 °C (98.2 °F)] 36.6 °C (97.9 °F)  Pulse:  [87-99] 87  Resp:  [16-20] 16  BP: (114-134)/(80-93) 126/83  SpO2:  [98 %-99 %] 99 %      Intake/Output Summary (Last 24 hours) at 6/13/2025 1539  Last data filed at 6/13/2025 0600  Gross per 24 hour   Intake 200 ml   Output 1840 ml   Net -1640 ml       Physical Exam:  Gen: Sleeping comfortably, nasal cannula in place  HEENT: MMM, EOMI  Cardio: RRR, clear s1/s2, no murmur  Resp: Slight decreased air movement, no wheezing, rales, rhonchi clear to auscultation  GI/: Soft, nondistended with generalized tenderness to palpation, laparoscopic incisions are intact without dehiscence or drainage.  CRIS drain with serosanguineous 25 cc of fluid.  Neuro: Non-focal, Gross intact, no deficits, chronic masked facies, no tremor  Skin/Extremities: Cap refill <3sec, warm/well perfused, no rash, normal extremities    LABS:  Recent Labs     06/12/25  0052 06/12/25  0950 06/13/25  0031   WBC 1.4* 1.4* 2.8*   RBC 3.40* 3.17* 3.12*   HEMOGLOBIN 10.0* 9.2* 9.2*   HEMATOCRIT 31.0* 29.1* 28.9*   MCV 91.2 91.8 92.6   MCH 29.4 29.0 29.5   RDW " 47.7 48.1 49.7   PLATELETCT 281 249 230   MPV 9.6 9.9 10.5   NEUTSPOLYS 79.60* 78.60* 88.90*   LYMPHOCYTES 10.60* 11.10* 5.10*   MONOCYTES 5.30 4.30 0.80   EOSINOPHILS 0.00 0.00 0.00   BASOPHILS 0.00 0.00 0.00   RBCMORPHOLO Present Present Present     Recent Labs     06/12/25  0052 06/12/25  0950 06/13/25  0031   SODIUM 143 143 147*   POTASSIUM 4.6 4.2 4.0   CHLORIDE 111 113* 117*   CO2 24 25 23   BUN 55* 43* 34*   CREATININE 1.47* 1.16 1.00   CALCIUM 7.3* 7.2* 7.4*   MAGNESIUM  --   --  2.5   PHOSPHORUS  --   --  2.2*   ALBUMIN 2.4* 2.2* 2.2*     Estimated GFR/CRCL = Estimated Creatinine Clearance: 65.8 mL/min (by C-G formula based on SCr of 1 mg/dL).  Recent Labs     06/12/25  0052 06/12/25  0950 06/13/25  0031   GLUCOSE 120* 107* 98     Recent Labs     06/11/25  1540 06/12/25  0052 06/12/25  0950 06/13/25  0031   ASTSGOT  --  1163* 1146* 883*   ALTSGPT  --  127* 102* 97*   TBILIRUBIN  --  0.5 0.4 0.3   ALKPHOSPHAT  --  166* 157* 148*   GLOBULIN  --  2.6 2.4 2.4   INR  --  1.06  --   --    AMMONIA 26  --   --   --      Recent Labs     06/12/25  0522   CPKTOTAL 174*         Recent Labs     06/12/25  0052   INR 1.06         IMAGING:  US-RUQ   Final Result         1.  Hepatomegaly   2.  Gallbladder sludge with gallbladder wall thickening, consider acalculous cholecystitis, could be further evaluated with HIDA scan as clinically appropriate.   3.  Trace abdominal ascites   4.  Small right pleural effusion      [id number] VIEW   Final Result      Digitized intraoperative radiograph is submitted for review. This examination is not for diagnostic purpose but for guidance during a surgical procedure. Please see the patient's chart for full procedural details.         INTERPRETING LOCATION: [address] [place] [address], [id number]      DX-PORTABLE FLUOROSCOPY < 1 HOUR Reason For Exam: Main OR   Final Result      Portable fluoroscopy utilized for 6 seconds.         INTERPRETING LOCATION: [facility], [place], [address]      CT-Cystogram    Final Result         1.  Diffuse bladder wall thickening, consider changes of cystitis.   2.  Contrast within the peritoneal cavity, compatible with intraperitoneal bladder rupture.   3.  Small foci of intra-abdominal free air, likely related to bladder rupture although radiographic appearance cannot exclude viscus perforation.   4.  Diffuse small bowel wall thickening, appearance suggests changes of enteritis.      These findings were discussed with the patient's clinician, Siena Oakley, on 6/10/2025 6:57 AM.      CT-IMAGE-GUIDED DRAIN PERITONEAL   Final Result      1.  CT GUIDED PLACEMENT OF A PERCUTANEOUS DRAINAGE CATHETER INTO THE PERITONEAL SPACE.   2.  THE CURRENT PLAN IS TO MONITOR DRAINAGE OUTPUT AND OBTAIN A FOLLOWUP CT SCAN IN 5-7 DAYS IF CLINICALLY INDICATED.      CT-ABDOMEN-PELVIS W/O   Final Result         1. 7 cm right pelvic high density mass may be a hematoma. This displaces the bladder to the left.      2. Large free peritoneal fluid in abdomen and pelvis.      3. Bladder empty. No hydronephrosis.      4. Mild ileus-type fluid in stomach and small bowel. Fluid-filled distal esophagus suggests reflux. No colon distention. Normal appendix.      5. Possible sludge in gallbladder. No inflammation or ductal dilatation.      6. Small indeterminate 8 mm liver lesion may be a benign cyst.                  DX-CHEST-PORTABLE (1 VIEW)   Final Result         1. No acute abnormalities evident.      2. Right chest wall battery pack with wire extending to the neck.                         CULTURES:   Results       Procedure Component Value Units Date/Time    Blood Culture - Draw one from central line and one from peripheral site [[id number]] Collected: 06/08/25 0547    Order Status: Completed Specimen: Blood from Peripheral Updated: 06/13/25 0700     Significant Indicator NEG     Source BLD     Site PERIPHERAL     Culture Result No growth after 5 days of incubation.    Blood Culture - Draw one from central line  and one from peripheral site [[id number]] Collected: 06/08/25 0540    Order Status: Completed Specimen: Blood from Line Updated: 06/13/25 0700     Significant Indicator NEG     Source BLD     Site Peripheral     Culture Result No growth after 5 days of incubation.    Cdiff By PCR Rflx Toxin [[id number]]     Order Status: Canceled Specimen: Stool     FLUID CULTURE W/GRAM STAIN [[id number]] Collected: 06/08/25 1415    Order Status: Completed Specimen: Body Fluid Updated: 06/11/25 0757     Significant Indicator NEG     Source BF     Site Peritoneal  drain aspiration     Culture Result No growth at 72 hours.     Gram Stain Result Moderate WBCs.  No organisms seen.      Urine Culture (New) [[id number]] Collected: 06/08/25 1255    Order Status: Completed Specimen: Urine, Clean Catch Updated: 06/10/25 1208     Significant Indicator NEG     Source UR     Site URINE, CLEAN CATCH     Culture Result No growth at 48 hours.    GRAM STAIN [[id number]] Collected: 06/08/25 1415    Order Status: Completed Specimen: Body Fluid Updated: 06/08/25 2050     Significant Indicator .     Source BF     Site Peritoneal  drain aspiration     Gram Stain Result Moderate WBCs.  No organisms seen.      URINALYSIS [[id number]]  (Abnormal) Collected: 06/08/25 1255    Order Status: Completed Specimen: Urine Updated: 06/08/25 1332     Color Orange     Character Turbid     Specific Gravity 1.020     Ph 5.0     Glucose Negative mg/dL      Ketones Negative mg/dL      Protein 100 mg/dL      Bilirubin Negative     Urobilinogen, Urine 0.2 EU/dL      Nitrite Negative     Leukocyte Esterase Small     Occult Blood Large     Micro Urine Req Microscopic    FLUID CULTURE W/GRAM STAIN [[id number]]     Order Status: No result Specimen: Other Body Fluid     Urinalysis [[id number]]     Order Status: Canceled Specimen: Urine, Clean Catch             MEDS:  Current Facility-Administered Medications   Medication Last Admin    oxyCODONE immediate-release (Roxicodone) tablet 2.5  mg      Or    oxyCODONE immediate-release (Roxicodone) tablet 5 mg      Or    HYDROmorphone (Dilaudid) injection 0.5 mg      Pharmacy Consult Request      oxybutynin SR (Ditropan-XL) tablet 10 mg 10 mg at 06/13/25 0512    heparin injection 5,000 Units 5,000 Units at 06/13/25 0513    metoclopramide (Reglan) injection 5 mg      NS infusion Rate Change at 06/13/25 0623    baclofen (Lioresal) tablet 5 mg 5 mg at 06/13/25 0512    normal saline flush 0.9 % SOLN 10 mL 10 mL at 06/13/25 0513    ondansetron (Zofran) syringe/vial injection 4 mg 4 mg at 06/11/25 2033    carbidopa-levodopa (Sinemet)  MG tablet 1 Tablet 1 Tablet at 06/13/25 1512    vitamin D3 (Cholecalciferol) tablet 4,000 Units 4,000 Units at 06/13/25 0512    piperacillin-tazobactam (Zosyn) 4.5 g in  mL IVPB 4.5 g at 06/13/25 1515       ASSESSMENT/PLAN:  60 y.o. male admitted for:  * Abdominal pain  Assessment & Plan  Worsening generalized abdominal pain for 3 to 4 days prior to admission in the setting of robotic prostatectomy 5/28/2025 for prostate cancer.  CT with 7 cm right pelvic hematoma with peritoneal free fluid considered to be urine ascites on admission.   Inpatient cystogram with concern for anastomosis disruption causing leakage   Pt is POD2 s/p cysto, on table cystogram, complex catheterization and laparoscopic washout of hematoma and abdominal cavity on 6/10/25 with Dr. Cesar    Plan:  -Serial abdominal exams  -IVF   -Pain control with IV dilaudid   -Antiemetics with Zofran, Reglan  -Bowel regimen   -Urology following, appreciate recommendations:  -Ditropan 10mg ER daily for bladder spasms  -Clear liquid diet to 1.5L daily.   -Continue luz catheter   -Antibiotics with Zosyn for at least another 24 hours, then transition to oral for 10 day course   -Discharge home with luz catheter and potentially CRIS drain  -Luz will remain for a minimum of 3 weeks and then a fluoroscopic cystogram will be performed to assess for resolution of  leak.  - Patient did have several episodes of watery diarrhea overnight, none this morning. If this continues will check for C. Diff.     Acute hypoxic respiratory failure (HCC)  Assessment & Plan  Acute, Resolving.  Likely atelectasis in the setting of immobilization.    Plan  - Oxygen supplementation to keep oxygen saturation greater than 90%, presently on RA with O2 available  - PT/OT for mobilization  - Incentive spirometry  - Wean oxygen as tolerated, presently on 1.5L     Anemia  Assessment & Plan  Acute blood loss anemia in the setting of robotic prostatectomy 5/28, hemoglobin postoperatively ~7. Now up to 10.  Plan:  -Trend H&H, transfusion threshold less than 7  -Vital signs every 4 hours    Acute renal failure (ARF) (HCC)- (present on admission)  Assessment & Plan  Improving, acute renal failure with creatinine 4.98 in the setting of robotic prostatectomy on 5/28 secondary to prostate cancer.  Today creatinine 1.47.    Plan:  -IV NS, wean with PO intake as diet is advanced   -Appreciate urology involvement  -Trend renal function  -Trend electrolytes  -Continue to monitor     Other neutropenia (HCC)  Assessment & Plan  Improving. Acute drop today patient with WBC 1.4.  Neutropenia and lymphopenia. May be secondary to developing sepsis, less likely bone marrow suppression given acute drop.     -Trend    Elevated liver enzymes  Assessment & Plan  Acute, worsening this hospitalization following ex lap on 6/10 due to urine ascites and anastomotic leak.  , ALT 65,  up to AST 1163, , .    > CPK mildly elevated but not enough to be rhabdo   > Right upper quadrant ultrasound shows hepatomegaly, gallbladder sludge and wall thickening     DDx includes shock liver, intrahepatic/biliary injury from laparoscopy, acalculous cholecystitis.  Less likely MI versus medication induced hepatotoxicity.    GI consulted, agreed with picture of shock liver.  Felt that would downtrend in the coming days  and did not recommend any further workup.    Plan:  -Trend CMP    Hyponatremia- (present on admission)  Assessment & Plan  Resolved.  Sodium 130 initially, likely hypertonic hyponatremia especially in the setting of urine ascites. Up to 143.  -Trend    Prostate cancer (HCC)- (present on admission)  Assessment & Plan  S/p robotic prostatectomy 5/28/2025    History of tobacco abuse- (present on admission)  Assessment & Plan  History of tobacco use as a former smoker at least 30 years ago, 5 pack years.  Currently chewing tobacco, last use 1 week ago.  Patient reports minimal cravings.  - Can order nicotine patch if needed for cravings  - Tobacco use cessation counseling    Liver lesion  Assessment & Plan  Incidental liver lesion 8 mm on CT this admission  - Recommend outpatient follow-up    Parkinson's disease with dyskinesia and fluctuating manifestations (HCC)- (present on admission)  Assessment & Plan  Chronic, has deep brain stimulator, battery implanted at chest.   Continue home carbidopa-levodopa          Core Measures:  Fluids: IV NS 83cc/hr   Lines: PIV, luz, CRIS drain   Abx: IV zosyn   Diet: Full liquid   PPX: SCDs/TEDs and heparin ppx    CODE Status: Full Code      Disposition  Inpatient      Porsha Dye D.O.   PGY-2  [facility]

## 2025-06-13 NOTE — PROGRESS NOTES
Pt has had hiccups for one hour. Had patient hold his breath, try cold water, and all non-medical methods to get rid of the hiccups. Pt was then offered medication to help stop the hiccups. This RN asked if he wanted the provider notified and asked for a medication to stop the hiccups. Pt stated he doesn't want a medication for the hiccups. This RN told the pt to use the call light and notify if he changes his mind and would like an pharmaceutical alternative for the hiccups.

## 2025-06-13 NOTE — CARE PLAN
The patient is Stable - Low risk of patient condition declining or worsening    Shift Goals  Clinical Goals: safety, 2Rn skin check, increased mobility/ambulation, IV ABX, pain control  Patient Goals: rest, get better  Family Goals: HOLLAND    Progress made toward(s) clinical / shift goals:  Pt medicated per MAR for pain, educated on the different modalities of pain management. IV abx done per MAR. 2RN skin check complete. Pt encouraged increased ambulation/mobility. Q2 turns in place, pt declines a lot of them due to current comfort, education provided.       Problem: Pain - Standard  Goal: Alleviation of pain or a reduction in pain to the patient’s comfort goal  Outcome: Progressing     Problem: Knowledge Deficit - Standard  Goal: Patient and family/care givers will demonstrate understanding of plan of care, disease process/condition, diagnostic tests and medications  Outcome: Progressing     Problem: Skin Integrity  Goal: Skin integrity is maintained or improved  Outcome: Progressing

## 2025-06-13 NOTE — PROGRESS NOTES
4 Eyes Skin Assessment Completed by YOAN Martinez and YOAN Jean Baptiste.    Skin assessment is primarily focused on high risk bony prominences. Pay special attention to skin beneath and around medical devices, high risk bony prominences, skin to skin areas and areas where the patient lacks sensation to feel pain and areas where the patient previously had breakdown.     Head (Occipital):  WDL   Ears (Under Medical Devices): WDL   Nose (Under Medical Devices): WDL   Mouth:  WDL   Neck: WDL   Breast/Chest:  WDL, implanted device (DBS) to R chest   Shoulder Blades:  WDL   Spine:   Bruising   (R) Arm/Elbow/Hand: Pink and Blanching   (L) Arm/Elbow/Hand: Pink and Blanching, bruising   Abdomen: Bruising and x6 abd lap sites with dermabond, LUQ CRIS drain with DIP, CDI   Pannus/Groin:  Red, Blanching, and Bruising. Luz Catheter in place with stat lock   Sacrum/Coccyx:   Pink and Blanching   (R) Ischial Tuberosity (Sit Bones):  WDL   (L) Ischial Tuberosity (Sit Bones):  WDL   (R) Leg:  WDL   (L) Leg:  Bruising   (R) Heel:  WDL   (R) Foot/Toe: WDL   (L) Heel: WDL   (L) Foot/Toe:  WDL      sacrum     backside     groin     abdomen    DEVICES IN USE:   Respiratory Devices:  Nasal cannula and Pulse ox  Feeding Devices:  N/A   Lines & BP Monitoring Devices:  Peripheral IV and Pulse ox, luz catheter  Orthopedic Devices:  N/A  Miscellaneous Devices:  implanted DBS to R Chest    PROTOCOL INTERVENTIONS:   Low Airloss Bed:  Already in place  Q2 Turns with Pillows:  Already in place  Glide Sheet:  Already in place  Dri-Daquan/Micro Climate Pads:  Already in place  Luz:  Already in place  Nasal Cannula with Gray Foams:  Already in place and Reinforced/reapplied    Pt declined use of sacral/elbow mepilex at this time, education provided, pt states is uncomfortable and declines use. Barrier cream in use for the sacrum and groin. Education provided on the importance of Q2 turns.    WOUND PHOTOS:   Completed and in EPIC     WOUND CONSULT:    N/A, no advanced wound care needs identified

## 2025-06-13 NOTE — PROGRESS NOTES
Bedside report received from off going RN/tech: Claudia, assumed care of patient.     Fall Risk Score: HIGH RISK  Fall risk interventions in place: Place yellow fall risk ID band on patient, Provide patient/family education based on risk assessment, Educate patient/family to call staff for assistance when getting out of bed, Place fall precaution signage outside patient door, Utilize bed/chair fall alarm, and Bed alarm connected correctly  Bed type: Low air loss (Cecilio Score less than 17 interventions in place)  Patient on cardiac monitor: No   IVF/IV medications: Infusion per MAR (List Med(s)) NS @ 125  Oxygen: How many liters 1.5L, Traced the line to wall oxygen, and No oxygen tank in room  Bedside sitter: Not Applicable   Isolation: Not applicable

## 2025-06-13 NOTE — PROGRESS NOTES
Report called to receiving RN on S521-2, provided opportunity for clarification and questions. Transport arranged with RN. Belongings gathered and sent with pt.     Pt is currently A&Ox4, Not cardiac monitored, breathing at a normal rate and rhythm, warm, dry, and skin color appropriate for ethnicity, and in no visible emotional or physical distress. Pt is medical, no cardiac monitor in place. No further needs at this time. Contact notified: Family-friend.

## 2025-06-14 ENCOUNTER — APPOINTMENT (OUTPATIENT)
Dept: RADIOLOGY | Facility: MEDICAL CENTER | Age: 60
DRG: 698 | End: 2025-06-14
Payer: MEDICARE

## 2025-06-14 ENCOUNTER — APPOINTMENT (OUTPATIENT)
Dept: RADIOLOGY | Facility: MEDICAL CENTER | Age: 60
DRG: 698 | End: 2025-06-14
Attending: FAMILY MEDICINE
Payer: MEDICARE

## 2025-06-14 PROBLEM — R06.6 INTRACTABLE HICCUPS: Status: ACTIVE | Noted: 2025-06-14

## 2025-06-14 PROBLEM — E46 MALNUTRITION (HCC): Status: ACTIVE | Noted: 2025-06-14

## 2025-06-14 LAB
ALBUMIN SERPL BCP-MCNC: 2.2 G/DL (ref 3.2–4.9)
ALBUMIN/GLOB SERPL: 0.9 G/DL
ALP SERPL-CCNC: 150 U/L (ref 30–99)
ALT SERPL-CCNC: 84 U/L (ref 2–50)
ANION GAP SERPL CALC-SCNC: 7 MMOL/L (ref 7–16)
ANISOCYTOSIS BLD QL SMEAR: ABNORMAL
AST SERPL-CCNC: 474 U/L (ref 12–45)
BASOPHILS # BLD AUTO: 0.9 % (ref 0–1.8)
BASOPHILS # BLD: 0.04 K/UL (ref 0–0.12)
BILIRUB SERPL-MCNC: 0.3 MG/DL (ref 0.1–1.5)
BUN SERPL-MCNC: 21 MG/DL (ref 8–22)
CALCIUM ALBUM COR SERPL-MCNC: 8.9 MG/DL (ref 8.5–10.5)
CALCIUM SERPL-MCNC: 7.5 MG/DL (ref 8.5–10.5)
CHLORIDE SERPL-SCNC: 117 MMOL/L (ref 96–112)
CHOLEST SERPL-MCNC: 62 MG/DL (ref 100–199)
CO2 SERPL-SCNC: 24 MMOL/L (ref 20–33)
CREAT SERPL-MCNC: 0.8 MG/DL (ref 0.5–1.4)
EOSINOPHIL # BLD AUTO: 0 K/UL (ref 0–0.51)
EOSINOPHIL NFR BLD: 0 % (ref 0–6.9)
ERYTHROCYTE [DISTWIDTH] IN BLOOD BY AUTOMATED COUNT: 50.3 FL (ref 35.9–50)
GFR SERPLBLD CREATININE-BSD FMLA CKD-EPI: 102 ML/MIN/1.73 M 2
GLOBULIN SER CALC-MCNC: 2.4 G/DL (ref 1.9–3.5)
GLUCOSE SERPL-MCNC: 88 MG/DL (ref 65–99)
HCT VFR BLD AUTO: 27.8 % (ref 42–52)
HGB BLD-MCNC: 8.7 G/DL (ref 14–18)
HYPOCHROMIA BLD QL SMEAR: ABNORMAL
LYMPHOCYTES # BLD AUTO: 0.18 K/UL (ref 1–4.8)
LYMPHOCYTES NFR BLD: 4.4 % (ref 22–41)
MAGNESIUM SERPL-MCNC: 2.1 MG/DL (ref 1.5–2.5)
MANUAL DIFF BLD: NORMAL
MCH RBC QN AUTO: 29.2 PG (ref 27–33)
MCHC RBC AUTO-ENTMCNC: 31.3 G/DL (ref 32.3–36.5)
MCV RBC AUTO: 93.3 FL (ref 81.4–97.8)
MICROCYTES BLD QL SMEAR: ABNORMAL
MONOCYTES # BLD AUTO: 0.1 K/UL (ref 0–0.85)
MONOCYTES NFR BLD AUTO: 3.5 % (ref 0–13.4)
MORPHOLOGY BLD-IMP: NORMAL
MYELOCYTES NFR BLD MANUAL: 0.9 %
NEUTROPHILS # BLD AUTO: 3.61 K/UL (ref 1.82–7.42)
NEUTROPHILS NFR BLD: 90.3 % (ref 44–72)
NRBC # BLD AUTO: 0.02 K/UL
NRBC BLD-RTO: 0.5 /100 WBC (ref 0–0.2)
OVALOCYTES BLD QL SMEAR: NORMAL
PHOSPHATE SERPL-MCNC: 2 MG/DL (ref 2.5–4.5)
PLATELET # BLD AUTO: 223 K/UL (ref 164–446)
PLATELET BLD QL SMEAR: NORMAL
PMV BLD AUTO: 10.6 FL (ref 9–12.9)
POIKILOCYTOSIS BLD QL SMEAR: NORMAL
POTASSIUM SERPL-SCNC: 3.8 MMOL/L (ref 3.6–5.5)
PROT SERPL-MCNC: 4.6 G/DL (ref 6–8.2)
RBC # BLD AUTO: 2.98 M/UL (ref 4.7–6.1)
RBC BLD AUTO: PRESENT
SODIUM SERPL-SCNC: 148 MMOL/L (ref 135–145)
TRIGL SERPL-MCNC: 181 MG/DL (ref 0–149)
WBC # BLD AUTO: 4 K/UL (ref 4.8–10.8)

## 2025-06-14 PROCEDURE — 700102 HCHG RX REV CODE 250 W/ 637 OVERRIDE(OP)

## 2025-06-14 PROCEDURE — 700105 HCHG RX REV CODE 258

## 2025-06-14 PROCEDURE — 99232 SBSQ HOSP IP/OBS MODERATE 35: CPT | Mod: GC | Performed by: FAMILY MEDICINE

## 2025-06-14 PROCEDURE — 770006 HCHG ROOM/CARE - MED/SURG/GYN SEMI*

## 2025-06-14 PROCEDURE — A9270 NON-COVERED ITEM OR SERVICE: HCPCS

## 2025-06-14 PROCEDURE — 80053 COMPREHEN METABOLIC PANEL: CPT

## 2025-06-14 PROCEDURE — 700111 HCHG RX REV CODE 636 W/ 250 OVERRIDE (IP): Mod: JZ

## 2025-06-14 PROCEDURE — 84478 ASSAY OF TRIGLYCERIDES: CPT

## 2025-06-14 PROCEDURE — 85007 BL SMEAR W/DIFF WBC COUNT: CPT

## 2025-06-14 PROCEDURE — 85027 COMPLETE CBC AUTOMATED: CPT

## 2025-06-14 PROCEDURE — 36415 COLL VENOUS BLD VENIPUNCTURE: CPT

## 2025-06-14 PROCEDURE — 84100 ASSAY OF PHOSPHORUS: CPT

## 2025-06-14 PROCEDURE — 82465 ASSAY BLD/SERUM CHOLESTEROL: CPT

## 2025-06-14 PROCEDURE — 83735 ASSAY OF MAGNESIUM: CPT

## 2025-06-14 RX ORDER — SODIUM CHLORIDE 450 MG/100ML
INJECTION, SOLUTION INTRAVENOUS CONTINUOUS
Status: DISCONTINUED | OUTPATIENT
Start: 2025-06-14 | End: 2025-06-14

## 2025-06-14 RX ORDER — PANTOPRAZOLE SODIUM 40 MG/10ML
40 INJECTION, POWDER, LYOPHILIZED, FOR SOLUTION INTRAVENOUS DAILY
Status: DISCONTINUED | OUTPATIENT
Start: 2025-06-14 | End: 2025-06-16

## 2025-06-14 RX ORDER — DEXTROSE MONOHYDRATE AND SODIUM CHLORIDE 5; .45 G/100ML; G/100ML
INJECTION, SOLUTION INTRAVENOUS CONTINUOUS
Status: DISCONTINUED | OUTPATIENT
Start: 2025-06-14 | End: 2025-06-16

## 2025-06-14 RX ORDER — THIAMINE HYDROCHLORIDE 100 MG/ML
100 INJECTION, SOLUTION INTRAMUSCULAR; INTRAVENOUS EVERY EVENING
Status: DISPENSED | OUTPATIENT
Start: 2025-06-14 | End: 2025-06-17

## 2025-06-14 RX ADMIN — DIBASIC SODIUM PHOSPHATE, MONOBASIC POTASSIUM PHOSPHATE AND MONOBASIC SODIUM PHOSPHATE 250 MG: 852; 155; 130 TABLET ORAL at 20:05

## 2025-06-14 RX ADMIN — THIAMINE HYDROCHLORIDE 100 MG: 100 INJECTION, SOLUTION INTRAMUSCULAR; INTRAVENOUS at 20:10

## 2025-06-14 RX ADMIN — Medication 4000 UNITS: at 05:48

## 2025-06-14 RX ADMIN — HYDROMORPHONE HYDROCHLORIDE 0.5 MG: 1 INJECTION, SOLUTION INTRAMUSCULAR; INTRAVENOUS; SUBCUTANEOUS at 23:27

## 2025-06-14 RX ADMIN — HYDROMORPHONE HYDROCHLORIDE 0.5 MG: 1 INJECTION, SOLUTION INTRAMUSCULAR; INTRAVENOUS; SUBCUTANEOUS at 04:29

## 2025-06-14 RX ADMIN — OXYBUTYNIN CHLORIDE 10 MG: 10 TABLET, EXTENDED RELEASE ORAL at 05:48

## 2025-06-14 RX ADMIN — ONDANSETRON 4 MG: 2 INJECTION INTRAMUSCULAR; INTRAVENOUS at 04:29

## 2025-06-14 RX ADMIN — CARBIDOPA AND LEVODOPA 1 TABLET: 25; 100 TABLET ORAL at 09:13

## 2025-06-14 RX ADMIN — HEPARIN SODIUM 5000 UNITS: 5000 INJECTION, SOLUTION INTRAVENOUS; SUBCUTANEOUS at 15:04

## 2025-06-14 RX ADMIN — AMOXICILLIN AND CLAVULANATE POTASSIUM 1 TABLET: 875; 125 TABLET, FILM COATED ORAL at 20:06

## 2025-06-14 RX ADMIN — DIBASIC SODIUM PHOSPHATE, MONOBASIC POTASSIUM PHOSPHATE AND MONOBASIC SODIUM PHOSPHATE 250 MG: 852; 155; 130 TABLET ORAL at 12:45

## 2025-06-14 RX ADMIN — OXYCODONE 5 MG: 5 TABLET ORAL at 22:16

## 2025-06-14 RX ADMIN — PANTOPRAZOLE SODIUM 40 MG: 40 INJECTION, POWDER, FOR SOLUTION INTRAVENOUS at 12:41

## 2025-06-14 RX ADMIN — DEXTROSE AND SODIUM CHLORIDE: 5; 450 INJECTION, SOLUTION INTRAVENOUS at 12:40

## 2025-06-14 ASSESSMENT — PAIN DESCRIPTION - PAIN TYPE
TYPE: ACUTE PAIN

## 2025-06-14 ASSESSMENT — PATIENT HEALTH QUESTIONNAIRE - PHQ9
2. FEELING DOWN, DEPRESSED, IRRITABLE, OR HOPELESS: NOT AT ALL
1. LITTLE INTEREST OR PLEASURE IN DOING THINGS: NOT AT ALL
SUM OF ALL RESPONSES TO PHQ9 QUESTIONS 1 AND 2: 0

## 2025-06-14 NOTE — PROGRESS NOTES
Urology Progress Note    6/13-Pt is lying in bed in NAD. Luz catheter draining clear yellow urine. CRIS drain with 45cc of SS of exam. He notes that his hiccups have return, he has not been able to ambulate yet w/PT. He is afebrile, UOP 3000cc/24hrs, creatinine 1. WB: 2.8, Hgb: 9.2 and HCT 28.9. G.I was consulted for elevated liver enzymes.     6/12-Pt is sleeping soundly on exam. Luz catheter in place draining watermelon urine, no clots. UOP 1200cc/24hrs. Abdominal drain 2100cc/24hrs of SS. Abdominal drain had minimal output on exam. Afebrile, WBC: 1.4, Hgb: 9.2, Hct: 29.1, creatinine improved,now 1.16. He is now on a full liquid diet along with ensure. Abdominal incision c/d/I.     6/11-Pt lying comfortably in bed in NAD. RN at bedside. Pt is POD1 s/p cysto, on table cystogram, complex catheterization and laparoscopic washout of hematoma and abdominal cavity on 6/10/25 with Dr. Cesar. He is afebrile, hypertensive at 145/94, tachycardic 100, UOP 700cc/24hrs, WBC: 2.9, Hgb: 11.3, creatinine elevated 1.97. Pt notes his hiccups have resolved and feels that he is due for a BM today.     6/10- Pt is lying comfortably in bed in NAD. He is afebrile. UOP 2100cc/24hrs, drain output 1185cc/24hrs. He remain on Zosyn and is NPO. CT cystogram on 6/10 showed contrast within the peritoneal cavity compatible with an intraperitoneal bladder rupture. WBC: 3.8, Hgb: 10.4, Hct: 31.5, creatinine increased, now 1.63 (1.19). Pt is schedule to go to the OR today with Dr. Cesar for a bladder repair.     6/9-S/p cysto luz placement over a wire on 6/8/25 performed by Dr. Todd and IR abdominal drain placement on 6/8. Pt is lying comfortably in bed in NAD. IR APN at bedside assessing CRIS drain. Tmax 99.2, pt is tachycardic at 105. Normotensive at 136/84. Documented abdominal drain output 3100mL/24hrs, drainage is dark/old SS. He remains on Zosyn. Luz catheter is drainage clear yellow urine, good UOP on exam. WBC: 5.3, Hgb:  "9.8, No updated creatinine as of yet, however his creatinine from 6/8/25 is 2.48 (4.79). Pt denies f/c/n/v     Overnight Events: None    S:    O:   /88   Pulse 77   Temp 36.4 °C (97.6 °F) (Temporal)   Resp 17   Ht 1.727 m (5' 8\")   Wt 57.8 kg (127 lb 6.8 oz)   SpO2 93%   Recent Labs     06/12/25  0052 06/12/25  0950 06/13/25  0031   SODIUM 143 143 147*   POTASSIUM 4.6 4.2 4.0   CHLORIDE 111 113* 117*   CO2 24 25 23   GLUCOSE 120* 107* 98   BUN 55* 43* 34*   CREATININE 1.47* 1.16 1.00   CALCIUM 7.3* 7.2* 7.4*     Recent Labs     06/12/25 0052 06/12/25  0950 06/13/25  0031   WBC 1.4* 1.4* 2.8*   RBC 3.40* 3.17* 3.12*   HEMOGLOBIN 10.0* 9.2* 9.2*   HEMATOCRIT 31.0* 29.1* 28.9*   MCV 91.2 91.8 92.6   MCH 29.4 29.0 29.5   MCHC 32.3 31.6* 31.8*   RDW 47.7 48.1 49.7   PLATELETCT 281 249 230   MPV 9.6 9.9 10.5         Intake/Output Summary (Last 24 hours) at 6/9/2025 1008  Last data filed at 6/9/2025 0822  Gross per 24 hour   Intake 840 ml   Output 3100 ml   Net -2260 ml       Exam:    Abdomen soft, benign, non-distended. Abdominal incisions are clean, dry, intact. No evidence of erythema, drainage or discharge. CRIS drain site is c/d/I.  Urine: luz draining yellow urine    A/P:    Active Hospital Problems    Diagnosis     Other neutropenia (HCC) [D70.8]     Elevated liver enzymes [R74.8]     Acute renal failure (ARF) (HCC) [N17.9]     Anemia [D64.9]     Hyponatremia [E87.1]     Acute hypoxic respiratory failure (HCC) [J96.01]     Liver lesion [K76.9]     Abdominal pain [R10.9]     Prostate cancer (HCC) [C61]     History of tobacco abuse [Z87.891]     Parkinson's disease with dyskinesia and fluctuating manifestations (HCC) [G20.B2]      -Continue to trend renal function.   -Continue Ditropan 10mg ER daily for bladder spasms  -Pt to continue on full liquid diet, will confirm with Dr. Cesar regarding advancing his diet.   -Monitor urine output and abdominal drain output.   -Urology appreciates G.I evaluation. "   -Urology will continue to follow the patient during this hospital stay.     -Patient's case was discussed with Dr. Grande and Dr. Cesar who have directed the plan of care for the patient.      Suzie Larsen P.A.-C.   [address] [place] [address]   [phone]

## 2025-06-14 NOTE — CARE PLAN
The patient is Stable - Low risk of patient condition declining or worsening    Shift Goals  Clinical Goals: Patient will remain at a comfortable pain level this shift.  Patient Goals: sleep  Family Goals: HOLLAND    Progress made toward(s) clinical / shift goals:  Patient has remained at a comfortable pain level this shift with medication per MAR. Patient has remained free from falls this shift.    Patient is not progressing towards the following goals:

## 2025-06-14 NOTE — ASSESSMENT & PLAN NOTE
Likely chronic secondary to Parkinson disease.  Exacerbated this admission due to abdominal surgery and little oral intake. S/p NG tube with tube feedings, discontinued due to meeitng >50% oral goal without pain.     Plan:  - Encourage oral intake   - Continue thiamine supplement 100mg daily  - Appreciate ongoing dietary recommendations

## 2025-06-14 NOTE — PROGRESS NOTES
Urology Progress Note    6/14- Pt is lying comfortably in bed. He was seen by speech therapy for a swallow evaluation, unfortunately the patient experience severe mid esophageal pain. He continue to have hicupps.Surgical the patient is doing well, abdominal drain with minimal output, luz draining yellow urine. WBC 4.0, Hgb: 8.7, creatinine 0.80, AST and ALT are improving, 474/97.     6/13-Pt is lying in bed in NAD. Luz catheter draining clear yellow urine. CRIS drain with 45cc of SS of exam. He notes that his hiccups have return, he has not been able to ambulate yet w/PT. He is afebrile, UOP 3000cc/24hrs, creatinine 1. WB: 2.8, Hgb: 9.2 and HCT 28.9. G.I was consulted for elevated liver enzymes.     6/12-Pt is sleeping soundly on exam. Luz catheter in place draining watermelon urine, no clots. UOP 1200cc/24hrs. Abdominal drain 2100cc/24hrs of SS. Abdominal drain had minimal output on exam. Afebrile, WBC: 1.4, Hgb: 9.2, Hct: 29.1, creatinine improved,now 1.16. He is now on a full liquid diet along with ensure. Abdominal incision c/d/I.     6/11-Pt lying comfortably in bed in NAD. RN at bedside. Pt is POD1 s/p cysto, on table cystogram, complex catheterization and laparoscopic washout of hematoma and abdominal cavity on 6/10/25 with Dr. Cesar. He is afebrile, hypertensive at 145/94, tachycardic 100, UOP 700cc/24hrs, WBC: 2.9, Hgb: 11.3, creatinine elevated 1.97. Pt notes his hiccups have resolved and feels that he is due for a BM today.     6/10- Pt is lying comfortably in bed in NAD. He is afebrile. UOP 2100cc/24hrs, drain output 1185cc/24hrs. He remain on Zosyn and is NPO. CT cystogram on 6/10 showed contrast within the peritoneal cavity compatible with an intraperitoneal bladder rupture. WBC: 3.8, Hgb: 10.4, Hct: 31.5, creatinine increased, now 1.63 (1.19). Pt is schedule to go to the OR today with Dr. Cesar for a bladder repair.     6/9-S/p cysto luz placement over a wire on 6/8/25 performed by   "Peyton and IR abdominal drain placement on 6/8. Pt is lying comfortably in bed in NAD. IR APN at bedside assessing CRIS drain. Tmax 99.2, pt is tachycardic at 105. Normotensive at 136/84. Documented abdominal drain output 3100mL/24hrs, drainage is dark/old SS. He remains on Zosyn. Luz catheter is drainage clear yellow urine, good UOP on exam. WBC: 5.3, Hgb: 9.8, No updated creatinine as of yet, however his creatinine from 6/8/25 is 2.48 (4.79). Pt denies f/c/n/v     Overnight Events: None    S:    O:   /74   Pulse 81   Temp 36.2 °C (97.2 °F) (Temporal)   Resp 13   Ht 1.727 m (5' 8\")   Wt 57.8 kg (127 lb 6.8 oz)   SpO2 97%   Recent Labs     06/12/25  0950 06/13/25  0031 06/14/25  0026   SODIUM 143 147* 148*   POTASSIUM 4.2 4.0 3.8   CHLORIDE 113* 117* 117*   CO2 25 23 24   GLUCOSE 107* 98 88   BUN 43* 34* 21   CREATININE 1.16 1.00 0.80   CALCIUM 7.2* 7.4* 7.5*     Recent Labs     06/12/25  0950 06/13/25  0031 06/14/25  0026   WBC 1.4* 2.8* 4.0*   RBC 3.17* 3.12* 2.98*   HEMOGLOBIN 9.2* 9.2* 8.7*   HEMATOCRIT 29.1* 28.9* 27.8*   MCV 91.8 92.6 93.3   MCH 29.0 29.5 29.2   MCHC 31.6* 31.8* 31.3*   RDW 48.1 49.7 50.3*   PLATELETCT 249 230 223   MPV 9.9 10.5 10.6         Intake/Output Summary (Last 24 hours) at 6/9/2025 1008  Last data filed at 6/9/2025 0822  Gross per 24 hour   Intake 840 ml   Output 3100 ml   Net -2260 ml       Exam:    Abdomen soft, benign, non-distended. Abdominal incisions are clean, dry, intact. No evidence of erythema, drainage or discharge. CRIS drain site is c/d/I.  Urine: luz draining yellow urine    A/P:    Active Hospital Problems    Diagnosis     Intractable hiccups [R06.6]     Other neutropenia (HCC) [D70.8]     Elevated liver enzymes [R74.8]     Acute renal failure (ARF) (HCC) [N17.9]     Anemia [D64.9]     Hyponatremia [E87.1]     Acute hypoxic respiratory failure (HCC) [J96.01]     Liver lesion [K76.9]     Abdominal pain [R10.9]     Prostate cancer (HCC) [C61]     History of " tobacco abuse [Z87.891]     Parkinson's disease with dyskinesia and fluctuating manifestations (HCC) [G20.B2]      -Continue resuscitation with 1L bolus, D5 based mIVF.  -Nutrition: pt needs a PICC line and start TPN today has he has not been able to tolerating PO intake due to severe esophageal pain. This will need to be in place for 4-5 days.  -Urology appreciates G.I reconsultation for his esophagus, stomach and small bowel.   -Continue to trend renal function.   -Continue Ditropan 10mg ER daily for bladder spasms  -Monitor urine output and abdominal drain output.   -Urology will continue to follow the patient during this hospital stay.     -Patient's case was discussed with Dr. Cesar who have directed the plan of care for the patient.      Suzie Larsen PMalathiA.-C.   [address] [address]   [phone]

## 2025-06-14 NOTE — PROGRESS NOTES
"Attending:   Edmond Ayala M.d.      Resident:   Siena Oakley D.O.    PATIENT:   Rakan Rader; [id number]; 1965    ID:    Rakan \"Pablo\"Prasanth is a 60 y.o. male with hx Parkinson's Disease with DBS, prostate cancer presented with abdominal pain after underwent RA laparoscopic prostatectomy 5/28; found to have urinary ascites and 7cm R pelvic hematoma s/p diagnostic laparoscopy and hematoma washout 6/10 on hospital day 6    SUBJECTIVE:   No acute events overnight, patient reports feeling abdominal pain with hiccups that have since recurred. He endorses reflux and some pain with swallowing. But he is tolerating sips of water and juice. He describes not wanting to eat.     Discussion of current malnutrition status and he is agreeable to try an NG tube, he expressed concern about being able to talk with it in place and his ability to swallow sips of water for dry mouth but is agreeable to NG tube.         OBJECTIVE:  Vitals:    06/13/25 2020 06/13/25 2026 06/14/25 0526 06/14/25 0727   BP:  137/88 129/80 121/74   Pulse:  77 84 81   Resp: 18 17 17 13   Temp:  36.4 °C (97.6 °F) 36.5 °C (97.7 °F) 36.2 °C (97.2 °F)   TempSrc:  Temporal Temporal Temporal   SpO2:  93% 99% 97%   Weight:       Height:           Intake/Output Summary (Last 24 hours) at 6/14/2025 0636  Last data filed at 6/14/2025 0526  Gross per 24 hour   Intake --   Output 1865 ml   Net -1865 ml       PHYSICAL EXAM:  General: Uncomfortable, afebrile, resting  HEENT: NC/AT. EOMI.   Cardiovascular: RRR without murmurs.   Respiratory: CTAB  Abdomen: soft,  nondistended, no masses, diffuse tenderness to palpation  EXT:  RODRIGUEZ, no edema  Skin: No erythema/lesions   Neuro: Non-focal    LABS:  Recent Labs     06/12/25  0950 06/13/25  0031 06/14/25  0026   WBC 1.4* 2.8* 4.0*   RBC 3.17* 3.12* 2.98*   HEMOGLOBIN 9.2* 9.2* 8.7*   HEMATOCRIT 29.1* 28.9* 27.8*   MCV 91.8 92.6 93.3   MCH 29.0 29.5 29.2   RDW 48.1 49.7 50.3*   PLATELETCT 249 230 223   MPV 9.9 10.5 10.6 "   NEUTSPOLYS 78.60* 88.90* 90.30*   LYMPHOCYTES 11.10* 5.10* 4.40*   MONOCYTES 4.30 0.80 3.50   EOSINOPHILS 0.00 0.00 0.00   BASOPHILS 0.00 0.00 0.90   RBCMORPHOLO Present Present Present     Recent Labs     06/12/25  0950 06/13/25  0031 06/14/25  0026   SODIUM 143 147* 148*   POTASSIUM 4.2 4.0 3.8   CHLORIDE 113* 117* 117*   CO2 25 23 24   BUN 43* 34* 21   CREATININE 1.16 1.00 0.80   CALCIUM 7.2* 7.4* 7.5*   MAGNESIUM  --  2.5 2.1   PHOSPHORUS  --  2.2* 2.0*   ALBUMIN 2.2* 2.2* 2.2*     Estimated GFR/CRCL = Estimated Creatinine Clearance: 82.3 mL/min (by C-G formula based on SCr of 0.8 mg/dL).  Recent Labs     06/12/25  0950 06/13/25  0031 06/14/25  0026   GLUCOSE 107* 98 88     Recent Labs     06/11/25  1540 06/12/25  0052 06/12/25  0052 06/12/25  0950 06/13/25  0031 06/14/25  0026   ASTSGOT  --  1163*   < > 1146* 883* 474*   ALTSGPT  --  127*   < > 102* 97* 84*   TBILIRUBIN  --  0.5   < > 0.4 0.3 0.3   ALKPHOSPHAT  --  166*   < > 157* 148* 150*   GLOBULIN  --  2.6   < > 2.4 2.4 2.4   INR  --  1.06  --   --   --   --    AMMONIA 26  --   --   --   --   --     < > = values in this interval not displayed.     Recent Labs     06/12/25  0522   CPKTOTAL 174*         Recent Labs     06/12/25  0052   INR 1.06         IMAGING:  US-RUQ   Final Result         1.  Hepatomegaly   2.  Gallbladder sludge with gallbladder wall thickening, consider acalculous cholecystitis, could be further evaluated with HIDA scan as clinically appropriate.   3.  Trace abdominal ascites   4.  Small right pleural effusion      [id number] VIEW   Final Result      Digitized intraoperative radiograph is submitted for review. This examination is not for diagnostic purpose but for guidance during a surgical procedure. Please see the patient's chart for full procedural details.         INTERPRETING LOCATION: [address] [place] [address], [place], [id number]      DX-PORTABLE FLUOROSCOPY < 1 HOUR Reason For Exam: Main OR   Final Result      Portable fluoroscopy utilized for  6 seconds.         INTERPRETING LOCATION: [address] [place] [address], [facility], [id number]      CT-Cystogram   Final Result         1.  Diffuse bladder wall thickening, consider changes of cystitis.   2.  Contrast within the peritoneal cavity, compatible with intraperitoneal bladder rupture.   3.  Small foci of intra-abdominal free air, likely related to bladder rupture although radiographic appearance cannot exclude viscus perforation.   4.  Diffuse small bowel wall thickening, appearance suggests changes of enteritis.      These findings were discussed with the patient's clinician, Siena Oakley, on 6/10/2025 6:57 AM.      CT-IMAGE-GUIDED DRAIN PERITONEAL   Final Result      1.  CT GUIDED PLACEMENT OF A PERCUTANEOUS DRAINAGE CATHETER INTO THE PERITONEAL SPACE.   2.  THE CURRENT PLAN IS TO MONITOR DRAINAGE OUTPUT AND OBTAIN A FOLLOWUP CT SCAN IN 5-7 DAYS IF CLINICALLY INDICATED.      CT-ABDOMEN-PELVIS W/O   Final Result         1. 7 cm right pelvic high density mass may be a hematoma. This displaces the bladder to the left.      2. Large free peritoneal fluid in abdomen and pelvis.      3. Bladder empty. No hydronephrosis.      4. Mild ileus-type fluid in stomach and small bowel. Fluid-filled distal esophagus suggests reflux. No colon distention. Normal appendix.      5. Possible sludge in gallbladder. No inflammation or ductal dilatation.      6. Small indeterminate 8 mm liver lesion may be a benign cyst.                  DX-CHEST-PORTABLE (1 VIEW)   Final Result         1. No acute abnormalities evident.      2. Right chest wall battery pack with wire extending to the neck.                         MEDS:  Current Facility-Administered Medications   Medication Last Admin    pantoprazole (Protonix) injection 40 mg 40 mg at 06/14/25 1241    phosphorus (K-Phos-Neutral) per tablet 250 mg 250 mg at 06/14/25 1245    Pharmacy Consult: Enteral tube insertion - review meds/change route/product selection      D5 1/2 NS infusion New Bag at  06/14/25 1240    thiamine (B-1) injection 100 mg      oxyCODONE immediate-release (Roxicodone) tablet 2.5 mg      Or    oxyCODONE immediate-release (Roxicodone) tablet 5 mg 5 mg at 06/13/25 2020    Or    HYDROmorphone (Dilaudid) injection 0.5 mg 0.5 mg at 06/14/25 0429    oxybutynin SR (Ditropan-XL) tablet 10 mg 10 mg at 06/14/25 0548    heparin injection 5,000 Units 5,000 Units at 06/14/25 1504    metoclopramide (Reglan) injection 5 mg      baclofen (Lioresal) tablet 5 mg 5 mg at 06/13/25 0512    normal saline flush 0.9 % SOLN 10 mL 10 mL at 06/13/25 0513    ondansetron (Zofran) syringe/vial injection 4 mg 4 mg at 06/14/25 0429    carbidopa-levodopa (Sinemet)  MG tablet 1 Tablet 1 Tablet at 06/14/25 0913    vitamin D3 (Cholecalciferol) tablet 4,000 Units 4,000 Units at 06/14/25 0548       ASSESSMENT/PLAN:    * Abdominal pain  Assessment & Plan  Acute pain in the setting of robotic prostatectomy 5/28/2025 for prostate cancer.  CT with 7 cm right pelvic hematoma with peritoneal free fluid considered to be urine ascites on admission.   Inpatient cystogram with concern for anastomosis disruption causing leakage     -Pt is post op s/p cysto, on table cystogram, complex catheterization and laparoscopic washout of hematoma and abdominal cavity on 6/10/25 with Dr. Cesar    Plan:  - IVF, wean as needed with   - Pain control with PRN Oxycodone, IV dilaudid   - Antiemetics with Zofran, Reglan, start PPI  - Bowel regimen   - Urology following, appreciate recommendations:  -Ditropan 10mg ER daily for bladder spasms  -Clear liquid diet to 1.5L daily.   -Continue luz catheter   -Antibiotics with Zosyn for at least another 24 hours, then transition to oral for 10 day course   - Plan for discharge home with luz catheter and potentially CRIS drain  -Luz will remain for a minimum of 3 weeks and then a fluoroscopic cystogram will be performed to assess for resolution of leak.    Malnutrition (HCC)  Assessment &  Plan  Significant malnutrition with muscle wasting,  minimal desire to eat, and current difficulty eating secondary to reflux, hiccups causing abdominal pain. Recommendation for TPN by Urology however due to benefits of enteral feeding over TPN, trial of NG tube feeding before consideration of TPN. Patient is agreeable to NG tube. Curbside discussion with GI confirms that esophageal pain is not a contraindication to an NG tube.   Due to malnutrition, he is at high risk of refeeding syndrome.     Plan:   - NG tube feeding with Iris tube per recommendation of Charge RN  - Thiamine supplement per RD, 100mg daily  - Monitor labs: K, PO4, Mg    Elevated liver enzymes  Assessment & Plan  Acute, Resolving. Worsening this hospitalization following ex lap on 6/10 due to urine ascites and anastomotic leak.  , ALT 65,  up to AST 1163, , .    > CPK mildly elevated but not enough to be rhabdo   > Right upper quadrant ultrasound shows hepatomegaly, gallbladder sludge and wall thickening     DDx includes shock liver, intrahepatic/biliary injury from laparoscopy, acalculous cholecystitis.  Less likely MI versus medication induced hepatotoxicity.    GI consulted, agreed with picture of shock liver.  Felt that would downtrend in the coming days and did not recommend any further workup. GI signed off.     Plan:  -Trend CMP    Intractable hiccups  Assessment & Plan  Started prior to admission, causing increased abdominal pain and preventing patient from eating due to significant discomfort and causing difficulty sleeping. Continuously trialing conservative measures with sips of water, breath holding.     Plan:  - Pantoprazole 40mg daily  - Baclofen PRN  - Encourage PO hydration    Other neutropenia (HCC)  Assessment & Plan  Improving. Acute drop today patient with WBC 1.4.  Neutropenia and lymphopenia. May be secondary to developing sepsis, less likely bone marrow suppression given acute drop.      -Trend    Liver lesion  Assessment & Plan  Incidental liver lesion 8 mm on CT this admission  - Recommend outpatient follow-up    Acute hypoxic respiratory failure (HCC)  Assessment & Plan  Acute, Resolving.  Likely atelectasis in the setting of immobilization.    Plan  - Oxygen supplementation to keep oxygen saturation greater than 90%, presently on RA with O2 available  - PT/OT for mobilization  - Incentive spirometry  - Wean oxygen as tolerated, presently on 1.5L     Hyponatremia- (present on admission)  Assessment & Plan  Resolved.  Sodium 130 initially, likely hypertonic hyponatremia especially in the setting of urine ascites. Up to 143.  -Trend    Anemia  Assessment & Plan  Acute blood loss anemia in the setting of robotic prostatectomy 5/28, hemoglobin postoperatively ~7. Now up to 10.  Plan:  -Trend H&H, transfusion threshold less than 7  -Vital signs every 4 hours    Acute renal failure (ARF) (HCC)- (present on admission)  Assessment & Plan  Improving, acute renal failure with creatinine 4.98 in the setting of robotic prostatectomy on 5/28 secondary to prostate cancer.  Today creatinine 1.47.    Plan:  -IV NS, wean with PO intake as diet is advanced   -Appreciate urology involvement  -Trend renal function  -Trend electrolytes  -Continue to monitor     Prostate cancer (HCC)- (present on admission)  Assessment & Plan  S/p robotic prostatectomy 5/28/2025    History of tobacco abuse- (present on admission)  Assessment & Plan  History of tobacco use as a former smoker at least 30 years ago, 5 pack years.  Currently chewing tobacco, last use 1 week ago.  Patient reports minimal cravings.  - Can order nicotine patch if needed for cravings  - Tobacco use cessation counseling    Parkinson's disease with dyskinesia and fluctuating manifestations (HCC)- (present on admission)  Assessment & Plan  Chronic, has deep brain stimulator, battery implanted at chest.   Continue home carbidopa-levodopa          Core  Measures:  Fluids:  D5 1/2 NS  Lines: PIV, CRIS drain  Abx: none  Diet: NG tube  PPX: Heparin  Wound care: drain care    DISPO: inpatient pending tolerance of NG tube      CODE STATUS: Full Code    Siena Oakley D.O.  PGY-1  [facility]

## 2025-06-14 NOTE — THERAPY
Speech Language Therapy Contact Note    Patient Name: Rakan Rader III  Age:  60 y.o., Sex:  male  Medical Record #: [id number]  Today's Date: 6/14/2025 06/14/25 1040   Treatment Variance   Reason For Missed Therapy Non-Medical - Other (Please Comment)   Initial Contact Note    Initial Contact Note  Order Received and Verified, Speech Therapy Evaluation in Progress with Full Report to Follow.   Interdisciplinary Plan of Care Collaboration   IDT Collaboration with  Family / Caregiver   Collaboration Comments Attempted to complete CSE. Patient reported significant pain in mid esophagus area with liquids. Pt reported that he was currently in pain and politely asked SLP to return at later time. SLP to hold and follow as pt is appropriate and schedule permits.

## 2025-06-14 NOTE — PROGRESS NOTES
Report received from dayshift RN and assumed patient care at 1900. Patient is A&Ox 4, on 1.5L NC, and reports a pain level of 7/10, medicated per MAR. Call light within reach and bed in lowest position. Reinforced the need to call for assistance. Plan of care discussed and patient does not have any further needs at this time.

## 2025-06-14 NOTE — NON-PROVIDER
"    FAMILY MEDICINE PROGRESS NOTE  **MEDICAL STUDENT NOTE FOR EDUCATIONAL PURPOSES**          PATIENT ID:  NAME:  Rakan Rader  MRN:               [id number]  YOB: 1965    Date of Admission: 6/8/2025     Attending: Edmond Ayala M.d.     Senior Resident: Dr. Ji    Junaid Resident: Dr. Oakley    Primary Care Physician:  Emeterio Dave M.D.    HPI: Rakan \"Ray\" Prasanth is a 60 y.o. male with a PMHx significant for Parkinson's Disease w/ DBS and prostate cancer who presented with abdominal pain after RA laparoscopic prostatectomy, found to have urinary ascites and 7cm R pelvic hematoma, now s/p dx laparoscopy and hematoma washout June 10th currently on hospital day 6.    OVERNIGHT EVENTS:  No acute events overnight.    SUBJECTIVE:   Patient reports pain is at 6/10 in severity. He reports that he has been having difficulty burping and that his hiccups are \"horrible.\" He is having difficulty tolerating PO intake, which he attributes to burning throat pain, persistent hiccups, and a lack of appetite. He additionally notes cramping epigastric pain and some nausea. Previous watery diarrhea has resolved with last episode yesterday morning. Denies SOB.    OBJECTIVE:  Temp:  [36.4 °C (97.6 °F)-36.6 °C (97.9 °F)] 36.5 °C (97.7 °F)  Pulse:  [77-84] 84  Resp:  [14-18] 17  BP: (112-137)/(74-88) 129/80  SpO2:  [93 %-100 %] 99 %      Intake/Output Summary (Last 24 hours) at 6/14/2025 0723  Last data filed at 6/14/2025 0526  Gross per 24 hour   Intake --   Output 1865 ml   Net -1865 ml       PHYSICAL EXAM:  Gen: Mild distress, sitting up in bed, hiccupping, nasal cannula in place  HEENT: normocephalic, atraumatic, EOMI  Cardio: RRR, no M/R/G  Pulm: clear to auscultation bilaterally, decreased air movement, no respiratory distress/increased work of breathing  Abd: soft, nondistended, diffuse TTP most notable in epigastric region, normoactive bowel sounds in all quadrants, incisions c/d/i, ~15cc serosanguinous fluid in CRIS " drain  Ext: Warm/well-perfused, no edema, 2+ pulses bilaterally  Neuro: Grossly intact, chronic masked facies, no tremor    LABS:  Recent Labs     06/12/25  0950 06/13/25  0031 06/14/25  0026   WBC 1.4* 2.8* 4.0*   RBC 3.17* 3.12* 2.98*   HEMOGLOBIN 9.2* 9.2* 8.7*   HEMATOCRIT 29.1* 28.9* 27.8*   MCV 91.8 92.6 93.3   MCH 29.0 29.5 29.2   RDW 48.1 49.7 50.3*   PLATELETCT 249 230 223   MPV 9.9 10.5 10.6   NEUTSPOLYS 78.60* 88.90* 90.30*   LYMPHOCYTES 11.10* 5.10* 4.40*   MONOCYTES 4.30 0.80 3.50   EOSINOPHILS 0.00 0.00 0.00   BASOPHILS 0.00 0.00 0.90   RBCMORPHOLO Present Present Present     Recent Labs     06/12/25  0950 06/13/25  0031 06/14/25  0026   SODIUM 143 147* 148*   POTASSIUM 4.2 4.0 3.8   CHLORIDE 113* 117* 117*   CO2 25 23 24   BUN 43* 34* 21   CREATININE 1.16 1.00 0.80   CALCIUM 7.2* 7.4* 7.5*   MAGNESIUM  --  2.5 2.1   PHOSPHORUS  --  2.2* 2.0*   ALBUMIN 2.2* 2.2* 2.2*     Estimated GFR/CRCL = Estimated Creatinine Clearance: 82.3 mL/min (by C-G formula based on SCr of 0.8 mg/dL).  Recent Labs     06/12/25  0950 06/13/25  0031 06/14/25  0026   GLUCOSE 107* 98 88     Recent Labs     06/11/25  1540 06/12/25  0052 06/12/25  0950 06/13/25  0031 06/14/25  0026   ASTSGOT  --  1163* 1146* 883* 474*   ALTSGPT  --  127* 102* 97* 84*   TBILIRUBIN  --  0.5 0.4 0.3 0.3   ALKPHOSPHAT  --  166* 157* 148* 150*   GLOBULIN  --  2.6 2.4 2.4 2.4   INR  --  1.06  --   --   --    AMMONIA 26  --   --   --   --      Recent Labs     06/12/25  0522   CPKTOTAL 174*         Recent Labs     06/12/25  0052   INR 1.06       IMAGING:  US-RUQ   Final Result         1.  Hepatomegaly   2.  Gallbladder sludge with gallbladder wall thickening, consider acalculous cholecystitis, could be further evaluated with HIDA scan as clinically appropriate.   3.  Trace abdominal ascites   4.  Small right pleural effusion      [id number] VIEW   Final Result      Digitized intraoperative radiograph is submitted for review. This examination is not for  diagnostic purpose but for guidance during a surgical procedure. Please see the patient's chart for full procedural details.         INTERPRETING LOCATION: [address], [place], [address]      DX-PORTABLE FLUOROSCOPY < 1 HOUR Reason For Exam: Main OR   Final Result      Portable fluoroscopy utilized for 6 seconds.         INTERPRETING LOCATION: [address], [place], [address]      CT-Cystogram   Final Result         1.  Diffuse bladder wall thickening, consider changes of cystitis.   2.  Contrast within the peritoneal cavity, compatible with intraperitoneal bladder rupture.   3.  Small foci of intra-abdominal free air, likely related to bladder rupture although radiographic appearance cannot exclude viscus perforation.   4.  Diffuse small bowel wall thickening, appearance suggests changes of enteritis.      These findings were discussed with the patient's clinician, Siena Oakley, on 6/10/2025 6:57 AM.      CT-IMAGE-GUIDED DRAIN PERITONEAL   Final Result      1.  CT GUIDED PLACEMENT OF A PERCUTANEOUS DRAINAGE CATHETER INTO THE PERITONEAL SPACE.   2.  THE CURRENT PLAN IS TO MONITOR DRAINAGE OUTPUT AND OBTAIN A FOLLOWUP CT SCAN IN 5-7 DAYS IF CLINICALLY INDICATED.      CT-ABDOMEN-PELVIS W/O   Final Result         1. 7 cm right pelvic high density mass may be a hematoma. This displaces the bladder to the left.      2. Large free peritoneal fluid in abdomen and pelvis.      3. Bladder empty. No hydronephrosis.      4. Mild ileus-type fluid in stomach and small bowel. Fluid-filled distal esophagus suggests reflux. No colon distention. Normal appendix.      5. Possible sludge in gallbladder. No inflammation or ductal dilatation.      6. Small indeterminate 8 mm liver lesion may be a benign cyst.                  DX-CHEST-PORTABLE (1 VIEW)   Final Result         1. No acute abnormalities evident.      2. Right chest wall battery pack with wire extending to the neck.                     IR-PICC LINE PLACEMENT W/ GUIDANCE > AGE 5     (Results Pending)       CULTURES:   Results       Procedure Component Value Units Date/Time    Blood Culture - Draw one from central line and one from peripheral site [[id number]] Collected: 06/08/25 0547    Order Status: Completed Specimen: Blood from Peripheral Updated: 06/13/25 0700     Significant Indicator NEG     Source BLD     Site PERIPHERAL     Culture Result No growth after 5 days of incubation.    Blood Culture - Draw one from central line and one from peripheral site [[id number]] Collected: 06/08/25 0540    Order Status: Completed Specimen: Blood from Line Updated: 06/13/25 0700     Significant Indicator NEG     Source BLD     Site Peripheral     Culture Result No growth after 5 days of incubation.    Cdiff By PCR Rflx Toxin [[id number]]     Order Status: Canceled Specimen: Stool     FLUID CULTURE W/GRAM STAIN [[id number]] Collected: 06/08/25 1415    Order Status: Completed Specimen: Body Fluid Updated: 06/11/25 0757     Significant Indicator NEG     Source BF     Site Peritoneal  drain aspiration     Culture Result No growth at 72 hours.     Gram Stain Result Moderate WBCs.  No organisms seen.      Urine Culture (New) [[id number]] Collected: 06/08/25 1255    Order Status: Completed Specimen: Urine, Clean Catch Updated: 06/10/25 1208     Significant Indicator NEG     Source UR     Site URINE, CLEAN CATCH     Culture Result No growth at 48 hours.    GRAM STAIN [[id number]] Collected: 06/08/25 1415    Order Status: Completed Specimen: Body Fluid Updated: 06/08/25 2050     Significant Indicator .     Source BF     Site Peritoneal  drain aspiration     Gram Stain Result Moderate WBCs.  No organisms seen.      URINALYSIS [[id number]]  (Abnormal) Collected: 06/08/25 1255    Order Status: Completed Specimen: Urine Updated: 06/08/25 1332     Color Orange     Character Turbid     Specific Gravity 1.020     Ph 5.0     Glucose Negative mg/dL      Ketones Negative mg/dL      Protein 100 mg/dL      Bilirubin Negative      Urobilinogen, Urine 0.2 EU/dL      Nitrite Negative     Leukocyte Esterase Small     Occult Blood Large     Micro Urine Req Microscopic    FLUID CULTURE W/GRAM STAIN [[id number]]     Order Status: No result Specimen: Other Body Fluid     Urinalysis [[id number]]     Order Status: Canceled Specimen: Urine, Clean Catch             MEDS:  Current Facility-Administered Medications   Medication Last Admin    oxyCODONE immediate-release (Roxicodone) tablet 2.5 mg      Or    oxyCODONE immediate-release (Roxicodone) tablet 5 mg 5 mg at 06/13/25 2020    Or    HYDROmorphone (Dilaudid) injection 0.5 mg 0.5 mg at 06/14/25 0429    lactated ringers infusion New Bag at 06/13/25 2213    MD Alert...TPN per Pharmacy      oxybutynin SR (Ditropan-XL) tablet 10 mg 10 mg at 06/14/25 0548    heparin injection 5,000 Units 5,000 Units at 06/13/25 2155    metoclopramide (Reglan) injection 5 mg      baclofen (Lioresal) tablet 5 mg 5 mg at 06/13/25 0512    normal saline flush 0.9 % SOLN 10 mL 10 mL at 06/13/25 0513    ondansetron (Zofran) syringe/vial injection 4 mg 4 mg at 06/14/25 0429    carbidopa-levodopa (Sinemet)  MG tablet 1 Tablet 1 Tablet at 06/13/25 2019    vitamin D3 (Cholecalciferol) tablet 4,000 Units 4,000 Units at 06/14/25 0548       ASSESSMENT/PLAN:  60 y.o. male with a PMHx significant for Parkinson's Disease w/ DBS and prostate cancer who presented with abdominal pain after RA laparoscopic prostatectomy, found to have urinary ascites and 7cm R pelvic hematoma, now s/p dx laparoscopy and hematoma washout June 10th currently on hospital day 6.    * Abdominal pain  Prostate cancer   Acute pain in the setting of robotic prostatectomy 5/28/2025 for prostate cancer.  CT with 7 cm right pelvic hematoma with peritoneal free fluid considered to be urine ascites on admission.   Inpatient cystogram with concern for anastomosis disruption causing leakage   > Pt is post op s/p cysto, on table cystogram, complex catheterization and  laparoscopic washout of hematoma and abdominal cavity on 6/10/25 with Dr. Cesar  > Pt symptoms on June 14th concerning for GERD  -IVF, wean with increasing PO intake   -Pain control prn (Roxicodone or Dilaudid)  -Antiemetics prn (Zofran, Reglan)  -Start Protonix 40mg qDaily  -Bowel regimen   -Urology following, appreciate recommendations:  -Ditropan 10mg ER daily for bladder spasms  -Clear liquid diet to 1.5L daily.   -Continue luz catheter   -Antibiotics with Zosyn for at least another 24 hours, then transition to oral for 10 day course   -Plan for discharge home with luz catheter and potentially CRIS drain  -Luz will remain for a minimum of 3 weeks and then a fluoroscopic cystogram will be performed to assess for resolution of leak    Malnutrition  Intractable hiccups  Hiccups started prior to admission, causing increased abdominal pain and preventing patient from eating due to significant discomfort. Additionally causing difficulty sleeping. Continuously trialing conservative measures with sips of water, breath holding. Concern for dehydration and malnourishment.  - Pantoprazole 40mg daily  - Baclofen prn  - Encourage PO hydration  - Consider NG tube pending discussion with GI; if pt cannot tolerate NG tube, consider TPN    Elevated liver enzymes - resolving  Acute, resolving. Worsening this hospitalization following surgery on 6/10 due to urine ascites and anastomotic leak. , ALT 65,  up to AST 1163, , ; now downtrending.  > CPK mildly elevated but not enough to be rhabdomyolysis  > RUQ ultrasound shows hepatomegaly, gallbladder sludge and wall thickening   > DDx includes shock liver, intrahepatic/biliary injury from laparoscopy, acalculous cholecystitis. Less likely MI vs medication induced hepatotoxicity.  > GI consulted, agreed with picture of shock liver.  Felt that labs would downtrend in the coming days and did not recommend any further workup.  -Trend CMP    Acute renal  failure (ARF) - resolved  Resolved, acute renal failure with creatinine 4.98 in the setting of robotic prostatectomy on 5/28 secondary to prostate cancer.  Today creatinine 0.8.  -IVF, wean with PO intake as diet is advanced   -Appreciate urology involvement  -Trend renal function  -Trend electrolytes  -CtM    Liver lesion  Incidental liver lesion 8 mm on CT this admission.  - Recommend outpatient follow-up    Acute hypoxic respiratory failure  Acute, resolving. Likely atelectasis in the setting of immobilization. Neuromuscular disorders, infectious etiology, PE unlikely due to patient's clinical picture. VS wnl and PE without concerning findings.  - Oxygen supplementation to keep O2 > 90%  - Wean oxygen as tolerated, presently on 1.5L   - PT/OT for mobilization  - Incentive spirometry    Hyponatremia - resolved  Hypernatremia  Sodium 130 initially, likely hypertonic hyponatremia especially in the setting of urine ascites. Up to 143.  -Trend    Hypophosphatemia  Phosphorus 2.2 > 2 today.  - Repleted with K Phos Neutral  - Trend and replete as needed    Anemia  Acute blood loss anemia in the setting of robotic prostatectomy 5/28, hemoglobin postoperatively ~7. Previously up to 10. Today 8.7.  -Trend H&H, transfuse if < 7 or < 8 w/ symptoms  -Vital signs q4h    Other neutropenia  Improving. Acute drop WBC initially to 1.4.  Neutropenia and lymphopenia. Previously thought to be secondary to developing sepsis, less likely bone marrow suppression given acute drop. Trending up.  -Continue to trend    History of tobacco abuse- (present on admission)  History of tobacco use as a former smoker 30+ years ago, 5 pack years.  Currently chewing tobacco, last use 1 week ago.  Patient reports minimal cravings.  - Can order nicotine patch if needed for cravings  - Tobacco use cessation counseling    Parkinson's disease with dyskinesia and fluctuating manifestations (HCC)- (present on admission)  Chronic, has deep brain stimulator,  battery implanted at chest.   - Continue home carbidopa-levodopa       Core Measures:  Fluids: D5 1/2 NS at 83 mL/h  Lines: pIV, Juarez, CRIS drain  Abx: IV Zosyn  Diet: Full liquid  DVT ppx: SCDs/TEDs, heparin  Bowel regimen in place    CODE Status: Full Code      Disposition  Inpatient      Elsy Hernandez, MS3  [facility]

## 2025-06-14 NOTE — PROGRESS NOTES
Patient refused Qshift 2RN skin check, educated on his high risk for skin breakdown, still refused. He said he would do it during day shift.

## 2025-06-14 NOTE — PROGRESS NOTES
Received bedside report and assumed care of patient at change of shift. Patient is alert and oriented × 4, currently on 1.5 via NC. Patient sleep calm at this time. Initial assessment completed; Bed is in the lowest and locked position, call light and personal belongings placed within reach, and environment is safe and free of hazards. Fall risk precautions in place as appropriate. Patient verbalized understanding of plan of care and expressed no additional needs or concerns at this time. Will continue to monitor and reassess as needed.

## 2025-06-14 NOTE — PROGRESS NOTES
Iris Placement    Tube Team verified patient name and medical record number prior to tube placement.  Cortrak tube (55 inches, 10 Luxembourgish) placed at 82 cm in left nare.  Per Cortrak picture, tube appears to be in the stomach.  Nursing Instructions: Awaiting KUB to confirm placement before use for medications or feeding. Once placement confirmed, flush tube with 30 ml of water, and then remove and save stylet, in patient medication drawer.

## 2025-06-14 NOTE — DIETARY
"Nutrition Services: Initial Assessment     Day 6 of admit. Rakan Raedr III is a 60 y.o. male with admitting DX of Acute renal failure (ARF)    Consult Received for: Enteral Nutrition  Previous consult entered for TPN. Consult clarified with MD. Pt to proceed with enteral nutrition support at this time.     Current Hospital Problems List:    Abdominal pain   Parkinson's disease with dyskinesia and fluctuating manifestations   History of tobacco abuse (POA: Yes)  Prostate cancer (HCC) (POA: Yes)  Acute renal failure (ARF) (HCC) (POA: Yes)  Anemia (POA: Unknown)  Hyponatremia (POA: Yes)  Acute hypoxic respiratory failure   Liver lesion   Elevated liver enzymes   Other neutropenia   Intractable hiccups     Nutrition Assessment:      Height: 172.7 cm (5' 8\")  Weight: 57.8 kg (127 lb 6.8 oz)  Weight taken via bed scale  Body mass index is 19.37 kg/m².  BMI Classification: WNL   Ideal Body Weight: 69.9 kg (154 lb)  Percent Ideal Body Weight: 83    Wt Readings from Last 6 Encounters:   25 57.8 kg (127 lb 6.8 oz)   25 57.1 kg (125 lb 14.1 oz)   25 58.8 kg (129 lb 10.1 oz)   25 59 kg (130 lb)   02/10/25 59 kg (130 lb)   25 58.6 kg (129 lb 3 oz)     Calculation/Equation: MSJ x 1.2 = 1651 kcals/day  Total Calories / day: 1651 - 1792  (Calories / k - 31)  Total Grams Protein / day: 70 - 81 (Grams Protein / k.2 - 1.4)    Objective:   Admitted with post op complications (abdominal pain, nausea, vomiting)  Pertinent Medical Hx: Anesthesia, Anxiety, Bronchitis, Parkinson's disease, PONV (postoperative nausea and vomiting), Status post deep brain stimulator placement, and Urinary bladder disorder.   S/p robotic lap prostatectomy with lysis of adhesions 25.  Bedside cystoscopy with placement of luz cath over wire .  SLP attempted swallow evaluation today, however pt declined d/t significant pain.   Indication for Nutrition Support: Unable to meet nutrition needs orally  Enteral " Access:  pending  Pertinent Labs: Sodium 148, , ALT 84, Alk phos 150, Phos 2.0  Pertinent Meds: Reglan, Protonix, KPhos, Vitamin D  Skin/Wounds:  no pressure injuries  Food Allergies: None known  Last BM: 06/13/25 per flowsheets  Formula based on RD Eval: Jevity 1.2 Jn  Pt may benefit from 100 mg of Thiamine daily given poor nutritional status. Voalte message sent to MD to request.    Current Diet Order/Intake:   Full Liquids    Subjective:   Patient reported UBW: 130 lbs  Dietary Recall/Energy Intake: <50% This indicates insufficient energy intake.   I met with pt at bedside today. He was sitting up in bed, he appeared thin with evidence of severe fat loss and severe muscle wasting.  Pt reports a very poor appetite related to pain. Full liquid lunch tray at bedside had not been eaten. Discussed PO intake with pt who stated even full liquids are very painful to swallow. Pt shared that enteral feeds were discussed with him and he is aware of enteral tube placement process. Tube feeding to meet 100% of estimated nutrition needs at this time d/t very poor PO intake and poor nutritional status.     Nutrition Focused Physical Exam (NFPE)  Weight Loss: no recent wt loss  Muscle Mass: Severe Wasting at temples, clavicles, shoulders, square patella  Subcutaneous Fat: Severe Loss at buccals, orbitals, zygomatic arches  Fluid Accumulation: 1+ penile edema  Reduced  Strength: N/A in acute care setting.    Nutrition Diagnosis:      Inadequate Oral Intake related to pain with swallowing as evidenced by <50% of caloric intake.      Severe Malnutrition in context of Acute Illness or Injury related to prostate cancer, with post op complications as evidenced by severe fat loss and severe muscle wasting.      RD notified provider via flowsheet.    Nutrition Interventions:      Once enteral tube is placed and verified, start Jevity 1.2 at 10 ml/hr continuous and advance slowly by 10 mL, q 8 hours to final goal rate of 60  ml/hr.  Goal tube feed volume provides 1728 kcals, 80 g protein, and 1162 ml free water daily.   Diet per MD/SLP.  Patient aware of active plan of care as appropriate.     Nutrition Monitoring and Evaluation:      Monitor nutrition POC.  Additional fluids per MD/DO  Monitor vital signs pertinent to nutrition.  Monitor for refeeding: Order BMP w/ Mg and Phos x 7 days. Replete K, Phos and Mg prn. Supplement 100 mg Thiamine x 7 days to reduce risk of refeeding    RD following and will provide updated recommendations as indicated.    Liv Martins R.D.                                       [facility]/AND CRITERIA FOR MALNUTRITION

## 2025-06-14 NOTE — ASSESSMENT & PLAN NOTE
Started prior to admission, causing increased abdominal pain and preventing patient from eating due to significant discomfort and causing difficulty sleeping. Continuously trialing conservative measures with sips of water, breath holding.     Plan:  - Pantoprazole 40mg daily  - Baclofen PRN  - Encourage PO hydration

## 2025-06-15 ENCOUNTER — ANESTHESIA EVENT (OUTPATIENT)
Dept: SURGERY | Facility: MEDICAL CENTER | Age: 60
DRG: 698 | End: 2025-06-15
Payer: MEDICARE

## 2025-06-15 LAB
ALBUMIN SERPL BCP-MCNC: 2.1 G/DL (ref 3.2–4.9)
ALBUMIN/GLOB SERPL: 0.8 G/DL
ALP SERPL-CCNC: 158 U/L (ref 30–99)
ALT SERPL-CCNC: 236 U/L (ref 2–50)
ANION GAP SERPL CALC-SCNC: 6 MMOL/L (ref 7–16)
AST SERPL-CCNC: 251 U/L (ref 12–45)
BILIRUB SERPL-MCNC: 0.3 MG/DL (ref 0.1–1.5)
BUN SERPL-MCNC: 14 MG/DL (ref 8–22)
CALCIUM ALBUM COR SERPL-MCNC: 8.8 MG/DL (ref 8.5–10.5)
CALCIUM SERPL-MCNC: 7.3 MG/DL (ref 8.5–10.5)
CHLORIDE SERPL-SCNC: 113 MMOL/L (ref 96–112)
CO2 SERPL-SCNC: 24 MMOL/L (ref 20–33)
CREAT SERPL-MCNC: 0.65 MG/DL (ref 0.5–1.4)
CRP SERPL HS-MCNC: 2.2 MG/DL (ref 0–0.75)
ERYTHROCYTE [DISTWIDTH] IN BLOOD BY AUTOMATED COUNT: 50.4 FL (ref 35.9–50)
GFR SERPLBLD CREATININE-BSD FMLA CKD-EPI: 109 ML/MIN/1.73 M 2
GLOBULIN SER CALC-MCNC: 2.6 G/DL (ref 1.9–3.5)
GLUCOSE SERPL-MCNC: 123 MG/DL (ref 65–99)
HCT VFR BLD AUTO: 27.6 % (ref 42–52)
HGB BLD-MCNC: 8.4 G/DL (ref 14–18)
MAGNESIUM SERPL-MCNC: 2.2 MG/DL (ref 1.5–2.5)
MCH RBC QN AUTO: 28.6 PG (ref 27–33)
MCHC RBC AUTO-ENTMCNC: 30.4 G/DL (ref 32.3–36.5)
MCV RBC AUTO: 93.9 FL (ref 81.4–97.8)
PHOSPHATE SERPL-MCNC: 1.7 MG/DL (ref 2.5–4.5)
PLATELET # BLD AUTO: 311 K/UL (ref 164–446)
PMV BLD AUTO: 10.4 FL (ref 9–12.9)
POTASSIUM SERPL-SCNC: 3.6 MMOL/L (ref 3.6–5.5)
PREALB SERPL-MCNC: 8.9 MG/DL (ref 18–38)
PROT SERPL-MCNC: 4.7 G/DL (ref 6–8.2)
RBC # BLD AUTO: 2.94 M/UL (ref 4.7–6.1)
SODIUM SERPL-SCNC: 143 MMOL/L (ref 135–145)
WBC # BLD AUTO: 6 K/UL (ref 4.8–10.8)

## 2025-06-15 PROCEDURE — 700102 HCHG RX REV CODE 250 W/ 637 OVERRIDE(OP)

## 2025-06-15 PROCEDURE — 99232 SBSQ HOSP IP/OBS MODERATE 35: CPT | Performed by: SPECIALIST

## 2025-06-15 PROCEDURE — 85027 COMPLETE CBC AUTOMATED: CPT

## 2025-06-15 PROCEDURE — 86140 C-REACTIVE PROTEIN: CPT

## 2025-06-15 PROCEDURE — 84134 ASSAY OF PREALBUMIN: CPT

## 2025-06-15 PROCEDURE — 36415 COLL VENOUS BLD VENIPUNCTURE: CPT

## 2025-06-15 PROCEDURE — 83735 ASSAY OF MAGNESIUM: CPT

## 2025-06-15 PROCEDURE — 80053 COMPREHEN METABOLIC PANEL: CPT

## 2025-06-15 PROCEDURE — 84100 ASSAY OF PHOSPHORUS: CPT

## 2025-06-15 PROCEDURE — 700111 HCHG RX REV CODE 636 W/ 250 OVERRIDE (IP)

## 2025-06-15 PROCEDURE — A9270 NON-COVERED ITEM OR SERVICE: HCPCS

## 2025-06-15 PROCEDURE — 770006 HCHG ROOM/CARE - MED/SURG/GYN SEMI*

## 2025-06-15 PROCEDURE — 99232 SBSQ HOSP IP/OBS MODERATE 35: CPT | Mod: GC | Performed by: FAMILY MEDICINE

## 2025-06-15 PROCEDURE — 700105 HCHG RX REV CODE 258

## 2025-06-15 RX ADMIN — HEPARIN SODIUM 5000 UNITS: 5000 INJECTION, SOLUTION INTRAVENOUS; SUBCUTANEOUS at 05:51

## 2025-06-15 RX ADMIN — PANTOPRAZOLE SODIUM 40 MG: 40 INJECTION, POWDER, FOR SOLUTION INTRAVENOUS at 05:44

## 2025-06-15 RX ADMIN — Medication 4000 UNITS: at 05:47

## 2025-06-15 RX ADMIN — AMOXICILLIN AND CLAVULANATE POTASSIUM 1 TABLET: 875; 125 TABLET, FILM COATED ORAL at 05:51

## 2025-06-15 RX ADMIN — AMOXICILLIN AND CLAVULANATE POTASSIUM 1 TABLET: 875; 125 TABLET, FILM COATED ORAL at 17:29

## 2025-06-15 RX ADMIN — OXYBUTYNIN CHLORIDE 10 MG: 10 TABLET, EXTENDED RELEASE ORAL at 05:47

## 2025-06-15 RX ADMIN — DEXTROSE AND SODIUM CHLORIDE: 5; 450 INJECTION, SOLUTION INTRAVENOUS at 02:36

## 2025-06-15 ASSESSMENT — PAIN DESCRIPTION - PAIN TYPE
TYPE: ACUTE PAIN

## 2025-06-15 NOTE — CARE PLAN
The patient is Stable - Low risk of patient condition declining or worsening    Shift Goals  Clinical Goals: Pt pain will be 6/10 or less after prn pain medication. Juarez management.  Patient Goals: eat more and get up more  Family Goals: pain control    Progress made toward(s) clinical / shift goals:  No complaints of pain this shift. Juarez output monitored and recorded.    Problem: Pain - Standard  Goal: Alleviation of pain or a reduction in pain to the patient’s comfort goal  Outcome: Progressing     Problem: Knowledge Deficit - Standard  Goal: Patient and family/care givers will demonstrate understanding of plan of care, disease process/condition, diagnostic tests and medications  Outcome: Progressing     Problem: Fall Risk  Goal: Patient will remain free from falls  Outcome: Progressing       Patient is not progressing towards the following goals:

## 2025-06-15 NOTE — PROGRESS NOTES
Urology Progress Note    6/15-Pt is sleeping on rounds. He has an NG tube in place. He is afebrile, luz is draining yellow urine, CRIS drain with 23cc/24 hrs. WBC improving now 6.0, Hgb: 8.4, creatinine 0.65. Pt declined examination for his abdomen. AST improved now 251 (474), ALT has increased 236 (84). Alk phos: 158.    6/14- Pt is lying comfortably in bed. He was seen by speech therapy for a swallow evaluation, unfortunately the patient experience severe mid esophageal pain. He continue to have hicupps.Surgical the patient is doing well, abdominal drain with minimal output, luz draining yellow urine. WBC 4.0, Hgb: 8.7, creatinine 0.80, AST and ALT are improving, 474/97.     6/13-Pt is lying in bed in NAD. Luz catheter draining clear yellow urine. CRIS drain with 45cc of SS of exam. He notes that his hiccups have return, he has not been able to ambulate yet w/PT. He is afebrile, UOP 3000cc/24hrs, creatinine 1. WB: 2.8, Hgb: 9.2 and HCT 28.9. G.I was consulted for elevated liver enzymes.     6/12-Pt is sleeping soundly on exam. Luz catheter in place draining watermelon urine, no clots. UOP 1200cc/24hrs. Abdominal drain 2100cc/24hrs of SS. Abdominal drain had minimal output on exam. Afebrile, WBC: 1.4, Hgb: 9.2, Hct: 29.1, creatinine improved,now 1.16. He is now on a full liquid diet along with ensure. Abdominal incision c/d/I.     6/11-Pt lying comfortably in bed in NAD. RN at bedside. Pt is POD1 s/p cysto, on table cystogram, complex catheterization and laparoscopic washout of hematoma and abdominal cavity on 6/10/25 with Dr. Cesar. He is afebrile, hypertensive at 145/94, tachycardic 100, UOP 700cc/24hrs, WBC: 2.9, Hgb: 11.3, creatinine elevated 1.97. Pt notes his hiccups have resolved and feels that he is due for a BM today.     6/10- Pt is lying comfortably in bed in NAD. He is afebrile. UOP 2100cc/24hrs, drain output 1185cc/24hrs. He remain on Zosyn and is NPO. CT cystogram on 6/10 showed contrast  "within the peritoneal cavity compatible with an intraperitoneal bladder rupture. WBC: 3.8, Hgb: 10.4, Hct: 31.5, creatinine increased, now 1.63 (1.19). Pt is schedule to go to the OR today with Dr. Cesar for a bladder repair.     6/9-S/p cysto luz placement over a wire on 6/8/25 performed by Dr. Todd and IR abdominal drain placement on 6/8. Pt is lying comfortably in bed in NAD. IR APN at bedside assessing CRIS drain. Tmax 99.2, pt is tachycardic at 105. Normotensive at 136/84. Documented abdominal drain output 3100mL/24hrs, drainage is dark/old SS. He remains on Zosyn. Luz catheter is drainage clear yellow urine, good UOP on exam. WBC: 5.3, Hgb: 9.8, No updated creatinine as of yet, however his creatinine from 6/8/25 is 2.48 (4.79). Pt denies f/c/n/v     Overnight Events: None    S:    O:   /78   Pulse 80   Temp 36.7 °C (98 °F) (Temporal)   Resp 15   Ht 1.727 m (5' 8\")   Wt 57.8 kg (127 lb 6.8 oz)   SpO2 95%   Recent Labs     06/13/25  0031 06/14/25  0026 06/15/25  0552   SODIUM 147* 148* 143   POTASSIUM 4.0 3.8 3.6   CHLORIDE 117* 117* 113*   CO2 23 24 24   GLUCOSE 98 88 123*   BUN 34* 21 14   CREATININE 1.00 0.80 0.65   CALCIUM 7.4* 7.5* 7.3*     Recent Labs     06/13/25  0031 06/14/25  0026 06/15/25  0552   WBC 2.8* 4.0* 6.0   RBC 3.12* 2.98* 2.94*   HEMOGLOBIN 9.2* 8.7* 8.4*   HEMATOCRIT 28.9* 27.8* 27.6*   MCV 92.6 93.3 93.9   MCH 29.5 29.2 28.6   MCHC 31.8* 31.3* 30.4*   RDW 49.7 50.3* 50.4*   PLATELETCT 230 223 311   MPV 10.5 10.6 10.4         Intake/Output Summary (Last 24 hours) at 6/9/2025 1008  Last data filed at 6/9/2025 0822  Gross per 24 hour   Intake 840 ml   Output 3100 ml   Net -2260 ml       Exam:    NG tube is present  Abdominal: pt declined abdominal examination today. CRIS with minimal output.   Urine: luz draining yellow urine    A/P:    Active Hospital Problems    Diagnosis     Intractable hiccups [R06.6]     Malnutrition (HCC) [E46]     Other neutropenia (HCC) [D70.8]     " Elevated liver enzymes [R74.8]     Acute renal failure (ARF) (HCC) [N17.9]     Anemia [D64.9]     Hyponatremia [E87.1]     Acute hypoxic respiratory failure (HCC) [J96.01]     Liver lesion [K76.9]     Abdominal pain [R10.9]     Prostate cancer (HCC) [C61]     History of tobacco abuse [Z87.891]     Parkinson's disease with dyskinesia and fluctuating manifestations (HCC) [G20.B2]      -Nutrition: Continue NG feeds has he has not been able to tolerat PO intake due to severe esophageal pain. This will need to be in place for 4-5 days from 6/14/25.  -G.I reconsulted for his esophagus, stomach and small bowel. We appreciate their input.   -Continue to trend renal function.   -Continue Ditropan 10mg ER daily for bladder spasms  -Monitor urine output and abdominal drain output.   -Urology will continue to follow the patient during this hospital stay.     -Patient's case was discussed with Dr. Cesar who have directed the plan of care for the patient.      Suzie Larsen PKATJA.-C.   [address] [place] [address]   [phone]

## 2025-06-15 NOTE — CARE PLAN
The patient is Stable - Low risk of patient condition declining or worsening    Shift Goals  Clinical Goals: Pt will report a pain level less than 7/10 after pain med intervention  Patient Goals: pain control  Family Goals: Updates    Progress made toward(s) clinical / shift goals:  Pt reported a pain level of 5/10 after pain med intervention.      Patient is not progressing towards the following goals:

## 2025-06-15 NOTE — PROGRESS NOTES
KUB results    IMPRESSION:     Feeding tube tip projects within the duodenum.        Exam Ended: 06/14/25 17:03   Iris is ok for meds and feed.

## 2025-06-15 NOTE — PROGRESS NOTES
Rec'd report from day shift RN. Assumed pt care. Assessment completed. AA&OX4. Denies pain at this time. Per report pt ambulates with 2 assist and use of FWW. Started TF through confirmed iris placement. Pt has generalized bruising to left leg/hip/side, lower back area. Juarez draining to gravity. Bed in lowest position, bed locked, bed alarm on for safety, treaded socks in place, RN and CNA numbers provided, call light within reach.

## 2025-06-15 NOTE — PROGRESS NOTES
"Attending:   Edmond Ayala M.d.      Resident:   Siena Oakley D.O.    PATIENT:   Rakan Rader; [id number]; 1965    ID:   Rakan \"Pablo\"Prasanth is a 60 y.o. male with hx Parkinson's Disease with DBS, prostate cancer presented with abdominal pain after underwent RA laparoscopic prostatectomy 5/28; found to have urinary ascites and 7cm R pelvic hematoma s/p diagnostic laparoscopy and hematoma washout 6/10 on hospital day 7    SUBJECTIVE:   No acute events overnight, NG tube was placed yesterday, he is tolerating tube feeds with no nausea, vomiting, or abdominal pain. He denies esophageal pain.   He endorses that he is passing gas, he had a BM yesterday described as loose.   He reports that overall his abdominal pain is slightly better.     Slurpee on cup at bedside, red dye seen on tape from tube, suspected to be cherry.     OBJECTIVE:  Vitals:    06/14/25 2216 06/14/25 2327 06/15/25 0358 06/15/25 0735   BP:   111/69 123/78   Pulse:   78 80   Resp: 18 18 18 15   Temp:   36.8 °C (98.2 °F) 36.7 °C (98 °F)   TempSrc:   Temporal Temporal   SpO2:   96% 95%   Weight:       Height:           Intake/Output Summary (Last 24 hours) at 6/15/2025 1030  Last data filed at 6/15/2025 0600  Gross per 24 hour   Intake 1112 ml   Output 2923 ml   Net -1811 ml       PHYSICAL EXAM:  General: No acute distress, afebrile, resting   HEENT: NC/AT. EOMI. NG tube in place  Cardiovascular: RRR without murmurs. Normal capillary refill   Respiratory: CTAB  Abdomen: soft, minimal tenderness to palpation, nondistended, no masses, incisions clean dry intact, CRIS drain with minimal output.  EXT:  RODRIGUEZ, no edema  Skin: No erythema/lesions   Neuro: at baseline    LABS:  Recent Labs     06/13/25  0031 06/14/25  0026 06/15/25  0552   WBC 2.8* 4.0* 6.0   RBC 3.12* 2.98* 2.94*   HEMOGLOBIN 9.2* 8.7* 8.4*   HEMATOCRIT 28.9* 27.8* 27.6*   MCV 92.6 93.3 93.9   MCH 29.5 29.2 28.6   RDW 49.7 50.3* 50.4*   PLATELETCT 230 223 311   MPV 10.5 10.6 10.4   NEUTSPOLYS " "88.90* 90.30*  --    LYMPHOCYTES 5.10* 4.40*  --    MONOCYTES 0.80 3.50  --    EOSINOPHILS 0.00 0.00  --    BASOPHILS 0.00 0.90  --    RBCMORPHOLO Present Present  --      Recent Labs     06/13/25  0031 06/14/25  0026 06/15/25  0552   SODIUM 147* 148* 143   POTASSIUM 4.0 3.8 3.6   CHLORIDE 117* 117* 113*   CO2 23 24 24   BUN 34* 21 14   CREATININE 1.00 0.80 0.65   CALCIUM 7.4* 7.5* 7.3*   MAGNESIUM 2.5 2.1 2.2   PHOSPHORUS 2.2* 2.0* 1.7*   ALBUMIN 2.2* 2.2* 2.1*     Estimated GFR/CRCL = Estimated Creatinine Clearance: 101.3 mL/min (by C-G formula based on SCr of 0.65 mg/dL).  Recent Labs     06/13/25  0031 06/14/25  0026 06/15/25  0552   GLUCOSE 98 88 123*     Recent Labs     06/13/25  0031 06/14/25  0026 06/15/25  0552   ASTSGOT 883* 474* 251*   ALTSGPT 97* 84* 236*   TBILIRUBIN 0.3 0.3 0.3   ALKPHOSPHAT 148* 150* 158*   GLOBULIN 2.4 2.4 2.6             No results for input(s): \"INR\", \"APTT\", \"FIBRINOGEN\" in the last 72 hours.    Invalid input(s): \"DIMER\"      IMAGING:  DX-ABDOMEN FOR TUBE PLACEMENT   Final Result      Feeding tube tip projects within the duodenum.      US-RUQ   Final Result         1.  Hepatomegaly   2.  Gallbladder sludge with gallbladder wall thickening, consider acalculous cholecystitis, could be further evaluated with HIDA scan as clinically appropriate.   3.  Trace abdominal ascites   4.  Small right pleural effusion      [id number] VIEW   Final Result      Digitized intraoperative radiograph is submitted for review. This examination is not for diagnostic purpose but for guidance during a surgical procedure. Please see the patient's chart for full procedural details.         INTERPRETING LOCATION: [address] [place] [address], [id number]      DX-PORTABLE FLUOROSCOPY < 1 HOUR Reason For Exam: Main OR   Final Result      Portable fluoroscopy utilized for 6 seconds.         INTERPRETING LOCATION: [address] [place] [address], [id number]      CT-Cystogram   Final Result         1.  Diffuse bladder wall thickening, " consider changes of cystitis.   2.  Contrast within the peritoneal cavity, compatible with intraperitoneal bladder rupture.   3.  Small foci of intra-abdominal free air, likely related to bladder rupture although radiographic appearance cannot exclude viscus perforation.   4.  Diffuse small bowel wall thickening, appearance suggests changes of enteritis.      These findings were discussed with the patient's clinician, Siena Oakley, on 6/10/2025 6:57 AM.      CT-IMAGE-GUIDED DRAIN PERITONEAL   Final Result      1.  CT GUIDED PLACEMENT OF A PERCUTANEOUS DRAINAGE CATHETER INTO THE PERITONEAL SPACE.   2.  THE CURRENT PLAN IS TO MONITOR DRAINAGE OUTPUT AND OBTAIN A FOLLOWUP CT SCAN IN 5-7 DAYS IF CLINICALLY INDICATED.      CT-ABDOMEN-PELVIS W/O   Final Result         1. 7 cm right pelvic high density mass may be a hematoma. This displaces the bladder to the left.      2. Large free peritoneal fluid in abdomen and pelvis.      3. Bladder empty. No hydronephrosis.      4. Mild ileus-type fluid in stomach and small bowel. Fluid-filled distal esophagus suggests reflux. No colon distention. Normal appendix.      5. Possible sludge in gallbladder. No inflammation or ductal dilatation.      6. Small indeterminate 8 mm liver lesion may be a benign cyst.                  DX-CHEST-PORTABLE (1 VIEW)   Final Result         1. No acute abnormalities evident.      2. Right chest wall battery pack with wire extending to the neck.                         MEDS:  Current Facility-Administered Medications   Medication Last Admin    phosphorus (K-Phos-Neutral) per tablet 500 mg      pantoprazole (Protonix) injection 40 mg 40 mg at 06/15/25 0573    Pharmacy Consult: Enteral tube insertion - review meds/change route/product selection      [Held by provider] D5 1/2 NS infusion Stopped at 06/15/25 1029    thiamine (B-1) injection 100 mg 100 mg at 06/14/25 2010    amoxicillin-clavulanate (Augmentin) 875-125 MG per tablet 1 Tablet 1 Tablet at  06/15/25 0551    oxyCODONE immediate-release (Roxicodone) tablet 2.5 mg      Or    oxyCODONE immediate-release (Roxicodone) tablet 5 mg 5 mg at 06/14/25 2216    Or    HYDROmorphone (Dilaudid) injection 0.5 mg 0.5 mg at 06/14/25 2327    oxybutynin SR (Ditropan-XL) tablet 10 mg 10 mg at 06/15/25 0547    heparin injection 5,000 Units 5,000 Units at 06/15/25 0551    metoclopramide (Reglan) injection 5 mg      baclofen (Lioresal) tablet 5 mg 5 mg at 06/13/25 0512    normal saline flush 0.9 % SOLN 10 mL 10 mL at 06/13/25 0513    ondansetron (Zofran) syringe/vial injection 4 mg 4 mg at 06/14/25 0429    carbidopa-levodopa (Sinemet)  MG tablet 1 Tablet 1 Tablet at 06/14/25 0913    vitamin D3 (Cholecalciferol) tablet 4,000 Units 4,000 Units at 06/15/25 0547       ASSESSMENT/PLAN:    * Abdominal pain  Assessment & Plan  Acute, Stable. Acute pain in the setting of robotic prostatectomy 5/28/2025 for prostate cancer.  CT with 7 cm right pelvic hematoma with peritoneal free fluid considered to be urine ascites on admission.   Inpatient cystogram with concern for anastomosis disruption causing leakage     -Pt is post op s/p cysto, on table cystogram, complex catheterization and laparoscopic washout of hematoma and abdominal cavity on 6/10/25 with Dr. Cesar    Plan:  - Hold IVF as receiving NG feeding  - Pain control with PRN Oxycodone, IV dilaudid   - Antiemetics with Zofran, Reglan, start PPI  - Bowel regimen   - Urology following, appreciate recommendations:  -Ditropan 10mg ER daily for bladder spasms  -Full liquid diet as tolerated   -Continue luz catheter   -Antibiotics with Zosyn for at least another 24 hours, then transition to oral for 10 day course   - Plan for discharge home with luz catheter and potentially CRIS drain  -Luz will remain for a minimum of 3 weeks and then a fluoroscopic cystogram will be performed to assess for resolution of leak.    Malnutrition (HCC)  Assessment & Plan  Significant malnutrition  with muscle wasting,  minimal desire to eat, and current difficulty eating secondary to reflux, hiccups causing abdominal pain. Recommendation for TPN by Urology however due to benefits of enteral feeding over TPN, trial of NG tube feeding before consideration of TPN. Patient is agreeable to NG tube. Curbside discussion with GI confirms that esophageal pain is not a contraindication to an NG tube.   Due to malnutrition, he is at high risk of refeeding syndrome.   Patient is tolerating NG feeding    Plan:   - NG tube feeding with Iris tube  - Thiamine supplement per RD, 100mg daily  - Monitor labs: K, PO4, Mg   - Hypophsphatemia 1.7, replete and continue to monitor    Elevated liver enzymes  Assessment & Plan  Acute, Resolving. Worsening this hospitalization following ex lap on 6/10 due to urine ascites and anastomotic leak.  , ALT 65,  up to AST 1163, , .    > CPK mildly elevated but not enough to be rhabdo   > Right upper quadrant ultrasound shows hepatomegaly, gallbladder sludge and wall thickening     DDx includes shock liver, intrahepatic/biliary injury from laparoscopy, acalculous cholecystitis.  Less likely MI versus medication induced hepatotoxicity.    GI consulted, agreed with picture of shock liver.  Felt that would downtrend in the coming days and did not recommend any further workup. GI signed off.     Noted to have downtrending AST, ALP but increase in ALT. Likely secondary to initiation of tube feeding.     Plan:  -Trend CMP    Intractable hiccups  Assessment & Plan  Started prior to admission, causing increased abdominal pain and preventing patient from eating due to significant discomfort and causing difficulty sleeping. Continuously trialing conservative measures with sips of water, breath holding.     Plan:  - Pantoprazole 40mg daily  - Baclofen PRN  - Encourage PO hydration    Other neutropenia (HCC)  Assessment & Plan  Resolved. Acute drop with WBC 1.4, steadily  improving, WBC 6.0.  Neutropenia and lymphopenia. May be secondary to developing sepsis, less likely bone marrow suppression given acute drop. Sepsis less likely in the setting of normal vitals, afebrile, normalizing labs.    -Trend    Liver lesion  Assessment & Plan  Incidental liver lesion 8 mm on CT this admission  - Recommend outpatient follow-up    Acute hypoxic respiratory failure (HCC)  Assessment & Plan  Acute, Resolving.  Likely atelectasis in the setting of immobilization.    Plan  - Oxygen supplementation to keep oxygen saturation greater than 90%, presently on RA with O2 available  - PT/OT for mobilization  - Incentive spirometry  - Wean oxygen as tolerated, presently on 1.5L     Hyponatremia- (present on admission)  Assessment & Plan  Resolved.  Sodium 130 initially, likely hypertonic hyponatremia especially in the setting of urine ascites. Up to 143.  -Trend    Anemia  Assessment & Plan  Acute blood loss anemia in the setting of robotic prostatectomy 5/28, hemoglobin postoperatively ~7. Now up to 10, downtrended throughout hospitalization slowly. Extensive flank and LE bruising. Abdominal drain has minimal output  Currently on heparin    Plan:  -Trend H&H, transfusion threshold less than 7  -Vital signs every 4 hours  - consider repeat CT scan in the setting of extensive bruising, concern for possible retroperitoneal hematoma  - obtain coags    Acute renal failure (ARF) (HCC)- (present on admission)  Assessment & Plan  Resolved, acute renal failure with creatinine 4.98 in the setting of robotic prostatectomy on 5/28 secondary to prostate cancer.  Cr normalizedc    Plan:  - Hold IVF  - Appreciate urology involvement  -Trend renal function  -Trend electrolytes  -Continue to monitor     Prostate cancer (HCC)- (present on admission)  Assessment & Plan  S/p robotic prostatectomy 5/28/2025, Brice score 7 (4+3) per pathology report    History of tobacco abuse- (present on admission)  Assessment &  Plan  History of tobacco use as a former smoker at least 30 years ago, 5 pack years.  Currently chewing tobacco, last use 1 week ago.  Patient reports minimal cravings.  - Can order nicotine patch if needed for cravings  - Tobacco use cessation counseling    Parkinson's disease with dyskinesia and fluctuating manifestations (HCC)- (present on admission)  Assessment & Plan  Chronic, has deep brain stimulator, battery implanted at chest.   Continue home carbidopa-levodopa          Core Measures:  Fluids: PO  Lines: NG, PIV, CRIS drain  Abx: Augmentin  Diet: NG tube, clear liquids  PPX: Heparin  Wound care: drain site    DISPO: inpatient due to continued tube feeding, not stable for dishcarge      CODE STATUS: Full Code    Siena Oakley D.O.  PGY-1  [facility]

## 2025-06-15 NOTE — NON-PROVIDER
"    FAMILY MEDICINE PROGRESS NOTE  **MEDICAL STUDENT NOTE FOR EDUCATIONAL PURPOSES**          PATIENT ID:  NAME:  Rakan Rader  MRN:               [id number]  YOB: 1965    Date of Admission: 6/8/2025     Attending: Edmond Ayala M.d.     Senior Resident: Dr. Dye    Junaid Resident: Dr. Oakley    Primary Care Physician:  Emeterio Dave M.D.    HPI: Rakan \"Ray\" Prasanth is a 60 y.o. male with a PMHx significant for Parkinson's Disease w/ DBS and prostate cancer who presented with abdominal pain after RA laparoscopic prostatectomy, found to have urinary ascites and 7cm R pelvic hematoma, now s/p dx laparoscopy and hematoma washout June 10th currently on hospital day 7.    OVERNIGHT EVENTS:  No acute events overnight.  -NG tube placed yesterday    SUBJECTIVE:   Patient reports pain has overall improved but still reaches up to 5-6/10 in severity. Hiccups have markedly improved with only minor, intermittent episodes. Reports burning/cramping esophageal/epigastric pain has resolved. Tolerating NG feeds. Denies N/V/D. Passing gas, reports he feels he will have a BM today. Denies fever/chills/malaise/SOB.    OBJECTIVE:  Temp:  [36.4 °C (97.5 °F)-36.8 °C (98.2 °F)] 36.7 °C (98 °F)  Pulse:  [78-82] 80  Resp:  [15-18] 15  BP: (111-123)/(69-78) 123/78  SpO2:  [95 %-97 %] 95 %      Intake/Output Summary (Last 24 hours) at 6/15/2025 0753  Last data filed at 6/15/2025 0600  Gross per 24 hour   Intake 1112 ml   Output 2923 ml   Net -1811 ml       PHYSICAL EXAM:  Gen: Laying comfortably in bed, no acute distress, non-toxic appearing  HEENT: normocephalic, atraumatic, EOMI, NG tube in place  Cardio: RRR, no M/R/G  Pulm: clear to auscultation bilaterally, no respiratory distress/increased work of breathing   Abd: soft, nondistended, mild diffuse TTP, normoactive bowel sounds in all quadrants, incisions c/d/i, minimal serosanguinous output in CRIS drain  Ext: No edema, 2+ pulses bilaterally  Neuro: At baseline, no " "tremor    LABS:  Recent Labs     06/12/25  0950 06/13/25  0031 06/14/25  0026 06/15/25  0552   WBC 1.4* 2.8* 4.0* 6.0   RBC 3.17* 3.12* 2.98* 2.94*   HEMOGLOBIN 9.2* 9.2* 8.7* 8.4*   HEMATOCRIT 29.1* 28.9* 27.8* 27.6*   MCV 91.8 92.6 93.3 93.9   MCH 29.0 29.5 29.2 28.6   RDW 48.1 49.7 50.3* 50.4*   PLATELETCT 249 230 223 311   MPV 9.9 10.5 10.6 10.4   NEUTSPOLYS 78.60* 88.90* 90.30*  --    LYMPHOCYTES 11.10* 5.10* 4.40*  --    MONOCYTES 4.30 0.80 3.50  --    EOSINOPHILS 0.00 0.00 0.00  --    BASOPHILS 0.00 0.00 0.90  --    RBCMORPHOLO Present Present Present  --      Recent Labs     06/13/25  0031 06/14/25  0026 06/15/25  0552   SODIUM 147* 148* 143   POTASSIUM 4.0 3.8 3.6   CHLORIDE 117* 117* 113*   CO2 23 24 24   BUN 34* 21 14   CREATININE 1.00 0.80 0.65   CALCIUM 7.4* 7.5* 7.3*   MAGNESIUM 2.5 2.1 2.2   PHOSPHORUS 2.2* 2.0* 1.7*   ALBUMIN 2.2* 2.2* 2.1*     Estimated GFR/CRCL = Estimated Creatinine Clearance: 101.3 mL/min (by C-G formula based on SCr of 0.65 mg/dL).  Recent Labs     06/13/25  0031 06/14/25  0026 06/15/25  0552   GLUCOSE 98 88 123*     Recent Labs     06/13/25  0031 06/14/25  0026 06/15/25  0552   ASTSGOT 883* 474* 251*   ALTSGPT 97* 84* 236*   TBILIRUBIN 0.3 0.3 0.3   ALKPHOSPHAT 148* 150* 158*   GLOBULIN 2.4 2.4 2.6             No results for input(s): \"INR\", \"APTT\", \"DDIMER\", \"FIBRINOGEN\" in the last 72 hours.      IMAGING:  DX-ABDOMEN FOR TUBE PLACEMENT   Final Result      Feeding tube tip projects within the duodenum.      US-RUQ   Final Result         1.  Hepatomegaly   2.  Gallbladder sludge with gallbladder wall thickening, consider acalculous cholecystitis, could be further evaluated with HIDA scan as clinically appropriate.   3.  Trace abdominal ascites   4.  Small right pleural effusion      [id number] VIEW   Final Result      Digitized intraoperative radiograph is submitted for review. This examination is not for diagnostic purpose but for guidance during a surgical procedure. Please " see the patient's chart for full procedural details.         INTERPRETING LOCATION: [address], [place], [address]      DX-PORTABLE FLUOROSCOPY < 1 HOUR Reason For Exam: Main OR   Final Result      Portable fluoroscopy utilized for 6 seconds.         INTERPRETING LOCATION: [address], [place], [address]      CT-Cystogram   Final Result         1.  Diffuse bladder wall thickening, consider changes of cystitis.   2.  Contrast within the peritoneal cavity, compatible with intraperitoneal bladder rupture.   3.  Small foci of intra-abdominal free air, likely related to bladder rupture although radiographic appearance cannot exclude viscus perforation.   4.  Diffuse small bowel wall thickening, appearance suggests changes of enteritis.      These findings were discussed with the patient's clinician, Siena Oakley, on 6/10/2025 6:57 AM.      CT-IMAGE-GUIDED DRAIN PERITONEAL   Final Result      1.  CT GUIDED PLACEMENT OF A PERCUTANEOUS DRAINAGE CATHETER INTO THE PERITONEAL SPACE.   2.  THE CURRENT PLAN IS TO MONITOR DRAINAGE OUTPUT AND OBTAIN A FOLLOWUP CT SCAN IN 5-7 DAYS IF CLINICALLY INDICATED.      CT-ABDOMEN-PELVIS W/O   Final Result         1. 7 cm right pelvic high density mass may be a hematoma. This displaces the bladder to the left.      2. Large free peritoneal fluid in abdomen and pelvis.      3. Bladder empty. No hydronephrosis.      4. Mild ileus-type fluid in stomach and small bowel. Fluid-filled distal esophagus suggests reflux. No colon distention. Normal appendix.      5. Possible sludge in gallbladder. No inflammation or ductal dilatation.      6. Small indeterminate 8 mm liver lesion may be a benign cyst.                  DX-CHEST-PORTABLE (1 VIEW)   Final Result         1. No acute abnormalities evident.      2. Right chest wall battery pack with wire extending to the neck.                         CULTURES:   Results       Procedure Component Value Units Date/Time    Blood Culture - Draw one from central  line and one from peripheral site [[id number]] Collected: 06/08/25 0547    Order Status: Completed Specimen: Blood from Peripheral Updated: 06/13/25 0700     Significant Indicator NEG     Source BLD     Site PERIPHERAL     Culture Result No growth after 5 days of incubation.    Blood Culture - Draw one from central line and one from peripheral site [[id number]] Collected: 06/08/25 0540    Order Status: Completed Specimen: Blood from Line Updated: 06/13/25 0700     Significant Indicator NEG     Source BLD     Site Peripheral     Culture Result No growth after 5 days of incubation.    Cdiff By PCR Rflx Toxin [[id number]]     Order Status: Canceled Specimen: Stool     FLUID CULTURE W/GRAM STAIN [[id number]] Collected: 06/08/25 1415    Order Status: Completed Specimen: Body Fluid Updated: 06/11/25 0757     Significant Indicator NEG     Source BF     Site Peritoneal  drain aspiration     Culture Result No growth at 72 hours.     Gram Stain Result Moderate WBCs.  No organisms seen.      Urine Culture (New) [[id number]] Collected: 06/08/25 1255    Order Status: Completed Specimen: Urine, Clean Catch Updated: 06/10/25 1208     Significant Indicator NEG     Source UR     Site URINE, CLEAN CATCH     Culture Result No growth at 48 hours.    GRAM STAIN [[id number]] Collected: 06/08/25 1415    Order Status: Completed Specimen: Body Fluid Updated: 06/08/25 2050     Significant Indicator .     Source BF     Site Peritoneal  drain aspiration     Gram Stain Result Moderate WBCs.  No organisms seen.      URINALYSIS [[id number]]  (Abnormal) Collected: 06/08/25 1255    Order Status: Completed Specimen: Urine Updated: 06/08/25 1332     Color Orange     Character Turbid     Specific Gravity 1.020     Ph 5.0     Glucose Negative mg/dL      Ketones Negative mg/dL      Protein 100 mg/dL      Bilirubin Negative     Urobilinogen, Urine 0.2 EU/dL      Nitrite Negative     Leukocyte Esterase Small     Occult Blood Large     Micro Urine Req Microscopic     FLUID CULTURE W/GRAM STAIN [[id number]]     Order Status: No result Specimen: Other Body Fluid             MEDS:  Current Facility-Administered Medications   Medication Last Admin    phosphorus (K-Phos-Neutral) per tablet 500 mg      pantoprazole (Protonix) injection 40 mg 40 mg at 06/15/25 0544    Pharmacy Consult: Enteral tube insertion - review meds/change route/product selection      D5 1/2 NS infusion New Bag at 06/15/25 0236    thiamine (B-1) injection 100 mg 100 mg at 06/14/25 2010    amoxicillin-clavulanate (Augmentin) 875-125 MG per tablet 1 Tablet 1 Tablet at 06/15/25 0551    oxyCODONE immediate-release (Roxicodone) tablet 2.5 mg      Or    oxyCODONE immediate-release (Roxicodone) tablet 5 mg 5 mg at 06/14/25 2216    Or    HYDROmorphone (Dilaudid) injection 0.5 mg 0.5 mg at 06/14/25 2327    oxybutynin SR (Ditropan-XL) tablet 10 mg 10 mg at 06/15/25 0547    heparin injection 5,000 Units 5,000 Units at 06/15/25 0551    metoclopramide (Reglan) injection 5 mg      baclofen (Lioresal) tablet 5 mg 5 mg at 06/13/25 0512    normal saline flush 0.9 % SOLN 10 mL 10 mL at 06/13/25 0513    ondansetron (Zofran) syringe/vial injection 4 mg 4 mg at 06/14/25 0429    carbidopa-levodopa (Sinemet)  MG tablet 1 Tablet 1 Tablet at 06/14/25 0913    vitamin D3 (Cholecalciferol) tablet 4,000 Units 4,000 Units at 06/15/25 0547       ASSESSMENT/PLAN:  60 y.o. male admitted for:    # Abdominal pain  # Prostate cancer   Acute pain in the setting of robotic prostatectomy 5/28/2025 for prostate cancer.  CT with 7 cm right pelvic hematoma with peritoneal free fluid considered to be urine ascites on admission.   Inpatient cystogram with concern for anastomosis disruption causing leakage   > Pt is post op s/p cysto, on table cystogram, complex catheterization and laparoscopic washout of hematoma and abdominal cavity on 6/10/25 with Dr. Cesar  > Pt symptoms on 6/14 concerning for GERD  -Started NG feeds; hold IVF  -Pain control  prn (Roxicodone or Dilaudid)  -Antiemetics prn (Zofran, Reglan)  -Continue Protonix 40mg qDaily  -Bowel regimen   -Urology following, appreciate recommendations:  -Ditropan 10mg ER daily for bladder spasms  -Continue luz catheter   -Antibiotics with Zosyn for at least another 24 hours, then transition to oral for 10 day course (transitioned to Augmentin starting 6/14)   -Plan for discharge home with luz catheter and potentially CRIS drain  -Luz will remain for a minimum of 3 weeks and then a fluoroscopic cystogram will be performed to assess for resolution of leak     # Malnutrition  # Hypophosphatemia  Malnutrition with muscle wasting, minimal desire to eat d/t previous pain/hiccups/lack of appetite. Urology recommended TPN however, due to the benefits of enteral feeding over TPN, NG tube feeding will be trialed first. Pt was agreeable to placement of NG tube. Due to malnutrition, pt needs to be monitored for refeeding syndrome.   - NG tube feeding with Iris tube   - 100mg Thiamine supplementation per RD   - Monitor labs: K, PO4, Mg   - Hypophosphatemia of 1.7 today; increased repletion of K Phos Neut    # Elevated liver enzymes - resolving  Acute, resolving. Worsening this hospitalization following surgery on 6/10 due to urine ascites and anastomotic leak. , ALT 65,  up to AST 1163, , ; now downtrending with the exception of increase in ALT on 6/15 likely 2/2 initiation of NG tube feeds.  > CPK mildly elevated but not enough to be rhabdomyolysis  > RUQ ultrasound shows hepatomegaly, gallbladder sludge and wall thickening   > DDx includes shock liver, intrahepatic/biliary injury from laparoscopy, acalculous cholecystitis. Less likely MI vs medication induced hepatotoxicity.  > GI consulted, agreed with picture of shock liver.  Felt that labs would downtrend in the coming days and did not recommend any further workup.  -Trend CMP    # Intractable hiccups - resolving  Hiccups started  prior to admission, causing increased abdominal pain and preventing patient from eating due to significant discomfort. Additionally causing difficulty sleeping. Continuously trialing conservative measures with sips of water, breath holding. Pt reported marked improvement after initiation of pantoprazole.  - Pantoprazole 40mg daily  - Baclofen prn  - Encourage PO hydration    # Anemia  Acute blood loss anemia in the setting of robotic prostatectomy 5/28, hemoglobin postoperatively ~7. Previously up to 10, then has been slowly downtrending. Extensive flank and LE bruising noted with minimal abominal drain output.  -Trend H&H, transfuse if < 7 or < 8 w/ symptoms  -Vital signs q4h  - Consider repeat CT scan in the setting of extensive bruising if suspicion for possible retroperitoneal hematoma  - Ordered coags     # Acute renal failure (ARF) - resolved  Resolved, acute renal failure with creatinine 4.98 in the setting of robotic prostatectomy on 5/28 secondary to prostate cancer. Cr normalized.  -Hold IVF as on tube feeds  -Appreciate urology involvement  -Trend renal function  -Trend electrolytes  -CtM     # Acute hypoxic respiratory failure - resolving  Acute, resolving. Likely atelectasis in the setting of immobilization. Neuromuscular disorders, infectious etiology, PE unlikely due to patient's clinical picture. VS wnl and PE without concerning findings.  - Oxygen supplementation to keep O2 > 90%  - Wean oxygen as tolerated, was on 1.5L overnight, RA during day so far  - PT/OT for mobilization  - Incentive spirometry     # Hyponatremia - resolved  # Hypernatremia - resolved  Sodium 130 initially, likely hypertonic hyponatremia especially in the setting of urine ascites. Up to 143. Normalized as of 6/15.  -Trend     # Neutropenia - resolved  Improving. Acute drop WBC initially to 1.4.  Neutropenia and lymphopenia. Previously thought to be secondary to developing sepsis, less likely bone marrow suppression given acute  drop. Sepsis not likely due to VS wnl and normalizing WBC.  -Continue to trend     # Liver lesion  Incidental liver lesion 8 mm on CT this admission.  - Recommend outpatient follow-up    # History of tobacco abuse  History of tobacco use as a former smoker 30+ years ago, 5 pack years.  Currently chewing tobacco, last use 1 week ago.  Patient reports minimal cravings.  - Can order nicotine patch if needed for cravings  - Tobacco use cessation counseling     # Parkinson's disease with dyskinesia and fluctuating manifestations  Chronic, has deep brain stimulator, battery implanted at chest.   - Continue home carbidopa-levodopa        Core Measures:  Fluids: NG/PO  Lines: NG, pIV, Juarez catheter, CRIS drain  Abx: Augmentin  Diet: NG tube feeds  DVT ppx: SCDs/TEDs, heparin  Drain care  Bowel regimen in place    CODE Status: Full Code      Disposition  Inpatient for continued tube feeding, monitoring for refeeding syndrome, and further work-up of anemia      Elsy Hernandez, MS3  [facility]

## 2025-06-15 NOTE — PROGRESS NOTES
4 Eyes Skin Assessment Completed by YOAN Joshi and YOAN Baca.    Skin assessment is primarily focused on high risk bony prominences. Pay special attention to skin beneath and around medical devices, high risk bony prominences, skin to skin areas and areas where the patient lacks sensation to feel pain and areas where the patient previously had breakdown.     Head (Occipital):  WDL   Ears (Under Medical Devices): WDL   Nose (Under Medical Devices): WDL   Mouth:  WDL   Neck: WDL   Breast/Chest:  WDL   Shoulder Blades:  WDL   Spine:   Purple/maroon and Bruising (lower back)   (R) Arm/Elbow/Hand: WDL   (L) Arm/Elbow/Hand: WDL   Abdomen: WDL   Pannus/Groin:  Bruising (penis and left groin area)   Sacrum/Coccyx:   WDL   (R) Ischial Tuberosity (Sit Bones):  WDL   (L) Ischial Tuberosity (Sit Bones):  WDL   (R) Leg:  WDL   (L) Leg:  Bruising (outer thigh area)   (R) Heel:  WDL   (R) Foot/Toe: WDL   (L) Heel: WDL   (L) Foot/Toe:  WDL       DEVICES IN USE:   Respiratory Devices:  N/A  Feeding Devices:  Iris feeding tube   Lines & BP Monitoring Devices:  Peripheral IV    Orthopedic Devices:  N/A  Miscellaneous Devices:  Drains and Juarez    PROTOCOL INTERVENTIONS:   Low Airloss Bed:  Already in place  Q2 Turns with Pillows:  Already in place  Glide Sheet:  Already in place  Dri-Daquan/Micro Climate Pads:  Already in place  Barrier Paste:  Already in place  Juarez:  Already in place    WOUND PHOTOS:   N/A no wounds identified    WOUND CONSULT:   N/A, no advanced wound care needs identified

## 2025-06-15 NOTE — CARE PLAN
The patient is Stable - Low risk of patient condition declining or worsening    Shift Goals  Clinical Goals: fall prevention, promote mobility and independance, support nutritional and hydration needs.  Patient Goals: rst and recovery  Family Goals: Updates    Progress made toward(s) clinical / shift goals:    Problem: Pain - Standard  Goal: Alleviation of pain or a reduction in pain to the patient’s comfort goal  Outcome: Progressing  Flowsheets  Taken 6/14/2025 0459 by Eli Sharma R.N.  Pain Rating Scale (NPRS): 4  Taken 6/12/2025 0522 by Ayesha Zapata R.N.  Non Verbal Scale:   Sleeping   Unlabored Breathing  Note: Assessed pain levels regularly, administered prescribed analgesics, and provided non pharmacological interventions for relief.      Problem: Knowledge Deficit - Standard  Goal: Patient and family/care givers will demonstrate understanding of plan of care, disease process/condition, diagnostic tests and medications  Outcome: Progressing  Note: Assessed patient's understanding, provided clear education, and reinforced key information, to promote comprehension and adherence.       Patient is not progressing towards the following goals:

## 2025-06-15 NOTE — PROGRESS NOTES
4 Eyes Skin Assessment Completed by YONA Degroot and YOAN Juan.    Skin assessment is primarily focused on high risk bony prominences. Pay special attention to skin beneath and around medical devices, high risk bony prominences, skin to skin areas and areas where the patient lacks sensation to feel pain and areas where the patient previously had breakdown.     Head (Occipital):  WDL   Ears (Under Medical Devices): Red and Blanching Pt reminded to remove glasses when sleeping   Nose (Under Medical Devices): Red and Blanching bridge of nose under glasses, Pt reminded to remove glasses when sleeping   Mouth:  WDL   Neck: WDL   Breast/Chest:  WDL   Shoulder Blades:  WDL   Spine:   WDL, Red, and Blanching   (R) Arm/Elbow/Hand: Red and Blanching   (L) Arm/Elbow/Hand: Red and Blanching   Abdomen: Incision lap sites open to air with dermabond   Pannus/Groin:  Bruising   Sacrum/Coccyx:   Red and Blanching   (R) Ischial Tuberosity (Sit Bones):  WDL   (L) Ischial Tuberosity (Sit Bones):  WDL   (R) Leg:  WDL   (L) Leg:  Bruising   (R) Heel:  WDL   (R) Foot/Toe: WDL   (L) Heel: WDL   (L) Foot/Toe:  WDL       DEVICES IN USE:   Respiratory Devices:  NA, patient on room air  Feeding Devices:  Iris feeding tube   Lines & BP Monitoring Devices:  Peripheral IV    Orthopedic Devices:  N/A  Miscellaneous Devices:  Juarez    PROTOCOL INTERVENTIONS:   Low Airloss Bed:  Already in place  Q2 Turns with Pillows:  Already in place  Glide Sheet:  Already in place  Barrier Paste:  Already in place    WOUND PHOTOS:   Completed and in EPIC     WOUND CONSULT:   N/A, no advanced wound care needs identified

## 2025-06-15 NOTE — PROGRESS NOTES
"Report received from NOC RN and assumed patient care at 0700. Patient is A&Ox 4, on RA, and bed frame alarm in in place with PÉREZ mattress . Patient reporting a pain level of 0/10. Juarez catheter in place draining red blood urine, ET tube in place running Jevity 1.2 @ 20ml/hr, will advance by 10ml/hr at 1200, D5 1/2 NS  running at 83 ml/hr, CRIS drain RLQ draining serosanguinous fluid. Call light within reach and bed in lowest position. Reinforced the need to call for assistance. Plan of care discussed and patient does not have any further needs at this time.     /78   Pulse 80   Temp 36.7 °C (98 °F) (Temporal)   Resp 15   Ht 1.727 m (5' 8\")   Wt 57.8 kg (127 lb 6.8 oz)   SpO2 95%   BMI 19.37 kg/m²         "

## 2025-06-16 ENCOUNTER — ANESTHESIA (OUTPATIENT)
Dept: SURGERY | Facility: MEDICAL CENTER | Age: 60
DRG: 698 | End: 2025-06-16
Payer: MEDICARE

## 2025-06-16 PROBLEM — R06.6 INTRACTABLE HICCUPS: Status: RESOLVED | Noted: 2025-06-14 | Resolved: 2025-06-16

## 2025-06-16 PROBLEM — K20.90 ESOPHAGITIS DETERMINED BY ENDOSCOPY: Status: ACTIVE | Noted: 2025-06-16

## 2025-06-16 PROBLEM — J96.01 ACUTE HYPOXIC RESPIRATORY FAILURE (HCC): Status: RESOLVED | Noted: 2025-06-08 | Resolved: 2025-06-16

## 2025-06-16 PROBLEM — E87.1 HYPONATREMIA: Status: RESOLVED | Noted: 2025-06-08 | Resolved: 2025-06-16

## 2025-06-16 PROBLEM — D70.8 OTHER NEUTROPENIA (HCC): Status: RESOLVED | Noted: 2025-06-12 | Resolved: 2025-06-16

## 2025-06-16 PROBLEM — N17.9 ACUTE RENAL FAILURE (ARF) (HCC): Status: RESOLVED | Noted: 2025-06-08 | Resolved: 2025-06-16

## 2025-06-16 LAB
ALBUMIN SERPL BCP-MCNC: 2.4 G/DL (ref 3.2–4.9)
ALBUMIN/GLOB SERPL: 1 G/DL
ALP SERPL-CCNC: 169 U/L (ref 30–99)
ALT SERPL-CCNC: 319 U/L (ref 2–50)
ANION GAP SERPL CALC-SCNC: 7 MMOL/L (ref 7–16)
APTT PPP: 28.5 SEC (ref 24.7–36)
AST SERPL-CCNC: 201 U/L (ref 12–45)
BILIRUB SERPL-MCNC: 0.4 MG/DL (ref 0.1–1.5)
BUN SERPL-MCNC: 12 MG/DL (ref 8–22)
CALCIUM ALBUM COR SERPL-MCNC: 8.8 MG/DL (ref 8.5–10.5)
CALCIUM SERPL-MCNC: 7.5 MG/DL (ref 8.5–10.5)
CHLORIDE SERPL-SCNC: 115 MMOL/L (ref 96–112)
CO2 SERPL-SCNC: 24 MMOL/L (ref 20–33)
CREAT SERPL-MCNC: 0.63 MG/DL (ref 0.5–1.4)
CRP SERPL HS-MCNC: 1.72 MG/DL (ref 0–0.75)
ERYTHROCYTE [DISTWIDTH] IN BLOOD BY AUTOMATED COUNT: 49.3 FL (ref 35.9–50)
GFR SERPLBLD CREATININE-BSD FMLA CKD-EPI: 110 ML/MIN/1.73 M 2
GLOBULIN SER CALC-MCNC: 2.4 G/DL (ref 1.9–3.5)
GLUCOSE SERPL-MCNC: 94 MG/DL (ref 65–99)
HCT VFR BLD AUTO: 26.4 % (ref 42–52)
HGB BLD-MCNC: 8.2 G/DL (ref 14–18)
HGB RETIC QN AUTO: 28.4 PG/CELL (ref 29–35)
IMM RETICS NFR: 25.4 % (ref 2.6–16.1)
INR PPP: 1.02 (ref 0.87–1.13)
MAGNESIUM SERPL-MCNC: 2.1 MG/DL (ref 1.5–2.5)
MCH RBC QN AUTO: 28.9 PG (ref 27–33)
MCHC RBC AUTO-ENTMCNC: 31.1 G/DL (ref 32.3–36.5)
MCV RBC AUTO: 93 FL (ref 81.4–97.8)
PATHOLOGY CONSULT NOTE: NORMAL
PHOSPHATE SERPL-MCNC: 1.5 MG/DL (ref 2.5–4.5)
PLATELET # BLD AUTO: 423 K/UL (ref 164–446)
PMV BLD AUTO: 10.8 FL (ref 9–12.9)
POTASSIUM SERPL-SCNC: 3.7 MMOL/L (ref 3.6–5.5)
PREALB SERPL-MCNC: 10.5 MG/DL (ref 18–38)
PROT SERPL-MCNC: 4.8 G/DL (ref 6–8.2)
PROTHROMBIN TIME: 13.4 SEC (ref 12–14.6)
RBC # BLD AUTO: 2.84 M/UL (ref 4.7–6.1)
RETICS # AUTO: 0.01 M/UL (ref 0.04–0.12)
RETICS/RBC NFR: 1.9 % (ref 0.8–2.6)
SODIUM SERPL-SCNC: 146 MMOL/L (ref 135–145)
TRIGL SERPL-MCNC: 111 MG/DL (ref 0–149)
WBC # BLD AUTO: 6.2 K/UL (ref 4.8–10.8)

## 2025-06-16 PROCEDURE — 88341 IMHCHEM/IMCYTCHM EA ADD ANTB: CPT | Mod: 26 | Performed by: PATHOLOGY

## 2025-06-16 PROCEDURE — 36415 COLL VENOUS BLD VENIPUNCTURE: CPT

## 2025-06-16 PROCEDURE — 88342 IMHCHEM/IMCYTCHM 1ST ANTB: CPT | Performed by: PATHOLOGY

## 2025-06-16 PROCEDURE — 160015 HCHG STAT PREOP MINUTES: Performed by: SPECIALIST

## 2025-06-16 PROCEDURE — 84100 ASSAY OF PHOSPHORUS: CPT

## 2025-06-16 PROCEDURE — 85046 RETICYTE/HGB CONCENTRATE: CPT

## 2025-06-16 PROCEDURE — 770006 HCHG ROOM/CARE - MED/SURG/GYN SEMI*

## 2025-06-16 PROCEDURE — 88341 IMHCHEM/IMCYTCHM EA ADD ANTB: CPT | Performed by: PATHOLOGY

## 2025-06-16 PROCEDURE — 700101 HCHG RX REV CODE 250: Performed by: STUDENT IN AN ORGANIZED HEALTH CARE EDUCATION/TRAINING PROGRAM

## 2025-06-16 PROCEDURE — 700102 HCHG RX REV CODE 250 W/ 637 OVERRIDE(OP)

## 2025-06-16 PROCEDURE — 88312 SPECIAL STAINS GROUP 1: CPT | Performed by: PATHOLOGY

## 2025-06-16 PROCEDURE — 160048 HCHG OR STATISTICAL LEVEL 1-5: Performed by: SPECIALIST

## 2025-06-16 PROCEDURE — 80053 COMPREHEN METABOLIC PANEL: CPT

## 2025-06-16 PROCEDURE — 83735 ASSAY OF MAGNESIUM: CPT

## 2025-06-16 PROCEDURE — 160197 HCHG MAC ANESTHESIA: Performed by: SPECIALIST

## 2025-06-16 PROCEDURE — 84134 ASSAY OF PREALBUMIN: CPT

## 2025-06-16 PROCEDURE — 88312 SPECIAL STAINS GROUP 1: CPT | Mod: 26 | Performed by: PATHOLOGY

## 2025-06-16 PROCEDURE — 700111 HCHG RX REV CODE 636 W/ 250 OVERRIDE (IP)

## 2025-06-16 PROCEDURE — 0DB68ZX EXCISION OF STOMACH, VIA NATURAL OR ARTIFICIAL OPENING ENDOSCOPIC, DIAGNOSTIC: ICD-10-PCS | Performed by: SPECIALIST

## 2025-06-16 PROCEDURE — A9270 NON-COVERED ITEM OR SERVICE: HCPCS

## 2025-06-16 PROCEDURE — 88342 IMHCHEM/IMCYTCHM 1ST ANTB: CPT | Mod: 26 | Performed by: PATHOLOGY

## 2025-06-16 PROCEDURE — 86140 C-REACTIVE PROTEIN: CPT

## 2025-06-16 PROCEDURE — 0DB58ZX EXCISION OF ESOPHAGUS, VIA NATURAL OR ARTIFICIAL OPENING ENDOSCOPIC, DIAGNOSTIC: ICD-10-PCS | Performed by: SPECIALIST

## 2025-06-16 PROCEDURE — 160193 HCHG PACU STANDARD - 1ST 60 MINS: Performed by: SPECIALIST

## 2025-06-16 PROCEDURE — 92610 EVALUATE SWALLOWING FUNCTION: CPT

## 2025-06-16 PROCEDURE — 700111 HCHG RX REV CODE 636 W/ 250 OVERRIDE (IP): Performed by: STUDENT IN AN ORGANIZED HEALTH CARE EDUCATION/TRAINING PROGRAM

## 2025-06-16 PROCEDURE — 160002 HCHG RECOVERY MINUTES (STAT): Performed by: SPECIALIST

## 2025-06-16 PROCEDURE — 85027 COMPLETE CBC AUTOMATED: CPT

## 2025-06-16 PROCEDURE — 43239 EGD BIOPSY SINGLE/MULTIPLE: CPT | Performed by: SPECIALIST

## 2025-06-16 PROCEDURE — 99232 SBSQ HOSP IP/OBS MODERATE 35: CPT | Mod: GC | Performed by: STUDENT IN AN ORGANIZED HEALTH CARE EDUCATION/TRAINING PROGRAM

## 2025-06-16 PROCEDURE — 84478 ASSAY OF TRIGLYCERIDES: CPT

## 2025-06-16 PROCEDURE — 85730 THROMBOPLASTIN TIME PARTIAL: CPT

## 2025-06-16 PROCEDURE — 700101 HCHG RX REV CODE 250

## 2025-06-16 PROCEDURE — 700105 HCHG RX REV CODE 258: Performed by: STUDENT IN AN ORGANIZED HEALTH CARE EDUCATION/TRAINING PROGRAM

## 2025-06-16 PROCEDURE — 700105 HCHG RX REV CODE 258

## 2025-06-16 PROCEDURE — 85610 PROTHROMBIN TIME: CPT

## 2025-06-16 PROCEDURE — 88305 TISSUE EXAM BY PATHOLOGIST: CPT | Performed by: PATHOLOGY

## 2025-06-16 PROCEDURE — 160203 HCHG ENDO MINUTES - 1ST 30 MINS LEVEL 4: Performed by: SPECIALIST

## 2025-06-16 PROCEDURE — 88305 TISSUE EXAM BY PATHOLOGIST: CPT | Mod: 26 | Performed by: PATHOLOGY

## 2025-06-16 RX ORDER — SUCRALFATE ORAL 1 G/10ML
1 SUSPENSION ORAL EVERY 6 HOURS
Status: DISCONTINUED | OUTPATIENT
Start: 2025-06-16 | End: 2025-06-20 | Stop reason: HOSPADM

## 2025-06-16 RX ORDER — LIDOCAINE HYDROCHLORIDE 20 MG/ML
INJECTION, SOLUTION EPIDURAL; INFILTRATION; INTRACAUDAL; PERINEURAL PRN
Status: DISCONTINUED | OUTPATIENT
Start: 2025-06-16 | End: 2025-06-16 | Stop reason: SURG

## 2025-06-16 RX ORDER — PANTOPRAZOLE SODIUM 40 MG/10ML
40 INJECTION, POWDER, LYOPHILIZED, FOR SOLUTION INTRAVENOUS 2 TIMES DAILY
Status: DISCONTINUED | OUTPATIENT
Start: 2025-06-16 | End: 2025-06-18

## 2025-06-16 RX ORDER — ONDANSETRON 2 MG/ML
4 INJECTION INTRAMUSCULAR; INTRAVENOUS
Status: DISCONTINUED | OUTPATIENT
Start: 2025-06-16 | End: 2025-06-16 | Stop reason: HOSPADM

## 2025-06-16 RX ORDER — SODIUM CHLORIDE, SODIUM LACTATE, POTASSIUM CHLORIDE, CALCIUM CHLORIDE 600; 310; 30; 20 MG/100ML; MG/100ML; MG/100ML; MG/100ML
INJECTION, SOLUTION INTRAVENOUS
Status: DISCONTINUED | OUTPATIENT
Start: 2025-06-16 | End: 2025-06-16 | Stop reason: SURG

## 2025-06-16 RX ADMIN — LIDOCAINE HYDROCHLORIDE 50 MG: 20 INJECTION, SOLUTION EPIDURAL; INFILTRATION; INTRACAUDAL; PERINEURAL at 10:21

## 2025-06-16 RX ADMIN — POTASSIUM PHOSPHATE, MONOBASIC POTASSIUM PHOSPHATE, DIBASIC INJECTION, 15 MMOL: 236; 224 SOLUTION, CONCENTRATE INTRAVENOUS at 21:54

## 2025-06-16 RX ADMIN — PANTOPRAZOLE SODIUM 40 MG: 40 INJECTION, POWDER, FOR SOLUTION INTRAVENOUS at 05:56

## 2025-06-16 RX ADMIN — SODIUM CHLORIDE, POTASSIUM CHLORIDE, SODIUM LACTATE AND CALCIUM CHLORIDE: 600; 310; 30; 20 INJECTION, SOLUTION INTRAVENOUS at 10:21

## 2025-06-16 RX ADMIN — PROPOFOL 30 MG: 10 INJECTION, EMULSION INTRAVENOUS at 10:25

## 2025-06-16 RX ADMIN — PROPOFOL 50 MG: 10 INJECTION, EMULSION INTRAVENOUS at 10:21

## 2025-06-16 RX ADMIN — THIAMINE HYDROCHLORIDE 100 MG: 100 INJECTION, SOLUTION INTRAMUSCULAR; INTRAVENOUS at 17:54

## 2025-06-16 RX ADMIN — AMOXICILLIN AND CLAVULANATE POTASSIUM 1 TABLET: 875; 125 TABLET, FILM COATED ORAL at 17:53

## 2025-06-16 RX ADMIN — SUCRALFATE ORAL SUSPENSION 1 G: 1 SUSPENSION ORAL at 14:21

## 2025-06-16 RX ADMIN — PANTOPRAZOLE SODIUM 40 MG: 40 INJECTION, POWDER, FOR SOLUTION INTRAVENOUS at 17:54

## 2025-06-16 RX ADMIN — SUCRALFATE ORAL SUSPENSION 1 G: 1 SUSPENSION ORAL at 17:53

## 2025-06-16 ASSESSMENT — PAIN DESCRIPTION - PAIN TYPE
TYPE: SURGICAL PAIN
TYPE: SURGICAL PAIN
TYPE: ACUTE PAIN
TYPE: SURGICAL PAIN
TYPE: ACUTE PAIN
TYPE: SURGICAL PAIN
TYPE: ACUTE PAIN

## 2025-06-16 ASSESSMENT — PAIN SCALES - GENERAL: PAIN_LEVEL: 0

## 2025-06-16 NOTE — PROGRESS NOTES
Pt left floor to preop via transport in [facility]. Report given to preop RN by NOC RN.

## 2025-06-16 NOTE — PROGRESS NOTES
Pt returned to floor from PACU in [facility] via transport. Report received from Siobhan MILTON. Pt ambulated to back to chair, and reports 0/10 pain. Tube feed Jevity 1.2 restarted at 40ml/hr. LR discontinued.

## 2025-06-16 NOTE — PROCEDURES
OPERATIVE REPORT    PATIENT:   Rakan Rader III   1965       PREOPERATIVE DIAGNOSES/INDICATIONS: epigastric pain, dysphagia    POSTOPERATIVE DIAGNOSIS: erosive esophagitis, hiatal hernia. Atrophic gastritis    PROCEDURE:  ESOPHAGOGASTRODUODENOSCOPY with biopsy    PHYSICIAN:  Kat Bloom MD    ANESTHESIA:  Per anesthesiologist.    ESTIMATED BLOOD LOSS:nil    LOCATION: [facility]    CONSENT:  OBTAINED. The risks, benefits and alternatives of the procedure were discussed in details. The risks include and are not limited to bleeding, infection, perforation, missed lesions, and sedations risks (cardiopulmonary compromise and allergic reaction to medications).    DESCRIPTION: The patient presented to the procedure room.  After routine checkup was performed, patient was brought into the endoscopy suite.  Patient was placed on his left lateral decubitus position. A bite block was placed in patient's mouth. Patient was sedated by anesthesia.  Vital signs were monitored throughout the procedure.  Oxygenation support was provided throughout the procedure. Upper endoscope was inserted into patient's mouth and advanced to the second portion of the duodenum under direct visualization.      Once the site was reached and examined, the upper endoscope was withdrawn.  Retroflexion was made within the stomach.  The stomach was decompressed, scope was withdrawn and the procedure was terminated.     The patient tolerated the procedure well.  There were no immediate complications.    OPERATIVE FINDINGS:    1. Esophagus: 30 to 40 cm Grade D erosive esophagitis. Biopsied.   2. Stomach:  2 cm hiatal hernia, Antrum and pre-pyloric area in stomach appears atrophic, biopsies. Bile in stomach  3. Duodenum: normal to third portion    IMPRESSION/RECOMMENDATIONS:  Grade D erosive esophagitis  2 cm hiatal hernia    PPI BID  Carafate  Await biopsies  Diet per primary care  GI to sign off    Followed up with GI for EGD in three months.  Please do not discharge patient with referral to [facility]. We do not have an outpatient clinic. If he has seen GI or has a GI doctor, please refer to them. If not please send new referral to [facility] or [facility]. If sent to [facility] which includes Dr. Thompson, Davion Toure, Dr. Macdonald and Dr. Bloom, the patient will not have proper care or follow-up. We do contact patient about their biopsies.         This note has been transcribed with digital voice recognition software and although it has been reviewed may contain grammatical or word errors

## 2025-06-16 NOTE — ANESTHESIA TIME REPORT
Anesthesia Start and Stop Event Times       Date Time Event    6/16/2025 1009 Ready for Procedure     1016 Anesthesia Start     1035 Anesthesia Stop          Responsible Staff  06/16/25      Name Role Begin End    Meredith Diaz M.D. Anesth 1016 1035          Overtime Reason:  no overtime (within assigned shift)    Comments:

## 2025-06-16 NOTE — CARE PLAN
The patient is Stable - Low risk of patient condition declining or worsening    Shift Goals  Clinical Goals: NPO at midnight, Cris drain and FC management  Patient Goals: sleep  Family Goals: sleep    Progress made toward(s) clinical / shift goals:        Problem: Knowledge Deficit - Standard  Goal: Patient and family/care givers will demonstrate understanding of plan of care, disease process/condition, diagnostic tests and medications  Outcome: Progressing  Note: Patient is educated on poc, upcoming procedure, NPO starting midnight for procedure and verbalized understanding.  CRIS drain and luz catheter output monitored. Refusing FC care and educated on the risk of developing infection from fc, pt verbalized understanding, but still refused luz care.     Problem: Fall Risk  Goal: Patient will remain free from falls  Outcome: Progressing  Note: Patient round on hourly. Bed locked to its lowest position with alarm on. Personal belongings and call light within reach. Patient remained free from falls at this time/       Patient is not progressing towards the following goals:

## 2025-06-16 NOTE — ANESTHESIA POSTPROCEDURE EVALUATION
Patient: Rakan Rader III    Procedure Summary       Date: 06/16/25 Room / Location: [facility] ROOM 26 / SURGERY SAME DAY [facility]    Anesthesia Start: 1016 Anesthesia Stop: 1035    Procedure: GASTROSCOPY, WITH BIOPSY (Esophagus) Diagnosis: (Erosive Esophagitis)    Surgeons: Kat Bloom M.D. Responsible Provider: Meredith Diaz M.D.    Anesthesia Type: MAC ASA Status: 3            Final Anesthesia Type: MAC  Last vitals  BP   Blood Pressure: 120/73    Temp   36.9 °C (98.4 °F)    Pulse   69   Resp   20    SpO2   96 %      Anesthesia Post Evaluation    Patient location during evaluation: PACU  Patient participation: complete - patient participated  Level of consciousness: awake and alert  Pain score: 0    Airway patency: patent  Anesthetic complications: no  Cardiovascular status: hemodynamically stable  Respiratory status: acceptable    PONV: none          No notable events documented.     Nurse Pain Score: 0 (NPRS)

## 2025-06-16 NOTE — ASSESSMENT & PLAN NOTE
Erosive esophagitis s/p EGD 6/16/2025 by Dr. Bloom.   -Tolerating regular diet  -PPI BID  -Carafate  -Await biopsies  -Can f/u outpatient with [facility]

## 2025-06-16 NOTE — PROGRESS NOTES
"Report received from NOC RN and assumed patient care at 0700. Patient is A&Ox 4, on RA, and bed frame alarm is in place. Pt has been NPO since midnight and tube feeds have been stopped. CRIS drain RLQ is CDI with scant amount of sanguinous fluid in drain. Juarez catheter in place draining pink tinged urine.  Patient reporting a pain level of 0/10. Call light within reach and bed in lowest position. Reinforced the need to call for assistance. Plan of care discussed and patient does not have any further needs at this time.     /78   Pulse 77   Temp 36.6 °C (97.9 °F) (Temporal)   Resp 15   Ht 1.727 m (5' 8\")   Wt 57.8 kg (127 lb 6.8 oz)   SpO2 96%   BMI 19.37 kg/m²         "

## 2025-06-16 NOTE — OR NURSING
1031-Pt to PACU from OR. Report from anesthesiologist and OR RN. Arrived on 4L mask at 98%. Respirations even and unlabored. VSS.     1052- Report called to Mikayla MILTON.     1118- Patient transported back up to  via rney with all of his personal belongings.

## 2025-06-16 NOTE — PROGRESS NOTES
"Patient is refusing to be turned Q2 and stated \"No. Leave me alone. I just want to sleep\" Patient educated the risk and benefits or turning every 2 hrs, but patient still refused. He also does not want to be woken up for lab draws. Patient also refuses Luz catheter care this shift. Educated the risk of developing infection related to luz catheter, patient still refused.   "

## 2025-06-16 NOTE — PROGRESS NOTES
"    FAMILY MEDICINE PROGRESS NOTE          PATIENT ID:  NAME:  Rakan Rader  MRN:               [id number]  YOB: 1965    Date of Admission: 6/8/2025     Attending: Zachary Harmon M.d.     Resident: Porsha Dye D.O. (PGY-2)    Primary Care Physician:  Emeterio Dave M.D.    HPI:  \"Ray\" Prasanth is a 60 y.o. male with hx Parkinson's Disease with DBS, prostate cancer presented with abdominal pain after underwent RA laparoscopic prostatectomy 5/28; found to have urinary ascites and 7cm R pelvic hematoma s/p diagnostic laparoscopy and hematoma washout 6/10 on hospital day 8    Interval Problem Update  Pt to EGD this AM with Dr. Bloom.   SLP with plans to eval swallow function this afternoon     SUBJECTIVE:   No acute events overnight, pt states that his mouth is dry and would like water. Does have an appetite, eventually would like to eat again. States that he does not like the nurses waking him up overnight to turn and for lab draws. Otherwise has good pain control and reports no concerns.     OBJECTIVE:  Temp:  [36.2 °C (97.1 °F)-36.9 °C (98.4 °F)] 36.7 °C (98 °F)  Pulse:  [69-99] 90  Resp:  [15-20] 16  BP: (104-128)/(66-80) 113/75  SpO2:  [94 %-99 %] 94 %      Intake/Output Summary (Last 24 hours) at 6/16/2025 1452  Last data filed at 6/16/2025 1405  Gross per 24 hour   Intake 680 ml   Output 860 ml   Net -180 ml       Physical Exam:  Gen:  NAD, sleeping comfortably   HEENT: MMM, EOMI, NG tube in place   Cardio: RRR, clear s1/s2, no murmur  Resp:  Equal bilat, clear to auscultation  GI/: Soft, non-distended, minimal TTP, normal bowel sounds, no guarding/rebound, CRIS drain to RLQ intact, luz catheter with 400cc urine out   Neuro: Non-focal, Gross intact, no deficits  Skin/Extremities: Cap refill <3sec, warm/well perfused, no rash, normal extremities      LABS:  Recent Labs     06/14/25  0026 06/15/25  0552 06/16/25  0237   WBC 4.0* 6.0 6.2   RBC 2.98* 2.94* 2.84*   HEMOGLOBIN 8.7* 8.4* 8.2* "   HEMATOCRIT 27.8* 27.6* 26.4*   MCV 93.3 93.9 93.0   MCH 29.2 28.6 28.9   RDW 50.3* 50.4* 49.3   PLATELETCT 223 311 423   MPV 10.6 10.4 10.8   NEUTSPOLYS 90.30*  --   --    LYMPHOCYTES 4.40*  --   --    MONOCYTES 3.50  --   --    EOSINOPHILS 0.00  --   --    BASOPHILS 0.90  --   --    RBCMORPHOLO Present  --   --      Recent Labs     06/14/25  0026 06/15/25  0552 06/16/25  0237   SODIUM 148* 143 146*   POTASSIUM 3.8 3.6 3.7   CHLORIDE 117* 113* 115*   CO2 24 24 24   BUN 21 14 12   CREATININE 0.80 0.65 0.63   CALCIUM 7.5* 7.3* 7.5*   MAGNESIUM 2.1 2.2 2.1   PHOSPHORUS 2.0* 1.7* 1.5*   ALBUMIN 2.2* 2.1* 2.4*     Estimated GFR/CRCL = Estimated Creatinine Clearance: 104.5 mL/min (by C-G formula based on SCr of 0.63 mg/dL).  Recent Labs     06/14/25  0026 06/15/25  0552 06/16/25  0237   GLUCOSE 88 123* 94     Recent Labs     06/14/25  0026 06/15/25  0552 06/16/25  0237   ASTSGOT 474* 251* 201*   ALTSGPT 84* 236* 319*   TBILIRUBIN 0.3 0.3 0.4   ALKPHOSPHAT 150* 158* 169*   GLOBULIN 2.4 2.6 2.4   INR  --   --  1.02             Recent Labs     06/16/25  0237   INR 1.02   APTT 28.5         IMAGING:  DX-ABDOMEN FOR TUBE PLACEMENT   Final Result      Feeding tube tip projects within the duodenum.      US-RUQ   Final Result         1.  Hepatomegaly   2.  Gallbladder sludge with gallbladder wall thickening, consider acalculous cholecystitis, could be further evaluated with HIDA scan as clinically appropriate.   3.  Trace abdominal ascites   4.  Small right pleural effusion      RE-[id number]-3 VIEW   Final Result      Digitized intraoperative radiograph is submitted for review. This examination is not for diagnostic purpose but for guidance during a surgical procedure. Please see the patient's chart for full procedural details.         INTERPRETING LOCATION: [address] [place] [address] [place], [id number]      DX-PORTABLE FLUOROSCOPY < 1 HOUR Reason For Exam: Main OR   Final Result      Portable fluoroscopy utilized for 6 seconds.          INTERPRETING LOCATION: [address] [facility], [facility], [address]      CT-Cystogram   Final Result         1.  Diffuse bladder wall thickening, consider changes of cystitis.   2.  Contrast within the peritoneal cavity, compatible with intraperitoneal bladder rupture.   3.  Small foci of intra-abdominal free air, likely related to bladder rupture although radiographic appearance cannot exclude viscus perforation.   4.  Diffuse small bowel wall thickening, appearance suggests changes of enteritis.      These findings were discussed with the patient's clinician, Siena Oakley, on 6/10/2025 6:57 AM.      CT-IMAGE-GUIDED DRAIN PERITONEAL   Final Result      1.  CT GUIDED PLACEMENT OF A PERCUTANEOUS DRAINAGE CATHETER INTO THE PERITONEAL SPACE.   2.  THE CURRENT PLAN IS TO MONITOR DRAINAGE OUTPUT AND OBTAIN A FOLLOWUP CT SCAN IN 5-7 DAYS IF CLINICALLY INDICATED.      CT-ABDOMEN-PELVIS W/O   Final Result         1. 7 cm right pelvic high density mass may be a hematoma. This displaces the bladder to the left.      2. Large free peritoneal fluid in abdomen and pelvis.      3. Bladder empty. No hydronephrosis.      4. Mild ileus-type fluid in stomach and small bowel. Fluid-filled distal esophagus suggests reflux. No colon distention. Normal appendix.      5. Possible sludge in gallbladder. No inflammation or ductal dilatation.      6. Small indeterminate 8 mm liver lesion may be a benign cyst.                  DX-CHEST-PORTABLE (1 VIEW)   Final Result         1. No acute abnormalities evident.      2. Right chest wall battery pack with wire extending to the neck.                         CULTURES:   Results       Procedure Component Value Units Date/Time    Blood Culture - Draw one from central line and one from peripheral site [[id number]] Collected: 06/08/25 0598    Order Status: Completed Specimen: Blood from Peripheral Updated: 06/13/25 0700     Significant Indicator NEG     Source BLD     Site PERIPHERAL     Culture Result No growth  after 5 days of incubation.    Blood Culture - Draw one from central line and one from peripheral site [[id number]] Collected: 06/08/25 0540    Order Status: Completed Specimen: Blood from Line Updated: 06/13/25 0700     Significant Indicator NEG     Source BLD     Site Peripheral     Culture Result No growth after 5 days of incubation.    Cdiff By PCR Rflx Toxin [[id number]]     Order Status: Canceled Specimen: Stool     FLUID CULTURE W/GRAM STAIN [[id number]] Collected: 06/08/25 1415    Order Status: Completed Specimen: Body Fluid Updated: 06/11/25 0757     Significant Indicator NEG     Source BF     Site Peritoneal  drain aspiration     Culture Result No growth at 72 hours.     Gram Stain Result Moderate WBCs.  No organisms seen.      Urine Culture (New) [[id number]] Collected: 06/08/25 1255    Order Status: Completed Specimen: Urine, Clean Catch Updated: 06/10/25 1208     Significant Indicator NEG     Source UR     Site URINE, CLEAN CATCH     Culture Result No growth at 48 hours.            MEDS:  Current Facility-Administered Medications   Medication Last Admin    pantoprazole (Protonix) injection 40 mg      sucralfate (Carafate) 1 GM/10ML suspension 1 g 1 g at 06/16/25 1421    Pharmacy Consult: Enteral tube insertion - review meds/change route/product selection      thiamine (B-1) injection 100 mg 100 mg at 06/14/25 2010    amoxicillin-clavulanate (Augmentin) 875-125 MG per tablet 1 Tablet 1 Tablet at 06/15/25 1729    oxyCODONE immediate-release (Roxicodone) tablet 2.5 mg      Or    oxyCODONE immediate-release (Roxicodone) tablet 5 mg 5 mg at 06/14/25 2216    Or    HYDROmorphone (Dilaudid) injection 0.5 mg 0.5 mg at 06/14/25 2327    oxybutynin SR (Ditropan-XL) tablet 10 mg 10 mg at 06/15/25 0547    heparin injection 5,000 Units 5,000 Units at 06/15/25 0551    baclofen (Lioresal) tablet 5 mg 5 mg at 06/13/25 0512    ondansetron (Zofran) syringe/vial injection 4 mg 4 mg at 06/14/25 0429    carbidopa-levodopa  (Sinemet)  MG tablet 1 Tablet 1 Tablet at 06/14/25 0913    vitamin D3 (Cholecalciferol) tablet 4,000 Units 4,000 Units at 06/15/25 0547       ASSESSMENT/PLAN:  60 y.o. male admitted for:  * Abdominal pain  Assessment & Plan  Acute, Stable. Acute pain in the setting of robotic prostatectomy 5/28/2025 for prostate cancer.  CT with 7 cm right pelvic hematoma with peritoneal free fluid considered to be urine ascites on admission.   Inpatient cystogram with concern for anastomosis disruption causing leakage   -Pt is post op s/p cysto, on table cystogram, complex catheterization and laparoscopic washout of hematoma and abdominal cavity on 6/10/25 with Dr. Cesar    Plan:  - NG feedings; add regular diet and thin liquids per SLP; can likely wean NGT over the coming days   - Pain control with PRN Oxycodone, IV dilaudid   - Antiemetics with Zofran, Reglan  - Carafate  - PPI BID   - Bowel regimen   - Urology following, appreciate recommendations:  -Ditropan 10mg ER daily for bladder spasms  -Full liquid diet as tolerated   -Continue luz catheter   -Antibiotics with Zosyn for at least another 24 hours, then transition to oral for 10 day course   -Luz and CRIS drain to remain for a minimum of 3 weeks and then a fluoroscopic cystogram will be performed to assess for resolution of leak.  -CRIS to be removed by Urology outpatient   -Continue Augmentin 6/14-6/24    Esophagitis determined by endoscopy  Assessment & Plan  Erosive esophagitis s/p EGD 6/16/2025 by Dr. Bloom.   -Appreciate SLP evaluation: patient reporting improvement, will trial regular diet and thin liquids   -Dietary consultation for NG and tube feed planning; if able to maintain nutrition can likely pull NGT  -PPI BID  -Carafate  -Await biopsies  -Can f/u outpatient with [facility]     Anemia  Assessment & Plan  Acute blood loss anemia in the setting of robotic prostatectomy 5/28, hemoglobin postoperatively ~7. Now up to 10, downtrended throughout hospitalization  slowly. Extensive flank and LE bruising. Abdominal drain has minimal output  Currently on heparin    Plan:  -Trend H&H, transfusion threshold less than 7  -Vital signs every 4 hours  - Check coags  -I If worsening may consider abdominal imaging to ascertain if any bleeding    Malnutrition (HCC)  Assessment & Plan  Significant malnutrition with muscle wasting,  minimal desire to eat, and current difficulty eating secondary to reflux, hiccups causing abdominal pain. Recommendation for TPN by Urology however due to benefits of enteral feeding over TPN, trial of NG tube feeding before consideration of TPN. Patient is agreeable to NG tube. Curbside discussion with GI confirms that esophageal pain is not a contraindication to an NG tube.   Due to malnutrition, he is at high risk of refeeding syndrome.   Patient is tolerating NG feeding    Plan:   - NG tube feeding with Iris tube  - Thiamine supplement 00mg daily  - Will reintroduce solid foods and reassess nutrition status; if able to maintain nutrition with oral only can consider discontinuing NG tube feeds   - Monitor labs: K, PO4, Mg and replete as necessary     Elevated liver enzymes  Assessment & Plan  Acute, Resolving. Likely shock liver due to illness. GI consulted and agreed. Recommended trending and no further investigation at this time.   Overall down-trending AST; stable ALP and slight elevation in ALT.   -Trend CMP  -Consider HIDA scan if not improving     Prostate cancer (HCC)- (present on admission)  Assessment & Plan  S/p robotic prostatectomy 5/28/2025, [place] score 7 (4+3) per pathology report    History of tobacco abuse- (present on admission)  Assessment & Plan  History of tobacco use as a former smoker at least 30 years ago, 5 pack years.  Currently chewing tobacco, last use 1 week ago.  Patient reports minimal cravings.  - Can order nicotine patch if needed for cravings  - Tobacco use cessation counseling    Liver lesion- (present on  admission)  Assessment & Plan  Incidental liver lesion 8 mm on CT this admission  - Recommend outpatient follow-up    Parkinson's disease with dyskinesia and fluctuating manifestations (HCC)- (present on admission)  Assessment & Plan  Chronic, has deep brain stimulator, battery implanted at chest.   Continue home carbidopa-levodopa          Core Measures:  Fluids: PO   Lines: NG tube, PIV   Abx: Augmentin   Diet: Regular, thin liquids, NGT feeds   PPX: SCD, heparin     CODE Status: Full Code      Disposition  Inpatient       Porsha Dye D.O.   PGY-2  [facility]

## 2025-06-16 NOTE — PROGRESS NOTES
Assumed patient care and received bedside report from Day RN. Patient is A&Ox4 and reports no pain at this tme. Patient on RA, awake, and alert. Juarez cather in place. NG in place in left nare. 2 RN Skin Check to be completed.    Patient educated on the use of the call light and verbalized understanding. Bed in the lowest and locked position with alarm on. Personal belongings and call light within reach. Moderate Fall Risk precautions and hourly rounding in place. Safety education provided. POC discussed and patient declines any further needs at this time. Orders carried out.

## 2025-06-16 NOTE — PROGRESS NOTES
Gastroenterology Progress Note:    Kat Bloom M.D.  Date & Time note created:       6/15/25  Patient ID:  Name:             Rakan Rader  YOB: 1965  Age:                 60 y.o.  male  MRN:               [id number]    Referring MD:  Dr. Mae Cervantes                                                             Chief Complaint(s):      Dysphagia with epigastric pain    History of Present Illness:    This is a very pleasant 60 y.o. male with history of Parkinson's with deep brain stimulator who had a prostatectomy for prostate cancer and had a post surgical bleed resulting in a 7 cm hematoma. We were initially called for elevated LFT's that were thought to be secondary to shock liver. They have improved with time and GI sign off. We were consulted again due to epigastric pain and dysphagia. Patient says things feel like they are getting stuck in his mid esophagus. He had a ct scan that showed hematoma and urinary ascites, but it also showed mild ileus-type fluid in stomach and small bowel. Fluid-filled distal esophagus suggests reflux. No colon distention. He was started on a PPI and this has helped but has  not fully resolved his pain. He is tolerating feeds through NG      Review of Systems:      Constitutional: Denies fevers, weight changes  Eyes: Denies changes in vision, jaundice  Ears/Nose/Throat/Mouth: Denies nasal congestion or sore throat   Cardiovascular: Denies chest pain or palpitations   Respiratory: Denies shortness of breath, denies cough  Gastrointestinal/Hepatic: + abdominal pain, nausea, vomiting, diarrhea, constipation or GI bleeding   Genitourinary: Denies dysuria or frequency  Musculoskeletal/Rheum: Denies  joint pain and swelling, edema  Skin: Denies rash  Neurological: Denies headache, confusion, memory loss or focal weakness/parasthesias  Psychiatric: denies mood disorder   Endocrine: Michelle thyroid problems  Heme/Oncology/Lymph Nodes: Denies enlarged lymph nodes,  denies brusing or known bleeding disorder  All other systems were reviewed and are negative (AMA/CMS criteria)            ROS   Past Medical History:   Past Medical History[1]  Active Hospital Problems    Diagnosis     Intractable hiccups [R06.6]     Malnutrition (HCC) [E46]     Other neutropenia (HCC) [D70.8]     Elevated liver enzymes [R74.8]     Acute renal failure (ARF) (HCC) [N17.9]     Anemia [D64.9]     Hyponatremia [E87.1]     Acute hypoxic respiratory failure (HCC) [J96.01]     Liver lesion [K76.9]     Abdominal pain [R10.9]     Prostate cancer (HCC) [C61]     History of tobacco abuse [Z87.891]     Parkinson's disease with dyskinesia and fluctuating manifestations (HCC) [G20.B2]        Past Surgical History:  Past Surgical History[2]    Hospital Medications:  Current Facility-Administered Medications   Medication Dose Frequency Provider Last Rate Last Admin    phosphorus (K-Phos-Neutral) per tablet 500 mg  500 mg BID Siena Oakley D.O.        pantoprazole (Protonix) injection 40 mg  40 mg DAILY KARL Garza.O.   40 mg at 06/15/25 0544    Pharmacy Consult: Enteral tube insertion - review meds/change route/product selection  1 Each PHARMACY TO DOSE KARL Garza.GIO.        [Held by provider] D5 1/2 NS infusion   Continuous KARL Garza.GIO.   Stopped at 06/15/25 1029    thiamine (B-1) injection 100 mg  100 mg Q EVENING KARL Garza.O.   100 mg at 06/14/25 2010    amoxicillin-clavulanate (Augmentin) 875-125 MG per tablet 1 Tablet  1 Tablet Q12HRS KARL Garza.O.   1 Tablet at 06/15/25 1729    oxyCODONE immediate-release (Roxicodone) tablet 2.5 mg  2.5 mg Q3HRS PRN Porsha Dye D.O.        Or    oxyCODONE immediate-release (Roxicodone) tablet 5 mg  5 mg Q3HRS PRN NalysAMAN MackO.   5 mg at 06/14/25 2216    Or    HYDROmorphone (Dilaudid) injection 0.5 mg  0.5 mg Q3HRS PRN Porsha Dye D.O.   0.5 mg at 06/14/25 0165    oxybutynin SR (Ditropan-XL) tablet 10 mg  10  "mg DAILY Suzie RADHA Larsen P.A.-C.   10 mg at 06/15/25 0547    [Held by provider] heparin injection 5,000 Units  5,000 Units Q8HRS Nalyssa K Marnie D.OMalathi   5,000 Units at 06/15/25 0551    baclofen (Lioresal) tablet 5 mg  5 mg TID PRN Siena Oakley D.O.   5 mg at 06/13/25 0512    ondansetron (Zofran) syringe/vial injection 4 mg  4 mg Q4HRS PRN Siena Oakley D.O.   4 mg at 06/14/25 0429    carbidopa-levodopa (Sinemet)  MG tablet 1 Tablet  1 Tablet TID Siena Oakley D.O.   1 Tablet at 06/14/25 0913    vitamin D3 (Cholecalciferol) tablet 4,000 Units  4,000 Units DAILY Siena Oakley D.O.   4,000 Units at 06/15/25 0547   Last reviewed on 6/8/2025  9:53 AM by Atilio Millan R.N.     Current Outpatient Medications:  Prescriptions Prior to Admission[3]    Medication Allergy:  Allergies[4]    Physical Exam:  Weight/BMI: Body mass index is 19.37 kg/m².  /80   Pulse 79   Temp 36.7 °C (98 °F) (Temporal)   Resp 16   Ht 1.727 m (5' 8\")   Wt 57.8 kg (127 lb 6.8 oz)   SpO2 96%   Vitals:    06/15/25 0358 06/15/25 0735 06/15/25 1544 06/15/25 1939   BP: 111/69 123/78 111/75 123/80   Pulse: 78 80 84 79   Resp: 18 15 15 16   Temp: 36.8 °C (98.2 °F) 36.7 °C (98 °F) 36.6 °C (97.9 °F) 36.7 °C (98 °F)   TempSrc: Temporal Temporal Temporal Temporal   SpO2: 96% 95% 95% 96%   Weight:       Height:         Oxygen Therapy:  Pulse Oximetry: 96 %, O2 (LPM): 0, O2 Delivery Device: None - Room Air    Intake/Output Summary (Last 24 hours) at 6/16/2025 0330  Last data filed at 6/16/2025 0100  Gross per 24 hour   Intake 1682 ml   Output 3570 ml   Net -1888 ml       Constitutional:   Well developed, well nourished, no acute distress  HEENT:  Normocephalic, Atraumatic, Conjunctiva not pale, Sclera not icteric, Oropharynx moist mucous membranes, No oral exudates, Nose normal.  No thyromegaly.  Neck:  Normal range of motion, No cervical tenderness,  no JVD.  Chest/Lungs:  Symmetric expansion, no spider angioma, " breath sounds clear to auscultation bilaterally,  no crackles, no wheezing.   Cardiovascular:  Normal heart rate, Normal rhythm, No murmurs, No rubs, No gallops.    Abdomen: Bowel sounds normal, Soft, No tenderness, No guarding, No rebound, No masses, No hepatosplenomegaly.  Extremities: No cyanosis/clubbing/edema/palmar erythema/flapping tremor  Skin: Warm, Dry, No erythema, No rash, no induration.  Physical Exam    MDM (Data Review):     Records reviewed and summarized in current documentation    Lab Data Review:  Recent Results (from the past 24 hours)   Comp Metabolic Panel    Collection Time: 06/15/25  5:52 AM   Result Value Ref Range    Sodium 143 135 - 145 mmol/L    Potassium 3.6 3.6 - 5.5 mmol/L    Chloride 113 (H) 96 - 112 mmol/L    Co2 24 20 - 33 mmol/L    Anion Gap 6.0 (L) 7.0 - 16.0    Glucose 123 (H) 65 - 99 mg/dL    Bun 14 8 - 22 mg/dL    Creatinine 0.65 0.50 - 1.40 mg/dL    Calcium 7.3 (L) 8.5 - 10.5 mg/dL    Correct Calcium 8.8 8.5 - 10.5 mg/dL    AST(SGOT) 251 (H) 12 - 45 U/L    ALT(SGPT) 236 (H) 2 - 50 U/L    Alkaline Phosphatase 158 (H) 30 - 99 U/L    Total Bilirubin 0.3 0.1 - 1.5 mg/dL    Albumin 2.1 (L) 3.2 - 4.9 g/dL    Total Protein 4.7 (L) 6.0 - 8.2 g/dL    Globulin 2.6 1.9 - 3.5 g/dL    A-G Ratio 0.8 g/dL   Magnesium    Collection Time: 06/15/25  5:52 AM   Result Value Ref Range    Magnesium 2.2 1.5 - 2.5 mg/dL   Phosphorus    Collection Time: 06/15/25  5:52 AM   Result Value Ref Range    Phosphorus 1.7 (L) 2.5 - 4.5 mg/dL   CRP Quantitive (Non-Cardiac)    Collection Time: 06/15/25  5:52 AM   Result Value Ref Range    Stat C-Reactive Protein 2.20 (H) 0.00 - 0.75 mg/dL   Prealbumin    Collection Time: 06/15/25  5:52 AM   Result Value Ref Range    Pre-Albumin 8.9 (L) 18.0 - 38.0 mg/dL   CBC WITHOUT DIFFERENTIAL    Collection Time: 06/15/25  5:52 AM   Result Value Ref Range    WBC 6.0 4.8 - 10.8 K/uL    RBC 2.94 (L) 4.70 - 6.10 M/uL    Hemoglobin 8.4 (L) 14.0 - 18.0 g/dL    Hematocrit 27.6 (L)  42.0 - 52.0 %    MCV 93.9 81.4 - 97.8 fL    MCH 28.6 27.0 - 33.0 pg    MCHC 30.4 (L) 32.3 - 36.5 g/dL    RDW 50.4 (H) 35.9 - 50.0 fL    Platelet Count 311 164 - 446 K/uL    MPV 10.4 9.0 - 12.9 fL   ESTIMATED GFR    Collection Time: 06/15/25  5:52 AM   Result Value Ref Range    GFR (CKD-EPI) 109 >60 mL/min/1.73 m 2   Prothrombin Time    Collection Time: 06/16/25  2:37 AM   Result Value Ref Range    PT 13.4 12.0 - 14.6 sec    INR 1.02 0.87 - 1.13   APTT    Collection Time: 06/16/25  2:37 AM   Result Value Ref Range    APTT 28.5 24.7 - 36.0 sec   CBC WITHOUT DIFFERENTIAL    Collection Time: 06/16/25  2:37 AM   Result Value Ref Range    WBC 6.2 4.8 - 10.8 K/uL    RBC 2.84 (L) 4.70 - 6.10 M/uL    Hemoglobin 8.2 (L) 14.0 - 18.0 g/dL    Hematocrit 26.4 (L) 42.0 - 52.0 %    MCV 93.0 81.4 - 97.8 fL    MCH 28.9 27.0 - 33.0 pg    MCHC 31.1 (L) 32.3 - 36.5 g/dL    RDW 49.3 35.9 - 50.0 fL    Platelet Count 423 164 - 446 K/uL    MPV 10.8 9.0 - 12.9 fL   RETICULOCYTES COUNT    Collection Time: 06/16/25  2:37 AM   Result Value Ref Range    Reticulocyte Count 1.9 0.8 - 2.6 %    Retic, Absolute 0.01 (L) 0.04 - 0.12 M/uL    Imm. Reticulocyte Fraction 25.4 (H) 2.6 - 16.1 %    Retic Hgb Equivalent 28.4 (L) 29.0 - 35.0 pg/cell       MDM (Assessment and Plan):     Active Hospital Problems    Diagnosis     Intractable hiccups [R06.6]     Malnutrition (HCC) [E46]     Other neutropenia (HCC) [D70.8]     Elevated liver enzymes [R74.8]     Acute renal failure (ARF) (HCC) [N17.9]     Anemia [D64.9]     Hyponatremia [E87.1]     Acute hypoxic respiratory failure (HCC) [J96.01]     Liver lesion [K76.9]     Abdominal pain [R10.9]     Prostate cancer (HCC) [C61]     History of tobacco abuse [Z87.891]     Parkinson's disease with dyskinesia and fluctuating manifestations (HCC) [G20.B2]        Imaging/Procedures Review:    DX-ABDOMEN FOR TUBE PLACEMENT   Final Result      Feeding tube tip projects within the duodenum.      US-RUQ   Final Result          1.  Hepatomegaly   2.  Gallbladder sludge with gallbladder wall thickening, consider acalculous cholecystitis, could be further evaluated with HIDA scan as clinically appropriate.   3.  Trace abdominal ascites   4.  Small right pleural effusion      SO-FEAGGDB-4 VIEW   Final Result      Digitized intraoperative radiograph is submitted for review. This examination is not for diagnostic purpose but for guidance during a surgical procedure. Please see the patient's chart for full procedural details.         INTERPRETING LOCATION: [address], [place], [address]      DX-PORTABLE FLUOROSCOPY < 1 HOUR Reason For Exam: Main OR   Final Result      Portable fluoroscopy utilized for 6 seconds.         INTERPRETING LOCATION: [address], [place], [address]      CT-Cystogram   Final Result         1.  Diffuse bladder wall thickening, consider changes of cystitis.   2.  Contrast within the peritoneal cavity, compatible with intraperitoneal bladder rupture.   3.  Small foci of intra-abdominal free air, likely related to bladder rupture although radiographic appearance cannot exclude viscus perforation.   4.  Diffuse small bowel wall thickening, appearance suggests changes of enteritis.      These findings were discussed with the patient's clinician, Siena Oakley, on 6/10/2025 6:57 AM.      CT-IMAGE-GUIDED DRAIN PERITONEAL   Final Result      1.  CT GUIDED PLACEMENT OF A PERCUTANEOUS DRAINAGE CATHETER INTO THE PERITONEAL SPACE.   2.  THE CURRENT PLAN IS TO MONITOR DRAINAGE OUTPUT AND OBTAIN A FOLLOWUP CT SCAN IN 5-7 DAYS IF CLINICALLY INDICATED.      CT-ABDOMEN-PELVIS W/O   Final Result         1. 7 cm right pelvic high density mass may be a hematoma. This displaces the bladder to the left.      2. Large free peritoneal fluid in abdomen and pelvis.      3. Bladder empty. No hydronephrosis.      4. Mild ileus-type fluid in stomach and small bowel. Fluid-filled distal esophagus suggests reflux. No colon distention. Normal appendix.       5. Possible sludge in gallbladder. No inflammation or ductal dilatation.      6. Small indeterminate 8 mm liver lesion may be a benign cyst.                  DX-CHEST-PORTABLE (1 VIEW)   Final Result         1. No acute abnormalities evident.      2. Right chest wall battery pack with wire extending to the neck.                         Assessment  !. Epigastric pain   2. Esophageal dysphagia  3. Prostate cancer s/p prostatectomy  4. Urinary ascites  5. Hematoma  6. Elevated LFT's  7. Fluid filled esophagus  Plan    EGD in am  NPO after midnight  PPI  Abdominal flat plate   Thank you very much for allowing me to participate in the care of your patient.  Please feel free to contact me anytime at          [1]   Past Medical History:  Diagnosis Date    Anesthesia     PONV    Anxiety     Bronchitis     Remote    Parkinson's disease (HCC)     PONV (postoperative nausea and vomiting)     Status post deep brain stimulator placement 06/2024    Followed by Dr Taylor of [facility] and Dr. Ivania Castro at [facility]    Urinary bladder disorder     Difficulty urinating   [2]   Past Surgical History:  Procedure Laterality Date    LA CYSTOURETHROSCOPY  6/10/2025    Procedure: CYSTOSCOPY ON TABLE FLUOROSCOPIC CYSTOGRAM , COMPLEX URETHRAL CATHETERIZATION;  Surgeon: Tj WATT M.D.;  Location: SURGERY [facility];  Service: Urology    LA LAP,DIAGNOSTIC ABDOMEN  6/10/2025    Procedure: DIAGNOSTIC LAPAROSCOPY, ABDOMINAL WASHOUT;  Surgeon: Tj WATT M.D.;  Location: SURGERY [facility];  Service: Urology    PROSTATECTOMY ROBOTIC XI N/A 5/28/2025    Procedure: ROBOTIC ASSISTED LAPAROSCOPIC PROSTATECTOMY;  Surgeon: Tj WATT M.D.;  Location: SURGERY [facility];  Service: Uro Robotic    LAPAROSCOPIC LYSIS OF ADHESIONS N/A 5/28/2025    Procedure: LYSIS, ADHESIONS, LAPAROSCOPIC ROBOTIC;  Surgeon: Tj WATT M.D.;  Location: SURGERY [facility];  Service: Uro Robotic    OTHER  06/2024    Deep brain  stimulator    INGUINAL HERNIA REPAIR ROBOTIC Bilateral 11/22/2017    Procedure: INGUINAL HERNIA REPAIR ROBOTIC/ WITH MESH PLACEMENT;  Surgeon: Zackary Ponce M.D.;  Location: SURGERY [facility];  Service: General    LAMINOTOMY      c5-7    ORIF, ELBOW      right as a child    OTHER NEUROLOGICAL SURG      cervical fusion    OTHER ORTHOPEDIC SURGERY      finger surgery as child    SEPTOPLASTY, NOSE, WITH TURBINOPLASTY      SHOULDER ARTHROSCOPY W/ SLAP / LABRAL REPAIR      left    SINUSCOPY      maxillary sinuses   [3]   Medications Prior to Admission   Medication Sig Dispense Refill Last Dose/Taking    oxybutynin SR (DITROPAN-XL) 10 MG CR tablet Take 10 mg by mouth every day. 2 day course dispensed 06/01/2025  COMPLETED   6/3/2025    oxyCODONE immediate-release (ROXICODONE) 5 MG Tab Take 5 mg by mouth every 8 hours as needed for Severe Pain. 3 day supply dispensed 06/01/2025  COMPLETED   6/6/2025    vitamin D3 (CHOLECALCIFEROL) 1000 Unit (25 mcg) Tab Take 4,000 Units by mouth 2 times a day.   5/21/2025 Evening    Omega-3 Fatty Acids (OMEGA 3 PO) Take 1 Capsule by mouth every day.   5/21/2025 Evening    B Complex Vitamins (B COMPLEX PO) Take 1 Tablet by mouth every day.   5/21/2025 Morning    MAGNESIUM PO Take 1 Tablet by mouth every day.   5/21/2025 Evening    VITAMIN K PO Take 1 Tablet by mouth every day.   5/21/2025 Morning    ibuprofen (MOTRIN) 200 MG Tab Take 200 mg by mouth every 6 hours as needed for Mild Pain.   5/18/2025    carbidopa-levodopa (SINEMET)  MG Tab Take 1 Tablet by mouth 3 times a day. For Parkinson's 270 Tablet 3 6/6/2025 Evening   [4] No Known Allergies

## 2025-06-16 NOTE — NON-PROVIDER
"    FAMILY MEDICINE PROGRESS NOTE  **MEDICAL STUDENT NOTE FOR EDUCATIONAL PURPOSES**          PATIENT ID:  NAME:  Rakan Rader  MRN:               [id number]  YOB: 1965    Date of Admission: 6/8/2025     Attending: Zachary Harmon M.d.     Senior Resident: Dr. Dye    Junaid Resident: Dr. Oakley    Primary Care Physician:  Emeterio Dave M.D.    HPI: Rakan \"Ray\" Prasanth is a 60 y.o. male with a PMHx significant for Parkinson's Disease w/ DBS and prostate cancer who presented with abdominal pain after RA laparoscopic prostatectomy, found to have urinary ascites and 7cm R pelvic hematoma, now s/p dx laparoscopy and hematoma washout 6/10 on hospital day 8    OVERNIGHT EVENTS:  No acute events overnight  - Pt made NPO at midnight in anticipation of endoscopy today    SUBJECTIVE:   Pt reports pain has not worsened or improved from yesterday, still reaching up to 6/10 in severity. He reports burning/cramping epigastric pain. Continues to have minor, intermittent episodes of hiccups. Denies N/V. Reports watery diarrhea. Denies fever/chills/malaise/SOB. Reports very dry mouth d/t NPO status. States he is hungry.    OBJECTIVE:  Temp:  [36.4 °C (97.5 °F)-36.7 °C (98 °F)] 36.4 °C (97.5 °F)  Pulse:  [71-84] 71  Resp:  [15-17] 17  BP: (111-123)/(74-80) 114/74  SpO2:  [95 %-96 %] 95 %      Intake/Output Summary (Last 24 hours) at 6/16/2025 0719  Last data filed at 6/16/2025 0600  Gross per 24 hour   Intake 570 ml   Output 860 ml   Net -290 ml       PHYSICAL EXAM:  Gen: No acute distress, laying in bed  HEENT: normocephalic, atraumatic, EOMI, NG tube in place  Cardio: RRR, no M/R/G  Pulm: clear to auscultation bilaterally, no respiratory distress/increased work of breathing  Abd: soft, nondistended, mild diffuse TTP most notably in epigastric region, bowel sounds present, incisions c/d/i, minimal serosang CRIS drain output  Ext: No edema, 2+ pulses bilaterally  Neuro: At baseline    LABS:  Recent Labs     06/14/25  0026 " 06/15/25  0552 06/16/25  0237   WBC 4.0* 6.0 6.2   RBC 2.98* 2.94* 2.84*   HEMOGLOBIN 8.7* 8.4* 8.2*   HEMATOCRIT 27.8* 27.6* 26.4*   MCV 93.3 93.9 93.0   MCH 29.2 28.6 28.9   RDW 50.3* 50.4* 49.3   PLATELETCT 223 311 423   MPV 10.6 10.4 10.8   NEUTSPOLYS 90.30*  --   --    LYMPHOCYTES 4.40*  --   --    MONOCYTES 3.50  --   --    EOSINOPHILS 0.00  --   --    BASOPHILS 0.90  --   --    RBCMORPHOLO Present  --   --      Recent Labs     06/14/25  0026 06/15/25  0552 06/16/25  0237   SODIUM 148* 143 146*   POTASSIUM 3.8 3.6 3.7   CHLORIDE 117* 113* 115*   CO2 24 24 24   BUN 21 14 12   CREATININE 0.80 0.65 0.63   CALCIUM 7.5* 7.3* 7.5*   MAGNESIUM 2.1 2.2 2.1   PHOSPHORUS 2.0* 1.7* 1.5*   ALBUMIN 2.2* 2.1* 2.4*     Estimated GFR/CRCL = Estimated Creatinine Clearance: 104.5 mL/min (by C-G formula based on SCr of 0.63 mg/dL).  Recent Labs     06/14/25  0026 06/15/25  0552 06/16/25 0237   GLUCOSE 88 123* 94     Recent Labs     06/14/25  0026 06/15/25  0552 06/16/25  0237   ASTSGOT 474* 251* 201*   ALTSGPT 84* 236* 319*   TBILIRUBIN 0.3 0.3 0.4   ALKPHOSPHAT 150* 158* 169*   GLOBULIN 2.4 2.6 2.4   INR  --   --  1.02             Recent Labs     06/16/25 0237   INR 1.02   APTT 28.5         IMAGING:  DX-ABDOMEN FOR TUBE PLACEMENT   Final Result      Feeding tube tip projects within the duodenum.      US-RUQ   Final Result         1.  Hepatomegaly   2.  Gallbladder sludge with gallbladder wall thickening, consider acalculous cholecystitis, could be further evaluated with HIDA scan as clinically appropriate.   3.  Trace abdominal ascites   4.  Small right pleural effusion      [id number] VIEW   Final Result      Digitized intraoperative radiograph is submitted for review. This examination is not for diagnostic purpose but for guidance during a surgical procedure. Please see the patient's chart for full procedural details.         INTERPRETING LOCATION: [address] [place] [address], [place], [id number]      DX-PORTABLE FLUOROSCOPY < 1 HOUR Reason  For Exam: Main OR   Final Result      Portable fluoroscopy utilized for 6 seconds.         INTERPRETING LOCATION: [address], [place], [address]      CT-Cystogram   Final Result         1.  Diffuse bladder wall thickening, consider changes of cystitis.   2.  Contrast within the peritoneal cavity, compatible with intraperitoneal bladder rupture.   3.  Small foci of intra-abdominal free air, likely related to bladder rupture although radiographic appearance cannot exclude viscus perforation.   4.  Diffuse small bowel wall thickening, appearance suggests changes of enteritis.      These findings were discussed with the patient's clinician, Siena Oakley, on 6/10/2025 6:57 AM.      CT-IMAGE-GUIDED DRAIN PERITONEAL   Final Result      1.  CT GUIDED PLACEMENT OF A PERCUTANEOUS DRAINAGE CATHETER INTO THE PERITONEAL SPACE.   2.  THE CURRENT PLAN IS TO MONITOR DRAINAGE OUTPUT AND OBTAIN A FOLLOWUP CT SCAN IN 5-7 DAYS IF CLINICALLY INDICATED.      CT-ABDOMEN-PELVIS W/O   Final Result         1. 7 cm right pelvic high density mass may be a hematoma. This displaces the bladder to the left.      2. Large free peritoneal fluid in abdomen and pelvis.      3. Bladder empty. No hydronephrosis.      4. Mild ileus-type fluid in stomach and small bowel. Fluid-filled distal esophagus suggests reflux. No colon distention. Normal appendix.      5. Possible sludge in gallbladder. No inflammation or ductal dilatation.      6. Small indeterminate 8 mm liver lesion may be a benign cyst.                  DX-CHEST-PORTABLE (1 VIEW)   Final Result         1. No acute abnormalities evident.      2. Right chest wall battery pack with wire extending to the neck.                         CULTURES:   Results       Procedure Component Value Units Date/Time    Blood Culture - Draw one from central line and one from peripheral site [[id number]] Collected: 06/08/25 0518    Order Status: Completed Specimen: Blood from Peripheral Updated: 06/13/25 0759      Significant Indicator NEG     Source BLD     Site PERIPHERAL     Culture Result No growth after 5 days of incubation.    Blood Culture - Draw one from central line and one from peripheral site [[id number]] Collected: 06/08/25 0540    Order Status: Completed Specimen: Blood from Line Updated: 06/13/25 0700     Significant Indicator NEG     Source BLD     Site Peripheral     Culture Result No growth after 5 days of incubation.    Cdiff By PCR Rflx Toxin [[id number]]     Order Status: Canceled Specimen: Stool     FLUID CULTURE W/GRAM STAIN [[id number]] Collected: 06/08/25 1415    Order Status: Completed Specimen: Body Fluid Updated: 06/11/25 0757     Significant Indicator NEG     Source BF     Site Peritoneal  drain aspiration     Culture Result No growth at 72 hours.     Gram Stain Result Moderate WBCs.  No organisms seen.      Urine Culture (New) [[id number]] Collected: 06/08/25 1255    Order Status: Completed Specimen: Urine, Clean Catch Updated: 06/10/25 1208     Significant Indicator NEG     Source UR     Site URINE, CLEAN CATCH     Culture Result No growth at 48 hours.            MEDS:  Current Facility-Administered Medications   Medication Last Admin    pantoprazole (Protonix) injection 40 mg 40 mg at 06/16/25 0556    Pharmacy Consult: Enteral tube insertion - review meds/change route/product selection      [Held by provider] D5 1/2 NS infusion Stopped at 06/15/25 1029    thiamine (B-1) injection 100 mg 100 mg at 06/14/25 2010    amoxicillin-clavulanate (Augmentin) 875-125 MG per tablet 1 Tablet 1 Tablet at 06/15/25 1729    oxyCODONE immediate-release (Roxicodone) tablet 2.5 mg      Or    oxyCODONE immediate-release (Roxicodone) tablet 5 mg 5 mg at 06/14/25 2216    Or    HYDROmorphone (Dilaudid) injection 0.5 mg 0.5 mg at 06/14/25 2327    oxybutynin SR (Ditropan-XL) tablet 10 mg 10 mg at 06/15/25 0547    [Held by provider] heparin injection 5,000 Units 5,000 Units at 06/15/25 0551    baclofen (Lioresal) tablet 5 mg  5 mg at 06/13/25 0512    ondansetron (Zofran) syringe/vial injection 4 mg 4 mg at 06/14/25 0429    carbidopa-levodopa (Sinemet)  MG tablet 1 Tablet 1 Tablet at 06/14/25 0913    vitamin D3 (Cholecalciferol) tablet 4,000 Units 4,000 Units at 06/15/25 0547       ASSESSMENT/PLAN:  60 y.o. male with PMHx significant for Parkinson's Disease w/ DBS and prostate cancer who presented with abdominal pain after RA laparoscopic prostatectomy, found to have urinary ascites and 7cm R pelvic hematoma, now s/p dx laparoscopy and hematoma washout 6/10. He has additionally been experiencing epigastric pain and dysphagia, and was found to have Grade D erosive esophagitis and 2cm hiatal hernia on EGD. He remains afebrile with pertinent physical exam findings including mild diffuse TTP most notably in epigastric region, surgical incisions c/d/i, and minimal serosang CRIS drain output.    # Abdominal pain  # Prostate cancer   Acute pain in the setting of robotic prostatectomy 5/28/2025 for prostate cancer. CT with 7 cm right pelvic hematoma with peritoneal free fluid considered to be urine ascites on admission. Inpatient cystogram with concern for anastomosis disruption causing leakage  Pt is post op s/p cysto, on table cystogram, complex catheterization and laparoscopic washout of hematoma and abdominal cavity on 6/10/25 with Dr. Cesar  > Pt symptoms on 6/14 concerning for GERD  - Started NG feeds; hold IVF  - Pain control prn (Roxicodone, Dilaudid)  - Antiemetics prn (Zofran, Reglan)  - Continue Protonix 40mg qDaily  - Bowel regimen   - Urology following, appreciate recommendations:  - Ditropan 10mg ER daily for bladder spasms  - Continue luz catheter   - Antibiotics with Zosyn for at least another 24 hours, then transition to oral for 10 day course (transitioned to Augmentin starting 6/14)   -Plan for discharge home with luz catheter and potentially CRIS drain  - Luz will remain for a minimum of 3 weeks and then a  fluoroscopic cystogram will be performed to assess for resolution of leak  - Consider repeat CT sooner to evaluate for resolution of leak if clinically not improving    # Erosive esophagitis  # Hiatal hernia  # Atrophic gastritis  Pt has been experiencing epigastric pain and dysphagia. Previous CT showed mild ileus-type fluid in stomach and small bowel. Fluid-filled distal esophagus suggestive of reflux. No colonic distention. He was started on daily PPI 6/14, which has offered some relief but not complete resolution of symptoms. Per Dr. Bloom's (GI) note, pt found to have Grade D erosive esophagitis and 2cm hiatal hernia on EGD.  - GI recommendations:   - PPI BID   - Carafate   - GI to f/u on biopsies     # Malnutrition  # Hypophosphatemia  Malnutrition with muscle wasting, minimal desire to eat d/t previous pain/hiccups/lack of appetite. Urology recommended TPN however, due to the benefits of enteral feeding over TPN, NG tube feeding will be trialed first. Pt was agreeable to placement of NG tube. Due to malnutrition, pt needs to be monitored for refeeding syndrome.              - NG tube feeding with Iris tube              - 100mg Thiamine supplementation per RD              - Monitor labs: K, PO4, Mg              - Hypophosphatemia of 1.5 today; repleted with K Phos Neut     # Elevated liver enzymes  Acute. Worsening this hospitalization following surgery on 6/10 due to urine ascites and anastomotic leak. , ALT 65,  up to AST 1163, , ; then downtrending. DDx includes shock liver, intrahepatic/biliary injury from laparoscopy, acalculous cholecystitis. Less likely MI vs medication induced hepatotoxicity. GI initially consulted, agreed with picture of shock liver.  Felt that labs would downtrend in the coming days and did not recommend any further workup. Increase in ALT and ALP starting 6/14; potentially due to inflamed gallbladder/bile duct system as previous RUQ ultrasound shows  hepatomegaly, gallbladder sludge, and wall thickening.   - Trend CMP  - Consider HIDA scan     # Intractable hiccups - resolving  Hiccups started prior to admission, causing increased abdominal pain and preventing patient from eating due to significant discomfort. Additionally causing difficulty sleeping. Continuously trialing conservative measures with sips of water, breath holding. Pt reported marked improvement after initiation of pantoprazole.  - Pantoprazole 40mg daily  - Baclofen prn  - Encourage PO hydration     # Anemia  Acute blood loss anemia in the setting of robotic prostatectomy 5/28, hemoglobin postoperatively ~7. Previously up to 10, then has been slowly downtrending. Extensive flank and LE bruising noted with minimal abominal drain output. PT/INR/PTT wnl; low-normal reticulocyte count indicating potential production issues.  - Trend H&H, transfuse if < 7 or < 8 w/ symptoms  - Vital signs q4h  - Consider repeat CT scan in the setting of extensive bruising if suspicion for possible retroperitoneal hematoma     # Acute renal failure (ARF) - resolved  Resolved, acute renal failure with creatinine 4.98 in the setting of robotic prostatectomy on 5/28 secondary to prostate cancer. Cr normalized.  - Hold IVF as on tube feeds  - Appreciate urology involvement  - Trend renal function  - Trend electrolytes  - CtM     # Acute hypoxic respiratory failure - resolving  Acute, resolving. Likely atelectasis in the setting of immobilization. Neuromuscular disorders, infectious etiology, PE unlikely due to patient's clinical picture. VS wnl and PE without concerning findings.  - Oxygen supplementation to keep O2 > 90%  - Wean oxygen as tolerated  - PT/OT for mobilization  - Incentive spirometry     # Hyponatremia - resolved  # Hypernatremia - resolved  Sodium 130 initially, likely hypertonic hyponatremia especially in the setting of urine ascites. Up to 143. Normalized as of 6/15.  - Trend     # Neutropenia -  resolved  Acute drop WBC initially to 1.4.  Neutropenia and lymphopenia. Previously thought to be secondary to developing sepsis, less likely bone marrow suppression given acute drop. Sepsis not likely due to VS wnl and normalizing WBC.  - Trend      # Liver lesion  Incidental liver lesion 8 mm on CT this admission.  - Recommend outpatient follow-up     # History of tobacco abuse  History of tobacco use as a former smoker 30+ years ago, 5 pack years. Currently chewing tobacco, last use 1 week ago.  Patient reports minimal cravings.  - Can order nicotine patch if needed for cravings  - Tobacco use cessation counseling     # Parkinson's disease with dyskinesia and fluctuating manifestations  Chronic, has deep brain stimulator, battery implanted at chest.   - Continue home carbidopa-levodopa       Core Measures:  Fluids: NG/PO  Lines: pIV, Juarez catheter, CRIS drain, NG  Abx: Augmentin  Diet: NG tube feeds  DVT ppx: SCDs/TEDs, heparin  Drain care  Bowel regimen in place    CODE Status: Full Code      Disposition  Inpatient      Elsy Hernandez, MS3  [facility]

## 2025-06-16 NOTE — ANESTHESIA PREPROCEDURE EVALUATION
Case: [id number] Date/Time: 06/16/25 0824    Procedure: GASTROSCOPY    Location: [facility] ROOM 26 / SURGERY SAME DAY [facility]    Surgeons: Kat Bloom M.D.            Relevant Problems   ANESTHESIA   (positive) PONV (postoperative nausea and vomiting)      NEURO   (positive) Migraine with aura and without status migrainosus, not intractable      CARDIAC   (positive) Migraine with aura and without status migrainosus, not intractable         (positive) Acute renal failure (ARF) (HCC) (Resolved)   (positive) Liver lesion       Physical Exam    Airway   Mallampati: I  TM distance: >3 FB  Neck ROM: limited       Cardiovascular - normal exam   Dental       Very poor dentition     Pulmonary - normal exam   Abdominal    Neurological                Anesthesia Plan    ASA 3       Plan - MAC         (61yo presenting for gastroscopy in the setting of abdominal pain. PMH: Parkinsons, acute renal failure (improved renal function). )      Induction: intravenous      Pertinent diagnostic labs and testing reviewed    Informed Consent:    Anesthetic plan and risks discussed with patient.

## 2025-06-16 NOTE — THERAPY
"Speech Language Pathology   Clinical Swallow Evaluation     Patient Name:  Rakan Rader III  AGE:  60 y.o., SEX:  male  Medical Record #:  [id number]  Date of Service:  6/16/2025    Precautions  Medical: Fall Risk  Swallowing: NG Tube, Swallow Precautions    History of Present Illness  Patient is a 60 y.o. male who presented 6/8 with abdominal pain after underwent RA laparoscopic prostatectomy 5/28; found to have urinary ascites and 7cm R pelvic hematoma s/p diagnostic laparoscopy and hematoma washout 6/10. EGD 6/16 found Grade D erosive esophagitis and 2 cm hiatal hernia.     PMHx: Parkinson's Disease with DBS, prostate cancer    6/8 CXR:   1. No acute abnormalities evident.  2. Right chest wall battery pack with wire extending to the neck.      General Information:  Vitals  O2 Delivery Device: None - Room Air  Level of Consciousness: Alert  Patient Behaviors: Withdrawn  Orientation: Oriented x 4  Follows Directives: Yes      Prior Living Situation & Level of Function:  Prior Services: Home-Independent  Housing / Facility: [address] [place] [address]  Lives with - Patient's Self Care Capacity: Unrelated Adult  Communication: Reported WFL  Swallowing: Reported WFL       Oral Mechanism Evaluation:  Dentition: Some missing dentition   Facial Symmetry: Equal  Facial Sensation: Equal     Labial Observations: WFL   Lingual Observations: Midline, Xerostomia        Laryngeal Function:  Secretion Management: Adequate  Voice Quality: Breathy continuous (mild-mod)      Subjective  MD contacted SLP for post-EGD swallow evaluation. Patient alert and sitting UIC upon entry. \"It's totally normal now\" r/t swallow after EGD. Patient reported no prior dysphagia outside of current problems and consuming a regular diet at baseline.      Assessment  Current Method of Nutrition: NPO until cleared by speech pathology, NGT  Positioning: Sitting UI  Bolus Administration: Patient    O2 Delivery Device: None - Room Air  Factor(s) Affecting " "Performance: None  Tracheostomy : No       Swallowing Trials:  Thin Liquid (TN0): WFL  Liquidised (LQ3): WFL  Regular (RG7): WFL      Comments: Adequate oral bolus acceptance/containment. Presumed timely oral transit. Complete AP transfer without notable oral bolus residue upon oral inspection. Adequate bite with functional mastication of solids. No cough/throat clear appreciated with PO. Vocal quality remained stable throughout PO intake. Single swallow completed per bolus. No signs of esophageal dysfunction. Patient adequately self-feeding with appropriate rate and volume of intake. Discussed diet initiation, patient eager for regular solids. Educated regarding reflux precautions. All questions addressed.       Clinical Impressions  Patient presents with clinically functional oropharyngeal swallow. Recommend initiation of regular/thin liquid diet. Will follow up give history of PD and esophagitis. Defer NGT removal to dietician.     Recommendations  Diet Consistency: Regular solids, thin liquids  Instrumentation: None indicated at this time  Medication: As tolerated  Supervision: Assist with meal tray set up, Distant supervision - check on patient 2-3 times per meal  Positioning: Fully upright and midline during oral intake, Remain upright for 60 minutes after oral intake  Risk Management : Alternate bites and sips, Slow rate of intake  Oral Care: Q6h  Referral: Registered Dietician         SLP Treatment Plan  Treatment Plan: Dysphagia Treatment  SLP Frequency: 3x Per Week  Estimated Duration: Until Therapy Goals Met      Anticipated Discharge Needs  Discharge Recommendations: Anticipate that the patient will have no further speech therapy needs after discharge from the hospital   Therapy Recommendations Upon DC: Not Indicated        Patient / Family Goals  Patient / Family Goal #1: \"Can I have cavanaugh and eggs?\"  Short Term Goals  Short Term Goal # 1: Patient will consume RG7/TN0 diet without s/sx of airway " invasion.      Siena Norton, SLP

## 2025-06-16 NOTE — THERAPY
Speech Language Therapy Contact Note    Patient Name: Rakan Rader III  Age:  60 y.o., Sex:  male  Medical Record #: [id number]  Today's Date: 6/16/2025 06/16/25 0813   Treatment Variance   Reason For Missed Therapy Medical - Other (Please Comment)   Interdisciplinary Plan of Care Collaboration   IDT Collaboration with  Nursing   Collaboration Comments Patient NPO for EGD. RN to contact SLP should patient be appropriate for swallow evaluation today.

## 2025-06-16 NOTE — PROGRESS NOTES
4 Eyes Skin Assessment Completed by YOAN Jean Baptiste and YOAN Joshi.    Skin assessment is primarily focused on high risk bony prominences. Pay special attention to skin beneath and around medical devices, high risk bony prominences, skin to skin areas and areas where the patient lacks sensation to feel pain and areas where the patient previously had breakdown.     Head (Occipital):  WDL   Ears (Under Medical Devices): Red, Blanching, and pt reminded to remove glasses when sleeping   Nose (Under Medical Devices): Red, Blanching, and pt reminded to remove glasses when sleeping   Mouth:  WDL   Neck: WDL   Breast/Chest:  WDL   Shoulder Blades:  WDL   Spine:   WDL   (R) Arm/Elbow/Hand: Red and Blanching   (L) Arm/Elbow/Hand: Red and Blanching   Abdomen: Incision lap sites, DERECK   Pannus/Groin:  Bruising   Sacrum/Coccyx:   Red and Blanching   (R) Ischial Tuberosity (Sit Bones):  WDL   (L) Ischial Tuberosity (Sit Bones):  WDL   (R) Leg:  WDL   (L) Leg:  Bruising   (R) Heel:  WDL   (R) Foot/Toe: WDL   (L) Heel: WDL   (L) Foot/Toe:  WDL       DEVICES IN USE:   Respiratory Devices:  NA, patient on room air  Feeding Devices:  Iris feeding tube   Lines & BP Monitoring Devices:  Peripheral IV    Orthopedic Devices:  N/A  Miscellaneous Devices:  Juarez    PROTOCOL INTERVENTIONS:   Low Airloss Bed:  Already in place  Elbows Padded with Offloading Dressing:  Already in place  Offloading Dressing - Heel:  Already in place  Q2 Turns with Pillows:  Already in place  Glide Sheet:  Already in place  Barrier Paste:  Already in place  Juarez:  Already in place    WOUND PHOTOS:   Completed and in EPIC     WOUND CONSULT:   N/A, no advanced wound care needs identified

## 2025-06-17 ENCOUNTER — APPOINTMENT (OUTPATIENT)
Dept: RADIOLOGY | Facility: MEDICAL CENTER | Age: 60
DRG: 698 | End: 2025-06-17
Payer: MEDICARE

## 2025-06-17 LAB
ALBUMIN SERPL BCP-MCNC: 2.5 G/DL (ref 3.2–4.9)
ALBUMIN/GLOB SERPL: 1 G/DL
ALP SERPL-CCNC: 164 U/L (ref 30–99)
ALT SERPL-CCNC: 98 U/L (ref 2–50)
ANION GAP SERPL CALC-SCNC: 4 MMOL/L (ref 7–16)
AST SERPL-CCNC: 138 U/L (ref 12–45)
BASOPHILS # BLD AUTO: 0.3 % (ref 0–1.8)
BASOPHILS # BLD: 0.02 K/UL (ref 0–0.12)
BILIRUB SERPL-MCNC: 0.4 MG/DL (ref 0.1–1.5)
BUN SERPL-MCNC: 12 MG/DL (ref 8–22)
CALCIUM ALBUM COR SERPL-MCNC: 8.6 MG/DL (ref 8.5–10.5)
CALCIUM SERPL-MCNC: 7.4 MG/DL (ref 8.5–10.5)
CHLORIDE SERPL-SCNC: 112 MMOL/L (ref 96–112)
CO2 SERPL-SCNC: 24 MMOL/L (ref 20–33)
CREAT SERPL-MCNC: 0.57 MG/DL (ref 0.5–1.4)
EOSINOPHIL # BLD AUTO: 0.12 K/UL (ref 0–0.51)
EOSINOPHIL NFR BLD: 1.7 % (ref 0–6.9)
ERYTHROCYTE [DISTWIDTH] IN BLOOD BY AUTOMATED COUNT: 50.3 FL (ref 35.9–50)
GFR SERPLBLD CREATININE-BSD FMLA CKD-EPI: 114 ML/MIN/1.73 M 2
GLOBULIN SER CALC-MCNC: 2.6 G/DL (ref 1.9–3.5)
GLUCOSE SERPL-MCNC: 108 MG/DL (ref 65–99)
HCT VFR BLD AUTO: 27.6 % (ref 42–52)
HGB BLD-MCNC: 8.5 G/DL (ref 14–18)
IMM GRANULOCYTES # BLD AUTO: 0.2 K/UL (ref 0–0.11)
IMM GRANULOCYTES NFR BLD AUTO: 2.8 % (ref 0–0.9)
LYMPHOCYTES # BLD AUTO: 0.8 K/UL (ref 1–4.8)
LYMPHOCYTES NFR BLD: 11.1 % (ref 22–41)
MAGNESIUM SERPL-MCNC: 2.2 MG/DL (ref 1.5–2.5)
MCH RBC QN AUTO: 28.6 PG (ref 27–33)
MCHC RBC AUTO-ENTMCNC: 30.8 G/DL (ref 32.3–36.5)
MCV RBC AUTO: 92.9 FL (ref 81.4–97.8)
MONOCYTES # BLD AUTO: 0.56 K/UL (ref 0–0.85)
MONOCYTES NFR BLD AUTO: 7.8 % (ref 0–13.4)
NEUTROPHILS # BLD AUTO: 5.48 K/UL (ref 1.82–7.42)
NEUTROPHILS NFR BLD: 76.3 % (ref 44–72)
NRBC # BLD AUTO: 0 K/UL
NRBC BLD-RTO: 0 /100 WBC (ref 0–0.2)
PHOSPHATE SERPL-MCNC: 1.6 MG/DL (ref 2.5–4.5)
PLATELET # BLD AUTO: 529 K/UL (ref 164–446)
PMV BLD AUTO: 10.8 FL (ref 9–12.9)
POTASSIUM SERPL-SCNC: 3.5 MMOL/L (ref 3.6–5.5)
PROT SERPL-MCNC: 5.1 G/DL (ref 6–8.2)
RBC # BLD AUTO: 2.97 M/UL (ref 4.7–6.1)
SODIUM SERPL-SCNC: 140 MMOL/L (ref 135–145)
WBC # BLD AUTO: 7.2 K/UL (ref 4.8–10.8)

## 2025-06-17 PROCEDURE — 700102 HCHG RX REV CODE 250 W/ 637 OVERRIDE(OP)

## 2025-06-17 PROCEDURE — 700111 HCHG RX REV CODE 636 W/ 250 OVERRIDE (IP): Mod: JZ

## 2025-06-17 PROCEDURE — 84100 ASSAY OF PHOSPHORUS: CPT

## 2025-06-17 PROCEDURE — 770006 HCHG ROOM/CARE - MED/SURG/GYN SEMI*

## 2025-06-17 PROCEDURE — 83735 ASSAY OF MAGNESIUM: CPT

## 2025-06-17 PROCEDURE — 80053 COMPREHEN METABOLIC PANEL: CPT

## 2025-06-17 PROCEDURE — 85025 COMPLETE CBC W/AUTO DIFF WBC: CPT

## 2025-06-17 PROCEDURE — 97535 SELF CARE MNGMENT TRAINING: CPT

## 2025-06-17 PROCEDURE — 36415 COLL VENOUS BLD VENIPUNCTURE: CPT

## 2025-06-17 PROCEDURE — A9270 NON-COVERED ITEM OR SERVICE: HCPCS

## 2025-06-17 PROCEDURE — 700105 HCHG RX REV CODE 258

## 2025-06-17 PROCEDURE — 700101 HCHG RX REV CODE 250

## 2025-06-17 PROCEDURE — 99232 SBSQ HOSP IP/OBS MODERATE 35: CPT | Mod: GC | Performed by: STUDENT IN AN ORGANIZED HEALTH CARE EDUCATION/TRAINING PROGRAM

## 2025-06-17 RX ORDER — POTASSIUM CHLORIDE 1500 MG/1
40 TABLET, EXTENDED RELEASE ORAL ONCE
Status: DISCONTINUED | OUTPATIENT
Start: 2025-06-17 | End: 2025-06-17

## 2025-06-17 RX ADMIN — PANTOPRAZOLE SODIUM 40 MG: 40 INJECTION, POWDER, FOR SOLUTION INTRAVENOUS at 06:09

## 2025-06-17 RX ADMIN — HYDROMORPHONE HYDROCHLORIDE 0.5 MG: 1 INJECTION, SOLUTION INTRAMUSCULAR; INTRAVENOUS; SUBCUTANEOUS at 21:28

## 2025-06-17 RX ADMIN — AMOXICILLIN AND CLAVULANATE POTASSIUM 1 TABLET: 875; 125 TABLET, FILM COATED ORAL at 06:09

## 2025-06-17 RX ADMIN — POTASSIUM PHOSPHATE, MONOBASIC POTASSIUM PHOSPHATE, DIBASIC INJECTION, 30 MMOL: 236; 224 SOLUTION, CONCENTRATE INTRAVENOUS at 08:58

## 2025-06-17 RX ADMIN — SUCRALFATE ORAL SUSPENSION 1 G: 1 SUSPENSION ORAL at 00:48

## 2025-06-17 RX ADMIN — SUCRALFATE ORAL SUSPENSION 1 G: 1 SUSPENSION ORAL at 06:12

## 2025-06-17 RX ADMIN — SUCRALFATE ORAL SUSPENSION 1 G: 1 SUSPENSION ORAL at 18:20

## 2025-06-17 RX ADMIN — CARBIDOPA AND LEVODOPA 1 TABLET: 25; 100 TABLET ORAL at 00:47

## 2025-06-17 RX ADMIN — PANTOPRAZOLE SODIUM 40 MG: 40 INJECTION, POWDER, FOR SOLUTION INTRAVENOUS at 18:20

## 2025-06-17 RX ADMIN — SUCRALFATE ORAL SUSPENSION 1 G: 1 SUSPENSION ORAL at 12:01

## 2025-06-17 RX ADMIN — CARBIDOPA AND LEVODOPA 1 TABLET: 25; 100 TABLET ORAL at 15:01

## 2025-06-17 RX ADMIN — CARBIDOPA AND LEVODOPA 1 TABLET: 25; 100 TABLET ORAL at 08:58

## 2025-06-17 RX ADMIN — AMOXICILLIN AND CLAVULANATE POTASSIUM 1 TABLET: 875; 125 TABLET, FILM COATED ORAL at 18:20

## 2025-06-17 RX ADMIN — OXYCODONE 2.5 MG: 5 TABLET ORAL at 19:56

## 2025-06-17 ASSESSMENT — COGNITIVE AND FUNCTIONAL STATUS - GENERAL
WALKING IN HOSPITAL ROOM: A LITTLE
MOBILITY SCORE: 17
MOVING TO AND FROM BED TO CHAIR: A LITTLE
MOVING FROM LYING ON BACK TO SITTING ON SIDE OF FLAT BED: A LITTLE
TOILETING: A LITTLE
DRESSING REGULAR LOWER BODY CLOTHING: A LOT
DAILY ACTIVITIY SCORE: 17
SUGGESTED CMS G CODE MODIFIER MOBILITY: CK
DRESSING REGULAR UPPER BODY CLOTHING: A LITTLE
DRESSING REGULAR UPPER BODY CLOTHING: A LITTLE
PERSONAL GROOMING: A LITTLE
TOILETING: A LITTLE
HELP NEEDED FOR BATHING: A LITTLE
DRESSING REGULAR LOWER BODY CLOTHING: A LITTLE
TURNING FROM BACK TO SIDE WHILE IN FLAT BAD: A LITTLE
CLIMB 3 TO 5 STEPS WITH RAILING: A LOT
DAILY ACTIVITIY SCORE: 19
PERSONAL GROOMING: A LITTLE
STANDING UP FROM CHAIR USING ARMS: A LITTLE
SUGGESTED CMS G CODE MODIFIER DAILY ACTIVITY: CK
HELP NEEDED FOR BATHING: A LOT
SUGGESTED CMS G CODE MODIFIER DAILY ACTIVITY: CK

## 2025-06-17 ASSESSMENT — SOCIAL DETERMINANTS OF HEALTH (SDOH)
WITHIN THE PAST 12 MONTHS, YOU WORRIED THAT YOUR FOOD WOULD RUN OUT BEFORE YOU GOT THE MONEY TO BUY MORE: NEVER TRUE
IN THE PAST 12 MONTHS, HAS THE ELECTRIC, GAS, OIL, OR WATER COMPANY THREATENED TO SHUT OFF SERVICE IN YOUR HOME?: NO
WITHIN THE PAST 12 MONTHS, THE FOOD YOU BOUGHT JUST DIDN'T LAST AND YOU DIDN'T HAVE MONEY TO GET MORE: NEVER TRUE

## 2025-06-17 ASSESSMENT — PAIN DESCRIPTION - PAIN TYPE
TYPE: ACUTE PAIN

## 2025-06-17 NOTE — CARE PLAN
The patient is Stable - Low risk of patient condition declining or worsening    Shift Goals  Clinical Goals: Pt to remain free from falls. Pt's luz and CRIS drain output to be monitored.  Patient Goals: Rest  Family Goals: HOLLAND    Progress made toward(s) clinical / shift goals:    Pt remains free from falls, pt ambulatory to bathroom, pt utilizing call light appropriately for needs, bed alarm on. Pt luz with renny-colored output. Pt on PÉREZ bed.     Problem: Knowledge Deficit - Standard  Goal: Patient and family/care givers will demonstrate understanding of plan of care, disease process/condition, diagnostic tests and medications  Outcome: Progressing     Problem: Fall Risk  Goal: Patient will remain free from falls  Outcome: Progressing     Problem: Skin Integrity  Goal: Skin integrity is maintained or improved  Outcome: Progressing       Patient is not progressing towards the following goals:

## 2025-06-17 NOTE — PROGRESS NOTES
Urology Progress Note    6/17- Pt sitting in chair. Feeding tube in place, minimal food eaten from trays. Urine with gross hematuria, which pt states started yesterday. Hbg stable on heparin. No increased pain. CRIS drain says 800 however this may be in error as CRIS empty during rounds. Also minimal output yesterday.     6/16 Pt in OR during rounds this was a chart check only. CRIS drain minimal, Good UOP. Labs stable     6/15-Pt is sleeping on rounds. He has an NG tube in place. He is afebrile, luz is draining yellow urine, CRIS drain with 23cc/24 hrs. WBC improving now 6.0, Hgb: 8.4, creatinine 0.65. Pt declined examination for his abdomen. AST improved now 251 (474), ALT has increased 236 (84). Alk phos: 158.    6/14- Pt is lying comfortably in bed. He was seen by speech therapy for a swallow evaluation, unfortunately the patient experience severe mid esophageal pain. He continue to have hicupps.Surgical the patient is doing well, abdominal drain with minimal output, luz draining yellow urine. WBC 4.0, Hgb: 8.7, creatinine 0.80, AST and ALT are improving, 474/97.     6/13-Pt is lying in bed in NAD. Luz catheter draining clear yellow urine. CRIS drain with 45cc of SS of exam. He notes that his hiccups have return, he has not been able to ambulate yet w/PT. He is afebrile, UOP 3000cc/24hrs, creatinine 1. WB: 2.8, Hgb: 9.2 and HCT 28.9. G.I was consulted for elevated liver enzymes.     6/12-Pt is sleeping soundly on exam. Luz catheter in place draining watermelon urine, no clots. UOP 1200cc/24hrs. Abdominal drain 2100cc/24hrs of SS. Abdominal drain had minimal output on exam. Afebrile, WBC: 1.4, Hgb: 9.2, Hct: 29.1, creatinine improved,now 1.16. He is now on a full liquid diet along with ensure. Abdominal incision c/d/I.     6/11-Pt lying comfortably in bed in NAD. RN at bedside. Pt is POD1 s/p cysto, on table cystogram, complex catheterization and laparoscopic washout of hematoma and abdominal cavity on  "6/10/25 with Dr. Cesar. He is afebrile, hypertensive at 145/94, tachycardic 100, UOP 700cc/24hrs, WBC: 2.9, Hgb: 11.3, creatinine elevated 1.97. Pt notes his hiccups have resolved and feels that he is due for a BM today.     6/10- Pt is lying comfortably in bed in NAD. He is afebrile. UOP 2100cc/24hrs, drain output 1185cc/24hrs. He remain on Zosyn and is NPO. CT cystogram on 6/10 showed contrast within the peritoneal cavity compatible with an intraperitoneal bladder rupture. WBC: 3.8, Hgb: 10.4, Hct: 31.5, creatinine increased, now 1.63 (1.19). Pt is schedule to go to the OR today with Dr. Cesar for a bladder repair.     6/9-S/p cysto luz placement over a wire on 6/8/25 performed by Dr. Todd and IR abdominal drain placement on 6/8. Pt is lying comfortably in bed in NAD. IR APN at bedside assessing CRIS drain. Tmax 99.2, pt is tachycardic at 105. Normotensive at 136/84. Documented abdominal drain output 3100mL/24hrs, drainage is dark/old SS. He remains on Zosyn. Luz catheter is drainage clear yellow urine, good UOP on exam. WBC: 5.3, Hgb: 9.8, No updated creatinine as of yet, however his creatinine from 6/8/25 is 2.48 (4.79). Pt denies f/c/n/v     Overnight Events: None    S:    O:   /81   Pulse 76   Temp 36.9 °C (98.5 °F) (Temporal)   Resp 16   Ht 1.727 m (5' 8\")   Wt 57.8 kg (127 lb 6.8 oz)   SpO2 95%   Recent Labs     06/15/25  0552 06/16/25  0237 06/17/25  0700   SODIUM 143 146* 140   POTASSIUM 3.6 3.7 3.5*   CHLORIDE 113* 115* 112   CO2 24 24 24   GLUCOSE 123* 94 108*   BUN 14 12 12   CREATININE 0.65 0.63 0.57   CALCIUM 7.3* 7.5* 7.4*     Recent Labs     06/15/25  0552 06/16/25  0237 06/17/25  0700   WBC 6.0 6.2 7.2   RBC 2.94* 2.84* 2.97*   HEMOGLOBIN 8.4* 8.2* 8.5*   HEMATOCRIT 27.6* 26.4* 27.6*   MCV 93.9 93.0 92.9   MCH 28.6 28.9 28.6   MCHC 30.4* 31.1* 30.8*   RDW 50.4* 49.3 50.3*   PLATELETCT 311 423 529*   MPV 10.4 10.8 10.8         Intake/Output Summary (Last 24 hours) at 6/9/2025 " 1008  Last data filed at 6/9/2025 0822  Gross per 24 hour   Intake 840 ml   Output 3100 ml   Net -2260 ml       Exam:    NG tube is present  Abdominal: pt declined abdominal examination today. CRIS with minimal output.   Urine: luz draining yellow urine    A/P:    Active Hospital Problems    Diagnosis     Esophagitis determined by endoscopy [K20.90]     Malnutrition (HCC) [E46]     Elevated liver enzymes [R74.8]     Anemia [D64.9]     Liver lesion [K76.9]     Abdominal pain [R10.9]     Prostate cancer (HCC) [C61]     History of tobacco abuse [Z87.891]     Parkinson's disease with dyskinesia and fluctuating manifestations (HCC) [G20.B2]      -Nutrition: Continue NG feeds has he has not been able to tolerat PO intake due to severe esophageal pain. This will need to be in place for 4-5 days from 6/14/25.  -Continue to trend renal function.   -Continue Ditropan 10mg ER daily for bladder spasms  -Monitor urine output and abdominal drain output.   -Luz to remain until cystogram done and confirmed with Dr. Cesar.   -CRIS can be removed in our office.   -Urology will continue to follow the patient during this hospital stay.     -Patient's case was discussed with Dr. Barajas and Dr. Cesar who have directed the plan of care for the patient.      Lizz Boland P.A.-C.   [address] [place] [address]   [phone]

## 2025-06-17 NOTE — CARE PLAN
The patient is Stable - Low risk of patient condition declining or worsening    Shift Goals  Clinical Goals: Patient will consume 50-75% or more of meals during shift  Patient Goals: rest, stop TPN  Family Goals: HOLLAND    Progress made toward(s) clinical / shift goals:      Problem: Knowledge Deficit - Standard  Goal: Patient and family/care givers will demonstrate understanding of plan of care, disease process/condition, diagnostic tests and medications  Outcome: Progressing    Patient educated on plan of care, medications, and procedures. Patient is Aox4. Patient verbalizes understanding of care. Will continue to update patient on Plan of care during shift.     Problem: Fall Risk  Goal: Patient will remain free from falls  Outcome: Progressing    Patient remained free from falls during course of shift. Patient is high fall risk. Patient is stable while walking, SBA. Patient bed is in low, locked position with bed alarm on. Standard fall precautions are in place. Personal belonging and call light are within reach. Patient reinforced to use call light when needed.     Problem: Nutrition  Goal: Patient's nutritional and fluid intake will be adequate or improve  Outcome: Progressing    Patient TPN reduced to 30Ml/hr from 60mL/hr. Patient advanced to regular diet. Patient at 50% of breakfast.     Patient is not progressing towards the following goals:

## 2025-06-17 NOTE — PROGRESS NOTES
Assumed care of patient at 1900. Received bedside report from day RN Mikayla. Patient A&Ox4, on RA, Reporting a pain level of 0/10. Juarez catheter, Cortrak, and CRIS drain in place. Call light within reach, belongings within reach, fall precautions in place, bed in lowest position, frame alarm on PÉREZ bed. Patient does not have any other needs at this time.     POC was discussed with patient. All questions were answered. Patient verbalized understanding.

## 2025-06-17 NOTE — THERAPY
Occupational Therapy  Daily Treatment     Patient Name:  Rakan Rader III  Age:  60 y.o., Sex:  male  Medical Record #:  [id number]  Today's Date:  6/17/2025    Precautions  Medical: Fall Risk  Swallowing: NG Tube    Assessment    Pt seen for OT tx, making progress toward acute OT goals. Pt completed toileting, standing grooming and LB clothing mgmt with increased independence. Pt navigated his room from bed>toilet>sink>chair while holding his IV pole, declined FWW use. Acute OT to continue to follow while admitted.    Plan    Treatment Plan Status: Continue Current Treatment Plan  Type of Treatment: Self Care / Activities of Daily Living, Adaptive Equipment, Community Re-Integration, Manual Therapy Techniques, Neuro Re-Education / Balance, Therapeutic Exercises, Therapeutic Activity, Family / Caregiver Training  Treatment Frequency: 3 Times per Week  Treatment Duration: Until Therapy Goals Met    DC Equipment Recommendations: Unable to determine at this time  Discharge Recommendations: Recommend post-acute placement for additional occupational therapy services prior to discharge home     Objective     06/17/25 1604   Precautions   Medical Fall Risk   Swallowing NG Tube   Pain 0 - 10 Group   Therapist Pain Assessment Post Activity Pain Same as Prior to Activity;Nurse Notified;0   Cognition    Cognition / Consciousness X   Speech/ Communication Delayed Responses   Level of Consciousness Alert   Attention Impaired   Comments flat affect, slow movement   Strength Upper Body   Upper Body Strength  X   Gross Strength Generalized Weakness, Equal Bilaterally.    Balance   Sitting Balance (Static) Fair +   Sitting Balance (Dynamic) Fair +   Standing Balance (Static) Fair   Standing Balance (Dynamic) Fair   Weight Shift Sitting Good   Weight Shift Standing Fair   Skilled Intervention Verbal Cuing   Comments standing with IV pole   Activities of Daily Living   Eating   (NG tube)   Grooming Standing;Standby  Assist  (handwashing at sink)   Lower Body Dressing Standby Assist  (doffed socks and donned clean socks)   Toileting Supervision   Skilled Intervention Verbal Cuing;Compensatory Strategies   How much help from another person does the patient currently need...   Putting on and taking off regular lower body clothing? 3   Bathing (including washing, rinsing, and drying)? 3   Toileting, which includes using a toilet, bedpan, or urinal? 3   Putting on and taking off regular upper body clothing? 3   Taking care of personal grooming such as brushing teeth? 3   Eating meals? 4   6 Clicks Daily Activity Score 19   Functional Mobility   Sit to Stand Contact Guard Assist   Bed, Chair, Wheelchair Transfer Contact Guard Assist   Toilet Transfers Supervised   Transfer Method Stand Step   Mobility holding IV pole for support   Activity Tolerance   Sitting in Chair 10+min   Standing 6min   Short Term Goals   Short Term Goal # 1 Pt will complete ADL transfers with supervision   Goal Outcome # 1 Progressing as expected   Short Term Goal # 2 Pt will complete LB dressing with supervision   Goal Outcome # 2 Goal met   Short Term Goal # 3 Pt will complete toileting with supervision   Goal Outcome # 3 Goal met   Education Group   Education Provided Role of Occupational Therapist;Activities of Daily Living;Transfers   Role of Occupational Therapist Patient Response Patient;Acceptance;Explanation;Verbal Demonstration   Transfers Patient Response Patient;Acceptance;Explanation;Verbal Demonstration   ADL Patient Response Patient;Acceptance;Explanation;Verbal Demonstration   Occupational Therapy Treatment Plan    O.T. Treatment Plan Continue Current Treatment Plan   Anticipated Discharge Equipment and Recommendations   DC Equipment Recommendations Unable to determine at this time   Discharge Recommendations Recommend post-acute placement for additional occupational therapy services prior to discharge home   Interdisciplinary Plan of Care  Collaboration   IDT Collaboration with  Nursing   Patient Position at End of Therapy Seated;Chair Alarm On;Call Light within Reach;Tray Table within Reach;Phone within Reach

## 2025-06-17 NOTE — PROGRESS NOTES
"    FAMILY MEDICINE PROGRESS NOTE          PATIENT ID:  NAME:  Rakan Rader  MRN:               [id number]  YOB: 1965    Date of Admission: 6/8/2025     Attending: Zachary Harmon M.d.     Resident: Porsha Dye D.O. (PGY-2)    Primary Care Physician:  Emeterio Dvae M.D.    HPI:  \"Ray\" Prasanth is a 60 y.o. male with hx Parkinson's Disease with DBS, prostate cancer presented with abdominal pain after underwent RA laparoscopic prostatectomy 5/28; found to have urinary ascites and 7cm R pelvic hematoma s/p diagnostic laparoscopy and hematoma washout 6/10 on hospital day 9    Interval Problem Update  Pt to EGD this AM with Dr. Bloom.   SLP with plans to eval swallow function this afternoon     SUBJECTIVE:   No acute events overnight, pt states that his mouth is dry and would like water. Does have an appetite, eventually would like to eat again. States that he does not like the nurses waking him up overnight to turn and for lab draws. Otherwise has good pain control and reports no concerns.     OBJECTIVE:  Temp:  [36.1 °C (97 °F)-37.2 °C (98.9 °F)] 36.9 °C (98.5 °F)  Pulse:  [76-95] 76  Resp:  [15-16] 16  BP: (104-123)/(71-81) 123/81  SpO2:  [95 %-97 %] 95 %      Intake/Output Summary (Last 24 hours) at 6/17/2025 1407  Last data filed at 6/17/2025 1101  Gross per 24 hour   Intake 460 ml   Output 900 ml   Net -440 ml       Physical Exam:  Gen:  NAD, sitting up in chair comfortably, texting with breakfast at bedside   HEENT: MMM, EOMI, NG tube in place   Cardio: RRR, clear s1/s2, no murmur  Resp:  Equal bilat, clear to auscultation  GI/: Soft, non-distended, minimal TTP, normal bowel sounds, no guarding/rebound, CRIS drain to RLQ intact, luz catheter with 200cc red urine out   Neuro: Non-focal, Gross intact, no deficits  Skin/Extremities: Cap refill <3sec, warm/well perfused, no rash, normal extremities    LABS:  Recent Labs     06/15/25  0552 06/16/25  0237 06/17/25  0700   WBC 6.0 6.2 7.2   RBC 2.94* " 2.84* 2.97*   HEMOGLOBIN 8.4* 8.2* 8.5*   HEMATOCRIT 27.6* 26.4* 27.6*   MCV 93.9 93.0 92.9   MCH 28.6 28.9 28.6   RDW 50.4* 49.3 50.3*   PLATELETCT 311 423 529*   MPV 10.4 10.8 10.8   NEUTSPOLYS  --   --  76.30*   LYMPHOCYTES  --   --  11.10*   MONOCYTES  --   --  7.80   EOSINOPHILS  --   --  1.70   BASOPHILS  --   --  0.30     Recent Labs     06/15/25  0552 06/16/25  0237 06/17/25  0700   SODIUM 143 146* 140   POTASSIUM 3.6 3.7 3.5*   CHLORIDE 113* 115* 112   CO2 24 24 24   BUN 14 12 12   CREATININE 0.65 0.63 0.57   CALCIUM 7.3* 7.5* 7.4*   MAGNESIUM 2.2 2.1 2.2   PHOSPHORUS 1.7* 1.5* 1.6*   ALBUMIN 2.1* 2.4* 2.5*     Estimated GFR/CRCL = Estimated Creatinine Clearance: 115.5 mL/min (by C-G formula based on SCr of 0.57 mg/dL).  Recent Labs     06/15/25  0552 06/16/25  0237 06/17/25  0700   GLUCOSE 123* 94 108*     Recent Labs     06/15/25  0552 06/16/25  0237 06/17/25  0700   ASTSGOT 251* 201* 138*   ALTSGPT 236* 319* 98*   TBILIRUBIN 0.3 0.4 0.4   ALKPHOSPHAT 158* 169* 164*   GLOBULIN 2.6 2.4 2.6   INR  --  1.02  --              Recent Labs     06/16/25 0237   INR 1.02   APTT 28.5         IMAGING:  DX-ABDOMEN FOR TUBE PLACEMENT   Final Result      Feeding tube tip projects within the duodenum.      US-RUQ   Final Result         1.  Hepatomegaly   2.  Gallbladder sludge with gallbladder wall thickening, consider acalculous cholecystitis, could be further evaluated with HIDA scan as clinically appropriate.   3.  Trace abdominal ascites   4.  Small right pleural effusion      [id number] VIEW   Final Result      Digitized intraoperative radiograph is submitted for review. This examination is not for diagnostic purpose but for guidance during a surgical procedure. Please see the patient's chart for full procedural details.         INTERPRETING LOCATION: [address] [place] [address], [place], [address]      DX-PORTABLE FLUOROSCOPY < 1 HOUR Reason For Exam: Main OR   Final Result      Portable fluoroscopy utilized for 6 seconds.          INTERPRETING LOCATION: [address] [facility], [facility], [address]      CT-Cystogram   Final Result         1.  Diffuse bladder wall thickening, consider changes of cystitis.   2.  Contrast within the peritoneal cavity, compatible with intraperitoneal bladder rupture.   3.  Small foci of intra-abdominal free air, likely related to bladder rupture although radiographic appearance cannot exclude viscus perforation.   4.  Diffuse small bowel wall thickening, appearance suggests changes of enteritis.      These findings were discussed with the patient's clinician, Siena Oakley, on 6/10/2025 6:57 AM.      CT-IMAGE-GUIDED DRAIN PERITONEAL   Final Result      1.  CT GUIDED PLACEMENT OF A PERCUTANEOUS DRAINAGE CATHETER INTO THE PERITONEAL SPACE.   2.  THE CURRENT PLAN IS TO MONITOR DRAINAGE OUTPUT AND OBTAIN A FOLLOWUP CT SCAN IN 5-7 DAYS IF CLINICALLY INDICATED.      CT-ABDOMEN-PELVIS W/O   Final Result         1. 7 cm right pelvic high density mass may be a hematoma. This displaces the bladder to the left.      2. Large free peritoneal fluid in abdomen and pelvis.      3. Bladder empty. No hydronephrosis.      4. Mild ileus-type fluid in stomach and small bowel. Fluid-filled distal esophagus suggests reflux. No colon distention. Normal appendix.      5. Possible sludge in gallbladder. No inflammation or ductal dilatation.      6. Small indeterminate 8 mm liver lesion may be a benign cyst.                  DX-CHEST-PORTABLE (1 VIEW)   Final Result         1. No acute abnormalities evident.      2. Right chest wall battery pack with wire extending to the neck.                         CULTURES:   Results       Procedure Component Value Units Date/Time    Blood Culture - Draw one from central line and one from peripheral site [[id number]] Collected: 06/08/25 0513    Order Status: Completed Specimen: Blood from Peripheral Updated: 06/13/25 0700     Significant Indicator NEG     Source BLD     Site PERIPHERAL     Culture Result No growth  after 5 days of incubation.    Blood Culture - Draw one from central line and one from peripheral site [[id number]] Collected: 06/08/25 0540    Order Status: Completed Specimen: Blood from Line Updated: 06/13/25 0700     Significant Indicator NEG     Source BLD     Site Peripheral     Culture Result No growth after 5 days of incubation.    Cdiff By PCR Rflx Toxin [[id number]]     Order Status: Canceled Specimen: Stool     FLUID CULTURE W/GRAM STAIN [[id number]] Collected: 06/08/25 1415    Order Status: Completed Specimen: Body Fluid Updated: 06/11/25 0757     Significant Indicator NEG     Source BF     Site Peritoneal  drain aspiration     Culture Result No growth at 72 hours.     Gram Stain Result Moderate WBCs.  No organisms seen.              MEDS:  Current Facility-Administered Medications   Medication Last Admin    potassium phosphate IVPB 30 mmol in 500 mL D5W (premix) 30 mmol at 06/17/25 0858    pantoprazole (Protonix) injection 40 mg 40 mg at 06/17/25 0609    sucralfate (Carafate) 1 GM/10ML suspension 1 g 1 g at 06/17/25 1201    Pharmacy Consult: Enteral tube insertion - review meds/change route/product selection      thiamine (B-1) injection 100 mg 100 mg at 06/16/25 1754    amoxicillin-clavulanate (Augmentin) 875-125 MG per tablet 1 Tablet 1 Tablet at 06/17/25 0609    oxyCODONE immediate-release (Roxicodone) tablet 2.5 mg      Or    oxyCODONE immediate-release (Roxicodone) tablet 5 mg 5 mg at 06/14/25 2216    Or    HYDROmorphone (Dilaudid) injection 0.5 mg 0.5 mg at 06/14/25 2327    oxybutynin SR (Ditropan-XL) tablet 10 mg 10 mg at 06/15/25 0547    heparin injection 5,000 Units 5,000 Units at 06/15/25 0551    ondansetron (Zofran) syringe/vial injection 4 mg 4 mg at 06/14/25 0429    carbidopa-levodopa (Sinemet)  MG tablet 1 Tablet 1 Tablet at 06/17/25 0858    vitamin D3 (Cholecalciferol) tablet 4,000 Units 4,000 Units at 06/15/25 0547       ASSESSMENT/PLAN:  60 y.o. male admitted for:  * Abdominal  pain  Assessment & Plan  Acute, improving. Pain 0/10 over the past 24 hours.   -Pt is post op s/p cysto, on table cystogram, complex catheterization and laparoscopic washout of hematoma and abdominal cavity on 6/10/25 with Dr. Cesar    Plan:  - NG feedings; add regular diet and thin liquids per SLP  - Dietary consultation: trial of tube feed reduction to 50% of goal and monitor PO intake; if PO improves can attempt to hold tube feeds   - Pain control with PRN Oxycodone, IV dilaudid   - Antiemetics with Zofran, Reglan  - Carafate  - PPI BID   - Bowel regimen   - Urology following, appreciate recommendations:  -Ditropan 10mg ER daily for bladder spasms  -Juarez and CRIS drain to remain for a minimum of 3 weeks and then a fluoroscopic cystogram will be performed to assess for resolution of leak.  -CRIS to be removed by Urology outpatient   -Continue Augmentin 6/14-6/24    Esophagitis determined by endoscopy  Assessment & Plan  Erosive esophagitis s/p EGD 6/16/2025 by Dr. Bloom.   -Appreciate SLP evaluation: patient reporting improvement, will trial regular diet and thin liquids   -Dietary consultation for NG and tube feed planning; if able to maintain nutrition can likely pull NGT  -PPI BID  -Carafate  -Await biopsies  -Can f/u outpatient with [facility]     Malnutrition (HCC)  Assessment & Plan  Significant malnutrition with muscle wasting, NG tube placed this admission.    Plan:  - NG tube feeding with Iris tube   - SLP eval 6/16 following EGD - able to tolerate regular diet with thin liquids; no airway infiltration    - Thiamine supplement 100mg daily  - Monitor labs: K, PO4, Mg and replete as necessary   - Will reintroduce solid foods and reassess nutrition status; if able to maintain nutrition with oral only while tube feeds are paused can consider discontinuing NG tube feeds   - Appreciate dietary recommendations       Anemia  Assessment & Plan  Acute blood loss anemia in the setting of robotic prostatectomy 5/28,  hemoglobin postoperatively ~7. Now up to 10, downtrended throughout hospitalization slowly. Extensive flank and LE bruising. Abdominal drain has minimal output  Currently on heparin    Plan:  -Trend H&H, transfusion threshold less than 7  -Vital signs every 4 hours  - Check coags  -I If worsening may consider abdominal imaging to ascertain if any bleeding    Elevated liver enzymes  Assessment & Plan  Acute, Resolving. Likely shock liver due to illness. GI consulted and agreed. Recommended trending and no further investigation at this time.   Overall down-trending AST; stable ALP and slight elevation in ALT.   -Trend CMP  -Consider HIDA scan if not improving     Prostate cancer (HCC)- (present on admission)  Assessment & Plan  S/p robotic prostatectomy 5/28/2025, Tiara score 7 (4+3) per pathology report    History of tobacco abuse- (present on admission)  Assessment & Plan  History of tobacco use as a former smoker at least 30 years ago, 5 pack years.  Currently chewing tobacco, last use 1 week ago.  Patient reports minimal cravings.  - Can order nicotine patch if needed for cravings  - Tobacco use cessation counseling    Liver lesion- (present on admission)  Assessment & Plan  Incidental liver lesion 8 mm on CT this admission  - Recommend outpatient follow-up    Parkinson's disease with dyskinesia and fluctuating manifestations (HCC)- (present on admission)  Assessment & Plan  Chronic, has deep brain stimulator, battery implanted at chest.   Continue home carbidopa-levodopa          Core Measures:  Fluids: PO   Lines: NG tube, PIV, luz, CRIS   Abx: Augmentin   Diet: Regular, thin liquids, NGT feeds   PPX: SCD, heparin     CODE Status: Full Code      Disposition  Inpatient       Porsha Dye D.O.   PGY-2  [facility]

## 2025-06-17 NOTE — PROGRESS NOTES
Urology Progress Note    6/16 Pt in OR during rounds this was a chart check only. CRIS drain minimal, Good UOP. Labs stable     6/15-Pt is sleeping on rounds. He has an NG tube in place. He is afebrile, luz is draining yellow urine, CRIS drain with 23cc/24 hrs. WBC improving now 6.0, Hgb: 8.4, creatinine 0.65. Pt declined examination for his abdomen. AST improved now 251 (474), ALT has increased 236 (84). Alk phos: 158.    6/14- Pt is lying comfortably in bed. He was seen by speech therapy for a swallow evaluation, unfortunately the patient experience severe mid esophageal pain. He continue to have hicupps.Surgical the patient is doing well, abdominal drain with minimal output, luz draining yellow urine. WBC 4.0, Hgb: 8.7, creatinine 0.80, AST and ALT are improving, 474/97.     6/13-Pt is lying in bed in NAD. Luz catheter draining clear yellow urine. CRIS drain with 45cc of SS of exam. He notes that his hiccups have return, he has not been able to ambulate yet w/PT. He is afebrile, UOP 3000cc/24hrs, creatinine 1. WB: 2.8, Hgb: 9.2 and HCT 28.9. G.I was consulted for elevated liver enzymes.     6/12-Pt is sleeping soundly on exam. Luz catheter in place draining watermelon urine, no clots. UOP 1200cc/24hrs. Abdominal drain 2100cc/24hrs of SS. Abdominal drain had minimal output on exam. Afebrile, WBC: 1.4, Hgb: 9.2, Hct: 29.1, creatinine improved,now 1.16. He is now on a full liquid diet along with ensure. Abdominal incision c/d/I.     6/11-Pt lying comfortably in bed in NAD. RN at bedside. Pt is POD1 s/p cysto, on table cystogram, complex catheterization and laparoscopic washout of hematoma and abdominal cavity on 6/10/25 with Dr. Cesar. He is afebrile, hypertensive at 145/94, tachycardic 100, UOP 700cc/24hrs, WBC: 2.9, Hgb: 11.3, creatinine elevated 1.97. Pt notes his hiccups have resolved and feels that he is due for a BM today.     6/10- Pt is lying comfortably in bed in NAD. He is afebrile. UOP  "2100cc/24hrs, drain output 1185cc/24hrs. He remain on Zosyn and is NPO. CT cystogram on 6/10 showed contrast within the peritoneal cavity compatible with an intraperitoneal bladder rupture. WBC: 3.8, Hgb: 10.4, Hct: 31.5, creatinine increased, now 1.63 (1.19). Pt is schedule to go to the OR today with Dr. Cesar for a bladder repair.     6/9-S/p cysto luz placement over a wire on 6/8/25 performed by Dr. Todd and IR abdominal drain placement on 6/8. Pt is lying comfortably in bed in NAD. IR APN at bedside assessing CRIS drain. Tmax 99.2, pt is tachycardic at 105. Normotensive at 136/84. Documented abdominal drain output 3100mL/24hrs, drainage is dark/old SS. He remains on Zosyn. Luz catheter is drainage clear yellow urine, good UOP on exam. WBC: 5.3, Hgb: 9.8, No updated creatinine as of yet, however his creatinine from 6/8/25 is 2.48 (4.79). Pt denies f/c/n/v     Overnight Events: None    S:    O:   /74   Pulse 76   Temp 36.1 °C (97 °F) (Temporal)   Resp 16   Ht 1.727 m (5' 8\")   Wt 57.8 kg (127 lb 6.8 oz)   SpO2 97%   Recent Labs     06/14/25  0026 06/15/25  0552 06/16/25  0237   SODIUM 148* 143 146*   POTASSIUM 3.8 3.6 3.7   CHLORIDE 117* 113* 115*   CO2 24 24 24   GLUCOSE 88 123* 94   BUN 21 14 12   CREATININE 0.80 0.65 0.63   CALCIUM 7.5* 7.3* 7.5*     Recent Labs     06/14/25  0026 06/15/25  0552 06/16/25  0237   WBC 4.0* 6.0 6.2   RBC 2.98* 2.94* 2.84*   HEMOGLOBIN 8.7* 8.4* 8.2*   HEMATOCRIT 27.8* 27.6* 26.4*   MCV 93.3 93.9 93.0   MCH 29.2 28.6 28.9   MCHC 31.3* 30.4* 31.1*   RDW 50.3* 50.4* 49.3   PLATELETCT 223 311 423   MPV 10.6 10.4 10.8         Intake/Output Summary (Last 24 hours) at 6/9/2025 1008  Last data filed at 6/9/2025 0822  Gross per 24 hour   Intake 840 ml   Output 3100 ml   Net -2260 ml       Exam:    NG tube is present  Abdominal: pt declined abdominal examination today. CRIS with minimal output.   Urine: luz draining yellow urine    A/P:    Active Hospital Problems    " Diagnosis     Esophagitis determined by endoscopy [K20.90]     Malnutrition (HCC) [E46]     Elevated liver enzymes [R74.8]     Anemia [D64.9]     Liver lesion [K76.9]     Abdominal pain [R10.9]     Prostate cancer (HCC) [C61]     History of tobacco abuse [Z87.891]     Parkinson's disease with dyskinesia and fluctuating manifestations (HCC) [G20.B2]      -Nutrition: Continue NG feeds has he has not been able to tolerat PO intake due to severe esophageal pain. This will need to be in place for 4-5 days from 6/14/25.  -Continue to trend renal function.   -Continue Ditropan 10mg ER daily for bladder spasms  -Monitor urine output and abdominal drain output.   -Urology will continue to follow the patient during this hospital stay.     -Patient's case was discussed with Dr. Cesar who have directed the plan of care for the patient.      Lizz Boland, P.A.-C.   [address] [place] [address]   [phone]

## 2025-06-17 NOTE — PROGRESS NOTES
Patient report received and assumed care. Assessed patient at 0815. Patient is Aox4 and reports no pain. Patient had NG tube in left nare. Patient x1 assist to bathroom. Patient has CRIS drain in RLQ, bloody drainage noted. Incision is CDI. Patient had luz in place. Urine is renny in color. Patient bed is in low, locked position with bed alarm on. Standard fall precautions are in place. Personal belonging and call light are within reach. Patient reinforced to use call light when needed.

## 2025-06-17 NOTE — DIETARY
Nutrition Services Brief Update:    Current tube feed running Jevity 1.2 @ goal rate 60 mL/hr to provide 1728 kcal, 80 gm protein, and 1162 mL free water daily.     Potassium 3.5, phosphorous 1.6. Receiving potassium phosphate and thiamine per MAR - refeeding protocol.    Pt seen by SLP yesterday, cleared for regular diet with thin liquids. Has consumed <25% of meals since diet ordered.    Problem: Nutritional:  Goal: Nutrition support tolerated and meeting greater than 85% of estimated needs  Outcome: met    Recommendations:  Decreased tube feed to 50% of goal (30 mL/hr) to promote hunger with goal of increased PO intake ~50% of meals.  If pt able to consistently consume >50% of meals, would then recommend discontinuing tube feed  Continue refeeding protocol  Monitor weight  Document PO intake as % in ADLs      RD following

## 2025-06-17 NOTE — CARE PLAN
The patient is Stable - Low risk of patient condition declining or worsening    Shift Goals  Clinical Goals: EGD, monitor CRIS drain and luz  Patient Goals: eat  Family Goals: HOLLAND    Progress made toward(s) clinical / shift goals:  EGD completed, CRIS & luz monitored.    Problem: Pain - Standard  Goal: Alleviation of pain or a reduction in pain to the patient’s comfort goal  Outcome: Progressing     Problem: Knowledge Deficit - Standard  Goal: Patient and family/care givers will demonstrate understanding of plan of care, disease process/condition, diagnostic tests and medications  Outcome: Progressing     Problem: Fall Risk  Goal: Patient will remain free from falls  Outcome: Progressing       Patient is not progressing towards the following goals:

## 2025-06-18 LAB
ALBUMIN SERPL BCP-MCNC: 2.8 G/DL (ref 3.2–4.9)
ALBUMIN/GLOB SERPL: 0.9 G/DL
ALP SERPL-CCNC: 170 U/L (ref 30–99)
ALT SERPL-CCNC: 249 U/L (ref 2–50)
ANION GAP SERPL CALC-SCNC: 8 MMOL/L (ref 7–16)
AST SERPL-CCNC: 116 U/L (ref 12–45)
BASOPHILS # BLD AUTO: 0.5 % (ref 0–1.8)
BASOPHILS # BLD: 0.05 K/UL (ref 0–0.12)
BILIRUB SERPL-MCNC: 0.4 MG/DL (ref 0.1–1.5)
BUN SERPL-MCNC: 11 MG/DL (ref 8–22)
CALCIUM ALBUM COR SERPL-MCNC: 8.8 MG/DL (ref 8.5–10.5)
CALCIUM SERPL-MCNC: 7.8 MG/DL (ref 8.5–10.5)
CHLORIDE SERPL-SCNC: 108 MMOL/L (ref 96–112)
CO2 SERPL-SCNC: 23 MMOL/L (ref 20–33)
CREAT SERPL-MCNC: 0.59 MG/DL (ref 0.5–1.4)
EOSINOPHIL # BLD AUTO: 0.16 K/UL (ref 0–0.51)
EOSINOPHIL NFR BLD: 1.7 % (ref 0–6.9)
ERYTHROCYTE [DISTWIDTH] IN BLOOD BY AUTOMATED COUNT: 52.7 FL (ref 35.9–50)
GFR SERPLBLD CREATININE-BSD FMLA CKD-EPI: 112 ML/MIN/1.73 M 2
GLOBULIN SER CALC-MCNC: 3 G/DL (ref 1.9–3.5)
GLUCOSE SERPL-MCNC: 103 MG/DL (ref 65–99)
HCT VFR BLD AUTO: 30.7 % (ref 42–52)
HGB BLD-MCNC: 9.8 G/DL (ref 14–18)
IMM GRANULOCYTES # BLD AUTO: 0.21 K/UL (ref 0–0.11)
IMM GRANULOCYTES NFR BLD AUTO: 2.2 % (ref 0–0.9)
LYMPHOCYTES # BLD AUTO: 0.95 K/UL (ref 1–4.8)
LYMPHOCYTES NFR BLD: 10.1 % (ref 22–41)
MAGNESIUM SERPL-MCNC: 2.2 MG/DL (ref 1.5–2.5)
MCH RBC QN AUTO: 29.6 PG (ref 27–33)
MCHC RBC AUTO-ENTMCNC: 31.9 G/DL (ref 32.3–36.5)
MCV RBC AUTO: 92.7 FL (ref 81.4–97.8)
MONOCYTES # BLD AUTO: 0.78 K/UL (ref 0–0.85)
MONOCYTES NFR BLD AUTO: 8.3 % (ref 0–13.4)
NEUTROPHILS # BLD AUTO: 7.23 K/UL (ref 1.82–7.42)
NEUTROPHILS NFR BLD: 77.2 % (ref 44–72)
NRBC # BLD AUTO: 0 K/UL
NRBC BLD-RTO: 0 /100 WBC (ref 0–0.2)
PHOSPHATE SERPL-MCNC: 1.8 MG/DL (ref 2.5–4.5)
PLATELET # BLD AUTO: 688 K/UL (ref 164–446)
PMV BLD AUTO: 10.7 FL (ref 9–12.9)
POTASSIUM SERPL-SCNC: 3.7 MMOL/L (ref 3.6–5.5)
PROT SERPL-MCNC: 5.8 G/DL (ref 6–8.2)
RBC # BLD AUTO: 3.31 M/UL (ref 4.7–6.1)
SODIUM SERPL-SCNC: 139 MMOL/L (ref 135–145)
WBC # BLD AUTO: 9.4 K/UL (ref 4.8–10.8)

## 2025-06-18 PROCEDURE — 700111 HCHG RX REV CODE 636 W/ 250 OVERRIDE (IP)

## 2025-06-18 PROCEDURE — A9270 NON-COVERED ITEM OR SERVICE: HCPCS

## 2025-06-18 PROCEDURE — 700102 HCHG RX REV CODE 250 W/ 637 OVERRIDE(OP)

## 2025-06-18 PROCEDURE — 99232 SBSQ HOSP IP/OBS MODERATE 35: CPT | Mod: GC | Performed by: STUDENT IN AN ORGANIZED HEALTH CARE EDUCATION/TRAINING PROGRAM

## 2025-06-18 PROCEDURE — 97530 THERAPEUTIC ACTIVITIES: CPT

## 2025-06-18 PROCEDURE — 83735 ASSAY OF MAGNESIUM: CPT

## 2025-06-18 PROCEDURE — 80053 COMPREHEN METABOLIC PANEL: CPT

## 2025-06-18 PROCEDURE — 36415 COLL VENOUS BLD VENIPUNCTURE: CPT

## 2025-06-18 PROCEDURE — 770006 HCHG ROOM/CARE - MED/SURG/GYN SEMI*

## 2025-06-18 PROCEDURE — 84100 ASSAY OF PHOSPHORUS: CPT

## 2025-06-18 PROCEDURE — 85025 COMPLETE CBC W/AUTO DIFF WBC: CPT

## 2025-06-18 RX ORDER — OMEPRAZOLE 20 MG/1
20 CAPSULE, DELAYED RELEASE ORAL 2 TIMES DAILY
Status: DISCONTINUED | OUTPATIENT
Start: 2025-06-18 | End: 2025-06-18

## 2025-06-18 RX ORDER — OMEPRAZOLE 20 MG/1
40 CAPSULE, DELAYED RELEASE ORAL 2 TIMES DAILY
Status: DISCONTINUED | OUTPATIENT
Start: 2025-06-18 | End: 2025-06-20 | Stop reason: HOSPADM

## 2025-06-18 RX ORDER — HYDROMORPHONE HYDROCHLORIDE 1 MG/ML
0.5 INJECTION, SOLUTION INTRAMUSCULAR; INTRAVENOUS; SUBCUTANEOUS
Status: DISCONTINUED | OUTPATIENT
Start: 2025-06-18 | End: 2025-06-19

## 2025-06-18 RX ADMIN — AMOXICILLIN AND CLAVULANATE POTASSIUM 1 TABLET: 875; 125 TABLET, FILM COATED ORAL at 05:40

## 2025-06-18 RX ADMIN — CARBIDOPA AND LEVODOPA 1 TABLET: 25; 100 TABLET ORAL at 09:07

## 2025-06-18 RX ADMIN — HEPARIN SODIUM 5000 UNITS: 5000 INJECTION, SOLUTION INTRAVENOUS; SUBCUTANEOUS at 05:42

## 2025-06-18 RX ADMIN — SUCRALFATE ORAL SUSPENSION 1 G: 1 SUSPENSION ORAL at 17:25

## 2025-06-18 RX ADMIN — OXYCODONE 5 MG: 5 TABLET ORAL at 21:29

## 2025-06-18 RX ADMIN — SUCRALFATE ORAL SUSPENSION 1 G: 1 SUSPENSION ORAL at 12:14

## 2025-06-18 RX ADMIN — AMOXICILLIN AND CLAVULANATE POTASSIUM 1 TABLET: 875; 125 TABLET, FILM COATED ORAL at 17:25

## 2025-06-18 RX ADMIN — SUCRALFATE ORAL SUSPENSION 1 G: 1 SUSPENSION ORAL at 05:42

## 2025-06-18 RX ADMIN — OMEPRAZOLE 40 MG: 20 CAPSULE, DELAYED RELEASE ORAL at 17:25

## 2025-06-18 RX ADMIN — HYDROMORPHONE HYDROCHLORIDE 0.5 MG: 1 INJECTION, SOLUTION INTRAMUSCULAR; INTRAVENOUS; SUBCUTANEOUS at 23:32

## 2025-06-18 RX ADMIN — Medication 4000 UNITS: at 05:38

## 2025-06-18 RX ADMIN — OXYCODONE 5 MG: 5 TABLET ORAL at 16:26

## 2025-06-18 RX ADMIN — CARBIDOPA AND LEVODOPA 1 TABLET: 25; 100 TABLET ORAL at 14:57

## 2025-06-18 RX ADMIN — HYDROMORPHONE HYDROCHLORIDE 0.5 MG: 1 INJECTION, SOLUTION INTRAMUSCULAR; INTRAVENOUS; SUBCUTANEOUS at 17:25

## 2025-06-18 RX ADMIN — SUCRALFATE ORAL SUSPENSION 1 G: 1 SUSPENSION ORAL at 23:32

## 2025-06-18 ASSESSMENT — PAIN DESCRIPTION - PAIN TYPE
TYPE: ACUTE PAIN

## 2025-06-18 ASSESSMENT — COGNITIVE AND FUNCTIONAL STATUS - GENERAL
TURNING FROM BACK TO SIDE WHILE IN FLAT BAD: A LITTLE
MOVING TO AND FROM BED TO CHAIR: A LITTLE
MOBILITY SCORE: 18
MOVING FROM LYING ON BACK TO SITTING ON SIDE OF FLAT BED: A LITTLE
WALKING IN HOSPITAL ROOM: A LITTLE
SUGGESTED CMS G CODE MODIFIER MOBILITY: CK
STANDING UP FROM CHAIR USING ARMS: A LITTLE
CLIMB 3 TO 5 STEPS WITH RAILING: A LITTLE

## 2025-06-18 ASSESSMENT — GAIT ASSESSMENTS
DISTANCE (FEET): 60
ASSISTIVE DEVICE: OTHER (COMMENTS)
DEVIATION: BRADYKINETIC;SHUFFLED GAIT
GAIT LEVEL OF ASSIST: CONTACT GUARD ASSIST

## 2025-06-18 NOTE — PROGRESS NOTES
Pt refused all evening medications. Pt education provided on the importance of each medication. Pt still refused.

## 2025-06-18 NOTE — PROGRESS NOTES
Patient report received and assumed care. Assessed patient at 0715. Patient is Aox4 and reports 3/10 pain in abdomen. Patient has TPN running at 30ml/hr and is on regular diet. Patient has luz in place. Patient had CRIS drain in RLQ. Dressing is CDI. Patient is SBA to bathroom. Patient bed is in low, locked position with bed alarm on. Standard fall precautions are in place. Personal belonging and call light are within reach. Patient reinforced to use call light when needed.

## 2025-06-18 NOTE — THERAPY
Physical Therapy   Daily Treatment     Patient Name:  Rakan Rader III  Age:  60 y.o., Sex:  male  Medical Record #:  [id number]  Today's Date: 6/18/2025     Precautions  Medical: Fall Risk  Surgical: Drain (CRIS drain to RLQ)  Swallowing: NG Tube    Assessment    Pt seen for PT tx session. Able to walk 60 ft with IV pole support and CGA, no overt LOB. Pt remains slow moving with grossly delayed cog. Pt is also limited by generalized weakness, impaired safety awareness, impaired balance, and decreased endurance. Will follow.    Plan    Treatment Plan Status: Continue Current Treatment Plan  Type of Treatment: Bed Mobility, Gait Training, Therapeutic Activities  Treatment Frequency: 4 Times per Week  Treatment Duration: Until Therapy Goals Met    DC Equipment Recommendations: Unable to determine at this time  Discharge Recommendations: Recommend post-acute placement for additional physical therapy services prior to discharge home      Subjective    Pt received standing in his room with chair alarm going off and requested to use the bathroom. Agreeable to participate.     Objective       06/18/25 1035   Precautions   Medical Fall Risk   Surgical Drain  (CRIS drain to RLQ)   Swallowing NG Tube   Vitals   O2 Delivery Device None - Room Air   Pain 0 - 10 Group   Therapist Pain Assessment Post Activity Pain Same as Prior to Activity;Nurse Notified  (no c/o pain)   Cognition    Cognition / Consciousness X   Speech/ Communication Delayed Responses   Level of Consciousness Alert   Safety Awareness Impaired   New Learning Impaired   Attention Impaired   Initiation Impaired   Comments pleasant and cooperative, grossly delayed cog, slow movements   Balance   Sitting Balance (Static) Fair +   Sitting Balance (Dynamic) Fair +   Standing Balance (Static) Fair   Standing Balance (Dynamic) Fair -   Weight Shift Sitting Fair   Weight Shift Standing Fair   Skilled Intervention Compensatory Strategies;Sequencing;Tactile Cuing;Verbal  Cuing   Comments IV pole support   Bed Mobility    Supine to Sit Standby Assist   Sit to Supine Standby Assist   Scooting Standby Assist   Skilled Intervention Compensatory Strategies;Sequencing;Verbal Cuing   Comments HOBE   Gait Analysis   Gait Level Of Assist Contact Guard Assist   Assistive Device Other (Comments)  (IV pole support)   Distance (Feet) 60   # of Times Distance was Traveled 1   Deviation Bradykinetic;Shuffled Gait   # of Stairs Climbed 0   Skilled Intervention Compensatory Strategies;Tactile Cuing;Verbal Cuing   Comments slow pace   Functional Mobility   Sit to Stand Standby Assist   Bed, Chair, Wheelchair Transfer Standby Assist   Toilet Transfers Standby Assist   Transfer Method Stand Step   Mobility standing in room>toilet with IV pole support>bed>up in hallway with IV pole support>bed   Skilled Intervention Compensatory Strategies;Sequencing;Verbal Cuing   Comments required cues to bring IV pole with multiple lines with him rather than leave it behind x1 time   6 Clicks Assessment - How much HELP from from another person do you currently need... (If the patient hasn't done an activity recently, how much help from another person do you think he/she would need if he/she tried?)   Turning from your back to your side while in a flat bed without using bedrails? 3   Moving from lying on your back to sitting on the side of a flat bed without using bedrails? 3   Moving to and from a bed to a chair (including a wheelchair)? 3   Standing up from a chair using your arms (e.g., wheelchair, or bedside chair)? 3   Walking in hospital room? 3   Climbing 3-5 steps with a railing? 3   6 clicks Mobility Score 18   Short Term Goals    Short Term Goal # 1 Pt to move supine to/from eob w/ spv in 6 visits   Goal Outcome # 1 goal not met   Short Term Goal # 2 Pt to move sit to/from stand w/ spv in 6 visits   Goal Outcome # 2 Goal not met   Short Term Goal # 3 Pt to ambulate 150 ft w/ fww and spv in 6 visits   Goal  Outcome # 3 Goal not met   Physical Therapy Treatment Plan   Physical Therapy Treatment Plan Continue Current Treatment Plan   Anticipated Discharge Equipment and Recommendations   DC Equipment Recommendations Unable to determine at this time   Discharge Recommendations Recommend post-acute placement for additional physical therapy services prior to discharge home   Interdisciplinary Plan of Care Collaboration   IDT Collaboration with  Nursing   Patient Position at End of Therapy In Bed;Bed Alarm On;Call Light within Reach;Tray Table within Reach;Phone within Reach   Collaboration Comments RN updated   Session Information   Date / Session Number  6/18- 3(1/4, 6/22)

## 2025-06-18 NOTE — PROGRESS NOTES
Report received from RN, assumed care of pt. Pt is A&O x 4, on RA, resting in bed. Pt states pain is 5/10 using NRS pain scale. Medicated per MAR. No other needs at this time. Bed is locked and in lowest position, call light is within reach, fall precautions are in place, safety education provided.

## 2025-06-18 NOTE — CARE PLAN
The patient is Stable - Low risk of patient condition declining or worsening    Shift Goals  Clinical Goals: Patient will consume 75% orgreater of meals during course of shift  Patient Goals: rest, NG tube removal  Family Goals: HOLLAND    Progress made toward(s) clinical / shift goals:      Problem: Knowledge Deficit - Standard  Goal: Patient and family/care givers will demonstrate understanding of plan of care, disease process/condition, diagnostic tests and medications  Outcome: Progressing    Patient educated on plan of care, medications, and procedures. Patient is Aox4. Patient verbalizes understanding of care. Will continue to update patient on Plan of care during shift.     Problem: Fall Risk  Goal: Patient will remain free from falls  Outcome: Progressing    Patient remained free from falls. Patient moved from moderate fall risk to low fall risk. Patient stable on feet. Patient SBA to bathroom. Patient bed is in low, locked position with bed alarm on. Standard fall precautions are in place. Personal belonging and call light are within reach. Patient reinforced to use call light when needed.     Problem: Nutrition  Goal: Patient's nutritional and fluid intake will be adequate or improve  Outcome: Progressing    Patient ate % of breakfast and lunch. Tube feeds stopped at 0800. Patient NG tube removed at 1440. Patient tolerating regular diet.      Patient is not progressing towards the following goals:

## 2025-06-18 NOTE — CARE PLAN
The patient is Stable - Low risk of patient condition declining or worsening    Shift Goals  Clinical Goals: Pain less than 5/10 on pain scale, nutrition, VSS  Patient Goals: Comfort  Family Goals: Not Present    Progress made toward(s) clinical / shift goals:      Problem: Knowledge Deficit - Standard  Goal: Patient and family/care givers will demonstrate understanding of plan of care, disease process/condition, diagnostic tests and medications  Outcome: Progressing  Pt verbalizes understanding of diagnosis and is an active participant in care provided. No questions or concerns at this time.     Problem: Pain - Standard  Goal: Alleviation of pain or a reduction in pain to the patient’s comfort goal  Outcome: Progressing  Pt is able to verbalize pain using NRS pain scale. Pt's pain is managed through pharmacological intervention per MAR, repositioning techniques, and cold packs.     Patient is not progressing towards the following goals:

## 2025-06-18 NOTE — PROGRESS NOTES
"    FAMILY MEDICINE PROGRESS NOTE          PATIENT ID:  NAME:  Rakan Rader  MRN:               [id number]  YOB: 1965    Date of Admission: 6/8/2025     Attending: Zachary Harmon M.d.     Resident: Porsha Dye D.O. (PGY-2)    Primary Care Physician:  Emeterio Dave M.D.    HPI:  \"Ray\" Prasanth is a 60 y.o. male with hx Parkinson's Disease with DBS, prostate cancer presented with abdominal pain after underwent RA laparoscopic prostatectomy 5/28; found to have urinary ascites and 7cm R pelvic hematoma s/p diagnostic laparoscopy and hematoma washout 6/10 on hospital day 10    Interval Problem Update  Pt able to consume >50% of his dinner and breakfast this morning  Still having blood tinged urine but no pain, adequate uopt  CRIS with minimal drainage out     SUBJECTIVE:   No acute events overnight. Pt states that he would like the NG tube moved today.  He is not having any pain after eating.  He is ambulatory to the chair in the bathroom.  Still having some loose stools which he thinks is due to the tube feeds.  Is feeling a little bit paranoid about developing new abdominal pain but overall his pain has been very well-controlled.  Has noticed that there is still some pink-tinged urine in his Juarez.      OBJECTIVE:  Temp:  [36.2 °C (97.2 °F)-36.9 °C (98.5 °F)] 36.6 °C (97.8 °F)  Pulse:  [61-88] 61  Resp:  [16-18] 18  BP: ()/(55-79) 95/55  SpO2:  [93 %-97 %] 93 %      Intake/Output Summary (Last 24 hours) at 6/18/2025 1509  Last data filed at 6/18/2025 1111  Gross per 24 hour   Intake 690 ml   Output 875 ml   Net -185 ml       Physical Exam:  Gen:  NAD, sitting up in chair comfortably, texting with breakfast at bedside about 50% eaten   HEENT: MMM, EOMI, NG tube in place, tube feeds paused   Cardio: RRR, clear s1/s2, no murmur  Resp:  Equal bilat, clear to auscultation  GI/: Soft, non-distended, no TTP, normal bowel sounds, lap scars healing well, no guarding/rebound, CRIS drain to RLQ intact without " drainage, luz catheter with 300cc pink urine out   Neuro: Non-focal, Gross intact, no deficits  Skin/Extremities: Cap refill <3sec, warm/well perfused, no rash, normal extremities    LABS:  Recent Labs     06/16/25 0237 06/17/25 0700 06/18/25  0747   WBC 6.2 7.2 9.4   RBC 2.84* 2.97* 3.31*   HEMOGLOBIN 8.2* 8.5* 9.8*   HEMATOCRIT 26.4* 27.6* 30.7*   MCV 93.0 92.9 92.7   MCH 28.9 28.6 29.6   RDW 49.3 50.3* 52.7*   PLATELETCT 423 529* 688*   MPV 10.8 10.8 10.7   NEUTSPOLYS  --  76.30* 77.20*   LYMPHOCYTES  --  11.10* 10.10*   MONOCYTES  --  7.80 8.30   EOSINOPHILS  --  1.70 1.70   BASOPHILS  --  0.30 0.50     Recent Labs     06/16/25 0237 06/17/25 0700 06/18/25  0747   SODIUM 146* 140 139   POTASSIUM 3.7 3.5* 3.7   CHLORIDE 115* 112 108   CO2 24 24 23   BUN 12 12 11   CREATININE 0.63 0.57 0.59   CALCIUM 7.5* 7.4* 7.8*   MAGNESIUM 2.1 2.2 2.2   PHOSPHORUS 1.5* 1.6* 1.8*   ALBUMIN 2.4* 2.5* 2.8*     Estimated GFR/CRCL = Estimated Creatinine Clearance: 111.6 mL/min (by C-G formula based on SCr of 0.59 mg/dL).  Recent Labs     06/16/25 0237 06/17/25 0700 06/18/25  0747   GLUCOSE 94 108* 103*     Recent Labs     06/16/25 0237 06/17/25 0700 06/18/25  0747   ASTSGOT 201* 138* 116*   ALTSGPT 319* 98* 249*   TBILIRUBIN 0.4 0.4 0.4   ALKPHOSPHAT 169* 164* 170*   GLOBULIN 2.4 2.6 3.0   INR 1.02  --   --              Recent Labs     06/16/25 0237   INR 1.02   APTT 28.5         IMAGING:  DX-ABDOMEN FOR TUBE PLACEMENT   Final Result      Feeding tube tip overlies the gastric antrum/proximal duodenum.      DX-ABDOMEN FOR TUBE PLACEMENT   Final Result      Feeding tube tip projects within the duodenum.      US-RUQ   Final Result         1.  Hepatomegaly   2.  Gallbladder sludge with gallbladder wall thickening, consider acalculous cholecystitis, could be further evaluated with HIDA scan as clinically appropriate.   3.  Trace abdominal ascites   4.  Small right pleural effusion      RK-ONPILUA-7 VIEW   Final Result       Digitized intraoperative radiograph is submitted for review. This examination is not for diagnostic purpose but for guidance during a surgical procedure. Please see the patient's chart for full procedural details.         INTERPRETING LOCATION: [address], [place], [address]      DX-PORTABLE FLUOROSCOPY < 1 HOUR Reason For Exam: Main OR   Final Result      Portable fluoroscopy utilized for 6 seconds.         INTERPRETING LOCATION: [address], [place], [address]      CT-Cystogram   Final Result         1.  Diffuse bladder wall thickening, consider changes of cystitis.   2.  Contrast within the peritoneal cavity, compatible with intraperitoneal bladder rupture.   3.  Small foci of intra-abdominal free air, likely related to bladder rupture although radiographic appearance cannot exclude viscus perforation.   4.  Diffuse small bowel wall thickening, appearance suggests changes of enteritis.      These findings were discussed with the patient's clinician, Siena Oakley, on 6/10/2025 6:57 AM.      CT-IMAGE-GUIDED DRAIN PERITONEAL   Final Result      1.  CT GUIDED PLACEMENT OF A PERCUTANEOUS DRAINAGE CATHETER INTO THE PERITONEAL SPACE.   2.  THE CURRENT PLAN IS TO MONITOR DRAINAGE OUTPUT AND OBTAIN A FOLLOWUP CT SCAN IN 5-7 DAYS IF CLINICALLY INDICATED.      CT-ABDOMEN-PELVIS W/O   Final Result         1. 7 cm right pelvic high density mass may be a hematoma. This displaces the bladder to the left.      2. Large free peritoneal fluid in abdomen and pelvis.      3. Bladder empty. No hydronephrosis.      4. Mild ileus-type fluid in stomach and small bowel. Fluid-filled distal esophagus suggests reflux. No colon distention. Normal appendix.      5. Possible sludge in gallbladder. No inflammation or ductal dilatation.      6. Small indeterminate 8 mm liver lesion may be a benign cyst.                  DX-CHEST-PORTABLE (1 VIEW)   Final Result         1. No acute abnormalities evident.      2. Right chest wall battery pack with  wire extending to the neck.                         CULTURES:   Results       Procedure Component Value Units Date/Time    Blood Culture - Draw one from central line and one from peripheral site [[id number]] Collected: 06/08/25 0547    Order Status: Completed Specimen: Blood from Peripheral Updated: 06/13/25 0700     Significant Indicator NEG     Source BLD     Site PERIPHERAL     Culture Result No growth after 5 days of incubation.    Blood Culture - Draw one from central line and one from peripheral site [[id number]] Collected: 06/08/25 0540    Order Status: Completed Specimen: Blood from Line Updated: 06/13/25 0700     Significant Indicator NEG     Source BLD     Site Peripheral     Culture Result No growth after 5 days of incubation.    Cdiff By PCR Rflx Toxin [[id number]]     Order Status: Canceled Specimen: Stool             MEDS:  Current Facility-Administered Medications   Medication Last Admin    omeprazole (PriLOSEC) capsule 20 mg      sucralfate (Carafate) 1 GM/10ML suspension 1 g 1 g at 06/18/25 1214    Pharmacy Consult: Enteral tube insertion - review meds/change route/product selection      amoxicillin-clavulanate (Augmentin) 875-125 MG per tablet 1 Tablet 1 Tablet at 06/18/25 0540    oxyCODONE immediate-release (Roxicodone) tablet 2.5 mg 2.5 mg at 06/17/25 1956    Or    oxyCODONE immediate-release (Roxicodone) tablet 5 mg 5 mg at 06/14/25 2216    heparin injection 5,000 Units 5,000 Units at 06/18/25 0542    ondansetron (Zofran) syringe/vial injection 4 mg 4 mg at 06/14/25 0429    carbidopa-levodopa (Sinemet)  MG tablet 1 Tablet 1 Tablet at 06/18/25 1457    vitamin D3 (Cholecalciferol) tablet 4,000 Units 4,000 Units at 06/18/25 0538       ASSESSMENT/PLAN:  60 y.o. male admitted for:  * Abdominal pain  Assessment & Plan  Acute, improved.   Pt is post op s/p cysto, on table cystogram, complex catheterization and laparoscopic washout of hematoma and abdominal cavity on 6/10/25 with   Zlatev    Plan:  - DC NGT, continue regular diet with thin liquids   - Pain control with PRN Oxycodone - not requiring   - Antiemetics with Zofran  - Carafate  - PPI BID   - Bowel regimen   - Urology following, appreciate recommendations:  -Ditropan 10mg ER daily for bladder spasms  -Juarez and CRIS drain to remain for a minimum of 3 weeks and then a fluoroscopic cystogram will be performed to assess for resolution of leak.  -CRIS to be removed by Urology outpatient   -Continue Augmentin 6/14-6/24  -Anticipate discharge to SNF in 1-2 days if remains clinically stable     Esophagitis determined by endoscopy  Assessment & Plan  Erosive esophagitis s/p EGD 6/16/2025 by Dr. Bloom.   -Tolerating regular diet  -PPI BID  -Carafate  -Await biopsies  -Can f/u outpatient with [facility]     Malnutrition (HCC)  Assessment & Plan  Likely chronic secondary to Parkinson disease.  Exacerbated this admission due to abdominal surgery and little oral intake.    Plan:  - Encourage oral intake   - Continue thiamine supplement 100mg daily  - DC NG tube as now meeting >50% oral goal without pain   - Appreciate ongoing dietary recommendations       Anemia  Assessment & Plan  Acute blood loss anemia in the setting of robotic prostatectomy 5/28, hemoglobin postoperatively ~7. Now up to 10, downtrended throughout hospitalization slowly. Extensive flank and LE bruising. Abdominal drain has minimal output  Currently on heparin    Plan:  -Trend H&H, transfusion threshold less than 7  -Vital signs every 4 hours  - Check coags  -I If worsening may consider abdominal imaging to ascertain if any bleeding    Elevated liver enzymes  Assessment & Plan  Acute, Resolving. Likely shock liver due to illness. GI consulted and agreed. Recommended trending and no further investigation at this time.   Overall down-trending AST; stable ALP and slight elevation in ALT.   -Trend CMP  -Consider HIDA scan if not improving     Prostate cancer (HCC)- (present on  admission)  Assessment & Plan  S/p robotic prostatectomy 5/28/2025, Tiara score 7 (4+3) per pathology report confirmed to be completely removed by surgery    History of tobacco abuse- (present on admission)  Assessment & Plan  History of tobacco use as a former smoker at least 30 years ago, 5 pack years.  Currently chewing tobacco, last use 1 week ago.  Patient reports minimal cravings.  - Can order nicotine patch if needed for cravings  - Tobacco use cessation counseling    Liver lesion- (present on admission)  Assessment & Plan  Incidental liver lesion 8 mm on CT this admission  - Discussed with urology, unlikely to be related to prostate cancer given path confirmation of total tumor removal  - Recommend outpatient follow-up    Parkinson's disease with dyskinesia and fluctuating manifestations (HCC)- (present on admission)  Assessment & Plan  Chronic, has deep brain stimulator, battery implanted at chest.   Continue home carbidopa-levodopa          Core Measures:  Fluids: PO   Lines: PIV, luz, CRIS   Abx: Augmentin   Diet: Regular, thin liquids  PPX: SCD, heparin     CODE Status: Full Code      Disposition  Inpatient       Porsha Dye D.O.   PGY-2  [facility]

## 2025-06-18 NOTE — PROGRESS NOTES
Patient refusing all IVPB medication. Patient educated on medications and MD notified. Patient TPN stopped per doctors order. Patient ate 100% of breakfast.

## 2025-06-18 NOTE — PROGRESS NOTES
Patient stated Ng tube partially dislodged. NG tube repositioned and re-taped. MD notified. Requested xray for placement verification.

## 2025-06-18 NOTE — DISCHARGE PLANNING
[facility]oke with [facility]. Bed Available for today. Requested time 1500.     @1606  Spoke to: Lissa   Agency: [facility]   Outcome: DPA canceled for today.

## 2025-06-19 ENCOUNTER — APPOINTMENT (OUTPATIENT)
Dept: RADIOLOGY | Facility: MEDICAL CENTER | Age: 60
DRG: 698 | End: 2025-06-19
Payer: MEDICARE

## 2025-06-19 LAB
ALBUMIN SERPL BCP-MCNC: 2.6 G/DL (ref 3.2–4.9)
ALBUMIN/GLOB SERPL: 1 G/DL
ALP SERPL-CCNC: 145 U/L (ref 30–99)
ALT SERPL-CCNC: 182 U/L (ref 2–50)
ANION GAP SERPL CALC-SCNC: 7 MMOL/L (ref 7–16)
AST SERPL-CCNC: 82 U/L (ref 12–45)
BILIRUB SERPL-MCNC: 0.5 MG/DL (ref 0.1–1.5)
BUN SERPL-MCNC: 12 MG/DL (ref 8–22)
CALCIUM ALBUM COR SERPL-MCNC: 8.9 MG/DL (ref 8.5–10.5)
CALCIUM SERPL-MCNC: 7.8 MG/DL (ref 8.5–10.5)
CHLORIDE SERPL-SCNC: 107 MMOL/L (ref 96–112)
CO2 SERPL-SCNC: 24 MMOL/L (ref 20–33)
CREAT SERPL-MCNC: 0.61 MG/DL (ref 0.5–1.4)
ERYTHROCYTE [DISTWIDTH] IN BLOOD BY AUTOMATED COUNT: 53.7 FL (ref 35.9–50)
GFR SERPLBLD CREATININE-BSD FMLA CKD-EPI: 111 ML/MIN/1.73 M 2
GLOBULIN SER CALC-MCNC: 2.7 G/DL (ref 1.9–3.5)
GLUCOSE SERPL-MCNC: 96 MG/DL (ref 65–99)
HCT VFR BLD AUTO: 29.3 % (ref 42–52)
HGB BLD-MCNC: 9.2 G/DL (ref 14–18)
MAGNESIUM SERPL-MCNC: 2.1 MG/DL (ref 1.5–2.5)
MCH RBC QN AUTO: 29.2 PG (ref 27–33)
MCHC RBC AUTO-ENTMCNC: 31.4 G/DL (ref 32.3–36.5)
MCV RBC AUTO: 93 FL (ref 81.4–97.8)
PHOSPHATE SERPL-MCNC: 2.1 MG/DL (ref 2.5–4.5)
PLATELET # BLD AUTO: 623 K/UL (ref 164–446)
PMV BLD AUTO: 10.5 FL (ref 9–12.9)
POTASSIUM SERPL-SCNC: 3.9 MMOL/L (ref 3.6–5.5)
PROT SERPL-MCNC: 5.3 G/DL (ref 6–8.2)
RBC # BLD AUTO: 3.15 M/UL (ref 4.7–6.1)
SODIUM SERPL-SCNC: 138 MMOL/L (ref 135–145)
WBC # BLD AUTO: 8.6 K/UL (ref 4.8–10.8)

## 2025-06-19 PROCEDURE — 700117 HCHG RX CONTRAST REV CODE 255

## 2025-06-19 PROCEDURE — 85027 COMPLETE CBC AUTOMATED: CPT

## 2025-06-19 PROCEDURE — 80053 COMPREHEN METABOLIC PANEL: CPT

## 2025-06-19 PROCEDURE — 700102 HCHG RX REV CODE 250 W/ 637 OVERRIDE(OP)

## 2025-06-19 PROCEDURE — 770006 HCHG ROOM/CARE - MED/SURG/GYN SEMI*

## 2025-06-19 PROCEDURE — 36415 COLL VENOUS BLD VENIPUNCTURE: CPT

## 2025-06-19 PROCEDURE — 83735 ASSAY OF MAGNESIUM: CPT

## 2025-06-19 PROCEDURE — A9270 NON-COVERED ITEM OR SERVICE: HCPCS

## 2025-06-19 PROCEDURE — 700111 HCHG RX REV CODE 636 W/ 250 OVERRIDE (IP): Mod: JZ

## 2025-06-19 PROCEDURE — 74177 CT ABD & PELVIS W/CONTRAST: CPT

## 2025-06-19 PROCEDURE — 84100 ASSAY OF PHOSPHORUS: CPT

## 2025-06-19 PROCEDURE — 99232 SBSQ HOSP IP/OBS MODERATE 35: CPT | Mod: GC | Performed by: STUDENT IN AN ORGANIZED HEALTH CARE EDUCATION/TRAINING PROGRAM

## 2025-06-19 RX ORDER — OXYCODONE HYDROCHLORIDE 5 MG/1
5 TABLET ORAL EVERY 4 HOURS PRN
Qty: 20 TABLET | Refills: 0 | Status: SHIPPED | OUTPATIENT
Start: 2025-06-19 | End: 2025-06-19

## 2025-06-19 RX ORDER — OXYCODONE HYDROCHLORIDE 5 MG/1
5 TABLET ORAL EVERY 4 HOURS PRN
Qty: 20 TABLET | Refills: 0 | Status: SHIPPED | OUTPATIENT
Start: 2025-06-19 | End: 2025-06-29

## 2025-06-19 RX ORDER — HYDROMORPHONE HYDROCHLORIDE 1 MG/ML
1 INJECTION, SOLUTION INTRAMUSCULAR; INTRAVENOUS; SUBCUTANEOUS
Status: DISCONTINUED | OUTPATIENT
Start: 2025-06-19 | End: 2025-06-20

## 2025-06-19 RX ORDER — HYDROMORPHONE HYDROCHLORIDE 1 MG/ML
1 INJECTION, SOLUTION INTRAMUSCULAR; INTRAVENOUS; SUBCUTANEOUS ONCE
Status: DISCONTINUED | OUTPATIENT
Start: 2025-06-19 | End: 2025-06-19

## 2025-06-19 RX ORDER — HYDROXYZINE HYDROCHLORIDE 10 MG/1
10 TABLET, FILM COATED ORAL 3 TIMES DAILY PRN
Status: DISCONTINUED | OUTPATIENT
Start: 2025-06-19 | End: 2025-06-19

## 2025-06-19 RX ORDER — OMEPRAZOLE 40 MG/1
40 CAPSULE, DELAYED RELEASE ORAL 2 TIMES DAILY
Status: SHIPPED
Start: 2025-06-19

## 2025-06-19 RX ORDER — HYDROXYZINE HYDROCHLORIDE 10 MG/1
10 TABLET, FILM COATED ORAL 3 TIMES DAILY PRN
Status: CANCELLED | OUTPATIENT
Start: 2025-06-19

## 2025-06-19 RX ORDER — HYDROXYZINE HYDROCHLORIDE 10 MG/1
10 TABLET, FILM COATED ORAL 3 TIMES DAILY PRN
Status: DISCONTINUED | OUTPATIENT
Start: 2025-06-19 | End: 2025-06-20 | Stop reason: HOSPADM

## 2025-06-19 RX ORDER — SUCRALFATE ORAL 1 G/10ML
1 SUSPENSION ORAL EVERY 6 HOURS
Status: SHIPPED
Start: 2025-06-19 | End: 2025-07-14 | Stop reason: SDUPTHER

## 2025-06-19 RX ADMIN — CARBIDOPA AND LEVODOPA 1 TABLET: 25; 100 TABLET ORAL at 16:33

## 2025-06-19 RX ADMIN — OXYCODONE 5 MG: 5 TABLET ORAL at 16:30

## 2025-06-19 RX ADMIN — OMEPRAZOLE 40 MG: 20 CAPSULE, DELAYED RELEASE ORAL at 06:33

## 2025-06-19 RX ADMIN — SUCRALFATE ORAL SUSPENSION 1 G: 1 SUSPENSION ORAL at 23:47

## 2025-06-19 RX ADMIN — SUCRALFATE ORAL SUSPENSION 1 G: 1 SUSPENSION ORAL at 06:33

## 2025-06-19 RX ADMIN — AMOXICILLIN AND CLAVULANATE POTASSIUM 1 TABLET: 875; 125 TABLET, FILM COATED ORAL at 06:33

## 2025-06-19 RX ADMIN — SUCRALFATE ORAL SUSPENSION 1 G: 1 SUSPENSION ORAL at 17:32

## 2025-06-19 RX ADMIN — CARBIDOPA AND LEVODOPA 1 TABLET: 25; 100 TABLET ORAL at 08:26

## 2025-06-19 RX ADMIN — HYDROMORPHONE HYDROCHLORIDE 1 MG: 1 INJECTION, SOLUTION INTRAMUSCULAR; INTRAVENOUS; SUBCUTANEOUS at 17:32

## 2025-06-19 RX ADMIN — IOHEXOL 97 ML: 350 INJECTION, SOLUTION INTRAVENOUS at 15:45

## 2025-06-19 RX ADMIN — OXYCODONE 5 MG: 5 TABLET ORAL at 20:34

## 2025-06-19 RX ADMIN — SUCRALFATE ORAL SUSPENSION 1 G: 1 SUSPENSION ORAL at 12:21

## 2025-06-19 RX ADMIN — OXYCODONE 5 MG: 5 TABLET ORAL at 13:23

## 2025-06-19 RX ADMIN — OXYCODONE 5 MG: 5 TABLET ORAL at 00:58

## 2025-06-19 RX ADMIN — HYDROMORPHONE HYDROCHLORIDE 1 MG: 1 INJECTION, SOLUTION INTRAMUSCULAR; INTRAVENOUS; SUBCUTANEOUS at 22:04

## 2025-06-19 RX ADMIN — HYDROXYZINE HYDROCHLORIDE 10 MG: 10 TABLET ORAL at 15:25

## 2025-06-19 RX ADMIN — CARBIDOPA AND LEVODOPA 1 TABLET: 25; 100 TABLET ORAL at 20:35

## 2025-06-19 RX ADMIN — AMOXICILLIN AND CLAVULANATE POTASSIUM 1 TABLET: 875; 125 TABLET, FILM COATED ORAL at 17:32

## 2025-06-19 RX ADMIN — Medication 4000 UNITS: at 06:33

## 2025-06-19 ASSESSMENT — PAIN DESCRIPTION - PAIN TYPE
TYPE: ACUTE PAIN

## 2025-06-19 NOTE — DIETARY
Nutrition Services: Follow-up for Nutrition Care Plan   Day 10 of admit. Rakan Rader III is a 60 y.o. male with admitting DX of Acute renal failure (ARF)     Pt previously on tube feeding.   Diet advanced to Regular 6/16.  Tube feeding rate was reduced to meet 50% of needs yesterday to promote appetite and encourage PO intake.  Enteral tube was removed today. Tube feeding order has been discontinued.  PO has been 25-75% for two recent meals.  Pt is being monitored for refeeding in the setting of malnutrition. K+ (3.7), Mg (2.2) WNL today; Phos low (1.8).   Pt received repletion of KPhos today. No Thiamine ordered currently.    Current diet order:   Regular diet  Ensure Plus High Protein (provides 350 calories, 20 g protein per 8 fl oz) TID     Malnutrition risk: Severe Malnutrition in context of Acute Illness or Injury related to prostate cancer, with post op complications as evidenced by severe fat loss and severe muscle wasting.       Nutrition Dx: Inadequate Oral Intake related to pain with swallowing as evidenced by <50% of caloric intake.        Nutrition Dx Status: Resolved     Problem: Nutritional:  Goal: Achieve adequate nutritional intake  Description: Patient will consume 50% of meals  Outcome: Progressing     Plan/ Recommendations:      Continue oral nutrition supplements.  Encourage intake of meals, goal for 50% consumption from meals and/or supplements  Document intake of all meals as % taken in ADLs to provide interdisciplinary communication across all shifts.   Monitor weight.  Nutrition rep available to see patient for ongoing meal and snack preferences.  Continue to monitor for refeeding: Order BMP w/ Mg and Phos x 7 days. Replete K, Phos and Mg prn. Supplement 100 mg Thiamine x 7 days to reduce risk of refeeding.     RD following

## 2025-06-19 NOTE — PROGRESS NOTES
4 Eyes Skin Assessment Completed by YOAN Mendoza and YOAN Lerner.    Skin assessment is primarily focused on high risk bony prominences. Pay special attention to skin beneath and around medical devices, high risk bony prominences, skin to skin areas and areas where the patient lacks sensation to feel pain and areas where the patient previously had breakdown.     Head (Occipital):  WDL   Ears (Under Medical Devices): WDL   Nose (Under Medical Devices): WDL   Mouth:  WDL   Neck: WDL   Breast/Chest:  WDL   Shoulder Blades:  WDL   Spine:   WDL   (R) Arm/Elbow/Hand: Pink and Blanching   (L) Arm/Elbow/Hand: WDL   Abdomen: Pink, Red, Bruising, and lap sites with dermabond, CRIS RLQ   Pannus/Groin:  Bruising L groin   Sacrum/Coccyx:   Pink and Blanching   (R) Ischial Tuberosity (Sit Bones):  WDL   (L) Ischial Tuberosity (Sit Bones):  Bruising   (R) Leg:  WDL   (L) Leg:  WDL   (R) Heel:  WDL   (R) Foot/Toe: Edema   (L) Heel: WDL   (L) Foot/Toe:  WDL       DEVICES IN USE:   Respiratory Devices:  NA, patient on room air  Feeding Devices:  N/A   Lines & BP Monitoring Devices:  Peripheral IV    Orthopedic Devices:  N/A  Miscellaneous Devices:  Drains and Juarez    PROTOCOL INTERVENTIONS:   Low Airloss Bed:  Already in place  Glide Sheet:  Already in place  Dri-Daquan/Micro Climate Pads:  Already in place  Juarez:  Already in place    WOUND PHOTOS:   Completed and in EPIC     WOUND CONSULT:   N/A, no advanced wound care needs identified

## 2025-06-19 NOTE — PROGRESS NOTES
Patient report received and assumed care. Assessed patient at 0815. Patient is Aox4 and reports 4/10 pain in abdomen. Patient has CRIS drain in right posterior back. Patient CRIS drain site is pink and has scant drainage. Patient has luz in place, urine is renny in color. Patient bed is in low, locked position with bed alarm on. Standard fall precautions are in place. Personal belonging and call light are within reach. Patient reinforced to use call light when needed.

## 2025-06-19 NOTE — DISCHARGE PLANNING
Case Management Discharge Planning    Admission Date: 6/8/2025  GMLOS: 10.4  ALOS: 11    6-Clicks ADL Score: 19  6-Clicks Mobility Score: 18      Anticipated Discharge Dispo: Discharge Disposition: D/T to SNF with Medicare cert in anticipation of skilled care (03)  Discharge Address: [address] [place] [address] [address] [place] [address]  Discharge Contact Phone Number: [phone]    Notified by MD pt is medically cleared for SNF today.     Called [facility] and spoke with aureliano. They're able to accept pt today and will pick pt up at 1500. MD and RN notified.   Called S/O to notify her of transport time    1410- Notified by MD pt is no longer medically cleared.   VM left for [facility]

## 2025-06-19 NOTE — DISCHARGE SUMMARY
"[facility] Discharge Summary    Attending: Zachary Harmon M.d.  Senior Resident: Dr. Dye  Intern:  Dr. Oakley  Contact Number: [phone]    CHIEF COMPLAINT ON ADMISSION  Chief Complaint   Patient presents with    Post-Op Complications     Abdominal pain with nausea and vomiting since pt had surgery on 5/28 and the pain is progressively worsening  + dark brown vomitus (small amount)  + no stool for 4 days  + abdominal tenderness  + shortness of breath    Denied any fever, chest pain    5/28/2025: History of ROBOTIC ASSISTED LAPAROSCOPIC PROSTATECTOMY (Abdomen)  LYSIS, ADHESIONS, LAPAROSCOPIC ROBOTIC (Abdomen)        Abdominal Pain    Nausea    Shortness of Breath       Reason for Admission  Post Op Complications     Admission Date  6/8/2025    CODE STATUS  Full Code    HPI & HOSPITAL COURSE  \"Rakan Raedr is a 60 y.o. male with a history of of PD with brain stimulator who presented with abdominal pain.   Patient underwent robotic prostatectomy on 5/28 with Dr. Cesar for prostate cancer. He states that he has had intermittent abdominal pain since then and it has gotten progressive worse over the past 3-4 days. He has had associated nausea and decreased appetite, has not had a BM for 4 days but has not had anything to eat for the past 3 days. He has been urinating, wearing depends due to some incontinence which he states is chronic d/t PD. Some hematuria which he was told was normal post operatively. He denies fever. Did have a couple episodes of small emesis today that were dark brown, prompting them to come to the ED.   Patient currently reports 3-4/10 abdominal pain presently. Denies any current chest pain but has been short of breath over the past few days. He denies any history of clots or clotting disorders. Does not take any blood thinners. No history of cardiac disease.   Has been taking daily medications including oxycodone post operatively.    CXR negative and CT showed 7 cm right pelvic high " "density mass may be a hematoma. This displaces the bladder to the left. Large free peritoneal fluid in abdomen and pelvis. Bladder empty. No hydronephrosis. Mild ileus-type fluid in stomach and small bowel. Fluid-filled distal esophagus suggests reflux. No colon distention. Normal appendix. Possible sludge in gallbladder. No inflammation or ductal dilatation. Small indeterminate 8 mm liver lesion may be a benign cyst. \"    Urology was consulted in the ED and placed a Juarez catheter under direct visualization.  He was admitted to the floor and IR was consulted for placement of a drain to drain the hematoma and urinary ascites.  He was started on Zosyn.     #Abdominal pain  He underwent cystoscopy, cystogram, complex catheterization and laparoscopic washout of hematoma in abdominal cavity on 6/7 with Dr. Cesar with [facility].  Operative report noted very small posterior vesicourethral opening between stitches. They recommend to continue Ditropan 10 mg daily for bladder spasms and to keep the Juarez for minimum 3 weeks.  CRIS drain placed at the time of surgery is removed on the day of discharge.  He remained on Zosyn through 6/13 and then was transition to Augmentin, instructed to continue for 10-day course of treatment and on 6/24.    #Esophagitis  Throughout his hospitalization, patient had reported some pain with swallowing and persistent and very bothersome hiccups.  This pain  and hiccuping had prevented him from maintaining p.o. intake prior to hospitalization.  Noted that he was very malnourished.  Trial of muscle relaxer to aid with resolving hiccups however minimal response.  He was started on a PPI and gastroenterology was consulted for endoscopy.  Results of endoscopy showed erosive esophagitis, biopsies were collected and results are still pending.  Recommendations by GI are to continue a PPI outpatient and Carafate.  He can follow-up with GI outpatient regarding esophagitis and for incidental liver " lesion found on CT.     #Malnutrition  Poor nutrition status secondary to esophageal pain and significantly decreased appetite.  NG tube was placed for tube feedings on 6/15.  He tolerated placement well and tolerated tube feeds very well, he was ready for it to be removed on 6/18 as he was tolerating oral feeds.  Per dietary, recommend continuing thiamine supplementation.     #Transaminitis, resolved  Postoperatively patient's liver enzymes spiked to peak of AST 1163, .  Gastroenterology was consulted and agreed with the diagnosis of likely shock liver in the presence of acute illness.  LFTs were trended and continued to downtrend and remained stable.  No further follow-up recommended    #Hyponatremia, Resolved  Initially presented with hyponatremia, attributed to urinary ascites causing protonic hyponatremia which improved as his urine ascites improved.       Therefore, he is discharged in good and stable condition to skilled nursing facility.    The patient met 2-midnight criteria for an inpatient stay at the time of discharge.    Discharge Date  06/19/25    Physical Exam on Day of Discharge  Physical Exam  HENT:      Head: Normocephalic and atraumatic.      Nose: Nose normal.      Mouth/Throat:      Mouth: Mucous membranes are moist.   Eyes:      Extraocular Movements: Extraocular movements intact.      Conjunctiva/sclera: Conjunctivae normal.   Cardiovascular:      Rate and Rhythm: Normal rate and regular rhythm.      Pulses: Normal pulses.      Heart sounds: Normal heart sounds.   Pulmonary:      Effort: Pulmonary effort is normal.      Breath sounds: Normal breath sounds.   Abdominal:      General: Abdomen is flat. Bowel sounds are normal.      Palpations: Abdomen is soft.      Comments: Mild suprapubic tenderness   Musculoskeletal:         General: Normal range of motion.      Cervical back: Normal range of motion and neck supple.   Skin:     General: Skin is warm and dry.   Neurological:       General: No focal deficit present.      Mental Status: He is alert and oriented to person, place, and time. Mental status is at baseline.   Psychiatric:         Mood and Affect: Mood normal.         Behavior: Behavior normal.         FOLLOW UP ITEMS POST DISCHARGE  #Juarez catheter  #Prostate cancer  Follow-up with urology for catheter removal    #Liver Lesion  #Esophagitis  Follow up with GI outpatient    #Malnutrition  Continue to monitor PO intake, encourage increased protein intake      DISCHARGE DIAGNOSES  Principal Problem:    Abdominal pain (POA: Unknown)  Active Problems:    Malnutrition (HCC) (POA: Unknown)    Elevated liver enzymes (POA: Unknown)    Parkinson's disease with dyskinesia and fluctuating manifestations (HCC) (POA: Yes)    History of tobacco abuse (POA: Yes)    Prostate cancer (HCC) (POA: Yes)    Anemia (POA: Unknown)    Liver lesion (POA: Yes)    Esophagitis determined by endoscopy (POA: Unknown)  Resolved Problems:    Acute renal failure (ARF) (HCC) (POA: Yes)    Hyperkalemia (POA: Unknown)    Leukocytosis (POA: Unknown)    Thrombocytosis (POA: Unknown)    Hyponatremia (POA: Yes)    Increased anion gap metabolic acidosis (POA: Unknown)    Acute hypoxic respiratory failure (HCC) (POA: Unknown)    SIRS (systemic inflammatory response syndrome) (HCC) (POA: Unknown)    Other neutropenia (HCC) (POA: Unknown)    Intractable hiccups (POA: Unknown)      FOLLOW UP  Future Appointments   Date Time Provider Department Center   7/9/2025  7:20 AM Gualberto Taylor D.O. [facility] [address]  [place] [address]  [phone]          MEDICATIONS ON DISCHARGE     Medication List        ASK your doctor about these medications        Instructions   B COMPLEX PO   Take 1 Tablet by mouth every day.  Dose: 1 Tablet     carbidopa-levodopa  MG Tabs  Commonly known as: Sinemet   Take 1 Tablet by mouth 3 times a day. For Parkinson's  Dose: 1 Tablet     ibuprofen 200 MG  Tabs  Commonly known as: Motrin   Take 200 mg by mouth every 6 hours as needed for Mild Pain.  Dose: 200 mg     MAGNESIUM PO   Take 1 Tablet by mouth every day.  Dose: 1 Tablet     OMEGA 3 PO   Take 1 Capsule by mouth every day.  Dose: 1 Capsule     oxybutynin SR 10 MG CR tablet  Commonly known as: Ditropan-XL   Take 10 mg by mouth every day. 2 day course dispensed 06/01/2025  COMPLETED  Dose: 10 mg     oxyCODONE immediate-release 5 MG Tabs  Commonly known as: Roxicodone   Take 5 mg by mouth every 8 hours as needed for Severe Pain. 3 day supply dispensed 06/01/2025  COMPLETED  Dose: 5 mg     vitamin D3 1000 Unit (25 mcg) Tabs  Commonly known as: Cholecalciferol   Take 4,000 Units by mouth 2 times a day.  Dose: 4,000 Units     VITAMIN K PO   Take 1 Tablet by mouth every day.  Dose: 1 Tablet              Allergies  Allergies[1]    DIET  Orders Placed This Encounter   Procedures    Diet Order Diet: Regular     Standing Status:   Standing     Number of Occurrences:   1     Diet::   Regular [1]       ACTIVITY  As tolerated and directed by skilled nursing.  Weight bearing as tolerated    CONSULTATIONS  [facility]  Gastroenterology  Interventional Radiology    PROCEDURES  -Bedside cystoscopy with luz placement  -IR CT guided abdominal drain placement  -Cystoscopy, on table cystogram, complex catheterization and laparoscopic washout of hematoma and abdominal cavity on 6/10/25   -EGD with biopsy    LABORATORY  Lab Results   Component Value Date    SODIUM 138 06/19/2025    POTASSIUM 3.9 06/19/2025    CHLORIDE 107 06/19/2025    CO2 24 06/19/2025    GLUCOSE 96 06/19/2025    BUN 12 06/19/2025    CREATININE 0.61 06/19/2025        Lab Results   Component Value Date    WBC 8.6 06/19/2025    HEMOGLOBIN 9.2 (L) 06/19/2025    HEMATOCRIT 29.3 (L) 06/19/2025    PLATELETCT 623 (H) 06/19/2025        Total time of the discharge process exceeds 40 minutes.       [1] No Known Allergies

## 2025-06-19 NOTE — CARE PLAN
The patient is Stable - Low risk of patient condition declining or worsening    Shift Goals  Clinical Goals: Patient pain will be 3/10 or below durinng course of shift  Patient Goals: rest, pain management  Family Goals: HOLLAND    Progress made toward(s) clinical / shift goals:      Problem: Knowledge Deficit - Standard  Goal: Patient and family/care givers will demonstrate understanding of plan of care, disease process/condition, diagnostic tests and medications  Outcome: Progressing    Patient educated on plan of care, medications, and procedures. Patient is Aox4. Patient verbalizes understanding of care. Will continue to update patient on Plan of care during shift.     Problem: Pain - Standard  Goal: Alleviation of pain or a reduction in pain to the patient’s comfort goal  Outcome: Progressing    Patient had 7/10 pain in abdomen. Pain managed with PRN pain medication and position.      Patient is not progressing towards the following goals    Problem: Hemodynamics  Goal: Patient's hemodynamics, fluid balance and neurologic status will be stable or improve  Outcome: Not Progressing    Patient CRIS burgess removed. Drain site started pooling out fluid. Drainage serosanguinous. A total of 2400ML out. Patient Ct done, patient pending paracentesis.

## 2025-06-19 NOTE — PROGRESS NOTES
"       Urology Progress Note    6/19- Labs continue to improve. Pt just ate \"a big breakfast\" of Ukrainian toast and feels well. Urine clear yellow.     6/18- Pt sleeping when I arrived. Urine light pink/yellow. No output in CRIS. Labs stable.      6/17- Pt sitting in chair. Feeding tube in place, minimal food eaten from trays. Urine with gross hematuria, which pt states started yesterday. Hbg stable on heparin. No increased pain. CRIS drain says 800 however this may be in error as CRIS empty during rounds. Also minimal output yesterday.     6/16 Pt in OR during rounds this was a chart check only. CRIS drain minimal, Good UOP. Labs stable     6/15-Pt is sleeping on rounds. He has an NG tube in place. He is afebrile, luz is draining yellow urine, CRIS drain with 23cc/24 hrs. WBC improving now 6.0, Hgb: 8.4, creatinine 0.65. Pt declined examination for his abdomen. AST improved now 251 (474), ALT has increased 236 (84). Alk phos: 158.    6/14- Pt is lying comfortably in bed. He was seen by speech therapy for a swallow evaluation, unfortunately the patient experience severe mid esophageal pain. He continue to have hicupps.Surgical the patient is doing well, abdominal drain with minimal output, luz draining yellow urine. WBC 4.0, Hgb: 8.7, creatinine 0.80, AST and ALT are improving, 474/97.     6/13-Pt is lying in bed in NAD. Luz catheter draining clear yellow urine. CRIS drain with 45cc of SS of exam. He notes that his hiccups have return, he has not been able to ambulate yet w/PT. He is afebrile, UOP 3000cc/24hrs, creatinine 1. WB: 2.8, Hgb: 9.2 and HCT 28.9. ANTONIO was consulted for elevated liver enzymes.     6/12-Pt is sleeping soundly on exam. Luz catheter in place draining watermelon urine, no clots. UOP 1200cc/24hrs. Abdominal drain 2100cc/24hrs of SS. Abdominal drain had minimal output on exam. Afebrile, WBC: 1.4, Hgb: 9.2, Hct: 29.1, creatinine improved,now 1.16. He is now on a full liquid diet along with ensure. " "Abdominal incision c/d/I.     6/11-Pt lying comfortably in bed in NAD. RN at bedside. Pt is POD1 s/p cysto, on table cystogram, complex catheterization and laparoscopic washout of hematoma and abdominal cavity on 6/10/25 with Dr. Cesar. He is afebrile, hypertensive at 145/94, tachycardic 100, UOP 700cc/24hrs, WBC: 2.9, Hgb: 11.3, creatinine elevated 1.97. Pt notes his hiccups have resolved and feels that he is due for a BM today.     6/10- Pt is lying comfortably in bed in NAD. He is afebrile. UOP 2100cc/24hrs, drain output 1185cc/24hrs. He remain on Zosyn and is NPO. CT cystogram on 6/10 showed contrast within the peritoneal cavity compatible with an intraperitoneal bladder rupture. WBC: 3.8, Hgb: 10.4, Hct: 31.5, creatinine increased, now 1.63 (1.19). Pt is schedule to go to the OR today with Dr. Cesar for a bladder repair.     6/9-S/p cysto luz placement over a wire on 6/8/25 performed by Dr. Todd and IR abdominal drain placement on 6/8. Pt is lying comfortably in bed in NAD. IR APN at bedside assessing CRIS drain. Tmax 99.2, pt is tachycardic at 105. Normotensive at 136/84. Documented abdominal drain output 3100mL/24hrs, drainage is dark/old SS. He remains on Zosyn. Luz catheter is drainage clear yellow urine, good UOP on exam. WBC: 5.3, Hgb: 9.8, No updated creatinine as of yet, however his creatinine from 6/8/25 is 2.48 (4.79). Pt denies f/c/n/v     Overnight Events: None    S:    O:   BP 92/62   Pulse 81   Temp 36.4 °C (97.5 °F) (Temporal)   Resp 18   Ht 1.727 m (5' 8\")   Wt 57.8 kg (127 lb 6.8 oz)   SpO2 95%   Recent Labs     06/17/25  0700 06/18/25  0747   SODIUM 140 139   POTASSIUM 3.5* 3.7   CHLORIDE 112 108   CO2 24 23   GLUCOSE 108* 103*   BUN 12 11   CREATININE 0.57 0.59   CALCIUM 7.4* 7.8*     Recent Labs     06/17/25  0700 06/18/25  0747 06/19/25  0753   WBC 7.2 9.4 8.6   RBC 2.97* 3.31* 3.15*   HEMOGLOBIN 8.5* 9.8* 9.2*   HEMATOCRIT 27.6* 30.7* 29.3*   MCV 92.9 92.7 93.0   MCH 28.6 " 29.6 29.2   MCHC 30.8* 31.9* 31.4*   RDW 50.3* 52.7* 53.7*   PLATELETCT 529* 688* 623*   MPV 10.8 10.7 10.5         Intake/Output Summary (Last 24 hours) at 6/9/2025 1008  Last data filed at 6/9/2025 0822  Gross per 24 hour   Intake 840 ml   Output 3100 ml   Net -2260 ml       Exam:    NG tube is present  Abdominal: pt declined abdominal examination today. CRIS with minimal output.   Urine: luz draining yellow urine    A/P:    Active Hospital Problems    Diagnosis     Esophagitis determined by endoscopy [K20.90]     Malnutrition (HCC) [E46]     Elevated liver enzymes [R74.8]     Anemia [D64.9]     Liver lesion [K76.9]     Abdominal pain [R10.9]     Prostate cancer (HCC) [C61]     History of tobacco abuse [Z87.891]     Parkinson's disease with dyskinesia and fluctuating manifestations (HCC) [G20.B2]      Plan:   -Continue mediations for esophogeal erosion to encourage PO intake   -Continue Ditropan 10mg ER daily for bladder spasms  -Monitor urine output and abdominal drain output.   -Luz to remain until cystogram done and confirmed with Dr. Cesar.   -CRIS can be removed in our office.   -Urology will continue to follow the patient during this hospital stay but has no other treatments or interventions planned     -Patient's case was discussed with Dr. Barajas, Dr. Cervantes and Dr. Cesar who have directed the plan of care for the patient.      Lizz Boland, P.A.-C.   [address] [place] [address]   [phone]

## 2025-06-19 NOTE — PROGRESS NOTES
Assumed care of patient at 1900. Received bedside report from day RN Lucille. Patient A&Ox4, on RA, Reporting a pain level of 7/10, prn oxy administered. CRIS drain in place to RLQ, luz catheter in place. Call light within reach, belongings within reach, fall precautions in place, bed in lowest position. Patient does not have any other needs at this time.     POC was discussed with patient. All questions were answered. Patient verbalized understanding.

## 2025-06-19 NOTE — CARE PLAN
The patient is Stable - Low risk of patient condition declining or worsening    Shift Goals  Clinical Goals: Pt's pian to decrease to 5/10 by end of shift. Pt to remain free from falls.  Patient Goals: Rest, pain management  Family Goals: HOLLAND    Progress made toward(s) clinical / shift goals:    Pt remains free from falls, pt ambulatory to bathroom, pt utilizing call light appropriately for needs, bed alarm on. Pt luz with renny-colored output. Pt on PÉREZ bed. Pt's pain decreased from 7/10 to 4/10 following administration of prn oxy and dilaudid. Pt able to rest with pain management.     Problem: Pain - Standard  Goal: Alleviation of pain or a reduction in pain to the patient’s comfort goal  Outcome: Progressing     Problem: Knowledge Deficit - Standard  Goal: Patient and family/care givers will demonstrate understanding of plan of care, disease process/condition, diagnostic tests and medications  Outcome: Progressing     Problem: Fall Risk  Goal: Patient will remain free from falls  Outcome: Progressing     Problem: Skin Integrity  Goal: Skin integrity is maintained or improved  Outcome: Progressing     Problem: Nutrition  Goal: Patient's nutritional and fluid intake will be adequate or improve  Outcome: Progressing       Patient is not progressing towards the following goals:

## 2025-06-19 NOTE — THERAPY
Speech Language Therapy Contact Note    Patient Name: Rakan Rader III  Age:  60 y.o., Sex:  male  Medical Record #: [id number]  Today's Date: 6/19/2025 06/19/25 1436   Time In/Time Out   Therapy Start Time 1418   Therapy End Time 1436   Total Therapy Time 18   Total Treatment Time   SLP Time Spent Yes   SLP Missed Visit (Mins) 18   Interdisciplinary Plan of Care Collaboration   IDT Collaboration with  Nursing   Collaboration Comments RN cleared patient for swallow follow up. Upon entry, RN placing bag to removed CRIS drain site r/t leaking. SLP assisted as needed. Once RN exiting, SLP initiated session with patient. At this point, RN stated patient would need to be NPO pending CT for possible replacement of CRIS drain. SLP did not provide PO and exited. Patient reported no dysphagia symptoms.

## 2025-06-19 NOTE — PROGRESS NOTES
Another 600 mL out from abdomen. A total of 2400mL out from CRIS drain site. Site drainage has slowed down with a few drops of fluid draining about every 2min.

## 2025-06-19 NOTE — PROGRESS NOTES
Removed patient CRIS drain. Upon removing tube, tube was noted to be clogged at the tip of the catheter. Once the tube was pulled out fluid started to pool out of CRIS drain site. MD contacted. Total output from abdomen is 1800mL out thus far. Patient left unit for stat CT scan. Patient is Aox4 and reports no pain. Drainage is serosanguineous.

## 2025-06-19 NOTE — PROGRESS NOTES
"Attending:   Zachary Harmon M.d.    Resident:   Siena Oakley D.O.    PATIENT:   Rakan Rader; [id number]; 1965    ID:   \"Pablo\" Prasanth is a 60 y.o. male with hx Parkinson's Disease with DBS, prostate cancer presented with abdominal pain after underwent RA laparoscopic prostatectomy 5/28; found to have urinary ascites and 7cm R pelvic hematoma s/p diagnostic laparoscopy and hematoma washout 6/10 on hospital day 11.    SUBJECTIVE:   No acute events overnight, he is doing well today and is ready to leave the hospital. Plans were made to transfer patient to SNF for continued rehab and PT/OT however needed to be cancelled due to copious drainage from abdominal side wall at time of CRIS drain being pulled due to several days of minimal to no output. Clot was seen in tubing after it was removed. ~800mL of serosanguinous fluid drained from the sidewall. An emesis bag was taped to the abdominal wall to be used as fluid collection while awaiting drain placement. No pain, no tenderness to drain site, to abdomen.       Prior to original discharge plan, Pablo had specified that he preferred homehealth but due to uncertainty to complete ADLs, he wanted his girlfriend Meredith to weigh in on decision of HomeHealth vs SNF. Phone call by Dr. Dye with Meredith and she recommended SNF for appropriate re cooperation. Pablo is agreeable to this plan.     OBJECTIVE:  Vitals:    06/19/25 0436 06/19/25 0735 06/19/25 0826 06/19/25 1323   BP: 101/66 92/62 92/62    Pulse: 74 81 81    Resp: 16 18  18   Temp: 36.9 °C (98.4 °F) 36.4 °C (97.5 °F)     TempSrc: Temporal Temporal     SpO2: 97% 95%     Weight:       Height:           Intake/Output Summary (Last 24 hours) at 6/19/2025 1427  Last data filed at 6/19/2025 1000  Gross per 24 hour   Intake 340 ml   Output 500 ml   Net -160 ml       PHYSICAL EXAM:  General: No acute distress, afebrile, resting comfortably  HEENT: NC/AT. EOMI.   Cardiovascular: RRR without murmurs. Normal capillary refill " "  Respiratory: CTAB  Abdomen: soft, nontender, nondistended, no masses. Incision sites intact and without erythema  EXT:  RODRIGUEZ, no edema  Skin: No erythema/lesions   Neuro: Non-focal    LABS:  Recent Labs     06/17/25  0700 06/18/25  0747 06/19/25  0753   WBC 7.2 9.4 8.6   RBC 2.97* 3.31* 3.15*   HEMOGLOBIN 8.5* 9.8* 9.2*   HEMATOCRIT 27.6* 30.7* 29.3*   MCV 92.9 92.7 93.0   MCH 28.6 29.6 29.2   RDW 50.3* 52.7* 53.7*   PLATELETCT 529* 688* 623*   MPV 10.8 10.7 10.5   NEUTSPOLYS 76.30* 77.20*  --    LYMPHOCYTES 11.10* 10.10*  --    MONOCYTES 7.80 8.30  --    EOSINOPHILS 1.70 1.70  --    BASOPHILS 0.30 0.50  --      Recent Labs     06/17/25  0700 06/18/25  0747 06/19/25  0753   SODIUM 140 139 138   POTASSIUM 3.5* 3.7 3.9   CHLORIDE 112 108 107   CO2 24 23 24   BUN 12 11 12   CREATININE 0.57 0.59 0.61   CALCIUM 7.4* 7.8* 7.8*   MAGNESIUM 2.2 2.2 2.1   PHOSPHORUS 1.6* 1.8* 2.1*   ALBUMIN 2.5* 2.8* 2.6*     Estimated GFR/CRCL = Estimated Creatinine Clearance: 107.9 mL/min (by C-G formula based on SCr of 0.61 mg/dL).  Recent Labs     06/17/25  0700 06/18/25  0747 06/19/25  0753   GLUCOSE 108* 103* 96     Recent Labs     06/17/25  0700 06/18/25  0747 06/19/25  0753   ASTSGOT 138* 116* 82*   ALTSGPT 98* 249* 182*   TBILIRUBIN 0.4 0.4 0.5   ALKPHOSPHAT 164* 170* 145*   GLOBULIN 2.6 3.0 2.7             No results for input(s): \"INR\", \"APTT\", \"FIBRINOGEN\" in the last 72 hours.    Invalid input(s): \"DIMER\"      IMAGING:  DX-ABDOMEN FOR TUBE PLACEMENT   Final Result      Feeding tube tip overlies the gastric antrum/proximal duodenum.      DX-ABDOMEN FOR TUBE PLACEMENT   Final Result      Feeding tube tip projects within the duodenum.      US-RUQ   Final Result         1.  Hepatomegaly   2.  Gallbladder sludge with gallbladder wall thickening, consider acalculous cholecystitis, could be further evaluated with HIDA scan as clinically appropriate.   3.  Trace abdominal ascites   4.  Small right pleural effusion      [id number] " VIEW   Final Result      Digitized intraoperative radiograph is submitted for review. This examination is not for diagnostic purpose but for guidance during a surgical procedure. Please see the patient's chart for full procedural details.         INTERPRETING LOCATION: [address], [place], [address]      DX-PORTABLE FLUOROSCOPY < 1 HOUR Reason For Exam: Main OR   Final Result      Portable fluoroscopy utilized for 6 seconds.         INTERPRETING LOCATION: [address], [place], [address]      CT-Cystogram   Final Result         1.  Diffuse bladder wall thickening, consider changes of cystitis.   2.  Contrast within the peritoneal cavity, compatible with intraperitoneal bladder rupture.   3.  Small foci of intra-abdominal free air, likely related to bladder rupture although radiographic appearance cannot exclude viscus perforation.   4.  Diffuse small bowel wall thickening, appearance suggests changes of enteritis.      These findings were discussed with the patient's clinician, Siena Oakley, on 6/10/2025 6:57 AM.      CT-IMAGE-GUIDED DRAIN PERITONEAL   Final Result      1.  CT GUIDED PLACEMENT OF A PERCUTANEOUS DRAINAGE CATHETER INTO THE PERITONEAL SPACE.   2.  THE CURRENT PLAN IS TO MONITOR DRAINAGE OUTPUT AND OBTAIN A FOLLOWUP CT SCAN IN 5-7 DAYS IF CLINICALLY INDICATED.      CT-ABDOMEN-PELVIS W/O   Final Result         1. 7 cm right pelvic high density mass may be a hematoma. This displaces the bladder to the left.      2. Large free peritoneal fluid in abdomen and pelvis.      3. Bladder empty. No hydronephrosis.      4. Mild ileus-type fluid in stomach and small bowel. Fluid-filled distal esophagus suggests reflux. No colon distention. Normal appendix.      5. Possible sludge in gallbladder. No inflammation or ductal dilatation.      6. Small indeterminate 8 mm liver lesion may be a benign cyst.                  DX-CHEST-PORTABLE (1 VIEW)   Final Result         1. No acute abnormalities evident.      2. Right chest  wall battery pack with wire extending to the neck.                     CT-ABDOMEN-PELVIS WITH    (Results Pending)   IR-IMAGE-GUIDED DRAIN PERITONEAL    (Results Pending)       MEDS:  Current Facility-Administered Medications   Medication Last Admin    hydrOXYzine HCl (Atarax) tablet 10 mg      omeprazole (PriLOSEC) capsule 40 mg 40 mg at 06/19/25 0633    sucralfate (Carafate) 1 GM/10ML suspension 1 g 1 g at 06/19/25 1221    Pharmacy Consult: Enteral tube insertion - review meds/change route/product selection      amoxicillin-clavulanate (Augmentin) 875-125 MG per tablet 1 Tablet 1 Tablet at 06/19/25 0633    oxyCODONE immediate-release (Roxicodone) tablet 2.5 mg 2.5 mg at 06/17/25 1956    Or    oxyCODONE immediate-release (Roxicodone) tablet 5 mg 5 mg at 06/19/25 1323    [Held by provider] heparin injection 5,000 Units 5,000 Units at 06/18/25 0542    ondansetron (Zofran) syringe/vial injection 4 mg 4 mg at 06/14/25 0429    carbidopa-levodopa (Sinemet)  MG tablet 1 Tablet 1 Tablet at 06/19/25 0826    vitamin D3 (Cholecalciferol) tablet 4,000 Units 4,000 Units at 06/19/25 0633       ASSESSMENT/PLAN:    * Abdominal pain- (present on admission)  Assessment & Plan  Acute, improved.   Pt is post op s/p cysto, on table cystogram, complex catheterization and laparoscopic washout of hematoma and abdominal cavity on 6/10/25 with Dr. Cesar    Plan:  - DC NGT, continue regular diet with thin liquids   - Pain control with PRN Oxycodone - not requiring   - Antiemetics with Zofran  - Carafate  - PPI BID   - Bowel regimen   - Urology following, appreciate recommendations:  -Ditropan 10mg ER daily for bladder spasms  -Juarez and CRIS drain to remain for a minimum of 3 weeks and then a fluoroscopic cystogram will be performed to assess for resolution of leak.  - CRIS drain removed at bedside with Urology's approval 6/19 with subsequent copious drainage of >800mL serosanguinous fluid from drain site. Found to be clot in pulled CRIS  drain tube. Painless   - Stat CT abd/pelvis with contrast ordered   - Stat IR consult placed for replacement of drain   - NPO now  -Continue Augmentin 6/14-6/24  -Anticipate discharge to SNF in 1-2 days if remains clinically stable     Malnutrition (HCC)- (present on admission)  Assessment & Plan  Likely chronic secondary to Parkinson disease.  Exacerbated this admission due to abdominal surgery and little oral intake. S/p NG tube with tube feedings, discontinued due to meeitng >50% oral goal without pain.     Plan:  - Encourage oral intake   - Continue thiamine supplement 100mg daily  - Appreciate ongoing dietary recommendations       Elevated liver enzymes- (present on admission)  Assessment & Plan  Acute, Resolving. Likely shock liver due to illness. GI consulted and agreed. Recommended trending and no further investigation at this time.   Overall down-trending AST; stable ALP and slight elevation in ALT.   -Trend CMP  -Consider HIDA scan if not improving     Esophagitis determined by endoscopy- (present on admission)  Assessment & Plan  Erosive esophagitis s/p EGD 6/16/2025 by Dr. Bloom.   -Tolerating regular diet  -PPI BID  -Carafate  -Await biopsies  -Can f/u outpatient with [facility]     Liver lesion- (present on admission)  Assessment & Plan  Incidental liver lesion 8 mm on CT this admission  - Discussed with urology, unlikely to be related to prostate cancer given path confirmation of total tumor removal  - Recommend outpatient follow-up    Anemia- (present on admission)  Assessment & Plan  Acute blood loss anemia in the setting of robotic prostatectomy 5/28, hemoglobin postoperatively ~7. Now up to 10, downtrended throughout hospitalization slowly. Extensive flank and LE bruising. Abdominal drain has minimal output  Currently on heparin for VTE prophylaxis    Plan:  -Trend H&H, transfusion threshold less than 7    Prostate cancer (HCC)- (present on admission)  Assessment & Plan  S/p robotic prostatectomy  5/28/2025, [place] score 7 (4+3) per pathology report confirmed to be completely removed by surgery    History of tobacco abuse- (present on admission)  Assessment & Plan  History of tobacco use as a former smoker at least 30 years ago, 5 pack years.  Currently chewing tobacco, last use 1 week ago.  Patient reports minimal cravings.  - Can order nicotine patch if needed for cravings  - Tobacco use cessation counseling    Parkinson's disease with dyskinesia and fluctuating manifestations (HCC)- (present on admission)  Assessment & Plan  Chronic, has deep brain stimulator, battery implanted at chest.   Continue home carbidopa-levodopa          Core Measures:  Fluids: PO  Lines: PIV  Abx: Augmentin  Diet: Regular  PPX: SCD, heparin held  Wound care: none    DISPO: Discharge cancelled due to need for IR drain placement      CODE STATUS: Full Code    Siena Oakley D.O.  PGY-1  [facility]

## 2025-06-20 ENCOUNTER — PATIENT OUTREACH (OUTPATIENT)
Dept: SCHEDULING | Facility: IMAGING CENTER | Age: 60
End: 2025-06-20
Payer: MEDICARE

## 2025-06-20 ENCOUNTER — APPOINTMENT (OUTPATIENT)
Dept: RADIOLOGY | Facility: MEDICAL CENTER | Age: 60
DRG: 698 | End: 2025-06-20
Payer: MEDICARE

## 2025-06-20 VITALS
HEART RATE: 117 BPM | OXYGEN SATURATION: 94 % | TEMPERATURE: 99.2 F | HEIGHT: 68 IN | WEIGHT: 127.87 LBS | BODY MASS INDEX: 19.38 KG/M2 | DIASTOLIC BLOOD PRESSURE: 62 MMHG | RESPIRATION RATE: 16 BRPM | SYSTOLIC BLOOD PRESSURE: 104 MMHG

## 2025-06-20 LAB
ALBUMIN SERPL BCP-MCNC: 2.5 G/DL (ref 3.2–4.9)
ALBUMIN/GLOB SERPL: 0.9 G/DL
ALP SERPL-CCNC: 144 U/L (ref 30–99)
ALT SERPL-CCNC: 58 U/L (ref 2–50)
ANION GAP SERPL CALC-SCNC: 7 MMOL/L (ref 7–16)
AST SERPL-CCNC: 61 U/L (ref 12–45)
BILIRUB SERPL-MCNC: 0.4 MG/DL (ref 0.1–1.5)
BUN SERPL-MCNC: 13 MG/DL (ref 8–22)
CALCIUM ALBUM COR SERPL-MCNC: 9.1 MG/DL (ref 8.5–10.5)
CALCIUM SERPL-MCNC: 7.9 MG/DL (ref 8.5–10.5)
CHLORIDE SERPL-SCNC: 108 MMOL/L (ref 96–112)
CO2 SERPL-SCNC: 23 MMOL/L (ref 20–33)
CREAT SERPL-MCNC: 0.68 MG/DL (ref 0.5–1.4)
ERYTHROCYTE [DISTWIDTH] IN BLOOD BY AUTOMATED COUNT: 52.4 FL (ref 35.9–50)
GFR SERPLBLD CREATININE-BSD FMLA CKD-EPI: 108 ML/MIN/1.73 M 2
GLOBULIN SER CALC-MCNC: 2.9 G/DL (ref 1.9–3.5)
GLUCOSE SERPL-MCNC: 89 MG/DL (ref 65–99)
GRAM STN SPEC: NORMAL
HCT VFR BLD AUTO: 30.1 % (ref 42–52)
HGB BLD-MCNC: 9.5 G/DL (ref 14–18)
MCH RBC QN AUTO: 29.1 PG (ref 27–33)
MCHC RBC AUTO-ENTMCNC: 31.6 G/DL (ref 32.3–36.5)
MCV RBC AUTO: 92.3 FL (ref 81.4–97.8)
PLATELET # BLD AUTO: 558 K/UL (ref 164–446)
PMV BLD AUTO: 10.1 FL (ref 9–12.9)
POTASSIUM SERPL-SCNC: 4 MMOL/L (ref 3.6–5.5)
PROT SERPL-MCNC: 5.4 G/DL (ref 6–8.2)
RBC # BLD AUTO: 3.26 M/UL (ref 4.7–6.1)
SIGNIFICANT IND 70042: NORMAL
SITE SITE: NORMAL
SODIUM SERPL-SCNC: 138 MMOL/L (ref 135–145)
SOURCE SOURCE: NORMAL
WBC # BLD AUTO: 11.5 K/UL (ref 4.8–10.8)

## 2025-06-20 PROCEDURE — 4410569 US-PARACENTESIS, ABD WITH IMAGING

## 2025-06-20 PROCEDURE — 99238 HOSP IP/OBS DSCHRG MGMT 30/<: CPT | Mod: GC | Performed by: STUDENT IN AN ORGANIZED HEALTH CARE EDUCATION/TRAINING PROGRAM

## 2025-06-20 PROCEDURE — 87205 SMEAR GRAM STAIN: CPT

## 2025-06-20 PROCEDURE — 700111 HCHG RX REV CODE 636 W/ 250 OVERRIDE (IP): Mod: JZ

## 2025-06-20 PROCEDURE — 92526 ORAL FUNCTION THERAPY: CPT

## 2025-06-20 PROCEDURE — 97535 SELF CARE MNGMENT TRAINING: CPT

## 2025-06-20 PROCEDURE — A9270 NON-COVERED ITEM OR SERVICE: HCPCS

## 2025-06-20 PROCEDURE — 0W9G3ZZ DRAINAGE OF PERITONEAL CAVITY, PERCUTANEOUS APPROACH: ICD-10-PCS

## 2025-06-20 PROCEDURE — 97530 THERAPEUTIC ACTIVITIES: CPT

## 2025-06-20 PROCEDURE — 85027 COMPLETE CBC AUTOMATED: CPT

## 2025-06-20 PROCEDURE — 80053 COMPREHEN METABOLIC PANEL: CPT

## 2025-06-20 PROCEDURE — 700102 HCHG RX REV CODE 250 W/ 637 OVERRIDE(OP)

## 2025-06-20 PROCEDURE — 87070 CULTURE OTHR SPECIMN AEROBIC: CPT

## 2025-06-20 PROCEDURE — 36415 COLL VENOUS BLD VENIPUNCTURE: CPT

## 2025-06-20 RX ORDER — HYDROXYZINE HYDROCHLORIDE 10 MG/1
10 TABLET, FILM COATED ORAL 3 TIMES DAILY PRN
Qty: 30 TABLET | Refills: 0 | Status: SHIPPED | OUTPATIENT
Start: 2025-06-20

## 2025-06-20 RX ORDER — LACTOBACILLUS RHAMNOSUS GG 10B CELL
1 CAPSULE ORAL DAILY
Qty: 90 CAPSULE | Refills: 0 | Status: SHIPPED | OUTPATIENT
Start: 2025-06-20

## 2025-06-20 RX ADMIN — SUCRALFATE ORAL SUSPENSION 1 G: 1 SUSPENSION ORAL at 13:04

## 2025-06-20 RX ADMIN — SUCRALFATE ORAL SUSPENSION 1 G: 1 SUSPENSION ORAL at 17:17

## 2025-06-20 RX ADMIN — SUCRALFATE ORAL SUSPENSION 1 G: 1 SUSPENSION ORAL at 05:06

## 2025-06-20 RX ADMIN — OMEPRAZOLE 40 MG: 20 CAPSULE, DELAYED RELEASE ORAL at 05:04

## 2025-06-20 RX ADMIN — Medication 4000 UNITS: at 05:04

## 2025-06-20 RX ADMIN — OXYCODONE 5 MG: 5 TABLET ORAL at 13:05

## 2025-06-20 RX ADMIN — OXYCODONE 5 MG: 5 TABLET ORAL at 03:46

## 2025-06-20 RX ADMIN — HYDROMORPHONE HYDROCHLORIDE 1 MG: 1 INJECTION, SOLUTION INTRAMUSCULAR; INTRAVENOUS; SUBCUTANEOUS at 05:13

## 2025-06-20 RX ADMIN — CARBIDOPA AND LEVODOPA 1 TABLET: 25; 100 TABLET ORAL at 08:16

## 2025-06-20 RX ADMIN — AMOXICILLIN AND CLAVULANATE POTASSIUM 1 TABLET: 875; 125 TABLET, FILM COATED ORAL at 05:04

## 2025-06-20 RX ADMIN — CARBIDOPA AND LEVODOPA 1 TABLET: 25; 100 TABLET ORAL at 16:27

## 2025-06-20 RX ADMIN — OMEPRAZOLE 40 MG: 20 CAPSULE, DELAYED RELEASE ORAL at 17:17

## 2025-06-20 RX ADMIN — HYDROXYZINE HYDROCHLORIDE 10 MG: 10 TABLET ORAL at 13:04

## 2025-06-20 ASSESSMENT — PAIN DESCRIPTION - PAIN TYPE
TYPE: ACUTE PAIN

## 2025-06-20 ASSESSMENT — COGNITIVE AND FUNCTIONAL STATUS - GENERAL
MOBILITY SCORE: 18
WALKING IN HOSPITAL ROOM: A LITTLE
STANDING UP FROM CHAIR USING ARMS: A LITTLE
MOVING FROM LYING ON BACK TO SITTING ON SIDE OF FLAT BED: A LITTLE
MOVING TO AND FROM BED TO CHAIR: A LITTLE
SUGGESTED CMS G CODE MODIFIER MOBILITY: CK
TURNING FROM BACK TO SIDE WHILE IN FLAT BAD: A LITTLE
CLIMB 3 TO 5 STEPS WITH RAILING: A LITTLE

## 2025-06-20 ASSESSMENT — GAIT ASSESSMENTS
DISTANCE (FEET): 20
DEVIATION: BRADYKINETIC;SHUFFLED GAIT;DECREASED HEEL STRIKE;DECREASED TOE OFF
GAIT LEVEL OF ASSIST: MINIMAL ASSIST

## 2025-06-20 ASSESSMENT — FIBROSIS 4 INDEX: FIB4 SCORE: 0.83

## 2025-06-20 NOTE — PROGRESS NOTES
"       Urology Progress Note    6/20- Patient resting comfortably. Reports there has been no more drainage from drain site. Small increase in WBC to 11.5. H/H stable. Denies new or worsening symptoms.    6/19- Labs continue to improve. Pt just ate \"a big breakfast\" of Frisian toast and feels well. Urine clear yellow.     6/18- Pt sleeping when I arrived. Urine light pink/yellow. No output in CRIS. Labs stable.      6/17- Pt sitting in chair. Feeding tube in place, minimal food eaten from trays. Urine with gross hematuria, which pt states started yesterday. Hbg stable on heparin. No increased pain. CRIS drain says 800 however this may be in error as CRIS empty during rounds. Also minimal output yesterday.     6/16 Pt in OR during rounds this was a chart check only. CRIS drain minimal, Good UOP. Labs stable     6/15-Pt is sleeping on rounds. He has an NG tube in place. He is afebrile, luz is draining yellow urine, CRIS drain with 23cc/24 hrs. WBC improving now 6.0, Hgb: 8.4, creatinine 0.65. Pt declined examination for his abdomen. AST improved now 251 (474), ALT has increased 236 (84). Alk phos: 158.    6/14- Pt is lying comfortably in bed. He was seen by speech therapy for a swallow evaluation, unfortunately the patient experience severe mid esophageal pain. He continue to have hicupps.Surgical the patient is doing well, abdominal drain with minimal output, luz draining yellow urine. WBC 4.0, Hgb: 8.7, creatinine 0.80, AST and ALT are improving, 474/97.     6/13-Pt is lying in bed in NAD. Luz catheter draining clear yellow urine. CRIS drain with 45cc of SS of exam. He notes that his hiccups have return, he has not been able to ambulate yet w/PT. He is afebrile, UOP 3000cc/24hrs, creatinine 1. WB: 2.8, Hgb: 9.2 and HCT 28.9. G.I was consulted for elevated liver enzymes.     6/12-Pt is sleeping soundly on exam. Luz catheter in place draining watermelon urine, no clots. UOP 1200cc/24hrs. Abdominal drain 2100cc/24hrs of SS. " "Abdominal drain had minimal output on exam. Afebrile, WBC: 1.4, Hgb: 9.2, Hct: 29.1, creatinine improved,now 1.16. He is now on a full liquid diet along with ensure. Abdominal incision c/d/I.     6/11-Pt lying comfortably in bed in NAD. RN at bedside. Pt is POD1 s/p cysto, on table cystogram, complex catheterization and laparoscopic washout of hematoma and abdominal cavity on 6/10/25 with Dr. Cesar. He is afebrile, hypertensive at 145/94, tachycardic 100, UOP 700cc/24hrs, WBC: 2.9, Hgb: 11.3, creatinine elevated 1.97. Pt notes his hiccups have resolved and feels that he is due for a BM today.     6/10- Pt is lying comfortably in bed in NAD. He is afebrile. UOP 2100cc/24hrs, drain output 1185cc/24hrs. He remain on Zosyn and is NPO. CT cystogram on 6/10 showed contrast within the peritoneal cavity compatible with an intraperitoneal bladder rupture. WBC: 3.8, Hgb: 10.4, Hct: 31.5, creatinine increased, now 1.63 (1.19). Pt is schedule to go to the OR today with Dr. eCsar for a bladder repair.     6/9-S/p cysto luz placement over a wire on 6/8/25 performed by Dr. Todd and IR abdominal drain placement on 6/8. Pt is lying comfortably in bed in NAD. IR APN at bedside assessing CRIS drain. Tmax 99.2, pt is tachycardic at 105. Normotensive at 136/84. Documented abdominal drain output 3100mL/24hrs, drainage is dark/old SS. He remains on Zosyn. Luz catheter is drainage clear yellow urine, good UOP on exam. WBC: 5.3, Hgb: 9.8, No updated creatinine as of yet, however his creatinine from 6/8/25 is 2.48 (4.79). Pt denies f/c/n/v     Overnight Events: None    S:    O:   BP 95/64   Pulse 81   Temp 36.7 °C (98 °F) (Temporal)   Resp 16   Ht 1.727 m (5' 8\")   Wt 58 kg (127 lb 13.9 oz)   SpO2 98%   Recent Labs     06/18/25  0747 06/19/25  0753 06/20/25  0329   SODIUM 139 138 138   POTASSIUM 3.7 3.9 4.0   CHLORIDE 108 107 108   CO2 23 24 23   GLUCOSE 103* 96 89   BUN 11 12 13   CREATININE 0.59 0.61 0.68   CALCIUM 7.8* 7.8* " 7.9*     Recent Labs     06/18/25  0747 06/19/25  0753 06/20/25  0329   WBC 9.4 8.6 11.5*   RBC 3.31* 3.15* 3.26*   HEMOGLOBIN 9.8* 9.2* 9.5*   HEMATOCRIT 30.7* 29.3* 30.1*   MCV 92.7 93.0 92.3   MCH 29.6 29.2 29.1   MCHC 31.9* 31.4* 31.6*   RDW 52.7* 53.7* 52.4*   PLATELETCT 688* 623* 558*   MPV 10.7 10.5 10.1         Intake/Output Summary (Last 24 hours) at 6/9/2025 1008  Last data filed at 6/9/2025 0822  Gross per 24 hour   Intake 840 ml   Output 3100 ml   Net -2260 ml       Exam:    Constitutional: Alert, oriented, no acute distress  HEENT: Pupils PERRL  Cardiovascular: Cap refill <2 seconds  Pulmonary: No increased work of breathing  Back: Ecchymosis overlying the lumbar spine  Abdominal: Laparoscopic incisions are clean, dry, and intact. No erythema or exudate, covered in dermabond. No abdominal distention or tenderness. No rebound. Significant left flank ecchymosis extending down to the left lateral leg and posterior to the low back.   : Juarez in place draining clear yellow urine.     A/P:    Active Hospital Problems    Diagnosis     Esophagitis determined by endoscopy [K20.90]     Malnutrition (HCC) [E46]     Elevated liver enzymes [R74.8]     Anemia [D64.9]     Liver lesion [K76.9]     Abdominal pain [R10.9]     Prostate cancer (HCC) [C61]     History of tobacco abuse [Z87.891]     Parkinson's disease with dyskinesia and fluctuating manifestations (HCC) [G20.B2]      Plan:   -Continue mediations for esophogeal erosion to encourage PO intake   -Continue Ditropan 10mg ER daily for bladder spasms  -Monitor urine output and abdominal drain output.   -Juarez to remain until cystogram done and confirmed with Dr. Cesar.   -CRIS can be removed in our office.   -Urology will continue to follow the patient during this hospital stay but has no other treatments or interventions planned     -Patient's case was discussed with Dr. Barajas, Dr. Cervantes and Dr. Cesar who have directed the plan of care for the patient.       Star Hunt, P.A.   [address] [place] [address]   [phone]

## 2025-06-20 NOTE — DISCHARGE PLANNING
Case Management Discharge Planning    Admission Date: 6/8/2025  GMLOS: 10.4  ALOS: 12    6-Clicks ADL Score: 19  6-Clicks Mobility Score: 18      Anticipated Discharge Dispo: Discharge Disposition: D/T to SNF with Medicare cert in anticipation of skilled care (03)  Discharge Address: [address] [place], [place] [address] [place] [address]  Discharge Contact Phone Number: [phone]    DME Needed: No    Action(s) Taken: OTHER    SW met with patient at bedside to discuss DCP. Patient verbalized being agreeable to SNF placement and to going to [facility]. Patient signed consent to transfer. SW to confirm transport for 1730 as [facility] does not have any transportation left for the day. Bedside RN and medical team aware.    SW called patient's S/O at his request to update on transport time and placement. Patient's S/O stated understanding and that she will be coming by after work.    Escalations Completed: None    Medically Clear: Yes    Next Steps: Confirm transportation for 1730.    Barriers to Discharge: None    Is the patient up for discharge tomorrow: No

## 2025-06-20 NOTE — PROCEDURES
Diagnostic RLQ Paracentesis was performed in US Room 2, by DARÍO Donaldson, removing 18 cc of cloudy, reddish fluid. Pt tolerated the procedure well.     Post procedure report was verbally provided to RN via volte.     Post BP = 107/71    Pt was taken back to room via transport.

## 2025-06-20 NOTE — DIETARY
Nutrition Services: Follow-up for Nutrition Care Plan   Day 12 of admit. Rakan Rader III is a 60 y.o. male with admitting DX of Acute renal failure (ARF).    Recorded PO intake variable but improving with patient eating % the past 2 meals.    Current diet order:   IDDSI level 0 - thin liquids and IDDSI level 6 - soft/bite-sized  Ensure Plus High Protein (provides 350 calories, 20 g protein per 8 fl oz) TID     Malnutrition risk: Severe Malnutrition in context of Acute Illness or Injury related to prostate cancer, with post op complications as evidenced by severe fat loss and severe muscle wasting.       Nutrition Dx: Inadequate Oral Intake related to pain with swallowing as evidenced by <50% of caloric intake.        Nutrition Dx Status: Resolved      Problem: Nutritional:  Goal: Achieve adequate nutritional intake  Description: Patient will consume 50% of meals  Outcome: Progressing     Plan/ Recommendations:      Encourage intake of meals, goal for >50% consumption from meals and/or supplements  Document intake of all meals as % taken in ADLs to provide interdisciplinary communication across all shifts.   Monitor weight.  Nutrition rep available upon request to see patient for ongoing meal and snack preferences.       RD following

## 2025-06-20 NOTE — PROGRESS NOTES
Assumed care of patient at 1900. Received Report from YOAN Adkins. Patient A&Ox4, on room air and not on apparent distress. Reporting a pain level of 4 at this time, medicated per Mar and reassessed after pain meds. PIV on left forearm checked- no signs of phlebitis. Previous CRIS site on right side of abdomen has a little fluid draining. Call light within reach, fall prevention education given, belongings within reach, bed alarm is on and bed in lowest position. All questions answered, plan of care discussed and pt verbalized understanding.

## 2025-06-20 NOTE — THERAPY
"Speech Language Pathology   Daily Treatment     Patient Name:  Rakan Rader III  AGE:  60 y.o., SEX:  male  Medical Record #:  [id number]  Date of Service:  6/20/2025    Precautions  Medical: Fall Risk  Swallowing: Dysphagia Diet      Patient is a 60 y.o. male who presented 6/8 with abdominal pain after underwent RA laparoscopic prostatectomy 5/28; found to have urinary ascites and 7cm R pelvic hematoma s/p diagnostic laparoscopy and hematoma washout 6/10. EGD 6/16 found Grade D erosive esophagitis and 2 cm hiatal hernia.     PMHx: Parkinson's Disease with DBS, prostate cancer      Subjective:  Patient seen this date for dysphagia management. Patient alert, agreeable to session, and positioned himself to EOB to consume lunch tray. Patient reported \"too dry\" to chicken on lunch tray.       Assessment:  PO presentations of thin liquids (TN0) cup and straw and regular solids (RG7; chicken breast, sauteed spinach) from lunch tray. Adequate oral bolus acceptance/containment. Visualized piecemeal deglutition with some open mouth chewing. Patient using several liquid washes to assist with oral clearance r/t dryness of RG7. No cough/throat clear appreciated with PO. Vocal quality remained stable throughout PO intake. No signs of esophageal dysfunction. Patient taking large bites of RG7 and adding new bolus prior to full oral clearance. Cued slower rate/smaller bites/at least oral clearance given patient's reported frustration with dryness. Offered SB6 diet modification which patient was very agreeable to. RN updated.       Clinical Impressions:   Patient presents with oral phase deficits characterized by prolonged mastication. No clinical indicators of pharyngeal dysphagia this date. Presentation is consistent with missing dentition. Modified diet initiation remains is in alignment with patient preference. Service will continue to follow to maximize swallowing outcomes.        Recommendations  Diet Consistency: Soft and " "bite size solids, thin liquids  Instrumentation: None indicated at this time  Medication: As tolerated  Supervision: Distant supervision - check on patient 2-3 times per meal  Positioning: Fully upright and midline during oral intake  Risk Management : Alternate bites and sips, Slow rate of intake  Oral Care: BID                                 SLP Treatment Plan  Treatment Plan: Dysphagia Treatment  SLP Frequency: 3x Per Week  Estimated Duration: Until Therapy Goals Met      Anticipated Discharge Needs  Discharge Recommendations: Anticipate that the patient will have no further speech therapy needs after discharge from the hospital  Therapy Recommendations Upon DC: Not Indicated      Patient / Family Goals  Patient / Family Goal #1: \"Can I have cavanaugh and eggs?\"  Goal #1 Outcome: Progressing as expected  Short Term Goals  Short Term Goal # 1: Patient will consume RG7/TN0 diet without s/sx of airway invasion.  Goal Outcome # 1: Goal not met  Short Term Goal # 2: Patient will consume diet of SB6/TN0 without s/sx of airway invasion.      Siena Norton, SLP  "

## 2025-06-20 NOTE — DISCHARGE PLANNING
DC Transport Scheduled    Transport Company Scheduled:  [facility]    Scheduled Date: 6/20/2025  Scheduled Time: 1730    Transport Type: Wheelchair  Destination:   [facility]   Destination address: [place] [address]    Notified care team of scheduled transport via Voalte.     If there are any changes needed to the DC transportation scheduled, please contact [facility] at ext. [phone] between the hours of 1659-3244. If outside those hours, contact the ED Case Manager at ext. [phone].

## 2025-06-20 NOTE — CARE PLAN
The patient is Stable - Low risk of patient condition declining or worsening    Shift Goals  Clinical Goals: Pain control to a comfort level of 3/10  Patient Goals: Rest  Family Goals: HOLLAND    Progress made toward(s) clinical / shift goals:      Problem: Pain - Standard  Goal: Alleviation of pain or a reduction in pain to the patient’s comfort goal  Outcome: Progressing  Note: Patient's pain managed through pharmacologic and non-pharmacologic measures. Is able to rest comfortably through the night after PRN pain medication was given. Educated to report any onset of pain. Patient's pain level will continually be monitored for the rest of the shift.     Problem: Fall Risk  Goal: Patient will remain free from falls  Outcome: Progressing  Note: Safety education provided; patient verbalized understanding. Bed is placed on low position with bed alarm on; care items within reach; emphasized to use the call button to seek assistance. Hourly rounding done. Remains free from thus far this shift. Monitoring to continue for the rest of the shift.     Problem: Hemodynamics  Goal: Patient's hemodynamics, fluid balance and neurologic status will be stable or improve  Outcome: Progressing  Note: Patient's vital signs maintained within normal range.       Patient is not progressing towards the following goals:

## 2025-06-20 NOTE — PROGRESS NOTES
Report received from NOC YOAN Hogan and assumed patient care at 0700. Patient is A&Ox 4, on RA, and resting with unlabored breathing, even rise and fall of chest. Patient reporting no pain at this time. Call light within reach and bed in lowest position. Reinforced the need to call for assistance. Plan of care discussed and patient does not have any further needs at this time.

## 2025-06-20 NOTE — DISCHARGE SUMMARY
"[facility] Discharge Summary    Attending: Zachary Harmon M.d.  Senior Resident: Dr. Dye  Intern:  Dr. Oakley  Contact Number: [phone]    CHIEF COMPLAINT ON ADMISSION  Chief Complaint   Patient presents with    Post-Op Complications     Abdominal pain with nausea and vomiting since pt had surgery on 5/28 and the pain is progressively worsening  + dark brown vomitus (small amount)  + no stool for 4 days  + abdominal tenderness  + shortness of breath    Denied any fever, chest pain    5/28/2025: History of ROBOTIC ASSISTED LAPAROSCOPIC PROSTATECTOMY (Abdomen)  LYSIS, ADHESIONS, LAPAROSCOPIC ROBOTIC (Abdomen)        Abdominal Pain    Nausea    Shortness of Breath       Reason for Admission  Post Op Complications     Admission Date  6/8/2025    CODE STATUS  Full Code    HPI & HOSPITAL COURSE  \"Rakan Rader is a 60 y.o. male with a history of of PD with brain stimulator who presented with abdominal pain.   Patient underwent robotic prostatectomy on 5/28 with Dr. Cesar for prostate cancer. He states that he has had intermittent abdominal pain since then and it has gotten progressive worse over the past 3-4 days. He has had associated nausea and decreased appetite, has not had a BM for 4 days but has not had anything to eat for the past 3 days. He has been urinating, wearing depends due to some incontinence which he states is chronic d/t PD. Some hematuria which he was told was normal post operatively. He denies fever. Did have a couple episodes of small emesis today that were dark brown, prompting them to come to the ED.   Patient currently reports 3-4/10 abdominal pain presently. Denies any current chest pain but has been short of breath over the past few days. He denies any history of clots or clotting disorders. Does not take any blood thinners. No history of cardiac disease.   Has been taking daily medications including oxycodone post operatively.    CXR negative and CT showed 7 cm right pelvic high " "density mass may be a hematoma. This displaces the bladder to the left. Large free peritoneal fluid in abdomen and pelvis. Bladder empty. No hydronephrosis. Mild ileus-type fluid in stomach and small bowel. Fluid-filled distal esophagus suggests reflux. No colon distention. Normal appendix. Possible sludge in gallbladder. No inflammation or ductal dilatation. Small indeterminate 8 mm liver lesion may be a benign cyst. \"    Urology was consulted in the ED and placed a Juarez catheter under direct visualization.  He was admitted to the floor and IR was consulted for placement of a drain to drain the hematoma and urinary ascites.  He was started on Zosyn.     #Abdominal pain  He underwent cystoscopy, cystogram, complex catheterization and laparoscopic washout of hematoma in abdominal cavity on 6/7 with Dr. Cesar with [facility].  Operative report noted very small posterior vesicourethral opening between stitches. They recommend to continue Ditropan 10 mg daily for bladder spasms and to keep the Juarez for minimum 3 weeks.  He remained on Zosyn through 6/13 and then was transition to Augmentin, instructed to continue for 10-day course of treatment and on 6/24.  On 6/19, CRIS drain that was placed during surgery was removed due to minimal output. Drain was noted to have a blood clot near the end of the tubing. Subsequently approximately 1800mL of serosanguinous fluid drained from the previous drain site. He had no abdominal pain, nausea or vomiting. A CT abdomen showed small amount of ascites throughout the abdomen. Discussion with Interventional Radiology determined that the fluid pocket was too small for drain placement and recommended US paracentesis.   On 6/20, US Paracentesis was performed with 18cc of cloudy reddish fluid removed with a sample sent to lab for analysis. He tolerated the procedure well.  In the setting of significant transaminitis, there was likely acute hepatitis that caused the ascites in " conjunction with the resolving hematoma. Unlikely he had a recurrence of urinary ascites due to normal electrolytes.     He is stable for discharge.      #Esophagitis  Throughout his hospitalization, patient had reported some pain with swallowing and persistent and very bothersome hiccups.  This pain  and hiccuping had prevented him from maintaining p.o. intake prior to hospitalization.  Noted that he was very malnourished.  Trial of muscle relaxer to aid with resolving hiccups however minimal response.  He was started on a PPI and gastroenterology was consulted for endoscopy.  Results of endoscopy showed erosive esophagitis, biopsies were collected and results are still pending.  Recommendations by GI are to continue a PPI outpatient and Carafate.  He can follow-up with GI outpatient regarding esophagitis and for incidental liver lesion found on CT.     #Malnutrition  Poor nutrition status secondary to esophageal pain and significantly decreased appetite.  NG tube was placed for tube feedings on 6/15.  He tolerated placement well and tolerated tube feeds very well, he was ready for it to be removed on 6/18 as he was tolerating oral feeds.  Per dietary, recommend continuing thiamine supplementation.     #Transaminitis, resolved  Postoperatively patient's liver enzymes spiked to peak of AST 1163, .  Gastroenterology was consulted and agreed with the diagnosis of likely shock liver in the presence of acute illness.  LFTs were trended and continued to downtrend and remained stable.  No further follow-up recommended. The acuity of liver injury likely contributed to the collection of ascites fluid in the abdomen.     #Hyponatremia, Resolved  Initially presented with hyponatremia, attributed to urinary ascites causing protonic hyponatremia which improved as his urine ascites improved.       Therefore, he is discharged in good and stable condition to skilled nursing facility.    The patient met 2-midnight criteria  for an inpatient stay at the time of discharge.    Discharge Date  06/19/25    Physical Exam on Day of Discharge  Physical Exam  HENT:      Head: Normocephalic and atraumatic.      Nose: Nose normal.      Mouth/Throat:      Mouth: Mucous membranes are moist.   Eyes:      Extraocular Movements: Extraocular movements intact.      Conjunctiva/sclera: Conjunctivae normal.   Cardiovascular:      Rate and Rhythm: Normal rate and regular rhythm.      Pulses: Normal pulses.      Heart sounds: Normal heart sounds.   Pulmonary:      Effort: Pulmonary effort is normal.      Breath sounds: Normal breath sounds.   Abdominal:      General: Abdomen is flat. Bowel sounds are normal.      Palpations: Abdomen is soft.      Comments: Mild suprapubic tenderness   Musculoskeletal:         General: Normal range of motion.      Cervical back: Normal range of motion and neck supple.   Skin:     General: Skin is warm and dry.   Neurological:      General: No focal deficit present.      Mental Status: He is alert and oriented to person, place, and time. Mental status is at baseline.   Psychiatric:         Mood and Affect: Mood normal.         Behavior: Behavior normal.         FOLLOW UP ITEMS POST DISCHARGE  #Juarez catheter  #Prostate cancer  Follow-up with urology for catheter removal    #Liver Lesion  #Esophagitis  Follow up with GI outpatient    #Malnutrition  Continue to monitor PO intake, encourage increased protein intake    #Ascites Fluid  Follow up with Urology or primary care for results of fluid analysis      DISCHARGE DIAGNOSES  Principal Problem:    Abdominal pain (POA: Yes)  Active Problems:    Malnutrition (HCC) (POA: Yes)    Elevated liver enzymes (POA: Yes)    Parkinson's disease with dyskinesia and fluctuating manifestations (HCC) (POA: Yes)    History of tobacco abuse (POA: Yes)    Prostate cancer (HCC) (POA: Yes)    Anemia (POA: Yes)    Liver lesion (POA: Yes)    Esophagitis determined by endoscopy (POA: Yes)  Resolved  Problems:    Acute renal failure (ARF) (HCC) (POA: Yes)    Hyperkalemia (POA: Unknown)    Leukocytosis (POA: Unknown)    Thrombocytosis (POA: Unknown)    Hyponatremia (POA: Yes)    Increased anion gap metabolic acidosis (POA: Unknown)    Acute hypoxic respiratory failure (HCC) (POA: Unknown)    SIRS (systemic inflammatory response syndrome) (HCC) (POA: Unknown)    Other neutropenia (HCC) (POA: Unknown)    Intractable hiccups (POA: Unknown)      FOLLOW UP  Future Appointments   Date Time Provider Department Center   7/9/2025  7:20 AM SHAHANA RazoGN [address]  [place] [address]  [phone]          MEDICATIONS ON DISCHARGE     Medication List        START taking these medications        Instructions   amoxicillin-clavulanate 875-125 MG Tabs  Commonly known as: Augmentin   Take 1 Tablet by mouth every 12 hours for 5 days.  Dose: 1 Tablet     Culturelle Digestive Health Caps   Take 1 Each by mouth every day.  Dose: 1 Each     hydrOXYzine HCl 10 MG Tabs  Commonly known as: Atarax   Take 1 Tablet by mouth 3 times a day as needed for Itching (Anxiety).  Dose: 10 mg     omeprazole 40 MG delayed-release capsule  Commonly known as: PriLOSEC   Take 1 Capsule by mouth 2 times a day.  Dose: 40 mg     sucralfate 1 GM/10ML Susp  Commonly known as: Carafate   Take 10 mL by mouth every 6 hours for 30 days.  Dose: 1 g            CHANGE how you take these medications        Instructions   oxyCODONE immediate-release 5 MG Tabs  What changed:   when to take this  additional instructions  Commonly known as: Roxicodone   Take 1 Tablet by mouth every four hours as needed for Severe Pain for up to 10 days.  Dose: 5 mg            CONTINUE taking these medications        Instructions   B COMPLEX PO   Take 1 Tablet by mouth every day.  Dose: 1 Tablet     carbidopa-levodopa  MG Tabs  Commonly known as: Sinemet   Take 1 Tablet by mouth 3 times a day. For Parkinson's  Dose: 1  Tablet     MAGNESIUM PO   Take 1 Tablet by mouth every day.  Dose: 1 Tablet     OMEGA 3 PO   Take 1 Capsule by mouth every day.  Dose: 1 Capsule     oxybutynin SR 10 MG CR tablet  Commonly known as: Ditropan-XL   Take 10 mg by mouth every day. 2 day course dispensed 06/01/2025  COMPLETED  Dose: 10 mg     vitamin D3 1000 Unit (25 mcg) Tabs  Commonly known as: Cholecalciferol   Take 4,000 Units by mouth 2 times a day.  Dose: 4,000 Units     VITAMIN K PO   Take 1 Tablet by mouth every day.  Dose: 1 Tablet            STOP taking these medications      ibuprofen 200 MG Tabs  Commonly known as: Motrin              Allergies  Allergies[1]    DIET  Orders Placed This Encounter   Procedures    Diet Order Diet: Regular (thin liquids)     Standing Status:   Standing     Number of Occurrences:   1     Diet::   Regular [1]              thin liquids       ACTIVITY  As tolerated and directed by skilled nursing.  Weight bearing as tolerated    CONSULTATIONS  [facility]  Gastroenterology  Interventional Radiology    PROCEDURES  - Bedside cystoscopy with luz placement  - IR CT guided abdominal drain placement  - Cystoscopy, on table cystogram, complex catheterization and laparoscopic washout of hematoma and abdominal cavity on 6/10/25   - EGD with biopsy  - US guided Paracentesis, 18cc fluid removed, sent to lab for analysis    LABORATORY  Lab Results   Component Value Date    SODIUM 138 06/20/2025    POTASSIUM 4.0 06/20/2025    CHLORIDE 108 06/20/2025    CO2 23 06/20/2025    GLUCOSE 89 06/20/2025    BUN 13 06/20/2025    CREATININE 0.68 06/20/2025        Lab Results   Component Value Date    WBC 11.5 (H) 06/20/2025    HEMOGLOBIN 9.5 (L) 06/20/2025    HEMATOCRIT 30.1 (L) 06/20/2025    PLATELETCT 558 (H) 06/20/2025        Total time of the discharge process exceeds 40 minutes.         [1] No Known Allergies

## 2025-06-20 NOTE — THERAPY
"Physical Therapy   Daily Treatment     Patient Name:  Rakan Rader III  Age:  60 y.o., Sex:  male  Medical Record #:  [id number]  Today's Date: 6/20/2025     Precautions  Medical: (P) Fall Risk    Assessment    Patient received in bed and agreeable to PT session. Pt initially very focused on L abdominal pain; improvement after BM. Pt able to ambulate to/from the bathroom with Kevin and no AD; pt with multiple minor LOB's. Pt in bathroom for prolonged period of time, declined further mobility after BM due to fatigue. Pt is primarily limited by generalized weakness impaired balance, limited activity tolerance, and impaired cognition. Recommend post acute placement. Will follow for acute care PT needs.    Plan    Treatment Plan Status: (P) Continue Current Treatment Plan  Type of Treatment: Bed Mobility, Gait Training, Therapeutic Activities  Treatment Frequency: 4 Times per Week  Treatment Duration: Until Therapy Goals Met    DC Equipment Recommendations: (P) Unable to determine at this time  Discharge Recommendations: (P) Recommend post-acute placement for additional physical therapy services prior to discharge home      Subjective    \"Turn off the bed alarm I don't need it, I have been getting up by myself\". (Bed alarm ON, RN aware. Pt educated to call for staff assistance prior to mobilizing).      Objective       06/20/25 1036   Precautions   Medical Fall Risk   Vitals   O2 (LPM) 0   O2 Delivery Device None - Room Air   Vitals Comments VSS, declines dizziness   Pain 0 - 10 Group   Location Abdomen   Location Orientation Left   Therapist Pain Assessment Post Activity Pain Same as Prior to Activity;Nurse Notified  (pain not rated; pain improved after BM)   Cognition    Cognition / Consciousness X   Speech/ Communication Delayed Responses   Level of Consciousness Alert   Safety Awareness Impaired;Impulsive   New Learning Impaired   Attention Impaired   Initiation Impaired   Comments pleasant and participatory, " delayed processing   Active ROM Lower Body    Active ROM Lower Body  WDL   Strength Lower Body   Comments WFL for mobility, gorssly 4/5   Balance   Sitting Balance (Static) Fair +   Sitting Balance (Dynamic) Fair +   Standing Balance (Static) Fair   Standing Balance (Dynamic) Fair -   Weight Shift Sitting Fair   Weight Shift Standing Fair   Skilled Intervention Verbal Cuing;Compensatory Strategies   Comments no AD   Bed Mobility    Supine to Sit Standby Assist   Sit to Supine Standby Assist   Scooting Standby Assist   Skilled Intervention Verbal Cuing;Compensatory Strategies   Comments HOB slightly elevated   Gait Analysis   Gait Level Of Assist Minimal Assist   Assistive Device None   Distance (Feet) 20   # of Times Distance was Traveled 2   Deviation Bradykinetic;Shuffled Gait;Decreased Heel Strike;Decreased Toe Off   Skilled Intervention Verbal Cuing;Tactile Cuing;Compensatory Strategies   Comments declined use of FWW   Functional Mobility   Sit to Stand Contact Guard Assist   Bed, Chair, Wheelchair Transfer Contact Guard Assist   Toilet Transfers Contact Guard Assist   Mobility bed <> bathroom   Skilled Intervention Verbal Cuing;Compensatory Strategies   6 Clicks Assessment - How much HELP from from another person do you currently need... (If the patient hasn't done an activity recently, how much help from another person do you think he/she would need if he/she tried?)   Turning from your back to your side while in a flat bed without using bedrails? 3   Moving from lying on your back to sitting on the side of a flat bed without using bedrails? 3   Moving to and from a bed to a chair (including a wheelchair)? 3   Standing up from a chair using your arms (e.g., wheelchair, or bedside chair)? 3   Walking in hospital room? 3   Climbing 3-5 steps with a railing? 3   6 clicks Mobility Score 18   Short Term Goals    Short Term Goal # 1 Pt to move supine to/from eob w/ spv in 6 visits   Goal Outcome # 1 goal not met    Short Term Goal # 2 Pt to move sit to/from stand w/ spv in 6 visits   Goal Outcome # 2 Goal not met   Short Term Goal # 3 Pt to ambulate 150 ft w/ fww and spv in 6 visits   Goal Outcome # 3 Goal not met   Education Group   Education Provided Role of Physical Therapist;Gait Training   Role of Physical Therapist Patient Response Patient;Acceptance;Explanation;Verbal Demonstration   Gait Training Patient Response Patient;Acceptance;Explanation;Action Demonstration   Additional Comments Educated pt on use of FWW vs SPC for improved stability, balance, gait mechanics and independence; pt declining use of AD at this time   Physical Therapy Treatment Plan   Physical Therapy Treatment Plan Continue Current Treatment Plan   Anticipated Discharge Equipment and Recommendations   DC Equipment Recommendations Unable to determine at this time   Discharge Recommendations Recommend post-acute placement for additional physical therapy services prior to discharge home   Interdisciplinary Plan of Care Collaboration   IDT Collaboration with  Nursing   Patient Position at End of Therapy In Bed;Bed Alarm On;Call Light within Reach;Tray Table within Reach;Phone within Reach   Collaboration Comments RN updated   Session Information   Date / Session Number  6/20 - 4 (2/4, 6/22)

## 2025-06-21 NOTE — PROGRESS NOTES
PIV D/C'd.  Discharge instructions provided to pt.  Pt states understanding.  Pt states all questions have been answered.  Copy of discharge provided to pt.  Signed copy in chart.  Pt states that all personal belongings are in possession.  Pt escorted off unit with assistance from [facility] without incident.

## 2025-06-24 NOTE — Clinical Note
REFERRAL APPROVAL NOTICE         Sent on June 24, 2025                   Rakan Rader III  [address] [place] [address]                   Dear Mr. Rader,    After a careful review of the medical information and benefit coverage, [facility] has processed your referral. See below for additional details.    If applicable, you must be actively enrolled with your insurance for coverage of the authorized service. If you have any questions regarding your coverage, please contact your insurance directly.    REFERRAL INFORMATION   Referral #:  [id number]  Referred-To Provider    Referred-By Provider:  Gastroenterology    Porsha Dye D.O.   [facility]      [address] [place] [address]  [phone] [id number]  [place] [address]  [phone]    Referral Start Date:  06/18/2025  Referral End Date:   06/18/2026             SCHEDULING  If you do not already have an appointment, please call [phone] to make an appointment.     MORE INFORMATION  If you do not already have a [email or web address] account, sign up at: [email or web address]  You can access your medical information, make appointments, see lab results, billing information, and more.  If you have questions regarding this referral, please contact  the [facility] Referrals department at:             [phone]. Monday - Friday 8:00AM - 5:00PM.     Sincerely,    [facility]

## 2025-06-25 LAB
BACTERIA FLD AEROBE CULT: NORMAL
GRAM STN SPEC: NORMAL
SIGNIFICANT IND 70042: NORMAL
SITE SITE: NORMAL
SOURCE SOURCE: NORMAL

## 2025-06-30 ENCOUNTER — APPOINTMENT (OUTPATIENT)
Dept: RADIOLOGY | Facility: MEDICAL CENTER | Age: 60
DRG: 698 | End: 2025-06-30
Attending: STUDENT IN AN ORGANIZED HEALTH CARE EDUCATION/TRAINING PROGRAM
Payer: MEDICARE

## 2025-06-30 ENCOUNTER — HOSPITAL ENCOUNTER (INPATIENT)
Facility: MEDICAL CENTER | Age: 60
LOS: 3 days | DRG: 698 | End: 2025-07-03
Attending: STUDENT IN AN ORGANIZED HEALTH CARE EDUCATION/TRAINING PROGRAM | Admitting: STUDENT IN AN ORGANIZED HEALTH CARE EDUCATION/TRAINING PROGRAM
Payer: MEDICARE

## 2025-06-30 DIAGNOSIS — E46 PROTEIN-CALORIE MALNUTRITION, UNSPECIFIED SEVERITY (HCC): ICD-10-CM

## 2025-06-30 DIAGNOSIS — N32.1 COLOVESICAL FISTULA: Primary | ICD-10-CM

## 2025-06-30 DIAGNOSIS — E43 SEVERE PROTEIN-CALORIE MALNUTRITION (HCC): ICD-10-CM

## 2025-06-30 DIAGNOSIS — G20.B1 DYSKINESIA DUE TO PARKINSON'S DISEASE (HCC): ICD-10-CM

## 2025-06-30 DIAGNOSIS — R18.8 OTHER ASCITES: ICD-10-CM

## 2025-06-30 DIAGNOSIS — N32.1 RECTOVESICAL FISTULA: ICD-10-CM

## 2025-06-30 DIAGNOSIS — R10.84 GENERALIZED ABDOMINAL PAIN: ICD-10-CM

## 2025-06-30 DIAGNOSIS — R32 BLADDER LEAK: ICD-10-CM

## 2025-06-30 DIAGNOSIS — C61 PROSTATE CANCER (HCC): ICD-10-CM

## 2025-06-30 DIAGNOSIS — G20.B2 PARKINSON'S DISEASE WITH DYSKINESIA AND FLUCTUATING MANIFESTATIONS (HCC): ICD-10-CM

## 2025-06-30 LAB
ALBUMIN SERPL BCP-MCNC: 2.7 G/DL (ref 3.2–4.9)
ALBUMIN/GLOB SERPL: 0.7 G/DL
ALP SERPL-CCNC: 153 U/L (ref 30–99)
ALT SERPL-CCNC: 23 U/L (ref 2–50)
ANION GAP SERPL CALC-SCNC: 10 MMOL/L (ref 7–16)
AST SERPL-CCNC: 34 U/L (ref 12–45)
BASOPHILS # BLD AUTO: 1.1 % (ref 0–1.8)
BASOPHILS # BLD: 0.1 K/UL (ref 0–0.12)
BILIRUB SERPL-MCNC: 0.2 MG/DL (ref 0.1–1.5)
BUN SERPL-MCNC: 18 MG/DL (ref 8–22)
CALCIUM ALBUM COR SERPL-MCNC: 9.6 MG/DL (ref 8.5–10.5)
CALCIUM SERPL-MCNC: 8.6 MG/DL (ref 8.5–10.5)
CHLORIDE SERPL-SCNC: 103 MMOL/L (ref 96–112)
CO2 SERPL-SCNC: 22 MMOL/L (ref 20–33)
CREAT SERPL-MCNC: 0.63 MG/DL (ref 0.5–1.4)
EOSINOPHIL # BLD AUTO: 0.1 K/UL (ref 0–0.51)
EOSINOPHIL NFR BLD: 1.1 % (ref 0–6.9)
ERYTHROCYTE [DISTWIDTH] IN BLOOD BY AUTOMATED COUNT: 54.4 FL (ref 35.9–50)
GFR SERPLBLD CREATININE-BSD FMLA CKD-EPI: 110 ML/MIN/1.73 M 2
GLOBULIN SER CALC-MCNC: 4.1 G/DL (ref 1.9–3.5)
GLUCOSE SERPL-MCNC: 94 MG/DL (ref 65–99)
HCT VFR BLD AUTO: 30.7 % (ref 42–52)
HGB BLD-MCNC: 9.5 G/DL (ref 14–18)
IMM GRANULOCYTES # BLD AUTO: 0.09 K/UL (ref 0–0.11)
IMM GRANULOCYTES NFR BLD AUTO: 1 % (ref 0–0.9)
LYMPHOCYTES # BLD AUTO: 1.39 K/UL (ref 1–4.8)
LYMPHOCYTES NFR BLD: 14.9 % (ref 22–41)
MCH RBC QN AUTO: 27.2 PG (ref 27–33)
MCHC RBC AUTO-ENTMCNC: 30.9 G/DL (ref 32.3–36.5)
MCV RBC AUTO: 88 FL (ref 81.4–97.8)
MONOCYTES # BLD AUTO: 0.98 K/UL (ref 0–0.85)
MONOCYTES NFR BLD AUTO: 10.5 % (ref 0–13.4)
NEUTROPHILS # BLD AUTO: 6.65 K/UL (ref 1.82–7.42)
NEUTROPHILS NFR BLD: 71.4 % (ref 44–72)
NRBC # BLD AUTO: 0 K/UL
NRBC BLD-RTO: 0 /100 WBC (ref 0–0.2)
PLATELET # BLD AUTO: 446 K/UL (ref 164–446)
PMV BLD AUTO: 9.5 FL (ref 9–12.9)
POTASSIUM SERPL-SCNC: 4 MMOL/L (ref 3.6–5.5)
PROT SERPL-MCNC: 6.8 G/DL (ref 6–8.2)
RBC # BLD AUTO: 3.49 M/UL (ref 4.7–6.1)
SODIUM SERPL-SCNC: 135 MMOL/L (ref 135–145)
WBC # BLD AUTO: 9.3 K/UL (ref 4.8–10.8)

## 2025-06-30 PROCEDURE — 85025 COMPLETE CBC W/AUTO DIFF WBC: CPT

## 2025-06-30 PROCEDURE — 99285 EMERGENCY DEPT VISIT HI MDM: CPT

## 2025-06-30 PROCEDURE — 36415 COLL VENOUS BLD VENIPUNCTURE: CPT

## 2025-06-30 PROCEDURE — 700102 HCHG RX REV CODE 250 W/ 637 OVERRIDE(OP): Performed by: STUDENT IN AN ORGANIZED HEALTH CARE EDUCATION/TRAINING PROGRAM

## 2025-06-30 PROCEDURE — 700117 HCHG RX CONTRAST REV CODE 255: Performed by: STUDENT IN AN ORGANIZED HEALTH CARE EDUCATION/TRAINING PROGRAM

## 2025-06-30 PROCEDURE — A9270 NON-COVERED ITEM OR SERVICE: HCPCS | Performed by: STUDENT IN AN ORGANIZED HEALTH CARE EDUCATION/TRAINING PROGRAM

## 2025-06-30 PROCEDURE — 72192 CT PELVIS W/O DYE: CPT

## 2025-06-30 PROCEDURE — 770006 HCHG ROOM/CARE - MED/SURG/GYN SEMI*

## 2025-06-30 PROCEDURE — 700111 HCHG RX REV CODE 636 W/ 250 OVERRIDE (IP): Mod: JZ | Performed by: STUDENT IN AN ORGANIZED HEALTH CARE EDUCATION/TRAINING PROGRAM

## 2025-06-30 PROCEDURE — 96374 THER/PROPH/DIAG INJ IV PUSH: CPT

## 2025-06-30 PROCEDURE — 80053 COMPREHEN METABOLIC PANEL: CPT

## 2025-06-30 RX ORDER — SODIUM CHLORIDE 9 MG/ML
INJECTION, SOLUTION INTRAVENOUS CONTINUOUS
Status: DISCONTINUED | OUTPATIENT
Start: 2025-07-01 | End: 2025-07-02

## 2025-06-30 RX ORDER — OXYCODONE HYDROCHLORIDE 5 MG/1
5 TABLET ORAL
Refills: 0 | Status: DISCONTINUED | OUTPATIENT
Start: 2025-06-30 | End: 2025-07-03 | Stop reason: HOSPADM

## 2025-06-30 RX ORDER — AMOXICILLIN 250 MG
2 CAPSULE ORAL EVERY EVENING
Status: DISCONTINUED | OUTPATIENT
Start: 2025-07-01 | End: 2025-07-03 | Stop reason: HOSPADM

## 2025-06-30 RX ORDER — OMEPRAZOLE 20 MG/1
40 CAPSULE, DELAYED RELEASE ORAL 2 TIMES DAILY
Status: DISCONTINUED | OUTPATIENT
Start: 2025-07-01 | End: 2025-07-03 | Stop reason: HOSPADM

## 2025-06-30 RX ORDER — ACETAMINOPHEN 500 MG
1000 TABLET ORAL EVERY 6 HOURS PRN
Status: DISCONTINUED | OUTPATIENT
Start: 2025-07-06 | End: 2025-07-03 | Stop reason: HOSPADM

## 2025-06-30 RX ORDER — HYDROXYZINE HYDROCHLORIDE 10 MG/1
10 TABLET, FILM COATED ORAL 3 TIMES DAILY PRN
Status: DISCONTINUED | OUTPATIENT
Start: 2025-06-30 | End: 2025-07-03 | Stop reason: HOSPADM

## 2025-06-30 RX ORDER — OXYCODONE HYDROCHLORIDE 5 MG/1
5 TABLET ORAL EVERY 4 HOURS PRN
Status: ON HOLD | COMMUNITY
End: 2025-07-03

## 2025-06-30 RX ORDER — HYDROMORPHONE HYDROCHLORIDE 1 MG/ML
0.5 INJECTION, SOLUTION INTRAMUSCULAR; INTRAVENOUS; SUBCUTANEOUS
Status: DISCONTINUED | OUTPATIENT
Start: 2025-06-30 | End: 2025-07-03 | Stop reason: HOSPADM

## 2025-06-30 RX ORDER — ACETAMINOPHEN 325 MG/1
500 TABLET ORAL EVERY 4 HOURS PRN
COMMUNITY

## 2025-06-30 RX ORDER — SUCRALFATE ORAL 1 G/10ML
1 SUSPENSION ORAL EVERY 6 HOURS
Status: DISCONTINUED | OUTPATIENT
Start: 2025-07-01 | End: 2025-07-03 | Stop reason: HOSPADM

## 2025-06-30 RX ORDER — HYDROCODONE BITARTRATE AND ACETAMINOPHEN 5; 325 MG/1; MG/1
1 TABLET ORAL ONCE
Refills: 0 | Status: COMPLETED | OUTPATIENT
Start: 2025-06-30 | End: 2025-06-30

## 2025-06-30 RX ORDER — PHENAZOPYRIDINE HYDROCHLORIDE 95 MG/1
190 TABLET ORAL 3 TIMES DAILY
Status: ON HOLD | COMMUNITY
End: 2025-07-03

## 2025-06-30 RX ORDER — ACETAMINOPHEN 325 MG/1
650 TABLET ORAL ONCE
Status: COMPLETED | OUTPATIENT
Start: 2025-06-30 | End: 2025-06-30

## 2025-06-30 RX ORDER — OXYCODONE HYDROCHLORIDE 10 MG/1
10 TABLET ORAL
Refills: 0 | Status: DISCONTINUED | OUTPATIENT
Start: 2025-06-30 | End: 2025-07-03 | Stop reason: HOSPADM

## 2025-06-30 RX ORDER — ACETAMINOPHEN 500 MG
1000 TABLET ORAL EVERY 6 HOURS
Status: DISCONTINUED | OUTPATIENT
Start: 2025-07-01 | End: 2025-07-03 | Stop reason: HOSPADM

## 2025-06-30 RX ORDER — CARBIDOPA AND LEVODOPA 25; 100 MG/1; MG/1
1 TABLET ORAL 3 TIMES DAILY
Status: DISCONTINUED | OUTPATIENT
Start: 2025-07-01 | End: 2025-07-03 | Stop reason: HOSPADM

## 2025-06-30 RX ORDER — MORPHINE SULFATE 4 MG/ML
4 INJECTION INTRAVENOUS ONCE
Status: COMPLETED | OUTPATIENT
Start: 2025-06-30 | End: 2025-06-30

## 2025-06-30 RX ORDER — POLYETHYLENE GLYCOL 3350 17 G/17G
1 POWDER, FOR SOLUTION ORAL
Status: DISCONTINUED | OUTPATIENT
Start: 2025-06-30 | End: 2025-07-03 | Stop reason: HOSPADM

## 2025-06-30 RX ORDER — LACTOBACILLUS RHAMNOSUS GG 10B CELL
1 CAPSULE ORAL DAILY
Status: DISCONTINUED | OUTPATIENT
Start: 2025-07-01 | End: 2025-06-30

## 2025-06-30 RX ADMIN — HYDROCODONE BITARTRATE AND ACETAMINOPHEN 1 TABLET: 5; 325 TABLET ORAL at 19:42

## 2025-06-30 RX ADMIN — IOHEXOL 25 ML: 240 INJECTION, SOLUTION INTRATHECAL; INTRAVASCULAR; INTRAVENOUS; ORAL at 20:40

## 2025-06-30 RX ADMIN — MORPHINE SULFATE 4 MG: 4 INJECTION, SOLUTION INTRAMUSCULAR; INTRAVENOUS at 18:28

## 2025-06-30 RX ADMIN — ACETAMINOPHEN 650 MG: 325 TABLET ORAL at 19:42

## 2025-06-30 ASSESSMENT — FIBROSIS 4 INDEX: FIB4 SCORE: 0.83

## 2025-06-30 ASSESSMENT — PAIN DESCRIPTION - PAIN TYPE: TYPE: CHRONIC PAIN

## 2025-06-30 NOTE — ED PROVIDER NOTES
ER Provider Note    Scribed for Dr. Gigi Nair MD. by Elsy Ivy. 6/30/2025  4:45 PM    Primary Care Provider: Emeterio Dave M.D.    CHIEF COMPLAINT  Chief Complaint   Patient presents with    Other     Pt sent from [facility] for surgery either tonight or tomorrow for post op infection s/p prostatectomy       EXTERNAL RECORDS REVIEWED  Inpatient Notes demonstrate patient was admitted on 5/28/2025 and discharged on 6/1/2025 for prostate cancer.     HPI/ROS  LIMITATION TO HISTORY   Select: : None    OUTSIDE HISTORIAN(S):  None    Rakan Rader III is a 60 y.o. male who presents to the ED by [facility] for surgery. Patient reports he was sent over by Dr. Cesar (urology) for surgery. He states he had a bladder leak following the surgery. Patient states he was sent over for a post op infection after a prostatectomy. Patient also states recent weight loss.     PAST MEDICAL HISTORY  Past Medical History[1]    SURGICAL HISTORY  Past Surgical History[2]    FAMILY HISTORY  Family History   Problem Relation Age of Onset    Heart Disease Mother     Hypertension Mother     Hyperlipidemia Mother     Psychiatric Illness Mother     Psychiatric Illness Father     Alcohol/Drug Father     Alcohol/Drug Sister     Sleep Apnea Neg Hx        SOCIAL HISTORY   reports that he quit smoking about 37 years ago. His smoking use included cigarettes. He started smoking about 42 years ago. He has a 5 pack-year smoking history. He has quit using smokeless tobacco.  His smokeless tobacco use included chew. He reports that he does not drink alcohol and does not use drugs.    CURRENT MEDICATIONS  Previous Medications    ACETAMINOPHEN (TYLENOL) 325 MG TAB    Take 650 mg by mouth every four hours as needed for Mild Pain or Moderate Pain. 650 mg = 2 tablets    AMOXICILLIN-CLAVULANATE (AUGMENTIN) 875-125 MG TAB    Take 1 Tablet by mouth 2 times a day. 5 day course completed started 06/20/2025 pm for Esophagitis    B COMPLEX  VITAMINS (B COMPLEX PO)    Take 1 Tablet by mouth every day.    CARBIDOPA-LEVODOPA (SINEMET)  MG TAB    Take 1 Tablet by mouth 3 times a day. For Parkinson's    HYDROXYZINE HCL (ATARAX) 10 MG TAB    Take 1 Tablet by mouth 3 times a day as needed for Itching (Anxiety).    LACTOBACILLUS-INULIN ([employer] [facility]) CAP    Take 1 Each by mouth every day.    MAGNESIUM PO    Take 1 Tablet by mouth every day.    OMEGA-3 FATTY ACIDS (OMEGA 3 PO)    Take 1 Capsule by mouth every day.    OMEPRAZOLE (PRILOSEC) 40 MG DELAYED-RELEASE CAPSULE    Take 1 Capsule by mouth 2 times a day.    OXYBUTYNIN SR (DITROPAN-XL) 10 MG CR TABLET    Take 10 mg by mouth every day. 2 day course dispensed 06/01/2025  COMPLETED    OXYCODONE IMMEDIATE-RELEASE (ROXICODONE) 5 MG TAB    Take 5 mg by mouth every four hours as needed for Severe Pain. 14 days    PHENAZOPYRIDINE (PHENAZO) 95 MG TABLET    Take 190 mg by mouth in the morning, at noon, and at bedtime. For 3 days. completed    SUCRALFATE (CARAFATE) 1 GM/10ML SUSPENSION    Take 10 mL by mouth every 6 hours for 30 days.    VITAMIN D3 (CHOLECALCIFEROL) 1000 UNIT (25 MCG) TAB    Take 4,000 Units by mouth 2 times a day.    VITAMIN K PO    Take 1 Tablet by mouth every day. For Transaminitis       ALLERGIES  Patient has no known allergies.    PHYSICAL EXAM  /72   Pulse 92   Temp 36.9 °C (98.5 °F) (Temporal)   Resp 18   Wt 50.8 kg (112 lb)   SpO2 96%   BMI 17.03 kg/m²   Physical Exam  Vitals and nursing note reviewed.   Constitutional:       Appearance: He is cachectic. He is ill-appearing.   HENT:      Head: Normocephalic.   Cardiovascular:      Rate and Rhythm: Normal rate and regular rhythm.      Heart sounds: No murmur heard.  Pulmonary:      Effort: Pulmonary effort is normal.      Breath sounds: Normal breath sounds.   Abdominal:      Palpations: Abdomen is soft.      Tenderness: There is no abdominal tenderness.   Musculoskeletal:         General: Normal range of  motion.      Right lower leg: No edema.      Left lower leg: No edema.   Skin:     General: Skin is warm.   Neurological:      General: No focal deficit present.      Mental Status: He is alert and oriented to person, place, and time.         DIAGNOSTIC STUDIES & PROCEDURES    Labs:   Results for orders placed or performed during the hospital encounter of 06/30/25   CBC WITH DIFFERENTIAL    Collection Time: 06/30/25  5:35 PM   Result Value Ref Range    WBC 9.3 4.8 - 10.8 K/uL    RBC 3.49 (L) 4.70 - 6.10 M/uL    Hemoglobin 9.5 (L) 14.0 - 18.0 g/dL    Hematocrit 30.7 (L) 42.0 - 52.0 %    MCV 88.0 81.4 - 97.8 fL    MCH 27.2 27.0 - 33.0 pg    MCHC 30.9 (L) 32.3 - 36.5 g/dL    RDW 54.4 (H) 35.9 - 50.0 fL    Platelet Count 446 164 - 446 K/uL    MPV 9.5 9.0 - 12.9 fL    Neutrophils-Polys 71.40 44.00 - 72.00 %    Lymphocytes 14.90 (L) 22.00 - 41.00 %    Monocytes 10.50 0.00 - 13.40 %    Eosinophils 1.10 0.00 - 6.90 %    Basophils 1.10 0.00 - 1.80 %    Immature Granulocytes 1.00 (H) 0.00 - 0.90 %    Nucleated RBC 0.00 0.00 - 0.20 /100 WBC    Neutrophils (Absolute) 6.65 1.82 - 7.42 K/uL    Lymphs (Absolute) 1.39 1.00 - 4.80 K/uL    Monos (Absolute) 0.98 (H) 0.00 - 0.85 K/uL    Eos (Absolute) 0.10 0.00 - 0.51 K/uL    Baso (Absolute) 0.10 0.00 - 0.12 K/uL    Immature Granulocytes (abs) 0.09 0.00 - 0.11 K/uL    NRBC (Absolute) 0.00 K/uL   COMP METABOLIC PANEL    Collection Time: 06/30/25  5:35 PM   Result Value Ref Range    Sodium 135 135 - 145 mmol/L    Potassium 4.0 3.6 - 5.5 mmol/L    Chloride 103 96 - 112 mmol/L    Co2 22 20 - 33 mmol/L    Anion Gap 10.0 7.0 - 16.0    Glucose 94 65 - 99 mg/dL    Bun 18 8 - 22 mg/dL    Creatinine 0.63 0.50 - 1.40 mg/dL    Calcium 8.6 8.5 - 10.5 mg/dL    Correct Calcium 9.6 8.5 - 10.5 mg/dL    AST(SGOT) 34 12 - 45 U/L    ALT(SGPT) 23 2 - 50 U/L    Alkaline Phosphatase 153 (H) 30 - 99 U/L    Total Bilirubin 0.2 0.1 - 1.5 mg/dL    Albumin 2.7 (L) 3.2 - 4.9 g/dL    Total Protein 6.8 6.0 - 8.2  g/dL    Globulin 4.1 (H) 1.9 - 3.5 g/dL    A-G Ratio 0.7 g/dL   ESTIMATED GFR    Collection Time: 06/30/25  5:35 PM   Result Value Ref Range    GFR (CKD-EPI) 110 >60 mL/min/1.73 m 2     All labs reviewed by me.    Radiology:   The attending Emergency Physician has independently interpreted the diagnostic imaging associated with this visit and is awaiting the final reading from the radiologist, which will be displayed below.    Preliminary interpretation is a follows: CT cystogram shows free fluid in the abdomen no obvious colovesical fistula  Radiologist interpretation:    CT-Cystogram   Final Result         1.  Moderate quantity of free fluid in the abdomen and pelvis, hyperdense contrast is seen tracking within the left abdomen, compatible with extravasated contrast, likely from the bladder or prostatectomy site.   2.  No hyperdense contrast is seen within the colon to readily notified colovesicular fistula, note that relatively small amount of contrast is seen within the bladder and most contrast is present within the peritoneal cavity limiting evaluation for    colovesicular fistula.   3.  Diverticulosis.   4.  Atherosclerosis.           COURSE & MEDICAL DECISION MAKING    INITIAL ASSESSMENT AND PLAN  Care Narrative:       4:45 PM - Patient seen and evaluated at bedside.  60-year-old male complex past medical history including recent radical prostatectomy with postoperative bladder leak who was referred to the emergency department after an outpatient cystogram today that showed a potential colovesical fistula.  Patient otherwise well-appearing with normal vital signs no significant abdominal tenderness will reach out to urology for further recommendations    5:03 PM - I paged urology.    5:14 PM - I spoke to Dr. Baker (urology) who recommends obtaining a CT-cystogram and admission.    9:53 PM - I discussed the patient's case and the above findings with Dr. Gonzalez (Hospitalist) who agrees to evaluate the patient  for hospitalization.    ADDITIONAL PROBLEM LIST AND DISPOSITION                 DISPOSITION AND DISCUSSIONS  I have discussed management of the patient with the following physicians and RITO's: Dr. Baker (urology), Dr. Gonzalez (hospitalist)    Discussion of management with other [facility] or appropriate source(s): None       DISPOSITION:  Patient will be hospitalized by Dr. Gonzalez (hospitalist) in guarded condition.    FINAL IMPRESSION   1. Colovesical fistula    2. Other ascites    3. Bladder leak         IElsy (Scribe), am scribing for, and in the presence of, Gigi Nair M.D..    Electronically signed by: Elsy Ivy (Scribe), 6/30/2025    IGigi M.D. personally performed the services described in this documentation, as scribed by Elsy Ivy in my presence, and it is both accurate and complete.    The note accurately reflects work and decisions made by me.  Gigi Nair M.D.  6/30/2025  11:43 PM         [1]   Past Medical History:  Diagnosis Date    Anesthesia     PONV    Anxiety     Bronchitis     Remote    Parkinson's disease (HCC)     PONV (postoperative nausea and vomiting)     Status post deep brain stimulator placement 06/2024    Followed by Dr Taylor of [facility] and Dr. Ivnaia Castro at [facility]    Urinary bladder disorder     Difficulty urinating   [2]   Past Surgical History:  Procedure Laterality Date    MI UPPER GI ENDOSCOPY,BIOPSY N/A 6/16/2025    Procedure: GASTROSCOPY, WITH BIOPSY;  Surgeon: Kat Bloom M.D.;  Location: SURGERY SAME DAY [facility];  Service: Gastroenterology    MI CYSTOURETHROSCOPY  6/10/2025    Procedure: CYSTOSCOPY ON TABLE FLUOROSCOPIC CYSTOGRAM , COMPLEX URETHRAL CATHETERIZATION;  Surgeon: Tj WATT M.D.;  Location: SURGERY [facility];  Service: Urology    MI LAP,DIAGNOSTIC ABDOMEN  6/10/2025    Procedure: DIAGNOSTIC LAPAROSCOPY, ABDOMINAL WASHOUT;  Surgeon: Tj WATT M.D.;  Location: SURGERY [facility];  Service:  Urology    PROSTATECTOMY ROBOTIC XI N/A 5/28/2025    Procedure: ROBOTIC ASSISTED LAPAROSCOPIC PROSTATECTOMY;  Surgeon: Tj WATT M.D.;  Location: [facility];  Service: Uro Robotic    LAPAROSCOPIC LYSIS OF ADHESIONS N/A 5/28/2025    Procedure: LYSIS, ADHESIONS, LAPAROSCOPIC ROBOTIC;  Surgeon: Tj WATT M.D.;  Location: [facility];  Service: Uro Robotic    OTHER  06/2024    Deep brain stimulator    INGUINAL HERNIA REPAIR ROBOTIC Bilateral 11/22/2017    Procedure: INGUINAL HERNIA REPAIR ROBOTIC/ WITH MESH PLACEMENT;  Surgeon: Zackary Ponce M.D.;  Location: [facility];  Service: General    LAMINOTOMY      c5-7    ORIF, ELBOW      right as a child    OTHER NEUROLOGICAL SURG      cervical fusion    OTHER ORTHOPEDIC SURGERY      finger surgery as child    SEPTOPLASTY, NOSE, WITH TURBINOPLASTY      SHOULDER ARTHROSCOPY W/ SLAP / LABRAL REPAIR      left    SINUSCOPY      maxillary sinuses

## 2025-06-30 NOTE — OP REPORT
Full Operative Note    Patient Name: Rakan Rader III    MRN: [id number]    Date of Surgery: 06/10/25    Pre-operative diagnosis: Urinary leak following robotic radical prostatectomy  Post-operative diagnosis: Same    Procedures Performed: Cystourethroscopy, on-table fluoroscopic cystogram, complex urethral catheterization (over wire), diagnostic laparoscopy and abdominal washout, pelvic drain placement    Surgeon: Tj WATT M.D.   Assistant: Lindsay Felix PA-C    Anesthesiologist: Meredith Diaz M.D.        Anesthesia Type: General    Estimated Blood Loss: Minimal    Fluids in: Crystalloid only, see anesthesia records    Specimen: None    Drains:  Urethral catheter in bladder, CRIS drain in R pelvis    Procedure Findings:   Cystoscopy with very small posterior vesico-urethral opening between stitches. Anastomosis >95% circumferentially intact. Efflux of urine from each UO. Heavily trabeculated bladder.  On table fluoroscopic cystogram with no leak from bladder and moderate leak from the posterior anastomosis.  Complex urethral catheterization over wire, 20F [place] catheter  Diagnostic laparoscopy with no evidence of bowel injury. Succus and pelvic hematoma suctioned out. Washout with 2L of sterile fluid, including 1L of Ancef antibiotic fluid.  19F drain in pelvis.  NG tube placed at end of case due to some reflux of bile colored fluid from stomach, with 200 ml of output.    Indications for Prodecure: The patient is a 60 y.o. male with urinary leak from vesicourethral anastomosis following recent radical prostatectomy, with associated ileus, kidney dysfunction, and infection. He presents today for a cystourethroscopy, on-table fluoroscopic cystogram, diagnostic laparoscopy, possible robotic repair of vesicourethral anastomosis. The risks, benefits and alternatives to procedure were explained to the patient in detail and he agreed to proceed. Consent was obtained.     Description of Procedure: The  patient was taken to the operating room, placed in supine position on the operating table. Once general anesthesia was achieved, the patient was given preoperative antibiotics. The patient was then placed in relaxed dorsal supine position and prepped and draped in usual sterile fashion. A surgical time out was performed.     The 22-Austrian cystoscope was inserted atraumatically into the bladder. Cystopandendoscopy was performed as described in the Findings. The on-table fluoroscopic cystogram was performed next, with the above Findings noted. A wire was placed via the cystoscope and the cystoscope was removed. Over the wire, a 20F [place] tipped catheter was inserted until hubbed, the wire removed, irrigant noted per the catheter, and the balloon of the catheter inflated.     The patient was repositioned to the supine position. The previous IR abdominal drain was removed. The abdomen was prepped and draped. Pneumoperitoneum of 15 was achieved with a Veress needle. A 5mm visualizing port was placed midline under direct vision. Next 2 additional 8mm robotic ports were placed on the right and left under direct vision. A diagnostic laparoscopy was performed, examining all 4 quadrants of the abdomen and pelvis. There was no evidence of bowel injury. Approximately 300-400 ml of succus and pelvic hematoma were suctioned out. I then proceeded to wash out the abdomen and pelvis with 2L of sterile fluid, including 1L of Ancef antibiotic fluid. The right port was removed and I placed a new drain via this site, positioned in the right pelvis. The left port was removed under direct vision. The abdomen was desufflated and the camera port was removed. A total of 30 ml 0.25% Lidocaine with epinephrine was used for the incisions as local block. The port sites were closed with Vicryl and dressed dermabond.     The patient was then awakened in the operating room and transported to the recovery room in stable condition.     Attestation:  I was the attending for this case and present for all aspects of the procedure including insertion and removal of all endoscopic equipment.     Urology Recommendations:  Remove NG tube in PACU  Hydroxyzine PRN hiccups   NPO today, we will assess in AM, likely advance to clears tomorrow. Await return of bowel function before solid food intake  Antibiotics with Zosyn for at least another 48 hours, then transition to oral for 10 day course given intra-abdominal urine leak  Discharge home with luz catheter and potentially CRIS drain - Urology will decide.   Luz will remain for a minimum of 3 weeks and then a fluoroscopic cystogram will be performed to assess for resolution of leak.  Appreciate remainder of management by Hospitalist team. We will follow while patient admitted.     _______________________  Tj Cesar MD  Urologic Surgical Oncology  [facility] [place]

## 2025-06-30 NOTE — ED TRIAGE NOTES
Chief Complaint   Patient presents with    Other     Pt sent from [facility] for surgery either tonight or tomorrow for post op infection s/p prostatectomy

## 2025-07-01 ENCOUNTER — APPOINTMENT (OUTPATIENT)
Dept: RADIOLOGY | Facility: MEDICAL CENTER | Age: 60
DRG: 698 | End: 2025-07-01
Attending: SURGERY
Payer: MEDICARE

## 2025-07-01 LAB
ALBUMIN SERPL BCP-MCNC: 2.6 G/DL (ref 3.2–4.9)
ALBUMIN/GLOB SERPL: 0.7 G/DL
ALP SERPL-CCNC: 145 U/L (ref 30–99)
ALT SERPL-CCNC: 51 U/L (ref 2–50)
ANION GAP SERPL CALC-SCNC: 8 MMOL/L (ref 7–16)
APTT PPP: 32.2 SEC (ref 24.7–36)
AST SERPL-CCNC: 34 U/L (ref 12–45)
BILIRUB SERPL-MCNC: 0.3 MG/DL (ref 0.1–1.5)
BUN SERPL-MCNC: 14 MG/DL (ref 8–22)
CALCIUM ALBUM COR SERPL-MCNC: 9.5 MG/DL (ref 8.5–10.5)
CALCIUM SERPL-MCNC: 8.4 MG/DL (ref 8.5–10.5)
CHLORIDE SERPL-SCNC: 105 MMOL/L (ref 96–112)
CO2 SERPL-SCNC: 23 MMOL/L (ref 20–33)
CREAT SERPL-MCNC: 0.71 MG/DL (ref 0.5–1.4)
ERYTHROCYTE [DISTWIDTH] IN BLOOD BY AUTOMATED COUNT: 54.1 FL (ref 35.9–50)
GFR SERPLBLD CREATININE-BSD FMLA CKD-EPI: 106 ML/MIN/1.73 M 2
GLOBULIN SER CALC-MCNC: 3.8 G/DL (ref 1.9–3.5)
GLUCOSE SERPL-MCNC: 86 MG/DL (ref 65–99)
HCT VFR BLD AUTO: 29.9 % (ref 42–52)
HGB BLD-MCNC: 9.3 G/DL (ref 14–18)
INR PPP: 1.07 (ref 0.87–1.13)
MAGNESIUM SERPL-MCNC: 2.2 MG/DL (ref 1.5–2.5)
MCH RBC QN AUTO: 27.4 PG (ref 27–33)
MCHC RBC AUTO-ENTMCNC: 31.1 G/DL (ref 32.3–36.5)
MCV RBC AUTO: 88.2 FL (ref 81.4–97.8)
PLATELET # BLD AUTO: 486 K/UL (ref 164–446)
PMV BLD AUTO: 9.4 FL (ref 9–12.9)
POTASSIUM SERPL-SCNC: 4 MMOL/L (ref 3.6–5.5)
PROT SERPL-MCNC: 6.4 G/DL (ref 6–8.2)
PROTHROMBIN TIME: 13.9 SEC (ref 12–14.6)
RBC # BLD AUTO: 3.39 M/UL (ref 4.7–6.1)
SODIUM SERPL-SCNC: 136 MMOL/L (ref 135–145)
WBC # BLD AUTO: 10.4 K/UL (ref 4.8–10.8)

## 2025-07-01 PROCEDURE — 97162 PT EVAL MOD COMPLEX 30 MIN: CPT

## 2025-07-01 PROCEDURE — 85610 PROTHROMBIN TIME: CPT

## 2025-07-01 PROCEDURE — 99222 1ST HOSP IP/OBS MODERATE 55: CPT | Performed by: SURGERY

## 2025-07-01 PROCEDURE — 74270 X-RAY XM COLON 1CNTRST STD: CPT

## 2025-07-01 PROCEDURE — 770006 HCHG ROOM/CARE - MED/SURG/GYN SEMI*

## 2025-07-01 PROCEDURE — 700102 HCHG RX REV CODE 250 W/ 637 OVERRIDE(OP)

## 2025-07-01 PROCEDURE — 85730 THROMBOPLASTIN TIME PARTIAL: CPT

## 2025-07-01 PROCEDURE — 700105 HCHG RX REV CODE 258

## 2025-07-01 PROCEDURE — 97535 SELF CARE MNGMENT TRAINING: CPT

## 2025-07-01 PROCEDURE — 700117 HCHG RX CONTRAST REV CODE 255: Performed by: SURGERY

## 2025-07-01 PROCEDURE — 83735 ASSAY OF MAGNESIUM: CPT

## 2025-07-01 PROCEDURE — 80053 COMPREHEN METABOLIC PANEL: CPT

## 2025-07-01 PROCEDURE — A9270 NON-COVERED ITEM OR SERVICE: HCPCS

## 2025-07-01 PROCEDURE — 85027 COMPLETE CBC AUTOMATED: CPT

## 2025-07-01 PROCEDURE — 99222 1ST HOSP IP/OBS MODERATE 55: CPT | Mod: GC | Performed by: STUDENT IN AN ORGANIZED HEALTH CARE EDUCATION/TRAINING PROGRAM

## 2025-07-01 RX ADMIN — ACETAMINOPHEN 1000 MG: 500 TABLET ORAL at 20:18

## 2025-07-01 RX ADMIN — CARBIDOPA AND LEVODOPA 1 TABLET: 25; 100 TABLET ORAL at 11:41

## 2025-07-01 RX ADMIN — CARBIDOPA AND LEVODOPA 1 TABLET: 25; 100 TABLET ORAL at 05:08

## 2025-07-01 RX ADMIN — OXYCODONE 5 MG: 5 TABLET ORAL at 20:22

## 2025-07-01 RX ADMIN — ACETAMINOPHEN 1000 MG: 500 TABLET ORAL at 13:32

## 2025-07-01 RX ADMIN — SUCRALFATE ORAL 1 G: 1 SUSPENSION ORAL at 23:37

## 2025-07-01 RX ADMIN — SUCRALFATE ORAL 1 G: 1 SUSPENSION ORAL at 16:40

## 2025-07-01 RX ADMIN — SUCRALFATE ORAL 1 G: 1 SUSPENSION ORAL at 11:41

## 2025-07-01 RX ADMIN — ACETAMINOPHEN 1000 MG: 500 TABLET ORAL at 08:52

## 2025-07-01 RX ADMIN — CARBIDOPA AND LEVODOPA 1 TABLET: 25; 100 TABLET ORAL at 16:40

## 2025-07-01 RX ADMIN — OXYCODONE 5 MG: 5 TABLET ORAL at 05:09

## 2025-07-01 RX ADMIN — SUCRALFATE ORAL 1 G: 1 SUSPENSION ORAL at 05:08

## 2025-07-01 RX ADMIN — IOHEXOL 500 ML: 300 INJECTION, SOLUTION INTRAVENOUS at 14:00

## 2025-07-01 RX ADMIN — SODIUM CHLORIDE: 9 INJECTION, SOLUTION INTRAVENOUS at 01:17

## 2025-07-01 RX ADMIN — OMEPRAZOLE 40 MG: 20 CAPSULE, DELAYED RELEASE ORAL at 05:08

## 2025-07-01 RX ADMIN — SUCRALFATE ORAL 1 G: 1 SUSPENSION ORAL at 01:16

## 2025-07-01 RX ADMIN — ACETAMINOPHEN 1000 MG: 500 TABLET ORAL at 01:17

## 2025-07-01 RX ADMIN — OMEPRAZOLE 40 MG: 20 CAPSULE, DELAYED RELEASE ORAL at 16:40

## 2025-07-01 ASSESSMENT — COGNITIVE AND FUNCTIONAL STATUS - GENERAL
MOBILITY SCORE: 21
SUGGESTED CMS G CODE MODIFIER MOBILITY: CK
WALKING IN HOSPITAL ROOM: A LITTLE
STANDING UP FROM CHAIR USING ARMS: A LITTLE
MOVING TO AND FROM BED TO CHAIR: A LITTLE
MOVING FROM LYING ON BACK TO SITTING ON SIDE OF FLAT BED: A LITTLE
SUGGESTED CMS G CODE MODIFIER DAILY ACTIVITY: CI
WALKING IN HOSPITAL ROOM: A LITTLE
SUGGESTED CMS G CODE MODIFIER MOBILITY: CJ
HELP NEEDED FOR BATHING: A LITTLE
MOBILITY SCORE: 18
MOVING TO AND FROM BED TO CHAIR: A LITTLE
STANDING UP FROM CHAIR USING ARMS: A LITTLE
CLIMB 3 TO 5 STEPS WITH RAILING: A LITTLE
DAILY ACTIVITIY SCORE: 23
TURNING FROM BACK TO SIDE WHILE IN FLAT BAD: A LITTLE

## 2025-07-01 ASSESSMENT — LIFESTYLE VARIABLES
EVER HAD A DRINK FIRST THING IN THE MORNING TO STEADY YOUR NERVES TO GET RID OF A HANGOVER: NO
HAVE YOU EVER FELT YOU SHOULD CUT DOWN ON YOUR DRINKING: NO
ALCOHOL_USE: NO
TOTAL SCORE: 0
EVER FELT BAD OR GUILTY ABOUT YOUR DRINKING: NO
CONSUMPTION TOTAL: NEGATIVE
ON A TYPICAL DAY WHEN YOU DRINK ALCOHOL HOW MANY DRINKS DO YOU HAVE: 0
HAVE PEOPLE ANNOYED YOU BY CRITICIZING YOUR DRINKING: NO
HOW MANY TIMES IN THE PAST YEAR HAVE YOU HAD 5 OR MORE DRINKS IN A DAY: 0
TOTAL SCORE: 0
TOTAL SCORE: 0
AVERAGE NUMBER OF DAYS PER WEEK YOU HAVE A DRINK CONTAINING ALCOHOL: 0

## 2025-07-01 ASSESSMENT — PAIN DESCRIPTION - PAIN TYPE
TYPE: ACUTE PAIN

## 2025-07-01 ASSESSMENT — SOCIAL DETERMINANTS OF HEALTH (SDOH)
WITHIN THE LAST YEAR, HAVE YOU BEEN KICKED, HIT, SLAPPED, OR OTHERWISE PHYSICALLY HURT BY YOUR PARTNER OR EX-PARTNER?: NO
IN THE PAST 12 MONTHS, HAS THE ELECTRIC, GAS, OIL, OR WATER COMPANY THREATENED TO SHUT OFF SERVICE IN YOUR HOME?: NO
IN THE PAST 12 MONTHS, HAS THE ELECTRIC, GAS, OIL, OR WATER COMPANY THREATENED TO SHUT OFF SERVICE IN YOUR HOME?: NO
WITHIN THE PAST 12 MONTHS, YOU WORRIED THAT YOUR FOOD WOULD RUN OUT BEFORE YOU GOT THE MONEY TO BUY MORE: NEVER TRUE
WITHIN THE PAST 12 MONTHS, THE FOOD YOU BOUGHT JUST DIDN'T LAST AND YOU DIDN'T HAVE MONEY TO GET MORE: NEVER TRUE
WITHIN THE LAST YEAR, HAVE YOU BEEN HUMILIATED OR EMOTIONALLY ABUSED IN OTHER WAYS BY YOUR PARTNER OR EX-PARTNER?: NO
WITHIN THE PAST 12 MONTHS, THE FOOD YOU BOUGHT JUST DIDN'T LAST AND YOU DIDN'T HAVE MONEY TO GET MORE: NEVER TRUE
WITHIN THE LAST YEAR, HAVE YOU BEEN AFRAID OF YOUR PARTNER OR EX-PARTNER?: NO
WITHIN THE LAST YEAR, HAVE TO BEEN RAPED OR FORCED TO HAVE ANY KIND OF SEXUAL ACTIVITY BY YOUR PARTNER OR EX-PARTNER?: NO
WITHIN THE PAST 12 MONTHS, YOU WORRIED THAT YOUR FOOD WOULD RUN OUT BEFORE YOU GOT THE MONEY TO BUY MORE: NEVER TRUE
WITHIN THE LAST YEAR, HAVE TO BEEN RAPED OR FORCED TO HAVE ANY KIND OF SEXUAL ACTIVITY BY YOUR PARTNER OR EX-PARTNER?: NO
WITHIN THE LAST YEAR, HAVE YOU BEEN KICKED, HIT, SLAPPED, OR OTHERWISE PHYSICALLY HURT BY YOUR PARTNER OR EX-PARTNER?: NO
WITHIN THE LAST YEAR, HAVE YOU BEEN HUMILIATED OR EMOTIONALLY ABUSED IN OTHER WAYS BY YOUR PARTNER OR EX-PARTNER?: NO

## 2025-07-01 ASSESSMENT — GAIT ASSESSMENTS
DISTANCE (FEET): 250
DEVIATION: BRADYKINETIC;SHUFFLED GAIT
ASSISTIVE DEVICE: FRONT WHEEL WALKER
GAIT LEVEL OF ASSIST: STANDBY ASSIST

## 2025-07-01 ASSESSMENT — FIBROSIS 4 INDEX: FIB4 SCORE: 0.57

## 2025-07-01 NOTE — ED NOTES
Med Rec completed per MAR from [facility]     Allergies reviewed: yes     Oral antibiotics in the past 30 days: yes   Augmentin 875-125 mg BID. 5 day course completed 06/25/2025 am    Anticoagulant in past 14 days: no    Dispense history available in EPIC: partial    Pharmacy patient utilizes: [facility]   [phone]

## 2025-07-01 NOTE — ASSESSMENT & PLAN NOTE
Pain consistent with s/p cystoscopy, on table cystogram, complex catheterization and laparoscopic washout of hematoma and abdominal cavity on 6/10/25 with Dr. Cesar, subsequent removal of right pelvis CRIS drain on 6/19/25. Completed course of Augmentin 6/14-6/24. No peritoneal signs on admission. No signs or symptoms of current infection.  - Pain panel with acetaminophen, oxycodone, dilaudid for breakthrough pain.  1 dose of oxycodone required overnight after admission  - Low threshold to initiate antibiotics if develops signs of infection

## 2025-07-01 NOTE — ASSESSMENT & PLAN NOTE
Chronic, stable. No signs or symptoms of acute blood loss. Hemodynamically stable.   - Monitor daily CBC, stable at 9.3 on 7/1 from 9.5 on admission  - 7/3 hemoglobin 9.4

## 2025-07-01 NOTE — DISCHARGE PLANNING
"Care Transition Team Assessment    Pt admitted from [facility] where he was discharged to on 6/20/25. Assessment completed on 6/9:  \"Pt lives with roommate in a single-story. Emergency contact is sister Nora Chacon [phone]. Prior to admission patient is independent with ADL's and IADL’s. Pt has a FWW, drives, denies home O2 use. Pt reported that S/O is good support. Pt reports, pt is emplyed FT with [employer] and makes about $1600/mo. The patient's PCP is Dr. Dave. Patient's preferred pharmacy is [facility]. Patient denies a history of SNF/IPR nor HHC use in the past. Pt denies any SA or MH concerns. Patients confirmed medical coverage with Medicare and [employer]. Patient has means to transportation and girlfriend will provide transport once medically stable for discharge.\"    [facility] #[id number]    Current six clicks are 23/21, pending PT/OT evaluations.    Information Source  Information Given By: Other (Comments) (chart review)  Who is responsible for making decisions for patient? : Patient    Readmission Evaluation  Is this a readmission?: Yes - unplanned readmission    Elopement Risk  Legal Hold: No  Ambulatory or Self Mobile in Wheelchair: Yes  Disoriented: No  Psychiatric Symptoms: None  History of Wandering: No  Elopement this Admit: No  Vocalizing Wanting to Leave: No  Displays Behaviors, Body Language Wanting to Leave: No-Not at Risk for Elopement    Interdisciplinary Discharge Planning  Lives with - Patient's Self Care Capacity: Other (Comments) ([facility])  Support Systems: Friends / Neighbors  Housing / Facility: Skilled Nursing Facility  Name of Care Facility: [facility]    Discharge Preparedness  What is your plan after discharge?: Home health care  What are your discharge supports?: Partner  Prior Functional Level: Ambulatory, Drives Self, Independent with Activities of Daily Living, Independent with Medication Management  Difficulity with ADLs: None  Difficulity with IADLs: " None    Functional Assesment  Prior Functional Level: Ambulatory, Drives Self, Independent with Activities of Daily Living, Independent with Medication Management    Finances  Financial Barriers to Discharge: No  Prescription Coverage: Yes    Vision / Hearing Impairment  Vision Impairment : Yes  Right Eye Vision: Wears Glasses  Left Eye Vision: Wears Glasses  Hearing Impairment : No    Advance Directive  Advance Directive?: None    Domestic Abuse  Have you ever been the victim of abuse or violence?: No  Possible Abuse/Neglect Reported to:: Not Applicable    Psychological Assessment  History of Substance Abuse: None  History of Psychiatric Problems: No  Non-compliant with Treatment: No  Newly Diagnosed Illness: No    Discharge Risks or Barriers  Discharge risks or barriers?: No    Anticipated Discharge Information  Discharge Disposition: D/T to home under A care in anticipation of covered skilled care (06)  Discharge Address: [address] [place], [place] [address] [address]  Discharge Contact Phone Number: [phone]

## 2025-07-01 NOTE — PROGRESS NOTES
Urology Progress Note    S: Seen and examined. Overall doing well today. Reports pain is minimal and well controlled. Luz remains in place.  Dr. Raymundo able to see patient. Patient pending barium enema. Denies fevers, chills, nausea or vomiting.   Patient has completed CT cystogram - showed moderate quantity of free fluid in the abdomen and pelvis, hyperdense contrast is seen tracking within the left abdomen, compatible with extravasated contrast, likely from the bladder or prostatectomy site.  No hyperdense contrast is seen within the colon to readily notified colovesicular fistula, note that relatively small amount of contrast is seen within the bladder and most contrast is present within the peritoneal cavity limiting evaluation for colovesicular fistula.     O:   /62   Pulse 68   Temp 36.3 °C (97.4 °F) (Temporal)   Resp 18   Wt 50 kg (110 lb 3.7 oz)   SpO2 96%   Recent Labs     06/30/25  1735 07/01/25  0338   SODIUM 135 136   POTASSIUM 4.0 4.0   CHLORIDE 103 105   CO2 22 23   GLUCOSE 94 86   BUN 18 14   CREATININE 0.63 0.71   CALCIUM 8.6 8.4*     Recent Labs     06/30/25  1735 07/01/25  0338   WBC 9.3 10.4   RBC 3.49* 3.39*   HEMOGLOBIN 9.5* 9.3*   HEMATOCRIT 30.7* 29.9*   MCV 88.0 88.2   MCH 27.2 27.4   MCHC 30.9* 31.1*   RDW 54.4* 54.1*   PLATELETCT 446 486*   MPV 9.5 9.4         Intake/Output Summary (Last 24 hours) at 7/1/2025 1152  Last data filed at 7/1/2025 0509  Gross per 24 hour   Intake 60 ml   Output --   Net 60 ml       Exam:  Gen: NAD   Resp: normal respiratory effort  ABD: Abdomen soft, no TTP.   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 He was discharge initially to [facility] [place]. Had cystogram 6/30 that showed bladder fistula connecting to sigmoid colon. He is report stool leaking around catheter for 3 days.   Plan:  - case, imaging and plan of care discussed with Dr. Cesar  - continue luz catheter  - trend urinary output  - no recommendation for drain from urology   - appreciate recommendations from Dr. Raymundo  - urology will continue follow     Rafael Keen PBEKAH   [address]  [place] [address]   [phone]

## 2025-07-01 NOTE — CARE PLAN
The patient is Watcher - Medium risk of patient condition declining or worsening    Shift Goals  Clinical Goals: maintain npo for possible procedure    Progress made toward(s) clinical / shift goals:        Problem: Pain - Standard  Goal: Alleviation of pain or a reduction in pain to the patient’s comfort goal  Description: Target End Date:  Prior to discharge or change in level of care    Document on Vitals flowsheet    1.  Document pain using the appropriate pain scale per order or unit policy  2.  Educate and implement non-pharmacologic comfort measures (i.e. relaxation, distraction, massage, cold/heat therapy, etc.)  3.  Pain management medications as ordered  4.  Reassess pain after pain med administration per policy  5.  If opiods administered assess patient's response to pain medication is appropriate per POSS sedation scale  6.  Follow pain management plan developed in collaboration with patient and interdisciplinary team (including palliative care or pain specialists if applicable)  Outcome: Progressing  Note: Scheduled tylenol administered      Problem: Knowledge Deficit - Standard  Goal: Patient and family/care givers will demonstrate understanding of plan of care, disease process/condition, diagnostic tests and medications  Description: Target End Date:  1-3 days or as soon as patient condition allows    Document in Patient Education    1.  Patient and family/caregiver oriented to unit, equipment, visitation policy and means for communicating concern  2.  Complete/review Learning Assessment  3.  Assess knowledge level of disease process/condition, treatment plan, diagnostic tests and medications  4.  Explain disease process/condition, treatment plan, diagnostic tests and medications  Outcome: Progressing  Note: Alert and oriented, due meds given, vitals within normal limits, s/p barium enema, no complaints of pain or discomfort, needs met     Problem: Skin Integrity  Goal: Skin integrity is maintained or  improved  Description: Target End Date:  Prior to discharge or change in level of care    Document interventions on Skin Risk/Cecilio flowsheet groups and corresponding LDA    1.  Assess and monitor skin integrity, appearance and/or temperature  2.  Assess risk factors for impaired skin integrity and/or pressures ulcers  3.  Implement precautions to protect skin integrity in collaboration with interdisciplinary team  4.  Implement pressure ulcer prevention protocol if at risk for skin breakdown  5.  Confirm wound care consult if at risk for skin breakdown  6.  Ensure patient use of pressure relieving devices  (Low air loss bed, waffle overlay, heel protectors, ROHO cushion, etc)  Outcome: Progressing  Note: No new skin breakdown noted, pt is able to reposition self      Problem: Fall Risk  Goal: Patient will remain free from falls  Description: Target End Date:  Prior to discharge or change in level of care    Document interventions on the [place] [employer] Fall Risk Assessment    1.  Assess for fall risk factors  2.  Implement fall precautions  Outcome: Progressing  Note: Fall education provided, bed alarm on with bed placed in low and locked position, call light within reach,        Patient is not progressing towards the following goals:

## 2025-07-01 NOTE — H&P
[facility] MEDICINE H&P        PATIENT ID:  NAME:  Rakan Rader  MRN:               [id number]  YOB: 1965    Date of Admission: 6/30/2025     Attending: Carter Nazario M.d.    Resident: Alix Gonzalez D.O.    Primary Care Physician:  Emeterio Dave M.D.    CC:  concern for rectovesical fistula    HPI: Rakan Rader is a 60 y.o. male with past medical history of Parkinson's disease with deep brain stimulator in situ, prostate cancer status post robot assisted radical prostatectomy on 5/28/2025 complicated by urinary leak in the postoperative period with subsequent diagnostic laparoscopy and abdominal washout with pelvic drain placement who presented from [facility] sent by his urologist after imaging completed at [facility] earlier today was concerning for rectovesical fistula.    Patient states he was advised to come for admission for surgical evaluation for potential rectovesical fistula.  He denies any sick symptoms today.  No fever, chills, diaphoresis, nausea, vomiting, diarrhea.  He has postoperative abdominal pain that he states is overall improving since his most recent procedure.  He denies abdominal fullness.  No dysuria, hematuria, pneumaturia, fecaluria.  No chest pain, dyspnea, palpitations.     ERCourse:  Urologist Dr. Cesar consulted from ER and evaluated patient.  Case was also discussed by ER physician with Dr. Raymundo of GI.  Admission for flex sig and evaluation of possible urinary leak tomorrow was recommended.  CT cystogram was pleated at the recommendation of urology and noted a moderate quantity of free fluid in the abdomen and pelvis with hyperdense contrast tracking within the left abdomen compatible with extravasated contrast likely from bladder or prostatectomy site.  No hyperdense contrast seen in colon to readily notify colovesicular fistula though evaluation was limited.  Patient was vitally stable on arrival to emergency  room.  No fever.   CBC notable for hemoglobin 9.5, which is stable.  No electrolyte abnormalities.  Mildly elevated alk phos 153. Albumin 2.7.     REVIEW OF SYSTEMS:   Ten systems reviewed and were negative except as noted in the HPI.                PAST MEDICAL HISTORY:  Past Medical History[1]    PAST SURGICAL HISTORY:  Past Surgical History[2]    FAMILY HISTORY:  Family History   Problem Relation Age of Onset    Heart Disease Mother     Hypertension Mother     Hyperlipidemia Mother     Psychiatric Illness Mother     Psychiatric Illness Father     Alcohol/Drug Father     Alcohol/Drug Sister     Sleep Apnea Neg Hx        SOCIAL HISTORY:   Pt lives in a house by himself, currently resides at [facility].  Smokin-pack-year history, quit 37 years ago.    Etoh use: Denies  Drug use: Denies    DIET:   Orders Placed This Encounter   Procedures    Diet Order Diet: Clear Liquid     Standing Status:   Standing     Number of Occurrences:   1     Diet::   Clear Liquid [10]    Diet NPO Restrict to: Sips with Medications     Standing Status:   Standing     Number of Occurrences:   8     Diet NPO Restrict to::   Sips with Medications [3]       ALLERGIES:  Allergies[3]    OUTPATIENT MEDICATIONS:  Medications - Current, Listed Continuously[4]    PHYSICAL EXAM:  Vitals:    25 1712 25 1812 25 1833 25 2206   BP: 120/73 121/81 135/81    Pulse: 84 82 80 79   Resp:       Temp:       TempSrc:       SpO2: 97% 97% 95% 96%   Weight:       , Temp (24hrs), Av.9 °C (98.5 °F), Min:36.9 °C (98.5 °F), Max:36.9 °C (98.5 °F)  , Pulse Oximetry: 96 %, O2 Delivery Device: None - Room Air    Physical Exam  Constitutional:       Appearance: He is cachectic. He is not toxic-appearing.   HENT:      Head: Normocephalic and atraumatic.      Nose: No congestion or rhinorrhea.      Mouth/Throat:      Mouth: Mucous membranes are moist.      Pharynx: Oropharynx is clear.   Eyes:      General: No scleral icterus.      Conjunctiva/sclera: Conjunctivae normal.   Cardiovascular:      Rate and Rhythm: Normal rate and regular rhythm.      Pulses: Normal pulses.      Heart sounds: Normal heart sounds.   Pulmonary:      Effort: Pulmonary effort is normal.      Breath sounds: Normal breath sounds.   Abdominal:      General: Abdomen is scaphoid. A surgical scar is present. Bowel sounds are normal. There is no distension.      Palpations: Abdomen is soft. There is no fluid wave.      Tenderness: There is generalized abdominal tenderness (TTP within expectations postoperatively). There is no guarding or rebound.   Musculoskeletal:         General: No swelling.      Cervical back: Normal range of motion and neck supple.      Right lower leg: No edema.      Left lower leg: No edema.   Skin:     General: Skin is warm and dry.      Capillary Refill: Capillary refill takes less than 2 seconds.      Coloration: Skin is not jaundiced or pale.   Neurological:      General: No focal deficit present.      Mental Status: He is alert and oriented to person, place, and time.   Psychiatric:         Mood and Affect: Mood normal.         Behavior: Behavior normal.       LAB TESTS:   Recent Labs     06/30/25  1735   WBC 9.3   RBC 3.49*   HEMOGLOBIN 9.5*   HEMATOCRIT 30.7*   MCV 88.0   MCH 27.2   RDW 54.4*   PLATELETCT 446   MPV 9.5   NEUTSPOLYS 71.40   LYMPHOCYTES 14.90*   MONOCYTES 10.50   EOSINOPHILS 1.10   BASOPHILS 1.10         Recent Labs     06/30/25  1735   SODIUM 135   POTASSIUM 4.0   CHLORIDE 103   CO2 22   BUN 18   CREATININE 0.63   CALCIUM 8.6   ALBUMIN 2.7*       CULTURES:   Results       Procedure Component Value Units Date/Time    URINALYSIS,CULTURE IF INDICATED [[id number]]     Order Status: Sent Specimen: Urine, Clean Catch             IMAGES:  CT-Cystogram   Final Result         1.  Moderate quantity of free fluid in the abdomen and pelvis, hyperdense contrast is seen tracking within the left abdomen, compatible with extravasated contrast,  likely from the bladder or prostatectomy site.   2.  No hyperdense contrast is seen within the colon to readily notified colovesicular fistula, note that relatively small amount of contrast is seen within the bladder and most contrast is present within the peritoneal cavity limiting evaluation for    colovesicular fistula.   3.  Diverticulosis.   4.  Atherosclerosis.          ASSESSMENT/PLAN: 60 y.o. male admitted for evaluation for rectovesical fistula.    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 Evaluated by urologist Dr. Cesar in ER, who also discussed this case with Dr. Raymundo with colorectal surgery and recommended admission and NPO at midnight for flexible sigmoidoscopy and evaluation for bladder leak in am.  -Clear liquid diet until midnight  -NPO at midnight  -CBC, CMP, coags ordered for am     Malnutrition (HCC)- (present on admission)  Assessment & Plan  Secondary to poor PO intake in multiple surgeries and illness. BMI on admission is 17.03, cachectic on exam. Albumin 2.7.  - Nutrition consult    Abdominal pain- (present on admission)  Assessment & Plan  Pain consistent with s/p cystoscopy, on table cystogram, complex catheterization and laparoscopic washout of hematoma and abdominal cavity on 6/10/25 with Dr. Cesar, subsequent removal of right pelvis CRIS drain on 6/19/25. Completed course of Augmentin 6/14-6/24. No peritoneal signs on admission. No signs or symptoms of current infection.  -Pain panel with acetaminophen, oxycodone, dilaudid for breakthrough pain  -Low threshold to initiate antibiotics if develops signs of infection    Anemia- (present on admission)  Assessment & Plan  Chronic, stable. No signs or symptoms of acute blood loss. Hemodynamically stable.   - Monitor daily CBC    Prostate cancer (HCC)- (present on admission)  Assessment & Plan  Prostatic adenocarcinoma s/p robot assisted radical  prostatectomy on 5/28/25. Followed by [facility] [place]. Patient reporting PSA returned to normal, no further treatment planned to his knowledge.     Parkinson's disease with dyskinesia and fluctuating manifestations (HCC)- (present on admission)  Assessment & Plan  Treated with deep brain stimulator and Sinemet.  Stable.  - continue Sinemet  mg tablet TID        Core Measures  Diet: clear liquids NPO at midnight for urology procedure  IV Fluids: 0.9NS  Antbx: N/A  Code status:  Full Code  Ppx: SCDs/TEDs and pharmacologic prophylaxis contraindicated due to planned procedure in am    Disposition  Patient is not medically cleared for discharge.   Anticipate discharge to skilled nursing facility.  I have placed the appropriate orders for post-discharge needs.    I have personally seen and examined the patient at bedside. I discussed the plan of care with patient & night Attending Physician Carter Nazario M.d.. Patient to be seen by Attending Physician Dr. Harmon in am.      Alix Gonzalez D.O.  [facility] Resident PGY-2         [1]   Past Medical History:  Diagnosis Date    Anesthesia     PONV    Anxiety     Bronchitis     Remote    Parkinson's disease (HCC)     PONV (postoperative nausea and vomiting)     Status post deep brain stimulator placement 06/2024    Followed by Dr Taylor of [facility] and Dr. Ivania Castro at [facility]    Urinary bladder disorder     Difficulty urinating   [2]   Past Surgical History:  Procedure Laterality Date    AZ UPPER GI ENDOSCOPY,BIOPSY N/A 6/16/2025    Procedure: GASTROSCOPY, WITH BIOPSY;  Surgeon: Kat Bloom M.D.;  Location: SURGERY SAME DAY [facility];  Service: Gastroenterology    AZ CYSTOURETHROSCOPY  6/10/2025    Procedure: CYSTOSCOPY ON TABLE FLUOROSCOPIC CYSTOGRAM , COMPLEX URETHRAL CATHETERIZATION;  Surgeon: Tj WATT M.D.;  Location: SURGERY [facility];  Service: Urology    AZ LAP,DIAGNOSTIC ABDOMEN  6/10/2025    Procedure: DIAGNOSTIC  LAPAROSCOPY, ABDOMINAL WASHOUT;  Surgeon: Tj WATT M.D.;  Location: SURGERY [facility];  Service: Urology    PROSTATECTOMY ROBOTIC XI N/A 5/28/2025    Procedure: ROBOTIC ASSISTED LAPAROSCOPIC PROSTATECTOMY;  Surgeon: Tj WATT M.D.;  Location: SURGERY [facility];  Service: Uro Robotic    LAPAROSCOPIC LYSIS OF ADHESIONS N/A 5/28/2025    Procedure: LYSIS, ADHESIONS, LAPAROSCOPIC ROBOTIC;  Surgeon: Tj WATT M.D.;  Location: SURGERY [facility];  Service: Uro Robotic    OTHER  06/2024    Deep brain stimulator    INGUINAL HERNIA REPAIR ROBOTIC Bilateral 11/22/2017    Procedure: INGUINAL HERNIA REPAIR ROBOTIC/ WITH MESH PLACEMENT;  Surgeon: Zackary Ponce M.D.;  Location: SURGERY [facility];  Service: General    LAMINOTOMY      c5-7    ORIF, ELBOW      right as a child    OTHER NEUROLOGICAL SURG      cervical fusion    OTHER ORTHOPEDIC SURGERY      finger surgery as child    SEPTOPLASTY, NOSE, WITH TURBINOPLASTY      SHOULDER ARTHROSCOPY W/ SLAP / LABRAL REPAIR      left    SINUSCOPY      maxillary sinuses   [3] No Known Allergies  [4]   Current Facility-Administered Medications:     [START ON 7/1/2025] NS infusion, , Intravenous, Continuous, Alix Gonzalez, D.O.    [START ON 7/1/2025] acetaminophen (Tylenol) tablet 1,000 mg, 1,000 mg, Oral, Q6HRS **FOLLOWED BY** [START ON 7/6/2025] acetaminophen (Tylenol) tablet 1,000 mg, 1,000 mg, Oral, Q6HRS PRN, Alix Gonzalez D.O.    oxyCODONE immediate-release (Roxicodone) tablet 5 mg, 5 mg, Oral, Q3HRS PRN **OR** oxyCODONE immediate-release (Roxicodone) tablet 10 mg, 10 mg, Oral, Q3HRS PRN **OR** HYDROmorphone (Dilaudid) injection 0.5 mg, 0.5 mg, Intravenous, Q3HRS PRN, Alix Gonzalez D.O.    [START ON 7/1/2025] senna-docusate (Pericolace Or Senokot S) 8.6-50 MG per tablet 2 Tablet, 2 Tablet, Oral, Q EVENING **AND** polyethylene glycol/lytes (Miralax) Packet 1 Packet, 1 Packet, Oral, QDAY PRN, Alix Gonzalez D.O.    Current Outpatient  Medications:     amoxicillin-clavulanate (AUGMENTIN) 875-125 MG Tab, Take 1 Tablet by mouth 2 times a day. 5 day course completed started 06/20/2025 pm for Esophagitis, Disp: , Rfl:     phenazopyridine (PHENAZO) 95 MG tablet, Take 190 mg by mouth in the morning, at noon, and at bedtime. For 3 days. completed, Disp: , Rfl:     LACTOBACILLUS RHAMNOSUS, GG, PO, Take 1 Tablet by mouth every day., Disp: , Rfl:     acetaminophen (TYLENOL) 325 MG Tab, Take 650 mg by mouth every four hours as needed for Mild Pain or Moderate Pain. 650 mg = 2 tablets, Disp: , Rfl:     oxyCODONE immediate-release (ROXICODONE) 5 MG Tab, Take 5 mg by mouth every four hours as needed for Severe Pain. 14 days, Disp: , Rfl:     hydrOXYzine HCl (ATARAX) 10 MG Tab, Take 1 Tablet by mouth 3 times a day as needed for Itching (Anxiety)., Disp: 30 Tablet, Rfl: 0    Lactobacillus-Inulin ([facility] DIGESTIVE HEALTH) Cap, Take 1 Each by mouth every day., Disp: 90 Capsule, Rfl: 0    omeprazole (PRILOSEC) 40 MG delayed-release capsule, Take 1 Capsule by mouth 2 times a day., Disp: , Rfl:     sucralfate (CARAFATE) 1 GM/10ML Suspension, Take 10 mL by mouth every 6 hours for 30 days., Disp: , Rfl:     oxybutynin SR (DITROPAN-XL) 10 MG CR tablet, Take 10 mg by mouth every day. 2 day course dispensed 06/01/2025 COMPLETED, Disp: , Rfl:     vitamin D3 (CHOLECALCIFEROL) 1000 Unit (25 mcg) Tab, Take 4,000 Units by mouth 2 times a day., Disp: , Rfl:     Omega-3 Fatty Acids (OMEGA 3 PO), Take 1 Capsule by mouth every day., Disp: , Rfl:     B Complex Vitamins (B COMPLEX PO), Take 1 Tablet by mouth every day., Disp: , Rfl:     MAGNESIUM PO, Take 1 Tablet by mouth every day., Disp: , Rfl:     VITAMIN K PO, Take 1 Tablet by mouth every day. For Transaminitis, Disp: , Rfl:     carbidopa-levodopa (SINEMET)  MG Tab, Take 1 Tablet by mouth 3 times a day. For Parkinson's, Disp: 270 Tablet, Rfl: 3

## 2025-07-01 NOTE — ASSESSMENT & PLAN NOTE
Patient sent for admission due to concern for rectovesical fistula. Evaluated by urologist Dr. Cesar in ER, who also discussed this case with Dr. Raymundo with colorectal surgery and recommended admission and NPO at midnight for flexible sigmoidoscopy and evaluation for bladder leak in am.  - Juarez in place pending urology evaluation.  Unable to be exchanged by nursing staff.  - 7/1 barium study inconclusive.  Sigmoidoscopy 7/2 also normal, per colorectal surgery.  - Will await urology recommendations for further workup  - CBC, CMP, coags 7/1 all reassuring, hemoglobin stable.  AST/ALT normal 7/3, remainder of BMP unremarkable.  Alk phos remains mildly elevated.

## 2025-07-01 NOTE — CONSULTS
Surgical History and Physical    Date: 2025    Requesting Physician: Dr Cesar    Consulting Physician: Mehdi Raymundo M.D.    Reason for consultation: Rectourethral fistula    CC: Abdominal pain    HPI: This is a 60 y.o. male who is presenting with a history of prostate cancer status post prostatectomy on .  1 month after surgery the patient presented to the emergency room for abdominal pain with imaging demonstrating bladder leak with possible colovesicular fistula.      Past Medical History[1]    Past Surgical History[2]    Current Medications[3]    Social History     Socioeconomic History    Marital status:      Spouse name: Not on file    Number of children: Not on file    Years of education: Not on file    Highest education level: GED or equivalent   Occupational History    Not on file   Tobacco Use    Smoking status: Former     Current packs/day: 0.00     Average packs/day: 1 pack/day for 5.0 years (5.0 ttl pk-yrs)     Types: Cigarettes     Start date: 1982     Quit date: 1987     Years since quittin.6    Smokeless tobacco: Former     Types: Chew    Tobacco comments:     quit at age 23   Vaping Use    Vaping status: Never Used   Substance and Sexual Activity    Alcohol use: No     Comment: stopped drinking  @ 20    Drug use: No    Sexual activity: Yes     Partners: Female     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    Other Topics Concern    Not on file   Social History Narrative    Not on file     Social Drivers of Health     Financial Resource Strain: Low Risk  (2022)    Overall Financial Resource Strain (CARDIA)     Difficulty of Paying Living Expenses: Not very hard   Food Insecurity: No Food Insecurity (2025)    Hunger Vital Sign     Worried About Running Out of Food in the Last Year: Never true     Ran Out of Food in the Last Year: Never true   Transportation Needs: No Transportation Needs (2025)    PRAPARE - Transportation      Lack of Transportation (Medical): No     Lack of Transportation (Non-Medical): No   Physical Activity: Sufficiently Active (4/20/2022)    Exercise Vital Sign     Days of Exercise per Week: 3 days     Minutes of Exercise per Session: 120 min   Stress: No Stress Concern Present (4/20/2022)    [employer] [place] [employer] - Occupational Stress Questionnaire     Feeling of Stress : Only a little   Social Connections: Socially Integrated (4/20/2022)    Social Connection and Isolation Panel [NHANES]     Frequency of Communication with Friends and Family: Three times a week     Frequency of Social Gatherings with Friends and Family: More than three times a week     Attends Holiness Services: 1 to 4 times per year     Active Member of Clubs or Organizations: Yes     Attends Club or Organization Meetings: More than 4 times per year     Marital Status:    Intimate Partner Violence: Not At Risk (7/1/2025)    Humiliation, Afraid, Rape, and Kick questionnaire     Fear of Current or Ex-Partner: No     Emotionally Abused: No     Physically Abused: No     Sexually Abused: No   Housing Stability: Low Risk  (7/1/2025)    Housing Stability Vital Sign     Unable to Pay for Housing in the Last Year: No     Number of Times Moved in the Last Year: 0     Homeless in the Last Year: No       Family History   Problem Relation Age of Onset    Heart Disease Mother     Hypertension Mother     Hyperlipidemia Mother     Psychiatric Illness Mother     Psychiatric Illness Father     Alcohol/Drug Father     Alcohol/Drug Sister     Sleep Apnea Neg Hx        Allergies:  Patient has no known allergies.    Review of Systems:  Constitutional: Negative for fever, chills positive for weight loss  HENT: Negative for nosebleeds   Eyes: Negative for changes in vision or photophobia  Respiratory: Negative for cough, shortness of breath or wheezing  Cardiovascular: Negative for chest pain or palpitations  Gastrointestinal: Negative for  nausea, vomiting, diarrhea, blood in stool and melena.   Genitourinary: Negative for dysuria or urinary incontinence   Musculoskeletal: Negative for back pain and joint pain.   Skin: Negative for itching and rash.  Neurological: Negative for dizziness, lightheadedness or loss of consciousness  Endo/Heme/Allergies: Does not bruise/bleed easily.   Psychiatric/Behavioral: Negative for substance abuse. The patient is not nervous/anxious and does not have insomnia.    Physical Exam:  /69   Pulse 82   Temp 36.4 °C (97.6 °F) (Temporal)   Resp 18   Wt 50 kg (110 lb 3.7 oz)   SpO2 97%     Constitutional: oriented to person, place, and time.  appears cachectic in mild distress.   Head: Normocephalic and atraumatic.   Neck: Normal range of motion. Neck supple. No JVD present. No tracheal deviation present. No thyromegaly present.   Cardiovascular: Normal rate, regular rhythm, normal heart sounds and intact distal pulses.  Exam reveals no gallop and no friction rub.  No murmur heard.  Pulmonary/Chest: Normal respiratory effort. No stridor. No respiratory distress.   Abdominal: Soft, nontender nondistended with well-healed surgical incisions Juarez catheter in place with clear straw-colored fluid  Musculoskeletal: Normal range of motion.  exhibits no edema and no tenderness.   Neurological: he is alert and oriented to person, place, and time. Coordination normal.   Skin: Skin is warm and dry. No rash noted. he is not diaphoretic. No erythema. No pallor.   Psychiatric: normal mood and affect.  Behavior is normal.       Labs:  Recent Labs     06/30/25 1735 07/01/25 0338   WBC 9.3 10.4   RBC 3.49* 3.39*   HEMOGLOBIN 9.5* 9.3*   HEMATOCRIT 30.7* 29.9*   MCV 88.0 88.2   MCH 27.2 27.4   MCHC 30.9* 31.1*   RDW 54.4* 54.1*   PLATELETCT 446 486*   MPV 9.5 9.4     Recent Labs     06/30/25 1735 07/01/25  0338   SODIUM 135 136   POTASSIUM 4.0 4.0   CHLORIDE 103 105   CO2 22 23   GLUCOSE 94 86   BUN 18 14   CREATININE 0.63 0.71    CALCIUM 8.6 8.4*     Recent Labs     07/01/25  0338   APTT 32.2   INR 1.07     Recent Labs     06/30/25  1735 07/01/25  0338   ASTSGOT 34 34   ALTSGPT 23 51*   TBILIRUBIN 0.2 0.3   ALKPHOSPHAT 153* 145*   GLOBULIN 4.1* 3.8*   INR  --  1.07         Radiology:  DX-BARIUM ENEMA   Final Result      Limited barium enema with only adequate distention of the rectum achieved. No extraluminal contrast to suggest leakage or fistula. The remainder of the colon was not evaluated.      CT-Cystogram   Final Result         1.  Moderate quantity of free fluid in the abdomen and pelvis, hyperdense contrast is seen tracking within the left abdomen, compatible with extravasated contrast, likely from the bladder or prostatectomy site.   2.  No hyperdense contrast is seen within the colon to readily notified colovesicular fistula, note that relatively small amount of contrast is seen within the bladder and most contrast is present within the peritoneal cavity limiting evaluation for    colovesicular fistula.   3.  Diverticulosis.   4.  Atherosclerosis.          Assessment: This is a 60 y.o.     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  Patient will be scheduled for flexible sigmoidoscopy tomorrow.  Risks, benefits, and alternatives were discussed with consenting person(s). Consenting person(s) were given an opportunity to ask questions and discuss other options. Risks including but not limited to failed or incomplete planned procedure, ineffective therapy, perforation, infection, bleeding, missed lesion(s), missed diagnosis, cardiac and/or pulmonary event, aspiration, stroke, possible need for surgery,  hospitalization possibly prolonged, discomfort, unsuccessful and/or incomplete procedure, possible need for repeat procedures and/or additional testings, damage to adjacent organs and/or vascular structures, medication reaction, disability, death, and other adverse events possibly life-threatening. Discussion was undertaken with Layman's terms. Consenting persons stated understanding and acceptance of these risks, and wished to proceed. Consent was given in clear state of mind.  Mehdi Raymundo MD PhD  [facility]  Colon and Rectal Surgeon  [phone]        Mehdi Raymundo MD PhD  [facility]  Colon and Rectal Surgeon  [phone]                       [1]   Past Medical History:  Diagnosis Date    Anesthesia     PONV    Anxiety     Bronchitis     Remote    Parkinson's disease (HCC)     PONV (postoperative nausea and vomiting)     Status post deep brain stimulator placement 06/2024    Followed by Dr Taylor of [facility] and Dr. Ivania Castro at [facility]    Urinary bladder disorder     Difficulty urinating   [2]   Past Surgical History:  Procedure Laterality Date    MO UPPER GI ENDOSCOPY,BIOPSY N/A 6/16/2025    Procedure: GASTROSCOPY, WITH BIOPSY;  Surgeon: Kat Bloom M.D.;  Location: SURGERY SAME DAY [facility];  Service: Gastroenterology    MO CYSTOURETHROSCOPY  6/10/2025    Procedure: CYSTOSCOPY ON TABLE FLUOROSCOPIC CYSTOGRAM , COMPLEX URETHRAL CATHETERIZATION;  Surgeon: Tj WATT M.D.;  Location: SURGERY [facility];  Service: Urology    MO LAP,DIAGNOSTIC ABDOMEN  6/10/2025    Procedure: DIAGNOSTIC LAPAROSCOPY, ABDOMINAL WASHOUT;  Surgeon: Tj WATT M.D.;  Location: SURGERY [facility];  Service: Urology    PROSTATECTOMY ROBOTIC XI N/A 5/28/2025    Procedure: ROBOTIC ASSISTED LAPAROSCOPIC PROSTATECTOMY;  Surgeon: Tj WATT M.D.;  Location: SURGERY [facility];  Service: Uro Robotic    LAPAROSCOPIC LYSIS OF ADHESIONS N/A 5/28/2025    Procedure: LYSIS,  ADHESIONS, LAPAROSCOPIC ROBOTIC;  Surgeon: Tj WATT M.D.;  Location: SURGERY [facility];  Service: Uro Robotic    OTHER  06/2024    Deep brain stimulator    INGUINAL HERNIA REPAIR ROBOTIC Bilateral 11/22/2017    Procedure: INGUINAL HERNIA REPAIR ROBOTIC/ WITH MESH PLACEMENT;  Surgeon: Zackary Ponce M.D.;  Location: SURGERY [facility];  Service: General    LAMINOTOMY      c5-7    ORIF, ELBOW      right as a child    OTHER NEUROLOGICAL SURG      cervical fusion    OTHER ORTHOPEDIC SURGERY      finger surgery as child    SEPTOPLASTY, NOSE, WITH TURBINOPLASTY      SHOULDER ARTHROSCOPY W/ SLAP / LABRAL REPAIR      left    SINUSCOPY      maxillary sinuses   [3]   Current Facility-Administered Medications   Medication Dose Route Frequency Provider Last Rate Last Admin    NS infusion   Intravenous Continuous Alix Gonzalez, D.O. 100 mL/hr at 07/01/25 0117 New Bag at 07/01/25 0117    acetaminophen (Tylenol) tablet 1,000 mg  1,000 mg Oral Q6HRS Alix Gonzalez, D.O.   1,000 mg at 07/01/25 1332    Followed by    [START ON 7/6/2025] acetaminophen (Tylenol) tablet 1,000 mg  1,000 mg Oral Q6HRS PRN Alix Gonzalez, D.O.        oxyCODONE immediate-release (Roxicodone) tablet 5 mg  5 mg Oral Q3HRS PRN Alix Gonzalez, D.O.   5 mg at 07/01/25 0509    Or    oxyCODONE immediate release (Roxicodone) tablet 10 mg  10 mg Oral Q3HRS PRN Alix Gonzalez, D.O.        Or    HYDROmorphone (Dilaudid) injection 0.5 mg  0.5 mg Intravenous Q3HRS PRN Alix Gonzalez, D.O.        senna-docusate (Pericolace Or Senokot S) 8.6-50 MG per tablet 2 Tablet  2 Tablet Oral Q EVENING Alix Gonzalez, D.O.        And    polyethylene glycol/lytes (Miralax) Packet 1 Packet  1 Packet Oral QDAY PRN Alix Gonzalez, D.O.        carbidopa-levodopa (Sinemet)  MG tablet 1 Tablet  1 Tablet Oral TID Alix Gonzalez, D.O.   1 Tablet at 07/01/25 1141    hydrOXYzine HCl (Atarax) tablet 10 mg  10 mg Oral TID PRN Alix Gonzalez D.O.         omeprazole (PriLOSEC) capsule 40 mg  40 mg Oral BID Alix Gonzalez D.O.   40 mg at 07/01/25 0508    sucralfate (Carafate) 1 GM/10ML suspension 1 g  1 g Oral Q6HRS KARL Jolley.O.   1 g at 07/01/25 1141

## 2025-07-01 NOTE — ED NOTES
Bedside report received from off going RN/tech: YOAN Zepeda assumed care of patient.  POC discussed with patient. Call light within reach, all needs addressed at this time.       Fall risk interventions in place: Keep floor surfaces clean and dry (all applicable per [facility] Fall risk assessment)   Continuous monitoring: Pulse Ox or Blood Pressure  IVF/IV medications: Not Applicable   Oxygen: Room Air  Bedside sitter: Not Applicable   Isolation: Not Applicable

## 2025-07-01 NOTE — PROGRESS NOTES
Luz not exchanged,per last admission notes urology MD placed luz cath.  Have day shift RN followup with MD/urology - urine specimen still needed    Alert and able to let his needs known but slow to respond  SBA out of bed to ambulate  C/o pain; medicate as requested

## 2025-07-01 NOTE — ASSESSMENT & PLAN NOTE
Prostatic adenocarcinoma s/p robot assisted radical prostatectomy on 5/28/25. Followed by [facility] [place]. Patient reporting PSA returned to normal, no further treatment planned to his knowledge.  - Alk phos persistently mildly elevated.  Patient did have recent transaminitis, though this has resolved.  Persistent alk phos elevation may be related to ongoing intra-abdominal process versus potential sign of metastasis  - Will follow urology recommendations, if no recommendations for further workup will defer for outpatient management

## 2025-07-01 NOTE — PROGRESS NOTES
4 Eyes Skin Assessment Completed by YOAN leonard and YOAN uribe.    Skin assessment is primarily focused on high risk bony prominences. Pay special attention to skin beneath and around medical devices, high risk bony prominences, skin to skin areas and areas where the patient lacks sensation to feel pain and areas where the patient previously had breakdown.     Head (Occipital):  WDL   Ears (Under Medical Devices): Red   Nose (Under Medical Devices): WDL   Mouth:  WDL   Neck: WDL   Breast/Chest:  WDL   Shoulder Blades:  WDL   Spine:   WDL   (R) Arm/Elbow/Hand: WDL   (L) Arm/Elbow/Hand: WDL   Abdomen: Scars; multiple. Scab    Pannus/Groin:  moisture   Sacrum/Coccyx:   Red,skin tag   (R) Ischial Tuberosity (Sit Bones):  WDL   (L) Ischial Tuberosity (Sit Bones):  WDL   (R) Leg:  WDL   (L) Leg:  Bruising   (R) Heel:  WDL   (R) Foot/Toe: WDL   (L) Heel: WDL   (L) Foot/Toe:  WDL       DEVICES IN USE:   Respiratory Devices:  NA, patient on room air  Feeding Devices:  N/A   Lines & BP Monitoring Devices:  Peripheral IV    Orthopedic Devices:  N/A  Miscellaneous Devices:  N/A    PROTOCOL INTERVENTIONS:   Low Airloss Bed:  would benefit    WOUND PHOTOS:   N/A no wounds identified    WOUND CONSULT:   N/A, no advanced wound care needs identified

## 2025-07-01 NOTE — PROGRESS NOTES
FAMILY MEDICINE PROGRESS NOTE          PATIENT ID:  NAME:  Rakan Rader  MRN:               [id number]  YOB: 1965    Date of Admission: 6/30/2025     Attending: Zachary Harmon M.d.     Resident: Emeterio Dave M.D. (PGY-3)    Primary Care Physician:  Emeterio Dave M.D.    HPI: Rakan Rader is a 60 y.o. male with history of Parkinson's Disease with deep brain stimulator, prostate CA s/p radical prostatectomy 5/28/25 complicated by bladder leak into abdomen with subsequent laparoscopy and washout with drain placement 6/10/25, drain out 6/18, admitted for suspected rectovesical fistula on hospital day 1    Interval Problem Update  6/30: Admitted for evaluation for rectovesical fistula.  Plan for flex sigmoidoscopy and possible laparoscopy in AM.    SUBJECTIVE:   No acute events overnight, states he is very hungry as he has not had a regular meal since yesterday.  He denies any significant pain at this time.  Juarez remains in place.    OBJECTIVE:  Temp:  [36.5 °C (97.7 °F)-36.9 °C (98.5 °F)] 36.5 °C (97.7 °F)  Pulse:  [72-92] 72  Resp:  [15-20] 15  BP: (119-135)/(71-81) 123/73  SpO2:  [95 %-97 %] 97 %      Intake/Output Summary (Last 24 hours) at 7/1/2025 0650  Last data filed at 7/1/2025 0509  Gross per 24 hour   Intake 60 ml   Output --   Net 60 ml       PHYSICAL EXAM:  Physical Exam  Vitals reviewed.   Constitutional:       General: He is not in acute distress.     Appearance: Normal appearance. He is not toxic-appearing.      Comments: Thin framed male, lying in hospital bed.  Appears older than stated age.   HENT:      Head: Normocephalic and atraumatic.      Right Ear: External ear normal.      Left Ear: External ear normal.      Nose: Nose normal. No congestion.      Mouth/Throat:      Mouth: Mucous membranes are moist.      Pharynx: Oropharynx is clear.   Eyes:      Extraocular Movements: Extraocular movements intact.      Pupils: Pupils are equal, round, and reactive to light.    Cardiovascular:      Rate and Rhythm: Normal rate and regular rhythm.      Heart sounds: No murmur heard.  Pulmonary:      Effort: Pulmonary effort is normal. No respiratory distress.      Breath sounds: Normal breath sounds.   Abdominal:      General: Abdomen is flat. Bowel sounds are normal. There is no distension.      Palpations: Abdomen is soft.      Tenderness: There is no abdominal tenderness. There is no guarding or rebound.   Genitourinary:     Comments: Juarez catheter in place  Musculoskeletal:         General: No swelling or deformity. Normal range of motion.      Cervical back: Normal range of motion and neck supple.   Skin:     General: Skin is warm and dry.      Capillary Refill: Capillary refill takes less than 2 seconds.      Findings: No rash.   Neurological:      Mental Status: He is alert and oriented to person, place, and time. Mental status is at baseline.      Cranial Nerves: No cranial nerve deficit.      Comments: Masked facies.  No resting tremor, does have mild active and positional tremor.   Psychiatric:         Mood and Affect: Mood normal.         Behavior: Behavior normal.         LABS:  Reviewed in Results Tab      IMAGING:  Reviewed in Results Tab    CULTURES:   Reviewed in chart review tab    MEDS:  Current Facility-Administered Medications   Medication Last Admin    NS infusion New Bag at 07/01/25 0117    acetaminophen (Tylenol) tablet 1,000 mg 1,000 mg at 07/01/25 0117    Followed by    [START ON 7/6/2025] acetaminophen (Tylenol) tablet 1,000 mg      oxyCODONE immediate-release (Roxicodone) tablet 5 mg 5 mg at 07/01/25 0509    Or    oxyCODONE immediate release (Roxicodone) tablet 10 mg      Or    HYDROmorphone (Dilaudid) injection 0.5 mg      senna-docusate (Pericolace Or Senokot S) 8.6-50 MG per tablet 2 Tablet      And    polyethylene glycol/lytes (Miralax) Packet 1 Packet      carbidopa-levodopa (Sinemet)  MG tablet 1 Tablet 1 Tablet at 07/01/25 0508    hydrOXYzine HCl  (Atarax) tablet 10 mg      omeprazole (PriLOSEC) capsule 40 mg 40 mg at 07/01/25 0508    sucralfate (Carafate) 1 GM/10ML suspension 1 g 1 g at 07/01/25 0508       ASSESSMENT/PLAN:  60 y.o. 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 Evaluated by urologist Dr. Cesar in ER, who also discussed this case with Dr. Raymundo with colorectal surgery and recommended admission and NPO at midnight for flexible sigmoidoscopy and evaluation for bladder leak in am.  - Juraez in place pending urology evaluation.  Unable to be exchanged by nursing staff.  - NPO for procedure 7/1  - CBC, CMP, coags 7/1 all reassuring, hemoglobin stable    Abdominal pain- (present on admission)  Assessment & Plan  Pain consistent with s/p cystoscopy, on table cystogram, complex catheterization and laparoscopic washout of hematoma and abdominal cavity on 6/10/25 with Dr. Cesar, subsequent removal of right pelvis CRIS drain on 6/19/25. Completed course of Augmentin 6/14-6/24. No peritoneal signs on admission. No signs or symptoms of current infection.  - Pain panel with acetaminophen, oxycodone, dilaudid for breakthrough pain.  1 dose of oxycodone required overnight after admission  - Low threshold to initiate antibiotics if develops signs of infection    Malnutrition (HCC)- (present on admission)  Assessment & Plan  Secondary to poor PO intake in multiple surgeries and illness. BMI on admission is 16.76, cachectic on exam. Albumin 2.7.  - Nutrition consult    Anemia- (present on admission)  Assessment & Plan  Chronic, stable. No signs or symptoms of acute blood loss. Hemodynamically stable.   - Monitor daily CBC, stable at 9.3 on 7/1 from 9.5 on admission    Prostate cancer (HCC)- (present on admission)  Assessment & Plan  Prostatic adenocarcinoma s/p robot assisted radical prostatectomy on 5/28/25. Followed by [facility] [place]. Patient reporting  PSA returned to normal, no further treatment planned to his knowledge.  - Alk phos persistently mildly elevated.  Patient did have recent transaminitis, though this has resolved.  Persistent alk phos elevation may be related to ongoing intra-abdominal process versus potential sign of metastasis  - Will follow urology recommendations, if no recommendations for further workup will defer for outpatient management    Parkinson's disease with dyskinesia and fluctuating manifestations (HCC)- (present on admission)  Assessment & Plan  Treated with deep brain stimulator and Sinemet.  Stable.  - continue Sinemet  mg tablet TID         Core Measures:  Fluids: 0.9NS @ 100 mL/hr  Lines: Juarez catheter for complicated prostatectomy/bladder leak and Peripheral IV for intravenous access  Abx: Status post Zosyn/Augmentin 6/8 through 6/20.  No antibiotics since admission.  Diet: NPO for pending urologic procedure   PPX: pharmacologic prophylaxis contraindicated due to pending procedure    CODE Status: Full Code        Disposition  Patient is not medically cleared for discharge.   Anticipate discharge dispo pending PT/OT recs.  I have placed the appropriate orders for post-discharge needs.    I have personally seen and examined the patient at bedside. I discussed the plan of care with patient, bedside RN, and  Zachary Harmon M.d..      Emeterio Dave M.D.   PGY-3  [facility]

## 2025-07-01 NOTE — ASSESSMENT & PLAN NOTE
Secondary to poor PO intake in multiple surgeries and illness. BMI on admission is 16.76, cachectic on exam. Albumin 2.7.  - Nutrition consult, recommending Ensure supplements and multivitamin

## 2025-07-02 ENCOUNTER — ANESTHESIA (OUTPATIENT)
Dept: SURGERY | Facility: MEDICAL CENTER | Age: 60
DRG: 698 | End: 2025-07-02
Payer: MEDICARE

## 2025-07-02 ENCOUNTER — ANESTHESIA EVENT (OUTPATIENT)
Dept: SURGERY | Facility: MEDICAL CENTER | Age: 60
DRG: 698 | End: 2025-07-02
Payer: MEDICARE

## 2025-07-02 PROCEDURE — 700111 HCHG RX REV CODE 636 W/ 250 OVERRIDE (IP): Performed by: ANESTHESIOLOGY

## 2025-07-02 PROCEDURE — 700101 HCHG RX REV CODE 250: Performed by: SURGERY

## 2025-07-02 PROCEDURE — 160191 HCHG ANESTHESIA STANDARD: Performed by: SURGERY

## 2025-07-02 PROCEDURE — 700105 HCHG RX REV CODE 258: Performed by: ANESTHESIOLOGY

## 2025-07-02 PROCEDURE — 45330 DIAGNOSTIC SIGMOIDOSCOPY: CPT | Performed by: SURGERY

## 2025-07-02 PROCEDURE — 160193 HCHG PACU STANDARD - 1ST 60 MINS: Performed by: SURGERY

## 2025-07-02 PROCEDURE — 160027 HCHG SURGERY MINUTES - 1ST 30 MINS LEVEL 2: Performed by: SURGERY

## 2025-07-02 PROCEDURE — 97166 OT EVAL MOD COMPLEX 45 MIN: CPT

## 2025-07-02 PROCEDURE — 160048 HCHG OR STATISTICAL LEVEL 1-5: Performed by: SURGERY

## 2025-07-02 PROCEDURE — 0DJD8ZZ INSPECTION OF LOWER INTESTINAL TRACT, VIA NATURAL OR ARTIFICIAL OPENING ENDOSCOPIC: ICD-10-PCS | Performed by: SURGERY

## 2025-07-02 PROCEDURE — 700101 HCHG RX REV CODE 250

## 2025-07-02 PROCEDURE — A9270 NON-COVERED ITEM OR SERVICE: HCPCS

## 2025-07-02 PROCEDURE — 700102 HCHG RX REV CODE 250 W/ 637 OVERRIDE(OP)

## 2025-07-02 PROCEDURE — 700101 HCHG RX REV CODE 250: Performed by: ANESTHESIOLOGY

## 2025-07-02 PROCEDURE — 99232 SBSQ HOSP IP/OBS MODERATE 35: CPT | Mod: GC | Performed by: STUDENT IN AN ORGANIZED HEALTH CARE EDUCATION/TRAINING PROGRAM

## 2025-07-02 PROCEDURE — 97535 SELF CARE MNGMENT TRAINING: CPT

## 2025-07-02 PROCEDURE — 700105 HCHG RX REV CODE 258

## 2025-07-02 PROCEDURE — 770006 HCHG ROOM/CARE - MED/SURG/GYN SEMI*

## 2025-07-02 PROCEDURE — 160002 HCHG RECOVERY MINUTES (STAT): Performed by: SURGERY

## 2025-07-02 PROCEDURE — 160015 HCHG STAT PREOP MINUTES: Performed by: SURGERY

## 2025-07-02 RX ORDER — ONDANSETRON 2 MG/ML
4 INJECTION INTRAMUSCULAR; INTRAVENOUS
Status: DISCONTINUED | OUTPATIENT
Start: 2025-07-02 | End: 2025-07-02 | Stop reason: HOSPADM

## 2025-07-02 RX ORDER — HALOPERIDOL 5 MG/ML
1 INJECTION INTRAMUSCULAR
Status: DISCONTINUED | OUTPATIENT
Start: 2025-07-02 | End: 2025-07-02 | Stop reason: HOSPADM

## 2025-07-02 RX ORDER — SODIUM PHOSPHATE,MONO-DIBASIC 19G-7G/118
2 ENEMA (ML) RECTAL ONCE
Status: COMPLETED | OUTPATIENT
Start: 2025-07-02 | End: 2025-07-02

## 2025-07-02 RX ORDER — DEXTROSE MONOHYDRATE, SODIUM CHLORIDE, AND POTASSIUM CHLORIDE 50; 1.49; 9 G/1000ML; G/1000ML; G/1000ML
INJECTION, SOLUTION INTRAVENOUS CONTINUOUS
Status: DISCONTINUED | OUTPATIENT
Start: 2025-07-02 | End: 2025-07-03 | Stop reason: HOSPADM

## 2025-07-02 RX ORDER — LIDOCAINE HYDROCHLORIDE 20 MG/ML
INJECTION, SOLUTION EPIDURAL; INFILTRATION; INTRACAUDAL; PERINEURAL PRN
Status: DISCONTINUED | OUTPATIENT
Start: 2025-07-02 | End: 2025-07-02 | Stop reason: SURG

## 2025-07-02 RX ORDER — DIPHENHYDRAMINE HYDROCHLORIDE 50 MG/ML
12.5 INJECTION, SOLUTION INTRAMUSCULAR; INTRAVENOUS
Status: DISCONTINUED | OUTPATIENT
Start: 2025-07-02 | End: 2025-07-02 | Stop reason: HOSPADM

## 2025-07-02 RX ORDER — SODIUM CHLORIDE, SODIUM LACTATE, POTASSIUM CHLORIDE, CALCIUM CHLORIDE 600; 310; 30; 20 MG/100ML; MG/100ML; MG/100ML; MG/100ML
INJECTION, SOLUTION INTRAVENOUS
Status: DISCONTINUED | OUTPATIENT
Start: 2025-07-02 | End: 2025-07-02 | Stop reason: SURG

## 2025-07-02 RX ADMIN — LIDOCAINE HYDROCHLORIDE 40 MG: 20 INJECTION, SOLUTION EPIDURAL; INFILTRATION; INTRACAUDAL; PERINEURAL at 13:33

## 2025-07-02 RX ADMIN — CARBIDOPA AND LEVODOPA 1 TABLET: 25; 100 TABLET ORAL at 17:58

## 2025-07-02 RX ADMIN — OMEPRAZOLE 40 MG: 20 CAPSULE, DELAYED RELEASE ORAL at 17:58

## 2025-07-02 RX ADMIN — PROPOFOL 50 MG: 10 INJECTION, EMULSION INTRAVENOUS at 13:33

## 2025-07-02 RX ADMIN — ACETAMINOPHEN 1000 MG: 500 TABLET ORAL at 19:33

## 2025-07-02 RX ADMIN — SODIUM CHLORIDE: 9 INJECTION, SOLUTION INTRAVENOUS at 08:26

## 2025-07-02 RX ADMIN — SUCRALFATE ORAL 1 G: 1 SUSPENSION ORAL at 18:00

## 2025-07-02 RX ADMIN — DEXTROSE MONOHYDRATE, SODIUM CHLORIDE, AND POTASSIUM CHLORIDE: 50; 9; 1.49 INJECTION, SOLUTION INTRAVENOUS at 21:30

## 2025-07-02 RX ADMIN — SUCRALFATE ORAL 1 G: 1 SUSPENSION ORAL at 05:00

## 2025-07-02 RX ADMIN — CARBIDOPA AND LEVODOPA 1 TABLET: 25; 100 TABLET ORAL at 15:36

## 2025-07-02 RX ADMIN — SODIUM PHOSPHATE, DIBASIC AND SODIUM PHOSPHATE, MONOBASIC 2 ENEMA: 7; 19 ENEMA RECTAL at 11:36

## 2025-07-02 RX ADMIN — PROPOFOL 50 MG: 10 INJECTION, EMULSION INTRAVENOUS at 13:36

## 2025-07-02 RX ADMIN — DOCUSATE SODIUM 50 MG AND SENNOSIDES 8.6 MG 2 TABLET: 8.6; 5 TABLET, FILM COATED ORAL at 17:57

## 2025-07-02 RX ADMIN — OXYCODONE HYDROCHLORIDE 10 MG: 10 TABLET ORAL at 13:57

## 2025-07-02 RX ADMIN — OMEPRAZOLE 40 MG: 20 CAPSULE, DELAYED RELEASE ORAL at 05:01

## 2025-07-02 RX ADMIN — CARBIDOPA AND LEVODOPA 1 TABLET: 25; 100 TABLET ORAL at 05:01

## 2025-07-02 RX ADMIN — OXYCODONE HYDROCHLORIDE 10 MG: 10 TABLET ORAL at 05:01

## 2025-07-02 RX ADMIN — SODIUM CHLORIDE, POTASSIUM CHLORIDE, SODIUM LACTATE AND CALCIUM CHLORIDE: 600; 310; 30; 20 INJECTION, SOLUTION INTRAVENOUS at 13:33

## 2025-07-02 ASSESSMENT — COGNITIVE AND FUNCTIONAL STATUS - GENERAL
DAILY ACTIVITIY SCORE: 23
SUGGESTED CMS G CODE MODIFIER DAILY ACTIVITY: CI
HELP NEEDED FOR BATHING: A LITTLE

## 2025-07-02 ASSESSMENT — PAIN DESCRIPTION - PAIN TYPE
TYPE: ACUTE PAIN;SURGICAL PAIN
TYPE: ACUTE PAIN;SURGICAL PAIN
TYPE: ACUTE PAIN
TYPE: ACUTE PAIN;SURGICAL PAIN
TYPE: ACUTE PAIN;SURGICAL PAIN
TYPE: ACUTE PAIN

## 2025-07-02 ASSESSMENT — ACTIVITIES OF DAILY LIVING (ADL): TOILETING: INDEPENDENT

## 2025-07-02 ASSESSMENT — PAIN SCALES - GENERAL: PAIN_LEVEL: 0

## 2025-07-02 NOTE — THERAPY
Occupational Therapy   Initial Evaluation     Patient Name:  Rakan Rader III  Age:  60 y.o., Sex:  male  Medical Record #:  [id number]  Today's Date:  7/2/2025     Precautions  Medical: Fall Risk    Assessment    Patient is 60 y.o. male admitted with abdominal pain, imaging demonstrating bladder leak and possible colovesicular fistula, pmhx includes prostate CA s/p prostatectomy on 5/28/25. Pt is awaiting flexible sigmoidoscopy today. Pt presents to OT eval able to demonstrate self-care independence and household mobility with FWW, close guarding provided for safety. Pt and girlfriend at bedside are in agreement that pt is at a functional independence level adequate for DC home. Recommend home health OT follow up, pt agreeable.     Plan    Occupational Therapy Initial Treatment Plan   Treatment Interventions: Self Care / Activities of Daily Living, Therapeutic Exercises, Therapeutic Activity, Adaptive Equipment  Treatment Frequency: 3 Times per Week  Duration: Until Therapy Goals Met    DC Equipment Recommendations: None  Discharge Recommendations: Recommend home health for continued occupational therapy services      Objective       07/02/25 0942   Prior Living Situation   Prior Services Continuous (24 Hour) Care Giving Per Service   Housing / Facility 1 Story House   Bathroom Set up Walk In Shower;Shower Chair   Equipment Owned Front-Wheel Walker;Single Point Cane;Tub / Shower Seat   Lives with - Patient's Self Care Capacity Unrelated Adult  (a roommate, not a friend)   Comments pt supportive girlfriend at bedside reports she will be able to assist in evenings after work. Pt lives with a roommate who is not a support-person.   Prior Level of ADL Function   Self Feeding Independent   Grooming / Hygiene Independent   Bathing Independent   Dressing Independent   Toileting Independent   Prior Level of IADL Function   Medication Management Independent   Laundry Independent   Kitchen Mobility Independent   Finances  Independent   Home Management Independent   Shopping Independent   Prior Level Of Mobility Independent Without Device in Community   Comments prior to May 28 when recent decline and string of hospitalizations started   Precautions   Medical Fall Risk   Pain 0 - 10 Group   Therapist Pain Assessment Nurse Notified;0;Post Activity Pain Same as Prior to Activity   Cognition    Cognition / Consciousness X   Level of Consciousness Alert   Safety Awareness Impaired   Attention Impaired   Comments pleasant and cooperative, mild attention deficit, pt left is L LE on the bed as he began to stand and required a cue to bring his left foot to the ground prior to standing   Active ROM Upper Body   Active ROM Upper Body  WDL   Strength Upper Body   Upper Body Strength  X   Gross Strength Generalized Weakness, Equal Bilaterally.    Comments underweight, frail   Sensation Upper Body   Upper Extremity Sensation  WDL   Upper Body Muscle Tone   Upper Body Muscle Tone  WDL   Coordination Upper Body   Coordination WDL   Balance Assessment   Sitting Balance (Static) Good   Sitting Balance (Dynamic) Fair +   Standing Balance (Static) Fair   Standing Balance (Dynamic) Fair   Weight Shift Sitting Good   Weight Shift Standing Fair   Comments FWW   Bed Mobility    Supine to Sit Standby Assist   Scooting Supervised   ADL Assessment   Eating Independent   Grooming Standing;Supervision   Upper Body Dressing Supervision   Lower Body Dressing Supervision   Toileting   (declined need for BM, luz in place)   How much help from another person does the patient currently need...   Putting on and taking off regular lower body clothing? 4   Bathing (including washing, rinsing, and drying)? 3   Toileting, which includes using a toilet, bedpan, or urinal? 4   Putting on and taking off regular upper body clothing? 4   Taking care of personal grooming such as brushing teeth? 4   Eating meals? 4   6 Clicks Daily Activity Score 23   Functional Mobility   Sit  to Stand Supervised   Bed, Chair, Wheelchair Transfer Supervised   Toilet Transfers Supervised   Transfer Method Stand Step   Mobility FWW   Comments agreeable to use FWW for first week or so at home and progress off it with HH therapy   Activity Tolerance   Sitting in Chair 10+min   Sitting Edge of Bed 3min   Standing 8min   Patient / Family Goals   Patient / Family Goal #1 home ASAP   Short Term Goals   Short Term Goal # 1 pt will complete all functional transfers without cues for safety at SPV level   Short Term Goal # 2 pt will complete toileting hygiene and clothing mgmt at SP level   Education Group   Education Provided Role of Occupational Therapist;Activities of Daily Living;Home Safety;Transfers;Pathology of bedrest   Role of Occupational Therapist Patient Response Patient;Acceptance;Explanation;Verbal Demonstration;Significant Other   Home Safety Patient Response Patient;Acceptance;Explanation;Verbal Demonstration;Significant Other   Transfers Patient Response Patient;Acceptance;Explanation;Verbal Demonstration;Significant Other   ADL Patient Response Patient;Acceptance;Explanation;Verbal Demonstration;Significant Other   Pathology of Bedrest Patient Response Patient;Acceptance;Explanation;Verbal Demonstration;Significant Other   Additional Comments girlfriend present   Occupational Therapy Initial Treatment Plan    Treatment Interventions Self Care / Activities of Daily Living;Therapeutic Exercises;Therapeutic Activity;Adaptive Equipment   Treatment Frequency 3 Times per Week   Duration Until Therapy Goals Met   Problem List   Problem List Decreased Active Daily Living Skills;Decreased Homemaking Skills;Decreased Upper Extremity Strength Right;Decreased Upper Extremity Strength Left;Decreased Activity Tolerance;Decreased Functional Mobility;Safety Awareness Deficits / Cognition   Anticipated Discharge Equipment and Recommendations   DC Equipment Recommendations None   Discharge Recommendations Recommend  home health for continued occupational therapy services   Interdisciplinary Plan of Care Collaboration   IDT Collaboration with  Nursing;Family / Caregiver   Patient Position at End of Therapy Seated;Chair Alarm On;Call Light within Reach;Tray Table within Reach;Phone within Reach;Family / Friend in Room

## 2025-07-02 NOTE — ANESTHESIA TIME REPORT
Anesthesia Start and Stop Event Times       Date Time Event    7/2/2025 1226 Ready for Procedure     1330 Anesthesia Start     1348 Anesthesia Stop          Responsible Staff  07/02/25      Name Role Begin End    Cecilia Martin M.D. Anesth 1330 1348          Overtime Reason:  no overtime (within assigned shift)    Comments:

## 2025-07-02 NOTE — ANESTHESIA POSTPROCEDURE EVALUATION
Patient: Rakan Rader III    Procedure Summary       Date: 07/02/25 Room / Location: [place] [address] / SURGERY [facility]    Anesthesia Start: 1330 Anesthesia Stop: 1348    Procedure: SIGMOIDOSCOPY, FLEXIBLE (Anus) Diagnosis: (BLADDER LEAK)    Surgeons: Mehdi Raymundo M.D. Responsible Provider: Cecilia Martin M.D.    Anesthesia Type: general ASA Status: 2            Final Anesthesia Type: general  Last vitals  BP   Blood Pressure: 128/82    Temp   36.3 °C (97.3 °F)    Pulse   81   Resp   20    SpO2   96 %      Anesthesia Post Evaluation    Patient location during evaluation: PACU  Patient participation: complete - patient participated  Level of consciousness: awake and alert  Pain score: 0    Airway patency: patent  Anesthetic complications: no  Cardiovascular status: hemodynamically stable  Respiratory status: acceptable  Hydration status: euvolemic    PONV: none        There were no known notable events for this encounter.     Nurse Pain Score: 4 (NPRS)

## 2025-07-02 NOTE — WOUND TEAM
"Ostomy Pre-Operative Marking and Teaching       Date of Surgery: TBD  Type of Surgery: possible ileostomy  Surgeon: Dr Raymundo    Subjective:    Objective: Assessed patient to identify potential stoma site within his/her line of site on a flat pouching surface, within the rectus muscle, away from belt-line, bony prominences, exiting scars and umbilicus.    Assessment:  Potential site (s) located for: Colostomy____, Ileostomy____  Urostomy Other_________  1. Procedure explained to the patient.  2. Rectus muscle identified with patient in supine position.  3. Appropriate abdominal quadrant for planned surgical procedure identified.  4. Existing scars identified.  5. Potential sites evaluated with patient:      a. Supine      b. Sitting       c. Bending forward/ twisting at waist   6. Site (s) selected with respect to skin folds, creases, abdominal contours and belt line.  7. Special considerations:      a. Wheel chair bound      b. Child      c. Continent procedure      d. Patient with multiple stomas      e. Ambulatory  8. Potential site (s) marked with an \"X\" (skin prepped with skin protectant wipe, gabi applied with indelible marker, gabi fixed with skin protectant wipe again.                       Site(s) marked:     Patient Teachin. Reviewed nature of surgical procedure.  2. Reviewed basic anatomy and function of the GI or  Tract.  3. Reviewed and provided the patient with literature-Colostomy, Ileostomy, Urostomy,     Other_______________.  4. Pouching system with hands on demonstrated.  5. Questions and concerns addressed  6. Other    Plan:wound ostomy RN to provide education if pt has ostomy surgery    "

## 2025-07-02 NOTE — ANESTHESIA PREPROCEDURE EVALUATION
Case: [id number] Date/Time: 07/02/25 1235    Procedure: SIGMOIDOSCOPY, FLEXIBLE (Anus)    Anesthesia type: Sedation    Pre-op diagnosis: COLOVESICULAR FISTULA    Location: [facility] OR 14 / SURGERY [facility]    Surgeons: Mehdi Raymundo M.D.            Relevant Problems   ANESTHESIA   (positive) PONV (postoperative nausea and vomiting)      NEURO   (positive) Migraine with aura and without status migrainosus, not intractable      CARDIAC   (positive) Migraine with aura and without status migrainosus, not intractable         (positive) Liver lesion       Physical Exam    Airway   Mallampati: II  TM distance: >3 FB       Cardiovascular   Rhythm: regular     Dental    Pulmonary Breath sounds clear to auscultation     Abdominal - normal exam   Neurological            Anesthesia Plan    ASA 2       Plan - general       Airway plan will be natural airway          Induction: intravenous      Pertinent diagnostic labs and testing reviewed    Informed Consent:    Anesthetic plan and risks discussed with patient.    Use of blood products discussed with: whom consented to blood products.

## 2025-07-02 NOTE — PROGRESS NOTES
FAMILY MEDICINE PROGRESS NOTE          PATIENT ID:  NAME:  Rakan Rader  MRN:               [id number]  YOB: 1965    Date of Admission: 6/30/2025     Attending: Zachary Harmon M.d.     Resident: Emeterio Dave M.D. (PGY-3)    Primary Care Physician:  Emeterio Dave M.D.    HPI: Rakan Rader is a 60 y.o. male with history of Parkinson's Disease with deep brain stimulator, prostate CA s/p radical prostatectomy 5/28/25 complicated by bladder leak into abdomen with subsequent laparoscopy and washout with drain placement 6/10/25, drain out 6/18, admitted for suspected rectovesical fistula on hospital day 2    Interval Problem Update  6/30: Admitted for evaluation for rectovesical fistula.  Plan for flex sigmoidoscopy and possible laparoscopy in AM.  7/1: Fistulography shows no obvious fistula, though poor visualization.  Dr. Raymundo recommending sigmoidoscopy in a.m.    SUBJECTIVE:   No acute events overnight, states he is hungry, but understands the need to be n.p.o. prior to his procedures.  He would like to figure out what is going on, and is agreeable to remaining in the hospital until any recommended procedures are complete.  He does request to be discharged to his home rather than to SNF.    OBJECTIVE:  Temp:  [36.1 °C (97 °F)-36.4 °C (97.6 °F)] 36.3 °C (97.4 °F)  Pulse:  [68-82] 77  Resp:  [16-18] 17  BP: (100-130)/(62-77) 130/77  SpO2:  [96 %-97 %] 96 %      Intake/Output Summary (Last 24 hours) at 7/2/2025 0655  Last data filed at 7/2/2025 0501  Gross per 24 hour   Intake 418 ml   Output 2350 ml   Net -1932 ml       PHYSICAL EXAM:  Physical Exam  Vitals reviewed.   Constitutional:       General: He is not in acute distress.     Appearance: Normal appearance. He is not toxic-appearing.      Comments: Cachectic male, lying in hospital bed.  Appears older than stated age.  Accompanied by significant other.   HENT:      Head: Normocephalic and atraumatic.      Right Ear: External ear normal.       Left Ear: External ear normal.      Nose: Nose normal. No congestion.      Mouth/Throat:      Mouth: Mucous membranes are moist.      Pharynx: Oropharynx is clear.   Eyes:      Extraocular Movements: Extraocular movements intact.      Pupils: Pupils are equal, round, and reactive to light.   Cardiovascular:      Rate and Rhythm: Normal rate and regular rhythm.      Heart sounds: No murmur heard.  Pulmonary:      Effort: Pulmonary effort is normal. No respiratory distress.      Breath sounds: Normal breath sounds.   Abdominal:      General: Abdomen is flat. Bowel sounds are normal. There is no distension.      Palpations: Abdomen is soft.      Tenderness: There is no abdominal tenderness. There is no guarding or rebound.   Genitourinary:     Comments: Juarez catheter in place  Musculoskeletal:         General: No swelling or deformity. Normal range of motion.      Cervical back: Normal range of motion and neck supple.   Skin:     General: Skin is warm and dry.      Capillary Refill: Capillary refill takes less than 2 seconds.      Findings: No rash.   Neurological:      Mental Status: He is alert and oriented to person, place, and time. Mental status is at baseline.      Cranial Nerves: No cranial nerve deficit.      Comments: Masked facies.  No resting tremor, does have mild active and positional tremor.   Psychiatric:         Mood and Affect: Mood normal.         Behavior: Behavior normal.         LABS:  Reviewed in Results Tab      IMAGING:  Reviewed in Results Tab    CULTURES:   Reviewed in chart review tab    MEDS:  Current Facility-Administered Medications   Medication Last Admin    NS infusion New Bag at 07/01/25 0117    acetaminophen (Tylenol) tablet 1,000 mg 1,000 mg at 07/01/25 2018    Followed by    [START ON 7/6/2025] acetaminophen (Tylenol) tablet 1,000 mg      oxyCODONE immediate-release (Roxicodone) tablet 5 mg 5 mg at 07/01/25 2022    Or    oxyCODONE immediate release (Roxicodone) tablet 10 mg 10 mg at  07/02/25 0501    Or    HYDROmorphone (Dilaudid) injection 0.5 mg      senna-docusate (Pericolace Or Senokot S) 8.6-50 MG per tablet 2 Tablet      And    polyethylene glycol/lytes (Miralax) Packet 1 Packet      carbidopa-levodopa (Sinemet)  MG tablet 1 Tablet 1 Tablet at 07/02/25 0501    hydrOXYzine HCl (Atarax) tablet 10 mg      omeprazole (PriLOSEC) capsule 40 mg 40 mg at 07/02/25 0501    sucralfate (Carafate) 1 GM/10ML suspension 1 g 1 g at 07/02/25 0500       ASSESSMENT/PLAN:  60 y.o. 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 Evaluated by urologist Dr. Cesar in ER, who also discussed this case with Dr. Raymundo with colorectal surgery and recommended admission and NPO at midnight for flexible sigmoidoscopy and evaluation for bladder leak in am.  - Juarez in place pending urology evaluation.  Unable to be exchanged by nursing staff.  - 7/1 barium study inconclusive.  Scheduled for sigmoidoscopy 7/2.  - CBC, CMP, coags 7/1 all reassuring, hemoglobin stable.  Will recheck 7/3    Abdominal pain- (present on admission)  Assessment & Plan  Pain consistent with s/p cystoscopy, on table cystogram, complex catheterization and laparoscopic washout of hematoma and abdominal cavity on 6/10/25 with Dr. Cesar, subsequent removal of right pelvis CRIS drain on 6/19/25. Completed course of Augmentin 6/14-6/24. No peritoneal signs on admission. No signs or symptoms of current infection.  - Pain panel with acetaminophen, oxycodone, dilaudid for breakthrough pain.  1 dose of oxycodone required overnight after admission  - Low threshold to initiate antibiotics if develops signs of infection    Malnutrition (HCC)- (present on admission)  Assessment & Plan  Secondary to poor PO intake in multiple surgeries and illness. BMI on admission is 16.76, cachectic on exam. Albumin 2.7.  - Nutrition consult, recommending Ensure  supplements and multivitamin    Anemia- (present on admission)  Assessment & Plan  Chronic, stable. No signs or symptoms of acute blood loss. Hemodynamically stable.   - Monitor daily CBC, stable at 9.3 on 7/1 from 9.5 on admission    Prostate cancer (HCC)- (present on admission)  Assessment & Plan  Prostatic adenocarcinoma s/p robot assisted radical prostatectomy on 5/28/25. Followed by [facility] [place]. Patient reporting PSA returned to normal, no further treatment planned to his knowledge.  - Alk phos persistently mildly elevated.  Patient did have recent transaminitis, though this has resolved.  Persistent alk phos elevation may be related to ongoing intra-abdominal process versus potential sign of metastasis  - Will follow urology recommendations, if no recommendations for further workup will defer for outpatient management    Parkinson's disease with dyskinesia and fluctuating manifestations (HCC)- (present on admission)  Assessment & Plan  Treated with deep brain stimulator and Sinemet.  Stable.  - continue Sinemet  mg tablet TID         Core Measures:  Fluids: D5-0.9NS, with 20 meq potassium/liter @ 100 mL/hr  Lines: Juarez catheter for complicated prostatectomy/bladder leak and Peripheral IV for intravenous access  Abx: Status post Zosyn/Augmentin 6/8 through 6/20.  No antibiotics since admission.  Diet: NPO for pending  procedure   PPX: pharmacologic prophylaxis contraindicated due to pending procedure    CODE Status: Full Code    Disposition  Patient is not medically cleared for discharge.   Anticipate discharge to home with organized home healthcare and close outpatient follow-up.  I have placed the appropriate orders for post-discharge needs.    I have personally seen and examined the patient at bedside. I discussed the plan of care with patient, bedside RN, and  Zachary Harmon M.d..      Emeterio Dave M.D.   PGY-3  [facility]

## 2025-07-02 NOTE — OP REPORT
Date: 7/2/2025    Surgeon: Mehdi Raymundo    Sedation: monitored anesthesia care provided by     Pre-operative Diagnosis:Concern for a rectourethral or colovesical fistula  Post-operative Diagnosis: Normal sigmoidoscopy    Procedure:   Diagnostic flexible sigmoidoscopy    Indications: 60-year-old male with a history of prostatectomy admitted for bladder defect with Juarez findings worrisome for communication to the enteric or colorectal structures.    Findings: Normal flexible sigmoidoscopy with mild diverticulosis cyst to 60 cm    Procedure in detail:   The procedure, indications, preparation and potential complications were explained to the patient, who indicated understanding and signed the corresponding consent forms.  Monitored anesthesia care was provided by anesthesiologist.  Continuous monitoring was used throughout the procedure and supplemental oxygen was administered.The quality of the preparation was fair and likely inadequate to identify polyps measuring less than 5 mm.  The patient was placed in the left lateral decubitus position.  Digital rectal examination revealed no abnormalities..  The flexible colonoscope was introduced through the rectum and advanced under direct visualization until the colon was reached.  The appendiceal orifice and ileocecal valve were not identified.  The colonoscope was not retroflexed within the rectum.  Careful visualization was performed as the instrument was withdrawn.  Patient tolerates to the procedure was good.  The procedure was not difficult.    Mehdi Raymundo MD PhD  [facility]  Colon and Rectal Surgeon  [phone]

## 2025-07-02 NOTE — PROGRESS NOTES
Urology Progress Note    S: Seen and examined. Pain states he is doing well this morning. Report pain is controlled. Reports luz in place and draining well. NPO for sigmoidoscopy. Denies fevers, chills, nausea, vomiting.    O:   /75   Pulse 77   Temp 36.6 °C (97.8 °F) (Temporal)   Resp 16   Wt 50 kg (110 lb 3.7 oz)   SpO2 97%   Recent Labs     06/30/25 1735 07/01/25  0338   SODIUM 135 136   POTASSIUM 4.0 4.0   CHLORIDE 103 105   CO2 22 23   GLUCOSE 94 86   BUN 18 14   CREATININE 0.63 0.71   CALCIUM 8.6 8.4*     Recent Labs     06/30/25  1735 07/01/25  0338   WBC 9.3 10.4   RBC 3.49* 3.39*   HEMOGLOBIN 9.5* 9.3*   HEMATOCRIT 30.7* 29.9*   MCV 88.0 88.2   MCH 27.2 27.4   MCHC 30.9* 31.1*   RDW 54.4* 54.1*   PLATELETCT 446 486*   MPV 9.5 9.4         Intake/Output Summary (Last 24 hours) at 7/1/2025 1152  Last data filed at 7/1/2025 0509  Gross per 24 hour   Intake 60 ml   Output --   Net 60 ml       Exam:  Gen: NAD   Resp: normal respiratory effort  ABD: Abdomen soft, no TTP.   Urine: luz output dark yellow, with sediment    A/P:    Active Hospital Problems    Diagnosis     Rectovesical fistula [N32.1]     Malnutrition (HCC) [E46]     Anemia [D64.9]     Abdominal pain [R10.9]     Prostate cancer (HCC) [C61]     Parkinson's disease with dyskinesia and fluctuating manifestations ([facility]) [G20.B2]      60 year old male who is s/p RALP 5/28/2025 complicated by bladder leak with return to hospital 6/8. He was discharge initially to [facility] [place]. Had cystogram 6/30 that showed bladder fistula connecting to sigmoid colon. He is report stool leaking around catheter for 3 days.   Plan:  - continue luz catheter  - trend urinary output  - appreciate recommendations from Dr. Raymundo. Pending sigmoidoscopy today  - urology will continue follow     Rafael Keen, PBEKAH   [address] [address]   [phone]

## 2025-07-02 NOTE — OR NURSING
1346 Pt arrived to PACU with Anesthesiologist and OR RN.      1412: POC update given to JULIETTE Harper over the phone. All questions answered.     1444: Pt meets floor criteria. Report called to YOAN Pearce.     1510: Pt transported to S6 with transport.

## 2025-07-02 NOTE — DIETARY
"Nutrition Services: Initial Assessment     Day 1 6/30/2025of admit. Rakan Rader III is 60 y.o., male with admitting DX of Rectovesical fistula [N32.1].    Consult Received for: FTT/Malnutrition    Pt history of prostate cancer s/p prostatectomy on 5/28/25, rectovesical fistula, malnutrition, anemia pmh Parkinson’s disease per EMR/provider note. SLP recommends soft and bite size on 6/20/25.        Nutrition Assessment:      Height:  68in  Weight: 50 kg (110 lb 3.7 oz)  Weight taken via: Other Healthcare Provider  BMI Calculated:  16.76  BMI Classification: Underweight       Weight Readings from Last 5 encounters:   Wt Readings from Last 5 Encounters:   07/01/25 50 kg (110 lb 3.7 oz)   06/20/25 58 kg (127 lb 13.9 oz)   05/29/25 57.1 kg (125 lb 14.1 oz)   03/07/25 58.8 kg (129 lb 10.1 oz)   02/11/25 59 kg (130 lb)         Objective:   Pertinent Labs: Reviewed  Pertinent Meds: omeprazole, bowel regimen, NS IVF  Skin/Wounds:  no concern snoted  Food Allergies: NKFA  Last BM:  Type 6: Fluffy pieces with ragged edges, a mushy stool  06/30/25       Current Diet Order/Intake:   Clear Liquids        Nutrition Focused Physical Exam (NFPE)  NFPE did not occur  Per RD assessment,   \"Muscle Mass: Severe Wasting at temples, clavicles, shoulders, square patella  Subcutaneous Fat: Severe Loss at buccals, orbitals, zygomatic arches\"    Nutrition Diagnosis:      Per RD assessment on 6/14/25, “Severe Malnutrition in context of Acute Illness or Injury related to prostate cancer, with post op complications as evidenced by severe fat loss and severe muscle wasting.”    Nutrition Interventions:      Recommend to advance diet as medically able/tolerable   Recommend Ensure Clear (provides 240 calories 8 g protein per 8 fl oz) TID.  Recommend multivitamin       Nutrition Monitoring and Evaluation:      Monitor nutrition POC, goal for >75% intake from meals and supplements.  Additional fluids per MD/DO  Monitor vital signs pertinent to " nutrition.    RD following and will provide updated recommendations as indicated.      Katelyn Nguyen R.D.                                         ASPEN/AND CRITERIA FOR MALNUTRITION

## 2025-07-02 NOTE — PROGRESS NOTES
Assumed care of patient from night shift nurse. Bed is in low and locked position. Call light in place. No needs or wants as this time.    1155 Patient leaving for procedure with transport, toileted.    1500 Patient returned from procedure. Alert and oriented, denies any pain or discomfort at this time. Concerned about when he may have regular diet. If tolerating may advance to regular diet per order. Vitals taken.    1600 post surgical skin check completed, no new wounds to note.

## 2025-07-02 NOTE — PROGRESS NOTES
Alert and able to let his needs known, flat affect and slow response. Bed alarm in place  C/o pain to abd; medicate as requested  NPO at midnight and clear liquid snack provided

## 2025-07-03 ENCOUNTER — HOME HEALTH ADMISSION (OUTPATIENT)
Dept: HOME HEALTH SERVICES | Facility: HOME HEALTHCARE | Age: 60
End: 2025-07-03
Payer: MEDICARE

## 2025-07-03 ENCOUNTER — PHARMACY VISIT (OUTPATIENT)
Dept: PHARMACY | Facility: MEDICAL CENTER | Age: 60
End: 2025-07-03
Payer: COMMERCIAL

## 2025-07-03 VITALS
RESPIRATION RATE: 18 BRPM | BODY MASS INDEX: 16.76 KG/M2 | WEIGHT: 110.23 LBS | HEART RATE: 72 BPM | SYSTOLIC BLOOD PRESSURE: 128 MMHG | DIASTOLIC BLOOD PRESSURE: 78 MMHG | TEMPERATURE: 97.2 F | OXYGEN SATURATION: 96 %

## 2025-07-03 PROBLEM — N32.1 RECTOVESICAL FISTULA: Status: RESOLVED | Noted: 2025-06-30 | Resolved: 2025-07-03

## 2025-07-03 PROBLEM — R10.9 ABDOMINAL PAIN: Status: RESOLVED | Noted: 2025-06-08 | Resolved: 2025-07-03

## 2025-07-03 PROBLEM — E43 SEVERE PROTEIN-CALORIE MALNUTRITION (HCC): Status: ACTIVE | Noted: 2025-06-14

## 2025-07-03 LAB
ALBUMIN SERPL BCP-MCNC: 2.6 G/DL (ref 3.2–4.9)
ALBUMIN/GLOB SERPL: 0.7 G/DL
ALP SERPL-CCNC: 154 U/L (ref 30–99)
ALT SERPL-CCNC: 31 U/L (ref 2–50)
ANION GAP SERPL CALC-SCNC: 9 MMOL/L (ref 7–16)
AST SERPL-CCNC: 28 U/L (ref 12–45)
BILIRUB SERPL-MCNC: 0.3 MG/DL (ref 0.1–1.5)
BUN SERPL-MCNC: 9 MG/DL (ref 8–22)
CALCIUM ALBUM COR SERPL-MCNC: 9.7 MG/DL (ref 8.5–10.5)
CALCIUM SERPL-MCNC: 8.6 MG/DL (ref 8.5–10.5)
CHLORIDE SERPL-SCNC: 104 MMOL/L (ref 96–112)
CO2 SERPL-SCNC: 22 MMOL/L (ref 20–33)
CREAT SERPL-MCNC: 0.7 MG/DL (ref 0.5–1.4)
ERYTHROCYTE [DISTWIDTH] IN BLOOD BY AUTOMATED COUNT: 52.9 FL (ref 35.9–50)
GFR SERPLBLD CREATININE-BSD FMLA CKD-EPI: 107 ML/MIN/1.73 M 2
GLOBULIN SER CALC-MCNC: 3.9 G/DL (ref 1.9–3.5)
GLUCOSE SERPL-MCNC: 97 MG/DL (ref 65–99)
HCT VFR BLD AUTO: 30.6 % (ref 42–52)
HGB BLD-MCNC: 9.4 G/DL (ref 14–18)
MAGNESIUM SERPL-MCNC: 2 MG/DL (ref 1.5–2.5)
MCH RBC QN AUTO: 27.2 PG (ref 27–33)
MCHC RBC AUTO-ENTMCNC: 30.7 G/DL (ref 32.3–36.5)
MCV RBC AUTO: 88.7 FL (ref 81.4–97.8)
PHOSPHATE SERPL-MCNC: 2.9 MG/DL (ref 2.5–4.5)
PLATELET # BLD AUTO: 495 K/UL (ref 164–446)
PMV BLD AUTO: 9.3 FL (ref 9–12.9)
POTASSIUM SERPL-SCNC: 4 MMOL/L (ref 3.6–5.5)
PROT SERPL-MCNC: 6.5 G/DL (ref 6–8.2)
RBC # BLD AUTO: 3.45 M/UL (ref 4.7–6.1)
SODIUM SERPL-SCNC: 135 MMOL/L (ref 135–145)
WBC # BLD AUTO: 10.4 K/UL (ref 4.8–10.8)

## 2025-07-03 PROCEDURE — A9270 NON-COVERED ITEM OR SERVICE: HCPCS

## 2025-07-03 PROCEDURE — 80053 COMPREHEN METABOLIC PANEL: CPT

## 2025-07-03 PROCEDURE — 84100 ASSAY OF PHOSPHORUS: CPT

## 2025-07-03 PROCEDURE — RXMED WILLOW AMBULATORY MEDICATION CHARGE

## 2025-07-03 PROCEDURE — 700102 HCHG RX REV CODE 250 W/ 637 OVERRIDE(OP)

## 2025-07-03 PROCEDURE — 99238 HOSP IP/OBS DSCHRG MGMT 30/<: CPT | Mod: GC | Performed by: STUDENT IN AN ORGANIZED HEALTH CARE EDUCATION/TRAINING PROGRAM

## 2025-07-03 PROCEDURE — 36415 COLL VENOUS BLD VENIPUNCTURE: CPT

## 2025-07-03 PROCEDURE — 83735 ASSAY OF MAGNESIUM: CPT

## 2025-07-03 PROCEDURE — 85027 COMPLETE CBC AUTOMATED: CPT

## 2025-07-03 PROCEDURE — 700101 HCHG RX REV CODE 250

## 2025-07-03 RX ORDER — OXYCODONE HYDROCHLORIDE 5 MG/1
5 TABLET ORAL
Qty: 20 TABLET | Refills: 0 | Status: SHIPPED | OUTPATIENT
Start: 2025-07-03 | End: 2025-07-08

## 2025-07-03 RX ADMIN — CARBIDOPA AND LEVODOPA 1 TABLET: 25; 100 TABLET ORAL at 12:36

## 2025-07-03 RX ADMIN — THERA TABS 1 TABLET: TAB at 05:04

## 2025-07-03 RX ADMIN — SUCRALFATE ORAL 1 G: 1 SUSPENSION ORAL at 12:37

## 2025-07-03 RX ADMIN — SUCRALFATE ORAL 1 G: 1 SUSPENSION ORAL at 00:07

## 2025-07-03 RX ADMIN — DEXTROSE MONOHYDRATE, SODIUM CHLORIDE, AND POTASSIUM CHLORIDE: 50; 9; 1.49 INJECTION, SOLUTION INTRAVENOUS at 09:05

## 2025-07-03 RX ADMIN — OMEPRAZOLE 40 MG: 20 CAPSULE, DELAYED RELEASE ORAL at 04:12

## 2025-07-03 RX ADMIN — SUCRALFATE ORAL 1 G: 1 SUSPENSION ORAL at 04:12

## 2025-07-03 RX ADMIN — CARBIDOPA AND LEVODOPA 1 TABLET: 25; 100 TABLET ORAL at 04:12

## 2025-07-03 ASSESSMENT — PAIN DESCRIPTION - PAIN TYPE: TYPE: ACUTE PAIN

## 2025-07-03 NOTE — PROGRESS NOTES
Assumed care of patient from night shift RN. Patient alert and oriented, bed in low and locked position, bed alarm on. Patient with no complaints of pain or discomfort at this time. Call light is within reach.

## 2025-07-03 NOTE — CARE PLAN
The patient is Stable - Low risk of patient condition declining or worsening    Shift Goals  Clinical Goals: maintain skin integrity during this shift    Progress made toward(s) clinical / shift goals:    Problem: Knowledge Deficit - Standard  Goal: Patient and family/care givers will demonstrate understanding of plan of care, disease process/condition, diagnostic tests and medications  Outcome: Progressing  Note: Patient verbalizes understanding of plan of care, and demonstrates understanding of disease process.     Problem: Fall Risk  Goal: Patient will remain free from falls  Outcome: Progressing  Note: Patient bed in low and locked position, bed alarm is on. Patient uses call light appropriately. Alert and oriented and able to make needs known to staff.       Patient is not progressing towards the following goals:

## 2025-07-03 NOTE — CARE PLAN
The patient is Stable - Low risk of patient condition declining or worsening    Shift Goals  Clinical Goals: maintain skin integrity throughout shift  Patient Goals: sleep  Family Goals: ynes    Progress made toward(s) clinical / shift goals:  Pt is A&Ox4, denies pain throughout shift. Bed locked in lowest position, bed alarm on, call light and belongings within reach.    Patient is not progressing towards the following goals:

## 2025-07-03 NOTE — DISCHARGE PLANNING
Case Management Discharge Planning    Admission Date: 6/30/2025  GMLOS: 5.1  ALOS: 3    6-Clicks ADL Score: 23  6-Clicks Mobility Score: 18      Anticipated Discharge Dispo: Discharge Disposition: D/T to home under [facility] care in anticipation of covered skilled care (06)  Discharge Address: [address] [place] [address] [place] [address]  Discharge Contact Phone Number: [phone]    DME Needed: No    Action(s) Taken: Choice obtained  @0608 Patient provided HH choice - 1- [facility]  2nd- [facility]   IMM presented to patient and signed by patient.   Patient reports his girlfriend will pick him up around 1700 today.     Escalations Completed: None    Medically Clear: Yes    Next Steps: follow for HH acceptance    Barriers to Discharge: None    Is the patient up for discharge tomorrow: No

## 2025-07-03 NOTE — PROGRESS NOTES
Assumed care of patient from night shift RN. Patient alert and oriented. Call light and personal belongings within reach. Denies any pain or discomfort at this time.

## 2025-07-03 NOTE — DISCHARGE SUMMARY
"[facility] MEDICINE DISCHARGE SUMMARY     Admit Date:  6/30/2025       Discharge Date:   7/3/2025    Attending: Zachary Harmon M.d.     Resident: Emeterio Dave M.D. (PGY-3)    PATIENT: Rakan Rader; [id number]; 1965    Diagnoses (includes active and resolved):  Principal Problem (Resolved):    Rectovesical fistula (POA: Yes)  Active Problems:    Anemia (POA: Yes)    Severe protein-calorie malnutrition (HCC) (POA: Yes)    Parkinson's disease with dyskinesia and fluctuating manifestations (HCC) (POA: Yes)    Prostate cancer (HCC) (POA: Yes)  Resolved Problems:    Abdominal pain (POA: Yes)       Problem List[1]     HPI by Dr. Gonzalez on 6/30/2025:  \"Rakan Rader is a 60 y.o. male with past medical history of Parkinson's disease with deep brain stimulator in situ, prostate cancer status post robot assisted radical prostatectomy on 5/28/2025 complicated by urinary leak in the postoperative period with subsequent diagnostic laparoscopy and abdominal washout with pelvic drain placement who presented from [facility] sent by his urologist after imaging completed at [facility] earlier today was concerning for rectovesical fistula.     Patient states he was advised to come for admission for surgical evaluation for potential rectovesical fistula.  He denies any sick symptoms today.  No fever, chills, diaphoresis, nausea, vomiting, diarrhea.  He has postoperative abdominal pain that he states is overall improving since his most recent procedure.  He denies abdominal fullness.  No dysuria, hematuria, pneumaturia, fecaluria.  No chest pain, dyspnea, palpitations.      ER Course:  Urologist Dr. Cesar consulted from ER and evaluated patient.  Case was also discussed by ER physician with Dr. Raymundo of GI.  Admission for flex sig and evaluation of possible urinary leak tomorrow was recommended.  CT cystogram was pleated at the recommendation of urology and noted a moderate quantity of " "free fluid in the abdomen and pelvis with hyperdense contrast tracking within the left abdomen compatible with extravasated contrast likely from bladder or prostatectomy site.  No hyperdense contrast seen in colon to readily notify colovesicular fistula though evaluation was limited.  Patient was vitally stable on arrival to emergency room.  No fever.   CBC notable for hemoglobin 9.5, which is stable.  No electrolyte abnormalities.  Mildly elevated alk phos 153. Albumin 2.7.\"    Hospital Course:  Pt admitted for evaluation of urinary leakage with concern for rectovesical fistula. He underwent evaluation including rectal barium contrast study and sigmoidoscopy which did not demonstrate any obvious fistula. He was followed by colorectal surgery and urology during this admission. Surgery has signed off due to normal evaluation. Urology recommended continuing indwelling luz catheter and will continue to follow outpatient.     Patient was discharged home with home health services. Referred to nutrition services for severe protein-calorie malnutrition and to neurology for follow up of Parkinson's disease.     Follow-up Items Post Discharge:  - Urology for prostate cancer and luz catheter management  - Nutrition services referral for severe malnutrition    Consultants:      Colorectal surgery, Urology    Procedures:        7/2/2025 Sigmoidoscopy with Dr. Mehdi Raymundo    Discharge Medications:        Medication Reconciliation Completed     Medication List        START taking these medications        Instructions   multivitamin Tabs  Start taking on: July 4, 2025   Take 1 Tablet by mouth every day.  Dose: 1 Tablet            CHANGE how you take these medications        Instructions   oxyCODONE immediate-release 5 MG Tabs  What changed:   when to take this  additional instructions  Commonly known as: Roxicodone   Take 1 Tablet by mouth every 3 hours as needed for Severe Pain for up to 5 days.  Dose: 5 mg        "     CONTINUE taking these medications        Instructions   acetaminophen 325 MG Tabs  Commonly known as: Tylenol   Take 650 mg by mouth every four hours as needed for Mild Pain or Moderate Pain. 650 mg = 2 tablets  Dose: 650 mg     B COMPLEX PO   Take 1 Tablet by mouth every day.  Dose: 1 Tablet     carbidopa-levodopa  MG Tabs  Commonly known as: Sinemet   Take 1 Tablet by mouth 3 times a day. For Parkinson's  Dose: 1 Tablet     Culturelle Digestive Health Caps   Take 1 Each by mouth every day.  Dose: 1 Each     hydrOXYzine HCl 10 MG Tabs  Commonly known as: Atarax   Take 1 Tablet by mouth 3 times a day as needed for Itching (Anxiety).  Dose: 10 mg     MAGNESIUM PO   Take 1 Tablet by mouth every day.  Dose: 1 Tablet     OMEGA 3 PO   Take 1 Capsule by mouth every day.  Dose: 1 Capsule     omeprazole 40 MG delayed-release capsule  Commonly known as: PriLOSEC   Take 1 Capsule by mouth 2 times a day.  Dose: 40 mg     sucralfate 1 GM/10ML Susp  Commonly known as: Carafate   Take 10 mL by mouth every 6 hours for 30 days.  Dose: 1 g     vitamin D3 25 MCG (1000 UT) Tabs  Commonly known as: Cholecalciferol   Take 4,000 Units by mouth 2 times a day.  Dose: 4,000 Units     VITAMIN K PO   Take 1 Tablet by mouth every day. For Transaminitis  Dose: 1 Tablet            STOP taking these medications      amoxicillin-clavulanate 875-125 MG Tabs  Commonly known as: Augmentin     oxybutynin SR 10 MG CR tablet  Commonly known as: Ditropan-XL     Phenazo 95 MG tablet  Generic drug: phenazopyridine              Discharge Criteria: The patient met 2-midnight criteria for an inpatient stay at the time of discharge.    Disposition:  to home with organized home healthcare and close outpatient follow-up    Diet:  regular diet    Activity:  Physical therapy as prescribed. Occupational therapy as prescribed.    Code Status: Full Code      PCP: Emeterio Dave M.D.    Follow Up:  Emeterio Dave M.D.  [address] [place]  [address]  [phone]    Follow up      [facility]  [address] [place] [address]  [phone]    Follow up       Future Appointments   Date Time Provider Department Center   7/9/2025  7:20 AM Gualberto Taylor D.O. [facility] [address]        Instructions:       The patient was instructed to return to the ER in the event of worsening symptoms. I have counseled the patient on the importance of compliance and the patient has agreed to proceed with all medical recommendations and follow up plan indicated above.   The patient understands that all medications come with benefits and risks. Risks may include permanent injury or death and these risks can be minimized with close reassessment and monitoring.            #########################################################    24 Hour Events: No acute events overnight, in better spirits now that he is able to eat. Pt inquiring about the etiology of the sediment in his luz catheter given his normal studies during this admission. We discussed the possibility that there may be a small leak or that we simply may not be able to determine the etiology of his symptoms. We discussed the plan for discharge today and outpatient follow up with urology. He expressed agreement.     OBJECTIVE:     Vitals:    07/02/25 1700 07/02/25 1918 07/03/25 0355 07/03/25 0723   BP: 111/74 92/54 117/74 130/83   Pulse:  74 77 82   Resp: 18 16 18 18   Temp:  36.5 °C (97.7 °F) 36 °C (96.8 °F) 36.5 °C (97.7 °F)   TempSrc: Temporal Temporal Temporal Temporal   SpO2: 94% 98% 97% 97%   Weight:           Intake/Output Summary (Last 24 hours) at 7/3/2025 1337  Last data filed at 7/3/2025 1200  Gross per 24 hour   Intake 640 ml   Output 3950 ml   Net -3310 ml       Physical Exam  Vitals reviewed.   Constitutional:       General: He is not in acute distress.     Appearance: Normal appearance. He is not toxic-appearing.      Comments: Cachectic male, sitting up in bedside chair eating breakfast.  Appears  older than stated age.     HENT:      Head: Normocephalic and atraumatic.      Right Ear: External ear normal.      Left Ear: External ear normal.      Nose: Nose normal. No congestion.      Mouth/Throat:      Mouth: Mucous membranes are moist.      Pharynx: Oropharynx is clear.   Eyes:      Extraocular Movements: Extraocular movements intact.      Pupils: Pupils are equal, round, and reactive to light.   Cardiovascular:      Rate and Rhythm: Normal rate and regular rhythm.      Heart sounds: No murmur heard.  Pulmonary:      Effort: Pulmonary effort is normal. No respiratory distress.      Breath sounds: Normal breath sounds.   Abdominal:      General: Abdomen is flat. Bowel sounds are normal. There is no distension.      Palpations: Abdomen is soft.      Tenderness: There is no abdominal tenderness. There is no guarding or rebound.   Genitourinary:     Comments: Juarez catheter in place  Musculoskeletal:         General: No swelling or deformity. Normal range of motion.      Cervical back: Normal range of motion and neck supple.   Skin:     General: Skin is warm and dry.      Capillary Refill: Capillary refill takes less than 2 seconds.      Findings: No rash.   Neurological:      Mental Status: He is alert and oriented to person, place, and time. Mental status is at baseline.      Cranial Nerves: No cranial nerve deficit.      Comments: Masked facies.  No resting tremor, does have mild active and positional tremor.   Psychiatric:         Mood and Affect: Mood normal.         Behavior: Behavior normal.         LABS:  Recent Labs     06/30/25  1735 07/01/25  0338 07/03/25  0337   WBC 9.3 10.4 10.4   RBC 3.49* 3.39* 3.45*   HEMOGLOBIN 9.5* 9.3* 9.4*   HEMATOCRIT 30.7* 29.9* 30.6*   MCV 88.0 88.2 88.7   MCH 27.2 27.4 27.2   RDW 54.4* 54.1* 52.9*   PLATELETCT 446 486* 495*   MPV 9.5 9.4 9.3   NEUTSPOLYS 71.40  --   --    LYMPHOCYTES 14.90*  --   --    MONOCYTES 10.50  --   --    EOSINOPHILS 1.10  --   --    BASOPHILS  1.10  --   --      Recent Labs     06/30/25 1735 07/01/25 0338 07/03/25 0337   SODIUM 135 136 135   POTASSIUM 4.0 4.0 4.0   CHLORIDE 103 105 104   CO2 22 23 22   BUN 18 14 9   CREATININE 0.63 0.71 0.70   CALCIUM 8.6 8.4* 8.6   MAGNESIUM  --  2.2 2.0   PHOSPHORUS  --   --  2.9   ALBUMIN 2.7* 2.6* 2.6*     Estimated GFR/CRCL = Estimated Creatinine Clearance: 81.3 mL/min (by C-G formula based on SCr of 0.7 mg/dL).  Recent Labs     06/30/25 1735 07/01/25 0338 07/03/25 0337   GLUCOSE 94 86 97     Recent Labs     06/30/25 1735 07/01/25 0338 07/03/25 0337   ASTSGOT 34 34 28   ALTSGPT 23 51* 31   TBILIRUBIN 0.2 0.3 0.3   ALKPHOSPHAT 153* 145* 154*   GLOBULIN 4.1* 3.8* 3.9*   INR  --  1.07  --              Recent Labs     07/01/25 0338   INR 1.07   APTT 32.2       MICROBIOLOGY:   Results       Procedure Component Value Units Date/Time    URINALYSIS,CULTURE IF INDICATED [[id number]]     Order Status: Sent Specimen: Urine, Clean Catch               IMAGING:   DX-BARIUM ENEMA   Final Result      Limited barium enema with only adequate distention of the rectum achieved. No extraluminal contrast to suggest leakage or fistula. The remainder of the colon was not evaluated.      CT-Cystogram   Final Result         1.  Moderate quantity of free fluid in the abdomen and pelvis, hyperdense contrast is seen tracking within the left abdomen, compatible with extravasated contrast, likely from the bladder or prostatectomy site.   2.  No hyperdense contrast is seen within the colon to readily notified colovesicular fistula, note that relatively small amount of contrast is seen within the bladder and most contrast is present within the peritoneal cavity limiting evaluation for    colovesicular fistula.   3.  Diverticulosis.   4.  Atherosclerosis.            Emeterio Dave M.D.   PGY-3  [facility]          [1]   Patient Active Problem List  Diagnosis    Anxiety    Parkinson's disease with dyskinesia and fluctuating  manifestations (HCC)    Night-waking disorder    History of tobacco abuse    Other insomnia    Elevated cholesterol    Benign prostatic hyperplasia with urinary frequency    Migraine with aura and without status migrainosus, not intractable    Dyskinesia due to Parkinson's disease (HCC)    Encounter for adjustment and management of neurostimulator [Z45.42]    PONV (postoperative nausea and vomiting)    Prostate cancer (HCC)    Anemia    Liver lesion    Elevated liver enzymes    Severe protein-calorie malnutrition (HCC)    Esophagitis determined by endoscopy

## 2025-07-03 NOTE — DOCUMENTATION QUERY
[facility]                                                                       Query Response Note      PATIENT:               LURDES VIZCARRA  ACCT #:                  [id number]  MRN:                     [id number]  :                      1965  ADMIT DATE:       2025 4:27 PM  DISCH DATE:          RESPONDING  PROVIDER #:        [id number]           QUERY TEXT:    Malnutrition is documented in the medical record. RD note on  references RD assessment done on 25, which noted patient met ASPEN criteria for Severe Malnutrition AEB severe fat loss and severe muscle wasting.    Based upon your judgment and the above clinical indicators, please select the most appropriate diagnosis for the findings.    The patient's clinical indicators include:  American Society for Parenteral and Enteral Nutrition (ASPEN) criteria (presence of at least two)  Per dietary consult dated 2025  - Muscle Mass: Severe Wasting at temples, clavicles, shoulders, square patella  - Subcutaneous Fat: Severe Loss at buccals, orbitals, zygomatic arches- Mild Loss at orbitals, triceps area    Risk Factors: Poor PO intake, s/p prostatectomy on 25 complicated by rectovesical fistula  Treatment: RD consult; document oral intake; monitor wt.; nutrition rep; Ensure clear TID    Contact me with any questions.    Thank you for your time and attention,  Portia Villafuerte RN, CDI  [email or web address]  Connect via email, Voalte or messenger.  Options provided:   -- Severe Malnutrition   -- Moderate Malnutrition   -- Mild Malnutrition   -- Other explanation, (Please specify the other explanation)      Query created by: Portia Villafuerte on 2025 10:57 AM    RESPONSE TEXT:    Severe Malnutrition          Electronically signed by:  KENNY DE LOS SANTOS 2025 11:04 PM

## 2025-07-03 NOTE — PROGRESS NOTES
4 Eyes Skin Assessment Completed by Portia SANTILLAN RN and Iwona LEDBETTER RN.    Skin assessment is primarily focused on high risk bony prominences. Pay special attention to skin beneath and around medical devices, high risk bony prominences, skin to skin areas and areas where the patient lacks sensation to feel pain and areas where the patient previously had breakdown.     Head (Occipital):  WDL   Ears (Under Medical Devices): red   Nose (Under Medical Devices): WDL   Mouth:  WDL   Neck: WDL   Breast/Chest:  WDL   Shoulder Blades:  WDL   Spine:   WDL   (R) Arm/Elbow/Hand: WDL   (L) Arm/Elbow/Hand: WDL   Abdomen: Scab and Scar   Pannus/Groin:  Moisture   Sacrum/Coccyx:   Red and skin tag, bony   (R) Ischial Tuberosity (Sit Bones):  WDL   (L) Ischial Tuberosity (Sit Bones):  WDL   (R) Leg:  WDL   (L) Leg:  Bruising   (R) Heel:  WDL and Bruising   (R) Foot/Toe: WDL   (L) Heel: WDL   (L) Foot/Toe:  WDL       DEVICES IN USE:   Respiratory Devices:  NA, patient on room air  Feeding Devices:  N/A   Lines & BP Monitoring Devices:  Peripheral IV and BP cuff    Orthopedic Devices:  N/A  Miscellaneous Devices:  N/A    PROTOCOL INTERVENTIONS:   Standard/Trauma Bed:  Already in place  Offloading Dressing - Sacrum:  Already in place    WOUND PHOTOS:   N/A no wounds identified    WOUND CONSULT:   N/A, no advanced wound care needs identified

## 2025-07-03 NOTE — DISCHARGE PLANNING
@3466  Agency/Facility Name: [facility]   Outcome: DPA left a voice message for Criselda to see if the pt will need PT/OT before returning to [facility].  Awaiting a call back.    Pt came from [facility]    @2411  Agency/Facility Name: [facility]  Spoke To: Marbella  Outcome: DPA called to see if the pt would need PT/OT once medically clear to return to [facility].  Marbella stated the pt would not need PT/OT.  Pt can return once medically clear.

## 2025-07-03 NOTE — DISCHARGE PLANNING
Received Choice form at [phone]  Agency/Facility Name: [facility]  Referral sent per Choice form @ [phone]

## 2025-07-03 NOTE — DISCHARGE PLANNING
ATTN: Case Management  RE: Referral for Home Health    As of 07.03.2025, we have accepted the Home Health referral for the patient listed above.    [facility]  will contact the patient within 48 hours. If you have any questions or concerns regarding the patient’s transition to Home Health, please do not hesitate to contact us at [phone].      We look forward to collaborating with you,  [facility] Team

## 2025-07-03 NOTE — PROGRESS NOTES
Assumed care of patient from night shift RN, patient resting with eyes closed. Call light and tray table are within reach.

## 2025-07-03 NOTE — PROGRESS NOTES
Urology Progress Note    S: Seen and examined.  Patient reports he feels well and is doing well. Luz remains in place and draining yellow, some sediment. No abdominal pain. Reports that he is tolerating diet. Ambulating well.  . Denies fevers, chills, nausea, vomiting.    O:   /83   Pulse 82   Temp 36.5 °C (97.7 °F) (Temporal)   Resp 18   Wt 50 kg (110 lb 3.7 oz)   SpO2 97%   Recent Labs     06/30/25 1735 07/01/25 0338 07/03/25 0337   SODIUM 135 136 135   POTASSIUM 4.0 4.0 4.0   CHLORIDE 103 105 104   CO2 22 23 22   GLUCOSE 94 86 97   BUN 18 14 9   CREATININE 0.63 0.71 0.70   CALCIUM 8.6 8.4* 8.6     Recent Labs     06/30/25 1735 07/01/25 0338 07/03/25 0337   WBC 9.3 10.4 10.4   RBC 3.49* 3.39* 3.45*   HEMOGLOBIN 9.5* 9.3* 9.4*   HEMATOCRIT 30.7* 29.9* 30.6*   MCV 88.0 88.2 88.7   MCH 27.2 27.4 27.2   MCHC 30.9* 31.1* 30.7*   RDW 54.4* 54.1* 52.9*   PLATELETCT 446 486* 495*   MPV 9.5 9.4 9.3         Intake/Output Summary (Last 24 hours) at 7/1/2025 1152  Last data filed at 7/1/2025 0509  Gross per 24 hour   Intake 60 ml   Output --   Net 60 ml       Exam:  Gen: NAD   Resp: normal respiratory effort  ABD: Abdomen soft, no TTP.   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 He was discharge initially to [facility]. Had cystogram 6/30 that showed bladder fistula connecting to sigmoid colon. He is report stool leaking around catheter for 3 days.   Plan:  - continue luz catheter; please ensure this is secured with stat lock  - trend urinary output  - case, imaging findings, and  sigmoidoscopy discussed with Dr. Cesar. Do not suspect fistula. He needs to continue luz catheter and urology will arrange outpatient follow up for management.   - he is ok for discharge from urology standpoint, hopeful for discharge today  - urology will follow     KALEY Linton   [address] [place] [address] [address]   [phone]

## 2025-07-03 NOTE — FACE TO FACE
Face to Face Supporting Documentation - Home Health    The encounter with this patient was in whole or in part the primary reason for home health admission.    Date of encounter:   Patient:                    MRN:                       YOB: 2023  Rakan Rader III  [id number]  1965     Home health to see patient for:  Skilled Nursing care for assessment, interventions & education, Wound Care, Registered dietitian consult, Physical Therapy evaluation and treatment, and Occupational therapy evaluation and treatment    Skilled need for:  Exacerbation of Chronic Disease State Parkinson's, prostate cancer, urinary leakage, protein calorie malnutrition    Skilled nursing interventions to include:  Line/Drain/Airway education and care    Homebound status evidenced by:  Needs the assistance of another person in order to leave the home. Leaving home requires a considerable and taxing effort. There is a normal inability to leave the home.    Community Physician to provide follow up care: Emeterio Dave M.D.     Optional Interventions? No      I certify the face to face encounter for this home health care referral meets the CMS requirements and the encounter/clinical assessment with the patient was, in whole, or in part, for the medical condition(s) listed above, which is the primary reason for home health care. Based on my clinical findings: the service(s) are medically necessary, support the need for home health care, and the homebound criteria are met.  I certify that this patient has had a face to face encounter by myself.  Emeterio Dave M.D. - NPI: [id number]

## 2025-07-04 ENCOUNTER — TELEPHONE (OUTPATIENT)
Dept: HOME HEALTH SERVICES | Facility: HOME HEALTHCARE | Age: 60
End: 2025-07-04
Payer: MEDICARE

## 2025-07-05 ENCOUNTER — HOME CARE VISIT (OUTPATIENT)
Dept: HOME HEALTH SERVICES | Facility: HOME HEALTHCARE | Age: 60
End: 2025-07-05
Payer: MEDICARE

## 2025-07-05 VITALS
TEMPERATURE: 98.9 F | RESPIRATION RATE: 16 BRPM | OXYGEN SATURATION: 97 % | HEART RATE: 70 BPM | DIASTOLIC BLOOD PRESSURE: 65 MMHG | BODY MASS INDEX: 16.97 KG/M2 | WEIGHT: 112 LBS | HEIGHT: 68 IN | SYSTOLIC BLOOD PRESSURE: 95 MMHG

## 2025-07-05 PROCEDURE — 665998 HH PPS REVENUE CREDIT

## 2025-07-05 PROCEDURE — 665005 NO-PAY RAP - HOME HEALTH

## 2025-07-05 PROCEDURE — G0493 RN CARE EA 15 MIN HH/HOSPICE: HCPCS

## 2025-07-05 PROCEDURE — 665999 HH PPS REVENUE DEBIT

## 2025-07-05 PROCEDURE — 665001 SOC-HOME HEALTH

## 2025-07-05 ASSESSMENT — ACTIVITIES OF DAILY LIVING (ADL)
BATHING ASSESSED: 1
TOILETING: 1
ORAL_CARE_ASSESSED: 1
CURRENT_FUNCTION: SUPERVISION
LAUNDRY ASSESSED: 1
SHOPPING ASSESSED: 1
FEEDING: SUPERVISION
LAUNDRY: DEPENDENT
TRANSPORTATION ASSESSED: 1
DRESSING_LB_CURRENT_FUNCTION: SUPERVISION
SHOPPING: DEPENDENT
BATHING_CURRENT_FUNCTION: SUPERVISION
USING THE TELPHONE: INDEPENDENT
TELEPHONE USE ASSESSED: 1
TOILETING: SUPERVISION
GROOMING ASSESSED: 1
FEEDING ASSESSED: 1
AMBULATION ASSISTANCE: SUPERVISION
GROOMING_CURRENT_FUNCTION: SUPERVISION
LIGHT HOUSEKEEPING: DEPENDENT
AMBULATION ASSISTANCE: 1
PHYSICAL TRANSFERS ASSESSED: 1
DRESSING_UB_CURRENT_FUNCTION: SUPERVISION
PREPARING MEALS: NEEDS ASSISTANCE
ORAL_CARE_CURRENT_FUNCTION: NEEDS ASSISTANCE
HOUSEKEEPING ASSESSED: 1
TRANSPORTATION: DEPENDENT

## 2025-07-05 ASSESSMENT — ENCOUNTER SYMPTOMS
LAST BOWEL MOVEMENT: 67391
NAUSEA: DENIES
VOMITING: DENIES
BOWEL PATTERN NORMAL: 1
DENIES PAIN: 1
STOOL FREQUENCY: DAILY

## 2025-07-05 ASSESSMENT — FIBROSIS 4 INDEX: FIB4 SCORE: 0.59

## 2025-07-06 PROCEDURE — 665998 HH PPS REVENUE CREDIT

## 2025-07-06 PROCEDURE — 665999 HH PPS REVENUE DEBIT

## 2025-07-07 ENCOUNTER — DOCUMENTATION (OUTPATIENT)
Dept: VASCULAR LAB | Facility: MEDICAL CENTER | Age: 60
End: 2025-07-07
Payer: MEDICARE

## 2025-07-07 ENCOUNTER — HOME CARE VISIT (OUTPATIENT)
Dept: HOME HEALTH SERVICES | Facility: HOME HEALTHCARE | Age: 60
End: 2025-07-07
Payer: MEDICARE

## 2025-07-07 VITALS
HEART RATE: 91 BPM | TEMPERATURE: 98.3 F | OXYGEN SATURATION: 98 % | RESPIRATION RATE: 16 BRPM | DIASTOLIC BLOOD PRESSURE: 60 MMHG | SYSTOLIC BLOOD PRESSURE: 100 MMHG

## 2025-07-07 PROCEDURE — 665999 HH PPS REVENUE DEBIT

## 2025-07-07 PROCEDURE — G0151 HHCP-SERV OF PT,EA 15 MIN: HCPCS

## 2025-07-07 PROCEDURE — 665998 HH PPS REVENUE CREDIT

## 2025-07-07 ASSESSMENT — ACTIVITIES OF DAILY LIVING (ADL)
AMBULATION ASSISTANCE ON FLAT SURFACES: 1
CURRENT_FUNCTION: SUPERVISION
AMBULATION ASSISTANCE ON UNEVEN SURFACES: 1
PHYSICAL TRANSFERS ASSESSED: 1
AMBULATION ASSISTANCE: SUPERVISION
PHYSICAL_TRANSFERS_DEVICES: 2WW
AMBULATION ASSISTANCE: 1
AMBULATION_DISTANCE/DURATION_TOLERATED: 50FT

## 2025-07-07 ASSESSMENT — ENCOUNTER SYMPTOMS
DENIES PAIN: 1
MUSCLE WEAKNESS: 1

## 2025-07-07 NOTE — PROGRESS NOTES
[facility]    Received referral from [facility] to review patient's medication list.    Med list reviewed and reconciled.  Allergies reviewed.    No clinically significant DDI identified.      Noris Javier, ArnavD

## 2025-07-08 ENCOUNTER — HOME CARE VISIT (OUTPATIENT)
Dept: HOME HEALTH SERVICES | Facility: HOME HEALTHCARE | Age: 60
End: 2025-07-08
Payer: MEDICARE

## 2025-07-08 PROCEDURE — 665999 HH PPS REVENUE DEBIT

## 2025-07-08 PROCEDURE — 665998 HH PPS REVENUE CREDIT

## 2025-07-08 ASSESSMENT — FIBROSIS 4 INDEX: FIB4 SCORE: 0.59

## 2025-07-08 ASSESSMENT — ENCOUNTER SYMPTOMS
PAIN LOCATION - PAIN FREQUENCY: FREQUENT
PAIN LOCATION - RELIEVING FACTORS: REST, MEDS
PAIN LOCATION - EXACERBATING FACTORS: WALK
PAIN LOCATION: SI JOINT
PAIN LOCATION - PAIN SEVERITY: 3/10
PAIN LOCATION - PAIN QUALITY: ACHE
STOOL FREQUENCY: DAILY
PAIN LOCATION - PAIN DURATION: ONGOING
LOWEST PAIN SEVERITY IN PAST 24 HOURS: 0/10
PAIN SEVERITY GOAL: 3/10
BOWEL PATTERN NORMAL: 1
MUSCLE WEAKNESS: 1
LAST BOWEL MOVEMENT: 67394
PAIN: 1
HIGHEST PAIN SEVERITY IN PAST 24 HOURS: 4/10
SUBJECTIVE PAIN PROGRESSION: UNCHANGED

## 2025-07-08 NOTE — Clinical Note
Noted   ----- Message -----  From: Love Hung R.N.  Sent: 7/8/2025   6:07 PM PDT  To: Arcelia Stovall R.N.; Emeterio Dave M.D.      I  HR 95 at rest, asymptomatic. Outside parameters

## 2025-07-09 ENCOUNTER — HOME CARE VISIT (OUTPATIENT)
Dept: HOME HEALTH SERVICES | Facility: HOME HEALTHCARE | Age: 60
End: 2025-07-09
Payer: MEDICARE

## 2025-07-09 ENCOUNTER — APPOINTMENT (OUTPATIENT)
Dept: NEUROLOGY | Facility: MEDICAL CENTER | Age: 60
End: 2025-07-09
Attending: INTERNAL MEDICINE
Payer: MEDICARE

## 2025-07-09 PROCEDURE — 665999 HH PPS REVENUE DEBIT

## 2025-07-09 PROCEDURE — G0152 HHCP-SERV OF OT,EA 15 MIN: HCPCS

## 2025-07-09 PROCEDURE — 665998 HH PPS REVENUE CREDIT

## 2025-07-09 ASSESSMENT — ACTIVITIES OF DAILY LIVING (ADL)
GROOMING_CURRENT_FUNCTION: INDEPENDENT
BATHING ASSESSED: 1
DRESSING_LB_CURRENT_FUNCTION: INDEPENDENT
BATHING_CURRENT_FUNCTION: INDEPENDENT
LAUNDRY: NEEDS ASSISTANCE
ORAL_CARE_ASSESSED: 1
HOUSEKEEPING ASSESSED: 1
ORAL_CARE_CURRENT_FUNCTION: INDEPENDENT
TOILETING: 1
MODE OF TRANSPORTATION: 2WW
TRANSPORTATION ASSESSED: 1
CURRENT_FUNCTION: SUPERVISION
AMBULATION ASSISTANCE: INDEPENDENT
FEEDING: INDEPENDENT
PREPARING MEALS: INDEPENDENT
TOILETING EQUIPMENT USED: 2WW
AMBULATION ASSISTANCE: 1
GROOMING ASSESSED: 1
LAUNDRY ASSESSED: 1
USING THE TELPHONE: INDEPENDENT
PHYSICAL TRANSFERS ASSESSED: 1
SHOPPING EQUIPMENT USED: 2WW
DRESSING_UB_CURRENT_FUNCTION: INDEPENDENT
ORAL_CARE_EQUIPMENT_USED: 2WW
TOILETING: INDEPENDENT
SHOPPING: NEEDS ASSISTANCE
LIGHT HOUSEKEEPING: NEEDS ASSISTANCE
BATHING EQUIPMENT USED: SHOWER BENCH
TRANSPORTATION: NEEDS ASSISTANCE
PHYSICAL_TRANSFERS_DEVICES: 2WW
FEEDING ASSESSED: 1
SHOPPING ASSESSED: 1
TELEPHONE USE ASSESSED: 1
LAUNDRY EQUIPMENT USED: 2WW

## 2025-07-09 ASSESSMENT — ENCOUNTER SYMPTOMS: DENIES PAIN: 1

## 2025-07-09 NOTE — Clinical Note
REFERRAL APPROVAL NOTICE         Sent on July 9, 2025                   Rakan Rader III  [address] [place] [address]                   Dear Mr. Rader,    After a careful review of the medical information and benefit coverage, [facility] has processed your referral. See below for additional details.    If applicable, you must be actively enrolled with your insurance for coverage of the authorized service. If you have any questions regarding your coverage, please contact your insurance directly.    REFERRAL INFORMATION   Referral #:  [id number]  Referred-To Department    Referred-By Provider:  Nneka Dave M.D.   [facility]      [address]  [place] [address]  [phone] [address]  [place] [id number] [phone]    Referral Start Date:  07/03/2025  Referral End Date:   07/03/2026             SCHEDULING  If you do not already have an appointment, please call [phone] to make an appointment.     MORE INFORMATION  If you do not already have a [facility] account, sign up at: [email or web address]  You can access your medical information, make appointments, see lab results, billing information, and more.  If you have questions regarding this referral, please contact  the [facility] Referrals department at:             [phone]. Monday - Friday 8:00AM - 5:00PM.     Sincerely,    [facility]

## 2025-07-10 PROCEDURE — 665998 HH PPS REVENUE CREDIT

## 2025-07-10 PROCEDURE — 665999 HH PPS REVENUE DEBIT

## 2025-07-11 ENCOUNTER — HOME CARE VISIT (OUTPATIENT)
Dept: HOME HEALTH SERVICES | Facility: HOME HEALTHCARE | Age: 60
End: 2025-07-11
Payer: MEDICARE

## 2025-07-11 PROCEDURE — G0299 HHS/HOSPICE OF RN EA 15 MIN: HCPCS

## 2025-07-11 PROCEDURE — 665999 HH PPS REVENUE DEBIT

## 2025-07-11 PROCEDURE — 665998 HH PPS REVENUE CREDIT

## 2025-07-12 PROCEDURE — 665999 HH PPS REVENUE DEBIT

## 2025-07-12 PROCEDURE — 665998 HH PPS REVENUE CREDIT

## 2025-07-13 PROCEDURE — 665999 HH PPS REVENUE DEBIT

## 2025-07-13 PROCEDURE — 665998 HH PPS REVENUE CREDIT

## 2025-07-14 ENCOUNTER — HOME CARE VISIT (OUTPATIENT)
Dept: HOME HEALTH SERVICES | Facility: HOME HEALTHCARE | Age: 60
End: 2025-07-14
Payer: MEDICARE

## 2025-07-14 VITALS
HEART RATE: 68 BPM | DIASTOLIC BLOOD PRESSURE: 64 MMHG | RESPIRATION RATE: 16 BRPM | OXYGEN SATURATION: 97 % | SYSTOLIC BLOOD PRESSURE: 108 MMHG | TEMPERATURE: 97.5 F

## 2025-07-14 VITALS
HEART RATE: 89 BPM | OXYGEN SATURATION: 99 % | SYSTOLIC BLOOD PRESSURE: 100 MMHG | DIASTOLIC BLOOD PRESSURE: 65 MMHG | RESPIRATION RATE: 16 BRPM | BODY MASS INDEX: 17.03 KG/M2 | WEIGHT: 112 LBS | TEMPERATURE: 97.7 F

## 2025-07-14 VITALS
HEART RATE: 96 BPM | RESPIRATION RATE: 16 BRPM | DIASTOLIC BLOOD PRESSURE: 65 MMHG | OXYGEN SATURATION: 99 % | SYSTOLIC BLOOD PRESSURE: 100 MMHG | TEMPERATURE: 97.7 F

## 2025-07-14 PROCEDURE — G0299 HHS/HOSPICE OF RN EA 15 MIN: HCPCS

## 2025-07-14 PROCEDURE — 665998 HH PPS REVENUE CREDIT

## 2025-07-14 PROCEDURE — G0151 HHCP-SERV OF PT,EA 15 MIN: HCPCS

## 2025-07-14 PROCEDURE — 665999 HH PPS REVENUE DEBIT

## 2025-07-14 RX ORDER — SUCRALFATE ORAL 1 G/10ML
1 SUSPENSION ORAL EVERY 6 HOURS
Qty: 1200 ML | Refills: 0 | Status: CANCELLED | OUTPATIENT
Start: 2025-07-14 | End: 2025-08-13

## 2025-07-14 RX ORDER — SUCRALFATE ORAL 1 G/10ML
1 SUSPENSION ORAL EVERY 6 HOURS
Qty: 1200 ML | Refills: 3 | Status: SHIPPED | OUTPATIENT
Start: 2025-07-14

## 2025-07-14 ASSESSMENT — ENCOUNTER SYMPTOMS
PAIN: 1
MUSCLE WEAKNESS: 1
PAIN LOCATION - EXACERBATING FACTORS: NO
PAIN LOCATION - PAIN SEVERITY: 4/10
PAIN LOCATION - PAIN FREQUENCY: INTERMITTENT
LOWEST PAIN SEVERITY IN PAST 24 HOURS: 0/10
DENIES PAIN: 1
STOOL FREQUENCY: DAILY
STOOL FREQUENCY: LESS THAN DAILY
NAUSEA: DENIES
HIGHEST PAIN SEVERITY IN PAST 24 HOURS: 4/10
LAST BOWEL MOVEMENT: 67400
PAIN LOCATION - PAIN QUALITY: ACHY
NAUSEA: NO
LAST BOWEL MOVEMENT: 67397
BOWEL PATTERN NORMAL: 1
VOMITING: DENIES
PAIN SEVERITY GOAL: 0/10
SUBJECTIVE PAIN PROGRESSION: WAXING AND WANING
DENIES PAIN: 1
PAIN LOCATION - PAIN DURATION: COMES AND GOES
PAIN LOCATION: STOMACH
BOWEL PATTERN NORMAL: 1
VOMITING: NO

## 2025-07-14 ASSESSMENT — FIBROSIS 4 INDEX: FIB4 SCORE: 0.59

## 2025-07-14 ASSESSMENT — PATIENT HEALTH QUESTIONNAIRE - PHQ9: CLINICAL INTERPRETATION OF PHQ2 SCORE: 0

## 2025-07-15 ENCOUNTER — HOME CARE VISIT (OUTPATIENT)
Dept: HOME HEALTH SERVICES | Facility: HOME HEALTHCARE | Age: 60
End: 2025-07-15
Payer: MEDICARE

## 2025-07-15 VITALS
OXYGEN SATURATION: 98 % | BODY MASS INDEX: 16.97 KG/M2 | HEIGHT: 68 IN | RESPIRATION RATE: 16 BRPM | WEIGHT: 112 LBS | DIASTOLIC BLOOD PRESSURE: 68 MMHG | TEMPERATURE: 98 F | HEART RATE: 95 BPM | SYSTOLIC BLOOD PRESSURE: 102 MMHG

## 2025-07-15 PROCEDURE — 665998 HH PPS REVENUE CREDIT

## 2025-07-15 PROCEDURE — 665999 HH PPS REVENUE DEBIT

## 2025-07-15 ASSESSMENT — ENCOUNTER SYMPTOMS
PAIN: 1
PERSON REPORTING PAIN: PATIENT

## 2025-07-16 ENCOUNTER — HOME CARE VISIT (OUTPATIENT)
Dept: HOME HEALTH SERVICES | Facility: HOME HEALTHCARE | Age: 60
End: 2025-07-16
Payer: MEDICARE

## 2025-07-16 ENCOUNTER — OFFICE VISIT (OUTPATIENT)
Dept: NEUROLOGY | Facility: MEDICAL CENTER | Age: 60
End: 2025-07-16
Attending: INTERNAL MEDICINE
Payer: MEDICARE

## 2025-07-16 VITALS
TEMPERATURE: 98.8 F | BODY MASS INDEX: 16.44 KG/M2 | DIASTOLIC BLOOD PRESSURE: 62 MMHG | HEART RATE: 90 BPM | SYSTOLIC BLOOD PRESSURE: 100 MMHG | RESPIRATION RATE: 16 BRPM | OXYGEN SATURATION: 97 % | WEIGHT: 108.13 LBS

## 2025-07-16 VITALS
TEMPERATURE: 98 F | SYSTOLIC BLOOD PRESSURE: 112 MMHG | HEART RATE: 75 BPM | DIASTOLIC BLOOD PRESSURE: 80 MMHG | WEIGHT: 110.23 LBS | BODY MASS INDEX: 16.71 KG/M2 | OXYGEN SATURATION: 97 % | RESPIRATION RATE: 16 BRPM | HEIGHT: 68 IN

## 2025-07-16 DIAGNOSIS — G47.01 INSOMNIA DUE TO MEDICAL CONDITION: Primary | ICD-10-CM

## 2025-07-16 DIAGNOSIS — G20.B2 PARKINSON'S DISEASE WITH DYSKINESIA AND FLUCTUATING MANIFESTATIONS (HCC): ICD-10-CM

## 2025-07-16 PROCEDURE — G0152 HHCP-SERV OF OT,EA 15 MIN: HCPCS

## 2025-07-16 PROCEDURE — G2211 COMPLEX E/M VISIT ADD ON: HCPCS | Performed by: INTERNAL MEDICINE

## 2025-07-16 PROCEDURE — 3079F DIAST BP 80-89 MM HG: CPT | Performed by: INTERNAL MEDICINE

## 2025-07-16 PROCEDURE — 3074F SYST BP LT 130 MM HG: CPT | Performed by: INTERNAL MEDICINE

## 2025-07-16 PROCEDURE — 665999 HH PPS REVENUE DEBIT

## 2025-07-16 PROCEDURE — 99213 OFFICE O/P EST LOW 20 MIN: CPT

## 2025-07-16 PROCEDURE — 99215 OFFICE O/P EST HI 40 MIN: CPT | Performed by: INTERNAL MEDICINE

## 2025-07-16 PROCEDURE — 665998 HH PPS REVENUE CREDIT

## 2025-07-16 RX ORDER — CARBIDOPA AND LEVODOPA 25; 100 MG/1; MG/1
1 TABLET ORAL 3 TIMES DAILY
Qty: 270 TABLET | Refills: 3 | Status: SHIPPED | OUTPATIENT
Start: 2025-07-16 | End: 2026-07-16

## 2025-07-16 RX ORDER — DOXEPIN HYDROCHLORIDE 10 MG/1
10 CAPSULE ORAL NIGHTLY
Qty: 90 CAPSULE | Refills: 3 | Status: SHIPPED | OUTPATIENT
Start: 2025-07-16 | End: 2026-07-16

## 2025-07-16 ASSESSMENT — FIBROSIS 4 INDEX
FIB4 SCORE: 0.59
FIB4 SCORE: 0.59

## 2025-07-16 ASSESSMENT — PATIENT HEALTH QUESTIONNAIRE - PHQ9: CLINICAL INTERPRETATION OF PHQ2 SCORE: 0

## 2025-07-16 ASSESSMENT — ENCOUNTER SYMPTOMS
POOR JUDGMENT: 1
DEPRESSED MOOD: 1
DENIES PAIN: 1

## 2025-07-16 NOTE — PROGRESS NOTES
[facility]  [address] [place] [address], [address]. [place] [address]  Phone: [phone], Fax: [phone]    Gualberto Taylor DO  Neurology, Movement Disorders Patient Name: Rakan Rader III  : 1965  MRN: [id number]     ASSESSMENT / PLAN   Rakan Rader III is a 59 y.o. RHD male presenting for parkinson's disease    Parkinson's disease  Levodopa induced dyskinesias  Bilateral STN deep brain stimulator ([employer] Genus RC)  Onset  with right sided symptoms, dx , s/p DBS . Still having some residual rigidity in right leg only when walking.  - carbidopa/levodopa 25/100, one tablet 3x/day  - DBS programming as noted below    Insomnia  - discussed sleep hygiene. Overall improved  - start doxepin 10mg qHS    Orders Placed This Encounter    carbidopa-levodopa (SINEMET)  MG Tab    doxepin (SINEQUAN) 10 MG Cap     Return in about 4 months (around 2025).    BILLING DOCUMENTATION:   Excluding time spent on procedures during visit, I spent 40 minutes reviewing the medical record, interviewing and examining the patient, discussing diagnosis and treatment, and coordinating care.    No procedure            HISTORY OF PRESENT ILLNESS   Rakan Rader III is a 59 y.o. RHD male presenting for parkinson's disease    Initial HPI 24  Right side hand tremors onset approx . Dx in .  started left side. Troublesome issues with motor fluctuations and dyskinesias. Now s/p bilateral STN and BSC Genus  with Dr. Ivania Castro ([facility]) on 2024.     Kat Kraus, NP-C 2024, [facility]  - His tremor is now mostly gone but does reemerge with walking in his right hand. Noted improvements in his dystonia. He is now up and walking without significant freezing (still occurs about 10% of the day) no longer requiring a wheelchair and will occasionally still use walking sticks when going on longer walks outside. He has been out taking his dog on long  walks. He has been riding his bike again and back to work a couple days a week as a .   - He slowly has continued to reduce his carbidopa levodopa, he is now taking a total of 5 full tablets a day spaced out and 0.5 tablet increments. Yesterday was his first day trying 4.5 total tablets per day and he tolerated well. He has remained in program #2 and his stimulations at 2.5 mA on both sides   - He reports an improved sense of smell, decreased urge to urinate frequently and he feels his speech and stuttering has improved. He is sleeping better without now waking up during the middle the night around 2 to 3 AM.       Interval history  08/23/24: first visit with me  10/23/24: DBS programming  01/08/25: DBS programming  02/06/25: DBS programming. Referral to behavioral health. Dx with functional tremor which had gradually resolved the following weeks.  03/07/25: no changes made  radical prostatectomy on 5/28/2025 complicated by urinary leak in the postoperative period with subsequent diagnostic laparoscopy and abdominal washout with pelvic drain placement  07/16/25: no changes made      Current Regimen  Carbidopa/levodopa 25/100, one tablet: 3x/day  DBS Program 4  Latency: can't tell  Duration: can't tell  Efficacy:   02/06/25: he had sudden worsening of right arm rest tremor and walking 2/3 (Monday).  He had recently just recovered from a viral illness which was rather mild.  No head injuries.  He tried adjusting DBS to different programs and increasing stimulation without significant improvement in his right arm tremor.  He was also having right leg rigidity that is affecting his walking.  03/07/25: right arm rest tremor gradually improved after last clinic visit. No issues now, back to his baseline.   - no dyskinesias, possibly some restlessness.   - Dystonia: possibly along left hip vs chronic hip pain  Sfx: none      ROS:  Gait: some stiffness in right leg, no falls. Riding bike.  10/23/24: Right  leg seems more stiff, not heel-toe as much. Can improve for 30 min with carbidopa-levodopa  01/08/25: Left leg feels more stiff at the hip. Right leg more stiff at the ankle.  07/16/25: weakness from hospitalization, but right leg not too stiff recently. Left hip still painful at times.  Orthostasis:   no issues, only when quickly.   Constipation:   no issues  Urinary issues:   better after DBS  Speech/Swallow:   no dysphagia. Voice better after DBS, mild stuttering. 07/16/25: weaker since hospitalization  Anosmia: better after DBS  Cognition:   No issues  Mood:   anxiety at night when not falling asleep.   02/06/25: new stressors with finances at home, pending prostate biopsy  03/07/25: PHQ-9 = 0, C-SSRS = neg. 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 Stays in bed for 2 hour. Rarely yells out in sleep.   01/08/25: sometimes trouble staying asleep past 2-3AM, once every 2 weeks  07/16/25: melatonin didn't help. Tired when waking up at 2-3AM.  Fatigue:   napping at 2PM for 30m-1 hour      Past Medical History:   Diagnosis Date    Anesthesia     PONV    Anxiety     Bronchitis     Remote    Parkinson's disease (HCC)     PONV (postoperative nausea and vomiting)     Status post deep brain stimulator placement 06/2024    Followed by Dr Taylor of [facility] Neurology and Dr. Ivania Castro at [facility]    Urinary bladder disorder     Difficulty urinating     Past Surgical History:   Procedure Laterality Date    SIGMOIDOSCOPY, FLEXIBLE N/A 7/2/2025    Procedure: SIGMOIDOSCOPY, FLEXIBLE;  Surgeon: Mehdi Raymundo M.D.;  Location: SURGERY [facility];  Service: Gastroenterology    NJ UPPER GI ENDOSCOPY,BIOPSY N/A 6/16/2025    Procedure: GASTROSCOPY, WITH BIOPSY;  Surgeon: Kat Bloom M.D.;  Location: SURGERY SAME DAY [facility];  Service: Gastroenterology    NJ CYSTOURETHROSCOPY  6/10/2025    Procedure: CYSTOSCOPY ON TABLE FLUOROSCOPIC CYSTOGRAM ,  COMPLEX URETHRAL CATHETERIZATION;  Surgeon: Tj WATT M.D.;  Location: SURGERY [facility];  Service: Urology    [place] LAP,DIAGNOSTIC ABDOMEN  6/10/2025    Procedure: DIAGNOSTIC LAPAROSCOPY, ABDOMINAL WASHOUT;  Surgeon: Tj WATT M.D.;  Location: SURGERY [facility];  Service: Urology    PROSTATECTOMY ROBOTIC XI N/A 5/28/2025    Procedure: ROBOTIC ASSISTED LAPAROSCOPIC PROSTATECTOMY;  Surgeon: Tj WATT M.D.;  Location: SURGERY [facility];  Service: Uro Robotic    LAPAROSCOPIC LYSIS OF ADHESIONS N/A 5/28/2025    Procedure: LYSIS, ADHESIONS, LAPAROSCOPIC ROBOTIC;  Surgeon: Tj WATT M.D.;  Location: SURGERY [facility];  Service: Uro Robotic    OTHER  06/2024    Deep brain stimulator    INGUINAL HERNIA REPAIR ROBOTIC Bilateral 11/22/2017    Procedure: INGUINAL HERNIA REPAIR ROBOTIC/ WITH MESH PLACEMENT;  Surgeon: Zackary Ponce M.D.;  Location: [facility];  Service: General    LAMINOTOMY      c5-7    ORIF, ELBOW      right as a child    OTHER NEUROLOGICAL SURG      cervical fusion    OTHER ORTHOPEDIC SURGERY      finger surgery as child    SEPTOPLASTY, NOSE, WITH TURBINOPLASTY      SHOULDER ARTHROSCOPY W/ SLAP / LABRAL REPAIR      left    SINUSCOPY      maxillary sinuses     Family History   Problem Relation Age of Onset    Heart Disease Mother     Hypertension Mother     Hyperlipidemia Mother     Psychiatric Illness Mother     Psychiatric Illness Father     Alcohol/Drug Father     Alcohol/Drug Sister     Sleep Apnea Neg Hx      Social History     Socioeconomic History    Marital status:      Spouse name: Not on file    Number of children: Not on file    Years of education: Not on file    Highest education level: GED or equivalent   Occupational History    Not on file   Tobacco Use    Smoking status: Former     Current packs/day: 0.00     Average packs/day: 1 pack/day for 5.0 years (5.0 ttl pk-yrs)     Types: Cigarettes     Start date: 11/16/1982     Quit date:  1987     Years since quittin.6    Smokeless tobacco: Former     Types: Chew    Tobacco comments:     quit at age 23   Vaping Use    Vaping status: Never Used   Substance and Sexual Activity    Alcohol use: No     Comment: stopped drinking  @     Drug use: No    Sexual activity: Yes     Partners: Female     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    Other Topics Concern    Not on file   Social History Narrative    Not on file     Social Drivers of Health     Financial Resource Strain: Low Risk  (2022)    Overall Financial Resource Strain (CARDIA)     Difficulty of Paying Living Expenses: Not very hard   Food Insecurity: No Food Insecurity (2025)    Hunger Vital Sign     Worried About Running Out of Food in the Last Year: Never true     Ran Out of Food in the Last Year: Never true   Transportation Needs: No Transportation Needs (2025)    PRAPARE - Transportation     Lack of Transportation (Medical): No     Lack of Transportation (Non-Medical): No   Physical Activity: Sufficiently Active (2022)    Exercise Vital Sign     Days of Exercise per Week: 3 days     Minutes of Exercise per Session: 120 min   Stress: No Stress Concern Present (2022)    Citizen of [place] Liberty of Occupational Health - Occupational Stress Questionnaire     Feeling of Stress : Only a little   Social Connections: Feeling Socially Integrated (2025)    OASIS : Social Isolation     Frequency of experiencing loneliness or isolation: Rarely   Intimate Partner Violence: Not At Risk (2025)    Humiliation, Afraid, Rape, and Kick questionnaire     Fear of Current or Ex-Partner: No     Emotionally Abused: No     Physically Abused: No     Sexually Abused: No   Housing Stability: Low Risk  (2025)    Housing Stability Vital Sign     Unable to Pay for Housing in the Last Year: No     Number of Times Moved in the Last Year: 0     Homeless in the Last Year: No     Current Outpatient Medications  "  Medication    carbidopa-levodopa (SINEMET)  MG Tab    doxepin (SINEQUAN) 10 MG Cap    sucralfate (CARAFATE) 1 GM/10ML Suspension    VIT C-CHOLECALCIFEROL-CHELLY HIP PO    Zinc 30 MG Cap    acetaminophen (TYLENOL) 325 MG Tab    Lactobacillus-Inulin ([facility] DIGEST[facility]) Cap    vitamin D3 (CHOLECALCIFEROL) 1000 Unit (25 mcg) Tab    Omega-3 Fatty Acids (OMEGA 3 PO)    B Complex Vitamins (B COMPLEX PO)    MAGNESIUM PO    VITAMIN K PO    multivitamin Tab    hydrOXYzine HCl (ATARAX) 10 MG Tab    omeprazole (PRILOSEC) 40 MG delayed-release capsule     No current facility-administered medications for this visit.     No Known Allergies            DATA / RESULTS     2020 Divya scan  abnormal pattern of uptake in the striata, left more than right, suggestive of a Parkinsonian type syndrome.    Vitamin B12 -True Cobalamin   Date Value Ref Range Status   06/18/2021 1346 (H) 211 - 911 pg/mL Final     TSH   Date Value Ref Range Status   06/18/2021 2.040 0.380 - 5.330 uIU/mL Final     Comment:     Please note new reference ranges effective 12/14/2017 10:00 AM    Pregnant Females, 1st Trimester  0.050-3.700  Pregnant Females, 2nd Trimester  0.310-4.350  Pregnant Females, 3rd Trimester  0.410-5.180       LDL   Date Value Ref Range Status   06/18/2021 104 (H) <100 mg/dL Final                OBJECTIVE      Vitals:    07/16/25 1622   BP: 112/80   BP Location: Left arm   Patient Position: Sitting   BP Cuff Size: Adult   Pulse: 75   Resp: 16   Temp: 36.7 °C (98 °F)   TempSrc: Temporal   SpO2: 97%   Weight: 50 kg (110 lb 3.7 oz)   Height: 1.727 m (5' 8\")         Physical Exam  Last dose of C/L taken 2PM (2.5 hours ago)    Voice hypophonic (worse)    UPDRS Right Left   Finger tapping 1  0   Hand Movement 0 0   Toe Tapping 0 0   Leg Agility 0 0   Rigidity 0 better 0 better   Rest Tremor 0 0   Postural Tremor 1 worse again 1 worse again   Kinetic Tremor 1 worse again 1 worse again     Cerebellar: No dysmetria with FTN    Gait: "   Posture - normal   Base - narrow   Stride length - slight decreased bilateral  Arm swing - holding cane  Speed - slower  Shuffling/freezing - none  U-Turn - normal, 3 steps          PROCEDURE     DEEP BRAIN STIMULATION PROGRAMMING  Model [id number] Genus RC  Leads [id number], Bilateral STN  Implanted 6/25/2024    Monopolar Review (ring mode) 7/22/2024  PW 60 130 Hz   L STN:  L 1.0   1.5 mA no AE  2.0 mA no AE  2.5 mA no AE  3.0 mA dyskinesia, bradykinesia improved, tremor improved  3.5 mA dyskinesia and transient tingling, bradykinesia improved, tremor improved R STN:   L 1.0   1.5 mA no AE  2.0 mA bradykinesia improved, tremor improved  2.5 mA dyskinesia, bradykinesia improved, tremor improved  3.0 mA dyskinesia, bradykinesia improved, tremor improved  3.5 mA dyskinesia, bradykinesia improved, tremor improved   L 2.0   1.5 mA no AE  2.0 mA no AE  2.5 mA no AE, bradykinesia improved, tremor improved  3.0 mA dyskinesia, bradykinesia improved, tremor improved   3.5 mA dyskinesia, bradykinesia improved, tremor improved  L 2.0   1.5 mA no AE  2.0 mA no AE  2.5 mA dyskinesia, bradykinesia improved, tremor improved  3.0 mA dyskinesia, bradykinesia improved, tremor improved  3.5 mA dyskinesia, bradykinesia improved, tremor improved   L 3.0  1.5 mA no AE  2.0 mA no AE  2.5 mA no AE bradykinesia improved, tremor improved  3.0 mA dyskinesia, bradykinesia improved, tremor improved  3.5 mA dyskinesia, bradykinesia improved, tremor improved L 3.0  1.5 mA no AE, bradykinesia improved  2.0 mA no AE, bradykinesia improved  2.5 mA no AE, bradykinesia improved  3.0 mA dyskinesia, bradykinesia improved  3.5 mA dyskinesia, bradykinesia improved   L 4.0  1.5 mA no AE  2.0 mA no AE, bradykinesia improved  2.5 mA no AE, bradykinesia improved  3.0 mA no AE, bradykinesia improved  3.5 mA no AE, bradykinesia improved L 4.0  1.5 mA no AE, bradykinesia improved  2.0 mA no AE, bradykinesia improved  2.5 mA no AE, bradykinesia  improved  3.0 mA dyskinesia, bradykinesia improved  3.5 mA dyskinesia, bradykinesia improved      Continue with Program #2, which targets ZI using more dorsal contacts to help prevent dyskinesia.   Plan to use Program #1 in the future to better address his tremor.       Left STN Setting Notes Right STN Setting Notes   08/23/24 Battery level 4V, Impedance OK   Program 1 3A: 9%  3B: 61%  2A: 4%  2C: 26%    0.5mA  50uS  185Hz  3A: 3%  3B: 3%  3C: 44%  2A: 3%  2B: 3%  2C: 44%    0.5mA  50uS  185Hz     Program 2  ORIGINAL 4: 20%  3A: 7%  3B: 49%  2A: 4%  2C: 20%    1mA  50uS  185Hz  4: 20%  3A: 2%  3B: 2%  3C: 35%  2A: 3%  2B: 3%  2C: 35%    1.2mA  50uS  185Hz     Program 3  NEW  ACTIVE 4: 16%  3A: 5%  3B: +10%  3C: 37%  2A: 5%  2B: +9%  2C: 37%    3.3mA  60uS  185Hz - added 3B+2B due to side effects with higher mA  - 60uS and 3.3mA helped with leg BK when walking 4: 20%  3A: 2%  3B: 2%  3C: 35%  2A: 3%  2B: 3%  2C: 35%    2.6mA  60uS  185Hz  - 60uS: RT better      10/23/24 Battery level 4V, Impedance OK   Program 3  ORIGINAL 4: -16%  3A: -5%  3B: +10%  3C: -37%  2A: -5%  2B: +9%  2C: -37%    3.8mA  60uS  185Hz Increased to 3.8mA at home due to right leg stiffness 4: -20%  3A: -2%  3B: -2%  3C: -35%  2A: 3%  2B: 3%  2C: 35%    3.2mA  60uS  185Hz  Increased to 3.2mA at home due to left arm tremors   Program 4  NEW  ACTIVE 4: -13%  3A: -4%  3B: +10%  3C: -36%  2A: -5%  2B: +9%  2C: -42%    3.8mA  60uS  185Hz Increasing to 4mA caused pulling sensation.    Increased 2C to stimulate more of STN. RT and arm swing improved 4: -18%  3A: -2%  3B: -2%  3C: -35%  2A: -2%  2B: -3%  2C: -38%    3.2mA  60uS  185Hz  Increased 2C to stimulate more of STN. RT improved.      01/08/25 Battery 4V, impedances OK   Program 1  NEW 4: +5%  3A: -3%  3B: +15%  3C: -37%  2A: -7%  2B: +13%  2C: -53%    5mA  60uS  185Hz Increase 5mA:  Slurred speech  4.7mA: resolved sfx  4.5mA: speech better at baseline    70uS: speech more slurred  4mA: resolved  sfx but walking worse (more rigid)    Removed L4+ stim, improved speech at 4.5mA    Added anodic block at L4, and increased at 3B+2B: tolerated 5mA better 4: -18%  3A: -2%  3B: -2%  3C: -35%  2A: -2%  2B: -3%  2C: -38%    4mA  60uS  185Hz  No changes   Program 4  ORIGINAL 4: -13%  3A: -4%  3B: +10%  3C: -36%  2A: -5%  2B: +9%  2C: -42%    4.5mA  60uS  185Hz No changes.   Patient increased mA at home 4: -18%  3A: -2%  3B: -2%  3C: -35%  2A: -2%  2B: -3%  2C: -38%    4mA  60uS  185Hz  No changes.   Patient increased mA at home           02/06/25 Battery 3.9V, impedances OK   Program 1 4: +5%  3A: -3%  3B: +15%  3C: -37%  2A: -7%  2B: +13%  2C: -53%    5mA  60uS  185Hz 3.5mA: worse toe tapping    6mA: possible improvement of RT    80uS: no change to RT    225Hz: no change to RT 4: -18%  3A: -2%  3B: -2%  3C: -35%  2A: -2%  2B: -3%  2C: -38%    3.5mA  60uS  185Hz  No changes           03/07/25 Battery 3.8V, impedances OK   Program 1   4: +5%  3A: -3%  3B: +15%  3C: -37%  2A: -7%  2B: +13%  2C: -53%    5.4mA  60uS  185Hz Increased stimulation at home.  No changes in clinic. 4: -18%  3A: -2%  3B: -2%  3C: -35%  2A: -2%  2B: -3%  2C: -38%    4.5mA  60uS  185Hz             07/16/25 Battery 3.8V, impedances OK   Program 4 4: -13%  3A: -4%  3B: +10%  3C: -36%  2A: -5%  2B: +9%  2C: -42%    5mA  60uS  185Hz Patient increased to 5mA, no changes today 4: -18%  3A: -2%  3B: -2%  3C: -35%  2A: -2%  2B: -3%  2C: -38%    4.5mA  60uS  185Hz

## 2025-07-17 ENCOUNTER — HOME CARE VISIT (OUTPATIENT)
Dept: HOME HEALTH SERVICES | Facility: HOME HEALTHCARE | Age: 60
End: 2025-07-17
Payer: MEDICARE

## 2025-07-17 ENCOUNTER — TELEPHONE (OUTPATIENT)
Dept: PHARMACY | Facility: MEDICAL CENTER | Age: 60
End: 2025-07-17
Payer: MEDICARE

## 2025-07-17 VITALS
BODY MASS INDEX: 16.21 KG/M2 | RESPIRATION RATE: 16 BRPM | TEMPERATURE: 97.6 F | SYSTOLIC BLOOD PRESSURE: 118 MMHG | OXYGEN SATURATION: 97 % | HEART RATE: 86 BPM | DIASTOLIC BLOOD PRESSURE: 64 MMHG | WEIGHT: 106.6 LBS

## 2025-07-17 PROCEDURE — 665998 HH PPS REVENUE CREDIT

## 2025-07-17 PROCEDURE — G0152 HHCP-SERV OF OT,EA 15 MIN: HCPCS

## 2025-07-17 PROCEDURE — 665999 HH PPS REVENUE DEBIT

## 2025-07-17 PROCEDURE — G0299 HHS/HOSPICE OF RN EA 15 MIN: HCPCS

## 2025-07-17 ASSESSMENT — ENCOUNTER SYMPTOMS
DENIES PAIN: 1
POOR JUDGMENT: 1
HYPERTENSION: 1
FATIGUE: 1
DENIES PAIN: 1
BOWEL PATTERN NORMAL: 1
LIMITED RANGE OF MOTION: 1
DRY SKIN: 1
FATIGUES EASILY: 1
MUSCLE WEAKNESS: 1
STOOL FREQUENCY: LESS THAN DAILY
LAST BOWEL MOVEMENT: 67403

## 2025-07-17 ASSESSMENT — PATIENT HEALTH QUESTIONNAIRE - PHQ9: CLINICAL INTERPRETATION OF PHQ2 SCORE: 0

## 2025-07-17 ASSESSMENT — FIBROSIS 4 INDEX: FIB4 SCORE: 0.59

## 2025-07-17 NOTE — TELEPHONE ENCOUNTER
Received [facility]  request via [employer]  for carbidopa-levodopa (SINEMET)  MG Tab. (Quantity:270, Day Supply:90)     Insurance: [place] [employer]  Member ID:  [id number]  BIN: [id number]  PCN: [id number]  Group: [id number]     Ran Test claim via [place] [facility] medication Pays for a $75 copay. Will outreach to patient to offer specialty pharmacy services and or release to preferred pharmacy    Will release to pt's preferred pharmacy.

## 2025-07-18 PROCEDURE — 665999 HH PPS REVENUE DEBIT

## 2025-07-18 PROCEDURE — 665998 HH PPS REVENUE CREDIT

## 2025-07-19 PROCEDURE — 665998 HH PPS REVENUE CREDIT

## 2025-07-19 PROCEDURE — 665999 HH PPS REVENUE DEBIT

## 2025-07-20 PROCEDURE — 665999 HH PPS REVENUE DEBIT

## 2025-07-20 PROCEDURE — 665998 HH PPS REVENUE CREDIT

## 2025-07-21 ENCOUNTER — HOME CARE VISIT (OUTPATIENT)
Dept: HOME HEALTH SERVICES | Facility: HOME HEALTHCARE | Age: 60
End: 2025-07-21
Payer: MEDICARE

## 2025-07-21 VITALS
OXYGEN SATURATION: 95 % | SYSTOLIC BLOOD PRESSURE: 110 MMHG | BODY MASS INDEX: 17.03 KG/M2 | DIASTOLIC BLOOD PRESSURE: 60 MMHG | TEMPERATURE: 98.5 F | WEIGHT: 112 LBS | HEART RATE: 84 BPM | RESPIRATION RATE: 16 BRPM

## 2025-07-21 PROCEDURE — 665999 HH PPS REVENUE DEBIT

## 2025-07-21 PROCEDURE — 665998 HH PPS REVENUE CREDIT

## 2025-07-21 PROCEDURE — G0151 HHCP-SERV OF PT,EA 15 MIN: HCPCS

## 2025-07-21 ASSESSMENT — FIBROSIS 4 INDEX: FIB4 SCORE: 0.59

## 2025-07-21 ASSESSMENT — PATIENT HEALTH QUESTIONNAIRE - PHQ9: CLINICAL INTERPRETATION OF PHQ2 SCORE: 0

## 2025-07-21 ASSESSMENT — ACTIVITIES OF DAILY LIVING (ADL): OASIS_M1830: 03

## 2025-07-21 ASSESSMENT — ENCOUNTER SYMPTOMS: DENIES PAIN: 1

## 2025-07-22 ENCOUNTER — HOME CARE VISIT (OUTPATIENT)
Dept: HOME HEALTH SERVICES | Facility: HOME HEALTHCARE | Age: 60
End: 2025-07-22
Payer: MEDICARE

## 2025-07-22 VITALS
TEMPERATURE: 98.4 F | BODY MASS INDEX: 17.2 KG/M2 | DIASTOLIC BLOOD PRESSURE: 68 MMHG | SYSTOLIC BLOOD PRESSURE: 108 MMHG | OXYGEN SATURATION: 98 % | HEART RATE: 95 BPM | WEIGHT: 113.13 LBS | RESPIRATION RATE: 16 BRPM

## 2025-07-22 PROCEDURE — 665999 HH PPS REVENUE DEBIT

## 2025-07-22 PROCEDURE — G0152 HHCP-SERV OF OT,EA 15 MIN: HCPCS

## 2025-07-22 PROCEDURE — 665998 HH PPS REVENUE CREDIT

## 2025-07-22 PROCEDURE — G0299 HHS/HOSPICE OF RN EA 15 MIN: HCPCS

## 2025-07-22 ASSESSMENT — ENCOUNTER SYMPTOMS
DENIES PAIN: 1
DIFFICULTY THINKING: 1

## 2025-07-22 ASSESSMENT — FIBROSIS 4 INDEX: FIB4 SCORE: 0.59

## 2025-07-23 ENCOUNTER — HOME CARE VISIT (OUTPATIENT)
Dept: HOME HEALTH SERVICES | Facility: HOME HEALTHCARE | Age: 60
End: 2025-07-23
Payer: MEDICARE

## 2025-07-23 VITALS
SYSTOLIC BLOOD PRESSURE: 108 MMHG | HEART RATE: 85 BPM | DIASTOLIC BLOOD PRESSURE: 68 MMHG | OXYGEN SATURATION: 98 % | TEMPERATURE: 98.4 F | RESPIRATION RATE: 16 BRPM

## 2025-07-23 PROCEDURE — 665998 HH PPS REVENUE CREDIT

## 2025-07-23 PROCEDURE — 665999 HH PPS REVENUE DEBIT

## 2025-07-23 ASSESSMENT — ENCOUNTER SYMPTOMS
PAIN LOCATION - PAIN SEVERITY: 3/10
BOWEL PATTERN NORMAL: 1
PAIN LOCATION - EXACERBATING FACTORS: NO
PAIN LOCATION - PAIN FREQUENCY: INTERMITTENT
HIGHEST PAIN SEVERITY IN PAST 24 HOURS: 3/10
SUBJECTIVE PAIN PROGRESSION: GRADUALLY IMPROVING
STOOL FREQUENCY: LESS THAN DAILY
PAIN: 1
PAIN LOCATION: STOMACH
LOWEST PAIN SEVERITY IN PAST 24 HOURS: 0/10
NAUSEA: NO
VOMITING: NO
LAST BOWEL MOVEMENT: 67408
PAIN SEVERITY GOAL: 0/10
MUSCLE WEAKNESS: 1

## 2025-07-23 ASSESSMENT — ACTIVITIES OF DAILY LIVING (ADL)
CURRENT_FUNCTION: STAND BY ASSIST
AMBULATION ASSISTANCE: STAND BY ASSIST

## 2025-07-24 ENCOUNTER — HOME CARE VISIT (OUTPATIENT)
Dept: HOME HEALTH SERVICES | Facility: HOME HEALTHCARE | Age: 60
End: 2025-07-24
Payer: MEDICARE

## 2025-07-24 VITALS
RESPIRATION RATE: 16 BRPM | OXYGEN SATURATION: 97 % | SYSTOLIC BLOOD PRESSURE: 102 MMHG | WEIGHT: 112 LBS | BODY MASS INDEX: 17.03 KG/M2 | HEART RATE: 90 BPM | TEMPERATURE: 98.3 F | DIASTOLIC BLOOD PRESSURE: 62 MMHG

## 2025-07-24 PROCEDURE — 665998 HH PPS REVENUE CREDIT

## 2025-07-24 PROCEDURE — G0152 HHCP-SERV OF OT,EA 15 MIN: HCPCS

## 2025-07-24 PROCEDURE — 665999 HH PPS REVENUE DEBIT

## 2025-07-24 ASSESSMENT — ENCOUNTER SYMPTOMS
PAIN LOCATION - PAIN QUALITY: ACHE
PAIN LOCATION: LOWER ABDOMEN
PAIN: 1
PAIN LOCATION - PAIN DURATION: MINUTES
PAIN LOCATION - RELIEVING FACTORS: REST
SUBJECTIVE PAIN PROGRESSION: UNCHANGED
DIFFICULTY THINKING: 1
PAIN LOCATION - PAIN SEVERITY: 3/10
HIGHEST PAIN SEVERITY IN PAST 24 HOURS: 3/10
LOWEST PAIN SEVERITY IN PAST 24 HOURS: 0/10
PAIN SEVERITY GOAL: 0/10
PAIN LOCATION - PAIN FREQUENCY: INTERMITTENT

## 2025-07-24 ASSESSMENT — FIBROSIS 4 INDEX: FIB4 SCORE: 0.59

## 2025-07-25 PROCEDURE — 665998 HH PPS REVENUE CREDIT

## 2025-07-25 PROCEDURE — 665999 HH PPS REVENUE DEBIT

## 2025-07-26 PROCEDURE — 665999 HH PPS REVENUE DEBIT

## 2025-07-26 PROCEDURE — 665998 HH PPS REVENUE CREDIT

## 2025-07-27 PROCEDURE — 665999 HH PPS REVENUE DEBIT

## 2025-07-27 PROCEDURE — 665998 HH PPS REVENUE CREDIT

## 2025-07-28 ENCOUNTER — HOME CARE VISIT (OUTPATIENT)
Dept: HOME HEALTH SERVICES | Facility: HOME HEALTHCARE | Age: 60
End: 2025-07-28
Payer: MEDICARE

## 2025-07-28 VITALS
RESPIRATION RATE: 16 BRPM | WEIGHT: 111 LBS | SYSTOLIC BLOOD PRESSURE: 130 MMHG | OXYGEN SATURATION: 99 % | BODY MASS INDEX: 16.88 KG/M2 | TEMPERATURE: 98.1 F | HEART RATE: 81 BPM | DIASTOLIC BLOOD PRESSURE: 80 MMHG

## 2025-07-28 VITALS
SYSTOLIC BLOOD PRESSURE: 120 MMHG | RESPIRATION RATE: 16 BRPM | HEART RATE: 89 BPM | TEMPERATURE: 99.2 F | BODY MASS INDEX: 16.88 KG/M2 | WEIGHT: 111 LBS | DIASTOLIC BLOOD PRESSURE: 60 MMHG | OXYGEN SATURATION: 89 %

## 2025-07-28 PROCEDURE — 665999 HH PPS REVENUE DEBIT

## 2025-07-28 PROCEDURE — G0152 HHCP-SERV OF OT,EA 15 MIN: HCPCS

## 2025-07-28 PROCEDURE — 665998 HH PPS REVENUE CREDIT

## 2025-07-28 PROCEDURE — G0151 HHCP-SERV OF PT,EA 15 MIN: HCPCS

## 2025-07-28 ASSESSMENT — ENCOUNTER SYMPTOMS
DENIES PAIN: 1
DENIES PAIN: 1

## 2025-07-28 ASSESSMENT — ACTIVITIES OF DAILY LIVING (ADL)
TRANSPORTATION ASSESSED: 1
USING THE TELPHONE: INDEPENDENT
HOUSEKEEPING ASSESSED: 1
BATHING EQUIPMENT USED: SHOWER BENCH
PREPARING MEALS: INDEPENDENT
CURRENT_FUNCTION: INDEPENDENT
SHOPPING: NEEDS ASSISTANCE
TRANSPORTATION: NEEDS ASSISTANCE
DRESSING_UB_CURRENT_FUNCTION: INDEPENDENT
GROOMING ASSESSED: 1
LAUNDRY: INDEPENDENT
TELEPHONE USE ASSESSED: 1
AMBULATION ASSISTANCE: INDEPENDENT
GROOMING_CURRENT_FUNCTION: INDEPENDENT
CURRENT_FUNCTION: INDEPENDENT
LAUNDRY ASSESSED: 1
PHYSICAL TRANSFERS ASSESSED: 1
SHOPPING ASSESSED: 1
AMBULATION ASSISTANCE: 1
LIGHT HOUSEKEEPING: NEEDS ASSISTANCE
DRESSING_LB_CURRENT_FUNCTION: INDEPENDENT
ORAL_CARE_ASSESSED: 1
BATHING_CURRENT_FUNCTION: INDEPENDENT
AMBULATION ASSISTANCE: 1
FEEDING: INDEPENDENT
TOILETING: 1
AMBULATION ASSISTANCE: INDEPENDENT
ORAL_CARE_CURRENT_FUNCTION: INDEPENDENT
PHYSICAL TRANSFERS ASSESSED: 1
BATHING ASSESSED: 1
FEEDING ASSESSED: 1
TOILETING: INDEPENDENT

## 2025-07-28 ASSESSMENT — FIBROSIS 4 INDEX
FIB4 SCORE: 0.59
FIB4 SCORE: 0.59

## 2025-07-29 ENCOUNTER — HOME CARE VISIT (OUTPATIENT)
Dept: HOME HEALTH SERVICES | Facility: HOME HEALTHCARE | Age: 60
End: 2025-07-29
Payer: MEDICARE

## 2025-07-29 PROCEDURE — 665999 HH PPS REVENUE DEBIT

## 2025-07-29 PROCEDURE — 665998 HH PPS REVENUE CREDIT

## 2025-07-30 PROCEDURE — 665998 HH PPS REVENUE CREDIT

## 2025-07-30 PROCEDURE — 665999 HH PPS REVENUE DEBIT

## 2025-08-05 ENCOUNTER — TELEPHONE (OUTPATIENT)
Dept: MEDICAL GROUP | Facility: CLINIC | Age: 60
End: 2025-08-05
Payer: MEDICARE

## 2025-08-05 ENCOUNTER — HOME CARE VISIT (OUTPATIENT)
Dept: HOME HEALTH SERVICES | Facility: HOME HEALTHCARE | Age: 60
End: 2025-08-05
Payer: MEDICARE

## 2025-08-05 PROCEDURE — G0299 HHS/HOSPICE OF RN EA 15 MIN: HCPCS

## 2025-08-05 SDOH — ECONOMIC STABILITY: FOOD INSECURITY: MEALS PER DAY: 3

## 2025-08-05 ASSESSMENT — ENCOUNTER SYMPTOMS
STOOL FREQUENCY: LESS THAN DAILY
DENIES PAIN: 1
LAST BOWEL MOVEMENT: 67422
NAUSEA: NO
BOWEL PATTERN NORMAL: 1
APPETITE LEVEL: GOOD
VOMITING: NO

## 2025-08-05 ASSESSMENT — ACTIVITIES OF DAILY LIVING (ADL): OASIS_M1830: 00

## 2025-08-05 ASSESSMENT — FIBROSIS 4 INDEX: FIB4 SCORE: 0.59

## 2025-08-06 ENCOUNTER — TELEPHONE (OUTPATIENT)
Dept: MEDICAL GROUP | Facility: CLINIC | Age: 60
End: 2025-08-06
Payer: MEDICARE

## 2025-08-07 VITALS
BODY MASS INDEX: 17.18 KG/M2 | OXYGEN SATURATION: 97 % | RESPIRATION RATE: 16 BRPM | HEART RATE: 78 BPM | SYSTOLIC BLOOD PRESSURE: 118 MMHG | TEMPERATURE: 97.7 F | DIASTOLIC BLOOD PRESSURE: 64 MMHG | WEIGHT: 113 LBS

## 2025-08-07 ASSESSMENT — ACTIVITIES OF DAILY LIVING (ADL): HOME_HEALTH_OASIS: 00

## 2025-08-07 ASSESSMENT — ENCOUNTER SYMPTOMS: CHANGE IN APPETITE: UNCHANGED

## (undated) DEVICE — GLOVE SZ 6 BIOGEL PI MICRO - PF LF (50PR/BX 4BX/CA)

## (undated) DEVICE — PROTECTOR ULNA NERVE - (36PR/CA)

## (undated) DEVICE — MASK PANORAMIC OXYGEN PRO2 (30EA/CA)

## (undated) DEVICE — GLOVE BIOGEL SZ 6.5 SURGICAL PF LTX (50PR/BX 4BX/CA)

## (undated) DEVICE — ELECTRODE 850 FOAM ADHESIVE - HYDROGEL RADIOTRNSPRNT (50/PK)

## (undated) DEVICE — CLIP HEM-O-LOC GREEN - (14EA/BX)

## (undated) DEVICE — TROCAR Z THREAD12MM OPTICAL - NON BLADED (6/BX)

## (undated) DEVICE — TUBING CLEARLINK DUO-VENT - C-FLO (48EA/CA)

## (undated) DEVICE — DRAPE ARM BOX OF 20

## (undated) DEVICE — Device

## (undated) DEVICE — TOWELS CLOTH SURGICAL - (4/PK 20PK/CA)

## (undated) DEVICE — SLEEVE VASO DVT COMPRESSION CALF MED - (10PR/CA)

## (undated) DEVICE — SYRINGE 20 ML LS (48/BX 4BX/CA)

## (undated) DEVICE — SYSTEM CLEARIFY VISUALIZATION (10EA/PK)

## (undated) DEVICE — SUTURE 2-0 STRATAFIX SPIRAL PDS SH (12EA/BX)

## (undated) DEVICE — TUBE CONNECT SUCTION CLEAR 120 X 1/4" (50EA/CA)"

## (undated) DEVICE — BAG RETRIEVAL 10ML (10EA/BX)

## (undated) DEVICE — ELECTRODE DUAL RETURN W/ CORD - (50/PK)

## (undated) DEVICE — CONTAINER, SPECIMEN, STERILE

## (undated) DEVICE — STAPLER SKIN DISP - (6/BX 10BX/CA) VISISTAT

## (undated) DEVICE — SYRINGE 50 ML LL (40EA/BX 4BX/CA)

## (undated) DEVICE — OBTURATOR BLADELESS STANDARD 8MM (6EA/BX)

## (undated) DEVICE — CANISTER SUCTION 3000ML MECHANICAL FILTER AUTO SHUTOFF MEDI-VAC NONSTERILE LF DISP (40EA/CA)

## (undated) DEVICE — KIT CUSTOM PROCEDURE SINGLE FOR ENDO (15/CA)

## (undated) DEVICE — GOWN SURGEONS X-LARGE - DISP. (30/CA)

## (undated) DEVICE — KIT ANESTHESIA W/CIRCUIT & 3/LT BAG W/FILTER (20EA/CA)

## (undated) DEVICE — ROBOTIC SURGERY SERVICES

## (undated) DEVICE — SUCTION INSTRUMENT YANKAUER BULBOUS TIP W/O VENT (50EA/CA)

## (undated) DEVICE — GLOVE BIOGEL SZ 7.5 SURGICAL PF LTX - (50PR/BX 4BX/CA)

## (undated) DEVICE — GOWN WARMING STANDARD FLEX - (30/CA)

## (undated) DEVICE — SHEARS MONOPOLAR CURVED DA VINCI 10X'S REUSABLE

## (undated) DEVICE — SET LEADWIRE 5 LEAD BEDSIDE DISPOSABLE ECG (1SET OF 5/EA)

## (undated) DEVICE — SLEEVE, VASO, THIGH, MED

## (undated) DEVICE — WATER IRRIG. STER 3000 ML - (4/CA)

## (undated) DEVICE — SODIUM CHL IRRIGATION 0.9% 1000ML (12EA/CA)

## (undated) DEVICE — TUBE E-T HI-LO CUFF 7.5MM (10EA/PK)

## (undated) DEVICE — LACTATED RINGERS INJ 1000 ML - (14EA/CA 60CA/PF)

## (undated) DEVICE — SENSOR SPO2 NEO LNCS ADHESIVE (20/BX) SEE USER NOTES

## (undated) DEVICE — SENSOR OXIMETER ADULT SPO2 RD SET (20EA/BX)

## (undated) DEVICE — NEEDLE INSFL 120MM 14GA VRRS - (20/BX)

## (undated) DEVICE — KIT ENDOSCOPIC LIGHT SIGMOIDOSCOPE WITH OBTURATOR SUCTION INSTRUMENT AND TUBING SET 8FR (10EA/CA)

## (undated) DEVICE — SUTURE 3-0 VICRYL PLUS RB-1 - (36/BX)

## (undated) DEVICE — COVER TIP ENDOWRIST HOT SHEAR - (10EA/BX) DA VINCI

## (undated) DEVICE — GLOVE BIOGEL PI INDICATOR SZ 8.0 SURGICAL PF LF -(50/BX 4BX/CA)

## (undated) DEVICE — CANISTER SUCTION RIGID RED 1500CC (40EA/CA)

## (undated) DEVICE — CATHETER URETHRAL FOLEY SILICONE OD18 FR 10 ML (10EA/CA)

## (undated) DEVICE — DRAPE COLUMN BOX OF 20

## (undated) DEVICE — SYRINGE 50ML CATHETER TIP (40EA/BX 4BX/CA)

## (undated) DEVICE — COVER LIGHT HANDLE ALC PLUS DISP (18EA/BX)

## (undated) DEVICE — SCISSORS 5MM CVD (6EA/BX)

## (undated) DEVICE — SYRINGE NON SAFETY TB 1 CC 27 GA X 1/2 IN (100/BX 8BX/CA)

## (undated) DEVICE — PACK DAVINCI PROSTATECTOMY - (1EA/CA)

## (undated) DEVICE — FILM CASSETTE ENDO

## (undated) DEVICE — BLADE SURGICAL CLIPPER - (50EA/CA)

## (undated) DEVICE — SUTURE 0 VICRYL PLUS CT-2 - 27 INCH (36/BX)

## (undated) DEVICE — WIRE GUIDE SENSOR DUAL FLEX - 5/BX

## (undated) DEVICE — SUTURE 4-0 VICRYL PLUSFS-1 - 27 INCH (36/BX)

## (undated) DEVICE — SYRINGE SAFETY 10 ML 18 GA X 1 1/2 BLUNT LL (100/BX 4BX/CA)

## (undated) DEVICE — NEPTUNE 4 PORT MANIFOLD - (20/PK)

## (undated) DEVICE — GLOVE LITE HANDLE DISP. - (1/PK 80PK/CS)

## (undated) DEVICE — SET EXTENSION WITH 2 PORTS (48EA/CA) ***PART #2C8610 IS A SUBSTITUTE*****

## (undated) DEVICE — GLOVE BIOGEL PI INDICATOR SZ 7.0 SURGICAL PF LF - (50/BX 4BX/CA)

## (undated) DEVICE — GLOVE SZ 7.5 BIOGEL PI MICRO - PF LF (50PR/BX)

## (undated) DEVICE — CHLORAPREP 26 ML APPLICATOR - ORANGE TINT(25/CA)

## (undated) DEVICE — SEALANT TISSUE CLOSURE KIT FIBIRIN VISTASEAL 10ML (1EA/BX)

## (undated) DEVICE — SUTURE GENERAL

## (undated) DEVICE — SEAL UNIVERSAL 5MM-12MM (10EA/BX)

## (undated) DEVICE — FORCEP RADIAL JAW 4 STANDARD CAPACITY W/NEEDLE 240CM (40EA/BX)

## (undated) DEVICE — BAG DRAINAGE URINARY CLOSED 2000ML (20EA/CA)

## (undated) DEVICE — GLOVE SZ 6.5 BIOGEL PI MICRO - PF LF (50PR/BX)

## (undated) DEVICE — SUTURE 2-0 30CM STRATAFIX SPIRAL PDO ***WAS PART #SXPD1B401 *****

## (undated) DEVICE — SUTURE 4-0 MONOCRYL PLUS PS-2 - 27 INCH (36/BX)

## (undated) DEVICE — WATER IRRIGATION STERILE 1000ML (12EA/CA)

## (undated) DEVICE — GLOVE BIOGEL PI INDICATOR SZ 6.5 SURGICAL PF LF - (50/BX 4BX/CA)

## (undated) DEVICE — BLOCK BITE MAXI DENTAL RETENTION RIM (100EA/BX)

## (undated) DEVICE — AIRLESS LAP SPRAY TIP VISTASEAL (3EA/BX)

## (undated) DEVICE — DRAPE LARGE 3 QUARTER - (20/CA)

## (undated) DEVICE — DRAPE UNDER BUTTOCKS FLUID - (20/CA)

## (undated) DEVICE — BUTTON ENDOSCOPY DISPOSABLE

## (undated) DEVICE — LEGGING LITHOTOMY 31 X 48 IN - (2EA/PK 20PK/CA)

## (undated) DEVICE — SUTURE 0 VICRYL PLUS UR-6 - 27 INCH (36/BX)

## (undated) DEVICE — CLIP HEMOLOCK PURPLE - (14/BX)

## (undated) DEVICE — GLOVE BIOGEL INDICATOR SZ 8 SURGICAL PF LTX - (50/BX 4BX/CA)

## (undated) DEVICE — TROCAR 5X100 NON BLADED Z-TH - READ KII (6/BX)

## (undated) DEVICE — WATER IRRIG. STER. 1500 ML - (9/CA)

## (undated) DEVICE — TROCAR Z THREAD BLADED 12 X 150 (6/BX)

## (undated) DEVICE — SUTURE 2-0 24CM X 24CM STRATAFIX SPRAL PDO VIO MH NEEDLE (12/BX)

## (undated) DEVICE — FORCEPS FENESTRATED BIPOLAR (14UN/EA)

## (undated) DEVICE — SCISSOR MONOPLR CRV(HOT SHEAR - DA VINCI 10X'S REUSABLE

## (undated) DEVICE — HEMOSTAT SURG ABSORBABLE - 4 X 8 IN SURGICEL (24EA/CA)

## (undated) DEVICE — ABSORBABLE TAPER POINT GREEN CLOSURE DEVICE V-LOC 180 3-0 CV-23 6IN (12EA/CA)

## (undated) DEVICE — CATHETER URETHRAL FOLEY SILICONE OD20 FR 10 ML (10EA/CA)

## (undated) DEVICE — GLOVE BIOGEL INDICATOR SZ 8.5 SURGICAL PF LTX - (50/BX 4BX/CA)

## (undated) DEVICE — FORCEP BIPOLAR FENESTRATED - DA VINCI 10X'S REUSABLE

## (undated) DEVICE — PORT AUXILLARY WATER (50EA/BX)

## (undated) DEVICE — OBTURATOR 8MM BLADELESS - (24EA/BX) DA VINCI

## (undated) DEVICE — CONTAINER SPECIMEN BAG OR - STERILE 4 OZ W/LID (100EA/CA)

## (undated) DEVICE — GLOVE BIOGEL SZ 8.5 SURGICAL PF LTX - (50PR/BX 4BX/CA)

## (undated) DEVICE — GLOVE BIOGEL SZ 8 SURGICAL PF LTX - (50PR/BX 4BX/CA)

## (undated) DEVICE — BLADE SURGICAL #15 - (50/BX 3BX/CA)

## (undated) DEVICE — SUTURE 2-0 ETHILON FS - (36/BX) 18 INCH

## (undated) DEVICE — SUTURE 0 COATED VICRYL 6-18IN - (12PK/BX)

## (undated) DEVICE — SUTURE 2-0 VICRYL PLUS SH - 27 INCH (36/BX)

## (undated) DEVICE — SYRINGE STERILE 10 ML LL (200/BX)

## (undated) DEVICE — MASK ANESTHESIA ADULT  - (100/CA)

## (undated) DEVICE — GLOVE BIOGEL PI ORTHO SZ 6 1/2 SURGICAL PF LF (40PR/BX)

## (undated) DEVICE — TOWEL STOP TIMEOUT SAFETY FLAG (40EA/CA)

## (undated) DEVICE — HEAD HOLDER JUNIOR/ADULT

## (undated) DEVICE — KIT ROOM DECONTAMINATION

## (undated) DEVICE — SPONGE GAUZESTER 4 X 4 4PLY - (128PK/CA)

## (undated) DEVICE — CANISTER SUCTION 3000ML MECHANICAL FILTER AUTO SHUTOFF MEDI-VAC NONSTERILE LF DISP  (40EA/CA)

## (undated) DEVICE — CONNECTOR HOSE NEPTUNE FOR CYSTO ROOM

## (undated) DEVICE — DRAPE 3 ARM DA VANCI SI - (5EA/CA)

## (undated) DEVICE — TUBE CONNECTING SUCTION - CLEAR PLASTIC STERILE 72 IN (50EA/CA)

## (undated) DEVICE — GLOVE BIOGEL SZ 6 PF LATEX - (50EA/BX 4BX/CA)

## (undated) DEVICE — CANNULA SEAL 8.5-13MM (10/BX)